# Patient Record
Sex: FEMALE | Race: WHITE | NOT HISPANIC OR LATINO
[De-identification: names, ages, dates, MRNs, and addresses within clinical notes are randomized per-mention and may not be internally consistent; named-entity substitution may affect disease eponyms.]

---

## 2017-01-17 ENCOUNTER — APPOINTMENT (OUTPATIENT)
Dept: PULMONOLOGY | Facility: CLINIC | Age: 62
End: 2017-01-17

## 2017-01-17 VITALS
DIASTOLIC BLOOD PRESSURE: 60 MMHG | HEIGHT: 64 IN | BODY MASS INDEX: 22.88 KG/M2 | WEIGHT: 134 LBS | OXYGEN SATURATION: 100 % | RESPIRATION RATE: 17 BRPM | SYSTOLIC BLOOD PRESSURE: 90 MMHG | HEART RATE: 66 BPM

## 2017-06-25 ENCOUNTER — FORM ENCOUNTER (OUTPATIENT)
Age: 62
End: 2017-06-25

## 2017-06-26 ENCOUNTER — OUTPATIENT (OUTPATIENT)
Dept: OUTPATIENT SERVICES | Facility: HOSPITAL | Age: 62
LOS: 1 days | End: 2017-06-26
Payer: COMMERCIAL

## 2017-06-26 ENCOUNTER — APPOINTMENT (OUTPATIENT)
Dept: CT IMAGING | Facility: IMAGING CENTER | Age: 62
End: 2017-06-26

## 2017-06-26 DIAGNOSIS — D24.9 BENIGN NEOPLASM OF UNSPECIFIED BREAST: Chronic | ICD-10-CM

## 2017-06-26 DIAGNOSIS — R93.8 ABNORMAL FINDINGS ON DIAGNOSTIC IMAGING OF OTHER SPECIFIED BODY STRUCTURES: ICD-10-CM

## 2017-06-26 PROCEDURE — 71250 CT THORAX DX C-: CPT

## 2017-06-28 ENCOUNTER — APPOINTMENT (OUTPATIENT)
Dept: THORACIC SURGERY | Facility: CLINIC | Age: 62
End: 2017-06-28

## 2017-06-28 VITALS
BODY MASS INDEX: 22.36 KG/M2 | DIASTOLIC BLOOD PRESSURE: 80 MMHG | HEIGHT: 64 IN | SYSTOLIC BLOOD PRESSURE: 110 MMHG | RESPIRATION RATE: 16 BRPM | OXYGEN SATURATION: 98 % | HEART RATE: 78 BPM | WEIGHT: 131 LBS

## 2017-06-28 DIAGNOSIS — Z87.81 PERSONAL HISTORY OF (HEALED) TRAUMATIC FRACTURE: ICD-10-CM

## 2017-06-28 DIAGNOSIS — Z87.891 PERSONAL HISTORY OF NICOTINE DEPENDENCE: ICD-10-CM

## 2017-06-28 DIAGNOSIS — Z86.69 PERSONAL HISTORY OF OTHER DISEASES OF THE NERVOUS SYSTEM AND SENSE ORGANS: ICD-10-CM

## 2017-06-28 RX ORDER — LEVOCETIRIZINE DIHYDROCHLORIDE 5 MG/1
5 TABLET, FILM COATED ORAL
Qty: 1 | Refills: 1 | Status: COMPLETED | COMMUNITY
Start: 2017-01-17 | End: 2017-06-28

## 2017-06-28 RX ORDER — ZOLPIDEM TARTRATE 5 MG/1
5 TABLET ORAL
Qty: 30 | Refills: 0 | Status: COMPLETED | COMMUNITY
Start: 2017-05-23

## 2017-06-28 RX ORDER — VALACYCLOVIR 500 MG/1
500 TABLET, FILM COATED ORAL
Qty: 30 | Refills: 0 | Status: COMPLETED | COMMUNITY
Start: 2017-03-16

## 2017-06-28 RX ORDER — ACYCLOVIR 50 MG/G
5 OINTMENT TOPICAL
Qty: 15 | Refills: 0 | Status: COMPLETED | COMMUNITY
Start: 2017-03-16

## 2017-06-28 RX ORDER — ZOLPIDEM TARTRATE 5 MG/1
5 TABLET ORAL
Refills: 0 | Status: ACTIVE | COMMUNITY

## 2017-07-03 ENCOUNTER — OUTPATIENT (OUTPATIENT)
Dept: OUTPATIENT SERVICES | Facility: HOSPITAL | Age: 62
LOS: 1 days | End: 2017-07-03

## 2017-07-03 VITALS
HEIGHT: 64 IN | DIASTOLIC BLOOD PRESSURE: 60 MMHG | WEIGHT: 128.09 LBS | RESPIRATION RATE: 16 BRPM | HEART RATE: 66 BPM | SYSTOLIC BLOOD PRESSURE: 107 MMHG | TEMPERATURE: 97 F | OXYGEN SATURATION: 99 %

## 2017-07-03 DIAGNOSIS — R91.1 SOLITARY PULMONARY NODULE: ICD-10-CM

## 2017-07-03 DIAGNOSIS — G47.30 SLEEP APNEA, UNSPECIFIED: ICD-10-CM

## 2017-07-03 DIAGNOSIS — D24.9 BENIGN NEOPLASM OF UNSPECIFIED BREAST: Chronic | ICD-10-CM

## 2017-07-03 LAB
BLD GP AB SCN SERPL QL: NEGATIVE — SIGNIFICANT CHANGE UP
RH IG SCN BLD-IMP: POSITIVE — SIGNIFICANT CHANGE UP

## 2017-07-03 RX ORDER — SODIUM CHLORIDE 9 MG/ML
1000 INJECTION, SOLUTION INTRAVENOUS
Qty: 0 | Refills: 0 | Status: DISCONTINUED | OUTPATIENT
Start: 2017-07-12 | End: 2017-07-13

## 2017-07-03 RX ORDER — SODIUM CHLORIDE 9 MG/ML
3 INJECTION INTRAMUSCULAR; INTRAVENOUS; SUBCUTANEOUS EVERY 8 HOURS
Qty: 0 | Refills: 0 | Status: DISCONTINUED | OUTPATIENT
Start: 2017-07-12 | End: 2017-07-20

## 2017-07-03 NOTE — H&P PST ADULT - NEGATIVE ENMT SYMPTOMS
no vertigo/no ear pain/no sinus symptoms/no recurrent cold sores/no post-nasal discharge/no hearing difficulty/no nasal discharge/no gum bleeding/no dysphagia/no tinnitus/no nasal congestion/no nasal obstruction/no nose bleeds/no throat pain/no abnormal taste sensation/no dry mouth

## 2017-07-03 NOTE — H&P PST ADULT - PMH
Disorder of shoulder  right  GERD (gastroesophageal reflux disease)    Mild asthma  denies intubation or hospitalizations  Nasal bone fracture    Papilloma of breast  right  Pulmonary nodule  bilateral  Seasonal allergies    Sleep apnea  noncompliant in using CPAP machine CAD (coronary artery disease)  non obstructive 50% proximal LAD stenosis  Disorder of shoulder  right  Eczema    GERD (gastroesophageal reflux disease)    Mild asthma  denies intubation or hospitalizations  Nasal bone fracture    Papilloma of breast  right  Pulmonary nodule  bilateral  PVC (premature ventricular contraction)    Seasonal allergies    Sleep apnea  noncompliant in using CPAP machine

## 2017-07-03 NOTE — H&P PST ADULT - RS GEN PE MLT RESP DETAILS PC
clear to auscultation bilaterally/no chest wall tenderness/good air movement/no rhonchi/no intercostal retractions/respirations non-labored/no wheezes/no rales/breath sounds equal/airway patent

## 2017-07-03 NOTE — H&P PST ADULT - NEGATIVE GENERAL GENITOURINARY SYMPTOMS
no renal colic/no hematuria/no flank pain R/no flank pain L/no bladder infections/no dysuria/no incontinence

## 2017-07-03 NOTE — H&P PST ADULT - HISTORY OF PRESENT ILLNESS
63y/o female s 63y/o female with h/o multiple lung nodule since 2011, gradually got bigger. Presents to PST for pre surgical evaluation for Right Video assisted thoracoscopy left upper lobe wedge resection possible segmentectomy possible left upper lobectomy with lung marking by Dr Chamberlain on 7/12/17. 63y/o female with h/o multiple lung nodule since 2011, gradually got bigger. Presents to PST for pre surgical evaluation for left Video assisted thoracoscopy left upper lobe wedge resection possible segmentectomy possible left upper lobectomy with lung marking by Dr Chamberlain on 7/12/17.

## 2017-07-03 NOTE — H&P PST ADULT - NEGATIVE GENERAL SYMPTOMS
no fatigue/no chills/no sweating/no anorexia/no fever/no weight gain/no polyphagia/no polyuria/no malaise/no weight loss

## 2017-07-03 NOTE — H&P PST ADULT - PRIMARY CARE PROVIDER
Dr. Acevedo pmd [phone], fax [phone] Dr. Acevedo pmd fax  [phone], phone [phone]

## 2017-07-03 NOTE — H&P PST ADULT - NEGATIVE BREAST SYMPTOMS
no breast lump R/no breast lump L/no breast tenderness R/no nipple discharge L/no nipple discharge R/no breast tenderness L

## 2017-07-03 NOTE — H&P PST ADULT - NEGATIVE CARDIOVASCULAR SYMPTOMS
no claudication/no paroxysmal nocturnal dyspnea/no dyspnea on exertion/no orthopnea/no chest pain/no palpitations/no peripheral edema

## 2017-07-03 NOTE — H&P PST ADULT - PROBLEM SELECTOR PLAN 2
pt has h/o documented sleep apnea, noncompliant with CPAP machine use OR booking notified. Instructed to bring the CPAP machine on the day of surgery.

## 2017-07-03 NOTE — H&P PST ADULT - OTHER CARE PROVIDERS
Dr. zhang cardiologist [phone], Dr Dos Santos  (pulmonary ) Dr. zhang cardiologist [phone], Dr Dos Santos  (pulmonary )  Dr. zhang   Dr Dos Santos  (pulmonary ) [phone]

## 2017-07-03 NOTE — H&P PST ADULT - PROBLEM SELECTOR PLAN 1
Scheduled to have Right video assisted thoracoscopy left upper lobe wedge resection possible segmentectomy possible left upper lobectomy with lung marking by dr Chamberlain on 7/12/17. Pre surgical instructions provided. Cardiology clearance in charts lab donne at Dr Dos Santos's office and  EKG done at Dr Blas's office Scheduled to have leftt video assisted thoracoscopy left upper lobe wedge resection possible segmentectomy possible left upper lobectomy with lung marking by dr Chamberlain on 7/12/17. Pre surgical instructions provided. Cardiology clearance in charts lab donne at Dr Dos Santos's office and  EKG done at Dr Blas's office

## 2017-07-03 NOTE — H&P PST ADULT - GASTROINTESTINAL DETAILS
no bruit/no distention/no organomegaly/no rigidity/soft/bowel sounds normal/no rebound tenderness/nontender/no masses palpable/no guarding

## 2017-07-03 NOTE — H&P PST ADULT - ANESTHESIA, PREVIOUS REACTION, PROFILE
nausea/vomiting/severe.   " I felt paralized and felt like I could not breath before going under. " severe.   " I felt paralyzed and felt like I could not breath before going under. "/nausea/vomiting

## 2017-07-03 NOTE — H&P PST ADULT - PSH
Papilloma of breast  right breast excision & bx 2013  S/P adenoidectomy  as a child  S/P arthroscopy of shoulder  right April 2013  S/P blepharoplasty  in 2008

## 2017-07-05 RX ORDER — ALPRAZOLAM 0.25 MG
0.25 TABLET ORAL
Qty: 0 | Refills: 0 | COMMUNITY
Start: 2017-07-05

## 2017-07-06 ENCOUNTER — APPOINTMENT (OUTPATIENT)
Dept: INTERVENTIONAL RADIOLOGY/VASCULAR | Facility: CLINIC | Age: 62
End: 2017-07-06

## 2017-07-06 VITALS
DIASTOLIC BLOOD PRESSURE: 63 MMHG | SYSTOLIC BLOOD PRESSURE: 96 MMHG | OXYGEN SATURATION: 99 % | HEART RATE: 57 BPM | BODY MASS INDEX: 22.2 KG/M2 | HEIGHT: 64 IN | WEIGHT: 130 LBS

## 2017-07-06 DIAGNOSIS — Z83.79 FAMILY HISTORY OF OTHER DISEASES OF THE DIGESTIVE SYSTEM: ICD-10-CM

## 2017-07-11 ENCOUNTER — APPOINTMENT (OUTPATIENT)
Dept: PULMONOLOGY | Facility: CLINIC | Age: 62
End: 2017-07-11

## 2017-07-11 VITALS
OXYGEN SATURATION: 98 % | SYSTOLIC BLOOD PRESSURE: 100 MMHG | DIASTOLIC BLOOD PRESSURE: 70 MMHG | WEIGHT: 130 LBS | BODY MASS INDEX: 22.2 KG/M2 | HEART RATE: 64 BPM | RESPIRATION RATE: 14 BRPM | HEIGHT: 64 IN

## 2017-07-12 ENCOUNTER — RESULT REVIEW (OUTPATIENT)
Age: 62
End: 2017-07-12

## 2017-07-12 ENCOUNTER — INPATIENT (INPATIENT)
Facility: HOSPITAL | Age: 62
LOS: 7 days | Discharge: HOME | End: 2017-07-20
Attending: THORACIC SURGERY (CARDIOTHORACIC VASCULAR SURGERY) | Admitting: THORACIC SURGERY (CARDIOTHORACIC VASCULAR SURGERY)
Payer: COMMERCIAL

## 2017-07-12 ENCOUNTER — APPOINTMENT (OUTPATIENT)
Dept: THORACIC SURGERY | Facility: HOSPITAL | Age: 62
End: 2017-07-12

## 2017-07-12 ENCOUNTER — TRANSCRIPTION ENCOUNTER (OUTPATIENT)
Age: 62
End: 2017-07-12

## 2017-07-12 VITALS
HEIGHT: 64 IN | HEART RATE: 56 BPM | SYSTOLIC BLOOD PRESSURE: 103 MMHG | OXYGEN SATURATION: 100 % | TEMPERATURE: 98 F | WEIGHT: 128.09 LBS | RESPIRATION RATE: 16 BRPM | DIASTOLIC BLOOD PRESSURE: 88 MMHG

## 2017-07-12 DIAGNOSIS — R91.1 SOLITARY PULMONARY NODULE: ICD-10-CM

## 2017-07-12 DIAGNOSIS — D24.9 BENIGN NEOPLASM OF UNSPECIFIED BREAST: Chronic | ICD-10-CM

## 2017-07-12 LAB — RH IG SCN BLD-IMP: POSITIVE — SIGNIFICANT CHANGE UP

## 2017-07-12 PROCEDURE — 99291 CRITICAL CARE FIRST HOUR: CPT

## 2017-07-12 PROCEDURE — 77012 CT SCAN FOR NEEDLE BIOPSY: CPT | Mod: 26

## 2017-07-12 PROCEDURE — 32999 UNLISTED PX LUNGS & PLEURA: CPT

## 2017-07-12 PROCEDURE — 88331 PATH CONSLTJ SURG 1 BLK 1SPC: CPT | Mod: 26

## 2017-07-12 PROCEDURE — 71010: CPT | Mod: 26

## 2017-07-12 PROCEDURE — 88305 TISSUE EXAM BY PATHOLOGIST: CPT | Mod: 26

## 2017-07-12 PROCEDURE — 88309 TISSUE EXAM BY PATHOLOGIST: CPT | Mod: 26

## 2017-07-12 RX ORDER — ACETAMINOPHEN 500 MG
1000 TABLET ORAL ONCE
Qty: 0 | Refills: 0 | Status: COMPLETED | OUTPATIENT
Start: 2017-07-12 | End: 2017-07-12

## 2017-07-12 RX ORDER — ONDANSETRON 8 MG/1
4 TABLET, FILM COATED ORAL ONCE
Qty: 0 | Refills: 0 | Status: COMPLETED | OUTPATIENT
Start: 2017-07-12 | End: 2017-07-12

## 2017-07-12 RX ORDER — SODIUM CHLORIDE 9 MG/ML
1000 INJECTION, SOLUTION INTRAVENOUS
Qty: 0 | Refills: 0 | Status: DISCONTINUED | OUTPATIENT
Start: 2017-07-12 | End: 2017-07-13

## 2017-07-12 RX ORDER — SENNA PLUS 8.6 MG/1
2 TABLET ORAL AT BEDTIME
Qty: 0 | Refills: 0 | Status: DISCONTINUED | OUTPATIENT
Start: 2017-07-12 | End: 2017-07-14

## 2017-07-12 RX ORDER — HYDROMORPHONE HYDROCHLORIDE 2 MG/ML
0.5 INJECTION INTRAMUSCULAR; INTRAVENOUS; SUBCUTANEOUS
Qty: 0 | Refills: 0 | Status: DISCONTINUED | OUTPATIENT
Start: 2017-07-12 | End: 2017-07-13

## 2017-07-12 RX ORDER — IPRATROPIUM BROMIDE 0.2 MG/ML
500 SOLUTION, NON-ORAL INHALATION EVERY 6 HOURS
Qty: 0 | Refills: 0 | Status: DISCONTINUED | OUTPATIENT
Start: 2017-07-12 | End: 2017-07-20

## 2017-07-12 RX ORDER — ONDANSETRON 8 MG/1
4 TABLET, FILM COATED ORAL EVERY 6 HOURS
Qty: 0 | Refills: 0 | Status: DISCONTINUED | OUTPATIENT
Start: 2017-07-12 | End: 2017-07-13

## 2017-07-12 RX ORDER — HYDROMORPHONE HYDROCHLORIDE 2 MG/ML
30 INJECTION INTRAMUSCULAR; INTRAVENOUS; SUBCUTANEOUS
Qty: 0 | Refills: 0 | Status: DISCONTINUED | OUTPATIENT
Start: 2017-07-12 | End: 2017-07-13

## 2017-07-12 RX ORDER — DOCUSATE SODIUM 100 MG
100 CAPSULE ORAL THREE TIMES A DAY
Qty: 0 | Refills: 0 | Status: DISCONTINUED | OUTPATIENT
Start: 2017-07-12 | End: 2017-07-20

## 2017-07-12 RX ORDER — MONTELUKAST 4 MG/1
10 TABLET, CHEWABLE ORAL DAILY
Qty: 0 | Refills: 0 | Status: DISCONTINUED | OUTPATIENT
Start: 2017-07-13 | End: 2017-07-20

## 2017-07-12 RX ORDER — HEPARIN SODIUM 5000 [USP'U]/ML
5000 INJECTION INTRAVENOUS; SUBCUTANEOUS ONCE
Qty: 0 | Refills: 0 | Status: COMPLETED | OUTPATIENT
Start: 2017-07-12 | End: 2017-07-12

## 2017-07-12 RX ORDER — APREPITANT 80 MG/1
40 CAPSULE ORAL EVERY 12 HOURS
Qty: 0 | Refills: 0 | Status: DISCONTINUED | OUTPATIENT
Start: 2017-07-12 | End: 2017-07-20

## 2017-07-12 RX ORDER — SIMVASTATIN 20 MG/1
40 TABLET, FILM COATED ORAL AT BEDTIME
Qty: 0 | Refills: 0 | Status: DISCONTINUED | OUTPATIENT
Start: 2017-07-12 | End: 2017-07-20

## 2017-07-12 RX ORDER — HEPARIN SODIUM 5000 [USP'U]/ML
5000 INJECTION INTRAVENOUS; SUBCUTANEOUS EVERY 12 HOURS
Qty: 0 | Refills: 0 | Status: DISCONTINUED | OUTPATIENT
Start: 2017-07-12 | End: 2017-07-14

## 2017-07-12 RX ORDER — METOCLOPRAMIDE HCL 10 MG
10 TABLET ORAL EVERY 8 HOURS
Qty: 0 | Refills: 0 | Status: DISCONTINUED | OUTPATIENT
Start: 2017-07-12 | End: 2017-07-20

## 2017-07-12 RX ORDER — LIDOCAINE 4 G/100G
1 CREAM TOPICAL ONCE
Qty: 0 | Refills: 0 | Status: COMPLETED | OUTPATIENT
Start: 2017-07-12 | End: 2017-07-12

## 2017-07-12 RX ORDER — NALOXONE HYDROCHLORIDE 4 MG/.1ML
0.1 SPRAY NASAL
Qty: 0 | Refills: 0 | Status: DISCONTINUED | OUTPATIENT
Start: 2017-07-12 | End: 2017-07-13

## 2017-07-12 RX ADMIN — ONDANSETRON 4 MILLIGRAM(S): 8 TABLET, FILM COATED ORAL at 19:00

## 2017-07-12 RX ADMIN — HEPARIN SODIUM 5000 UNIT(S): 5000 INJECTION INTRAVENOUS; SUBCUTANEOUS at 21:29

## 2017-07-12 RX ADMIN — APREPITANT 40 MILLIGRAM(S): 80 CAPSULE ORAL at 23:44

## 2017-07-12 RX ADMIN — LIDOCAINE 1 PATCH: 4 CREAM TOPICAL at 23:44

## 2017-07-12 RX ADMIN — SENNA PLUS 2 TABLET(S): 8.6 TABLET ORAL at 21:29

## 2017-07-12 RX ADMIN — SODIUM CHLORIDE 30 MILLILITER(S): 9 INJECTION, SOLUTION INTRAVENOUS at 23:44

## 2017-07-12 RX ADMIN — HYDROMORPHONE HYDROCHLORIDE 30 MILLILITER(S): 2 INJECTION INTRAMUSCULAR; INTRAVENOUS; SUBCUTANEOUS at 19:45

## 2017-07-12 RX ADMIN — HEPARIN SODIUM 5000 UNIT(S): 5000 INJECTION INTRAVENOUS; SUBCUTANEOUS at 14:01

## 2017-07-12 RX ADMIN — HYDROMORPHONE HYDROCHLORIDE 0.5 MILLIGRAM(S): 2 INJECTION INTRAMUSCULAR; INTRAVENOUS; SUBCUTANEOUS at 20:45

## 2017-07-12 RX ADMIN — SIMVASTATIN 40 MILLIGRAM(S): 20 TABLET, FILM COATED ORAL at 21:29

## 2017-07-12 RX ADMIN — Medication 400 MILLIGRAM(S): at 23:45

## 2017-07-12 RX ADMIN — ONDANSETRON 4 MILLIGRAM(S): 8 TABLET, FILM COATED ORAL at 18:35

## 2017-07-12 RX ADMIN — Medication 100 MILLIGRAM(S): at 21:29

## 2017-07-12 RX ADMIN — Medication 10 MILLIGRAM(S): at 20:31

## 2017-07-12 RX ADMIN — SODIUM CHLORIDE 3 MILLILITER(S): 9 INJECTION INTRAMUSCULAR; INTRAVENOUS; SUBCUTANEOUS at 21:32

## 2017-07-12 NOTE — ASU PATIENT PROFILE, ADULT - PMH
CAD (coronary artery disease)  non obstructive 50% proximal LAD stenosis  Disorder of shoulder  right  Eczema    GERD (gastroesophageal reflux disease)    Mild asthma  denies intubation or hospitalizations  Nasal bone fracture    Papilloma of breast  right  Pulmonary nodule  bilateral  PVC (premature ventricular contraction)    Seasonal allergies    Sleep apnea  noncompliant in using CPAP machine

## 2017-07-12 NOTE — PROGRESS NOTE ADULT - SUBJECTIVE AND OBJECTIVE BOX
LOUISA MILLAN            [id number]         codeine (Angioedema)  codeine (Angioedema; Hives)           Daily Height in cm: 162.56 (2017 10:24)    Daily     HPI:  62 F w CAD, R shoulder pain, Eczema, GERD, asthma, Nasal bone fracture, R breast papilloma, Pulmonary nodule bilateral, KIRA (BiPAP),  POD # 0 ([id number]) VATS, robot-assisted t PHYLLIS segmentectomy  Issues: PO SOB & pain    Past Medical History:  CAD (coronary artery disease)  non obstructive 50% proximal LAD stenosis  Disorder of shoulder  right  Eczema    GERD (gastroesophageal reflux disease)    Mild asthma  denies intubation or hospitalizations  Nasal bone fracture    Papilloma of breast  right  Pulmonary nodule  bilateral  PVC (premature ventricular contraction)    Seasonal allergies    Sleep apnea  noncompliant in using CPAP machine.    Past Surgical History:  Papilloma of breast  right breast excision & bx   S/P adenoidectomy  as a child  S/P arthroscopy of shoulder  right 2013  S/P blepharoplasty  in .    Family History:  Father  Still living? Unknown  Family history of lung cancer, Age at diagnosis: Age Unknown.    Social History:  · Marital Status	, 2 children, mother dementia, hypothyroid,  father  70y/o lung cancer, no siblings	  · Occupation	NP	  · Lives With	alone	    Substance Use History:  · Substance Use	never used	    Alcohol Use History:  · Have you ever consumed alcohol	yes...	  · Alcohol Type	wine	  · Alcohol Frequency	monthly or less	  · Alcohol Amount	1-2 drinks	    Tobacco Usage:  · Tobacco Usage: Former smoker	  · Tobacco Type: cigarettes	  · Number of Packs per Day: 0.5	  · Number of yrs: 30	  · Pack yrs: 15	    MEDICATIONS  (STANDING):  sodium chloride 0.9% lock flush 3 milliLiter(s) IV Push every 8 hours  lactated ringers. 1000 milliLiter(s) (30 mL/Hr) IV Continuous <Continuous>  HYDROmorphone PCA (1 mG/mL) 30 milliLiter(s) PCA Continuous PCA Continuous  simvastatin 40 milliGRAM(s) Oral at bedtime  lactated ringers. 1000 milliLiter(s) (30 mL/Hr) IV Continuous <Continuous>  docusate sodium 100 milliGRAM(s) Oral three times a day  senna 2 Tablet(s) Oral at bedtime  heparin  Injectable 5000 Unit(s) SubCutaneous every 12 hours    MEDICATIONS  (PRN):  HYDROmorphone PCA (1 mG/mL) Rescue Clinician Bolus 0.5 milliGRAM(s) IV Push every 15 minutes PRN for Pain Scale GREATER THAN 6  naloxone Injectable 0.1 milliGRAM(s) IV Push every 3 minutes PRN For ANY of the following changes in patient status:  A. RR LESS THAN 10 breaths per minute, B. Oxygen saturation LESS THAN 90%, C. Sedation score of 6  ondansetron Injectable 4 milliGRAM(s) IV Push every 6 hours PRN Nausea  ipratropium    for Nebulization 500 MICROGram(s) Nebulizer every 6 hours PRN Shortness of Breath and/or Wheezing  aprepitant 40 milliGRAM(s) Oral every 12 hours PRN N/V      ICU Vital Signs Last 24 Hrs  T(C): 35.1 (2017 18:25), Max: 36.6 (2017 10:24)  T(F): 95.2 (2017 18:25), Max: 97.8 (2017 10:24)  HR: 67 (2017 18:25) (56 - 70)  BP: 111/51 (2017 13:50) (103/88 - 111/51)  BP(mean): --  ABP: 125/59 (2017 18:25) (125/59 - 125/59)  ABP(mean): 84 (2017 18:25) (84 - 84)  RR: 13 (2017 18:25) (13 - 16)  SpO2: 99% (2017 18:25) (99% - 100%)    Physical exam:                                              Neuro:      Alert & nonfocal    /  Confused   /  Sedated                          Cardiovascular:  S1 & S2, regular / irregular                          Respiratory:   Air entry is fair and equal on both sides, has bilateral conducted sounds / rhonchi /rales                          GI:  Soft, nondistended and nontender, Bowel sounds active        Distended / tender                          Ext: No cyanosis or edema,   bilateral pedal edema        Labs:    Pending                                                                          Plan:    62 F w CAD, R shoulder pain, Eczema, GERD, asthma, Nasal bone fracture, R breast papilloma, Pulmonary nodule bilateral, KIRA (BiPAP),  POD # 0 ([id number]) VATS, robot-assisted t PHYLLIS segmentectomy                            Neuro:                                         Pain control with PCA                                          Pt is on Precedex for agitation                            Cardiovascular:                                          Continue hemodynamic monitoring.                                         Not on any pressors                                         Continue cardiovascular / antihypertensive medications                            Respiratory:                                         Pt is on  L nasal canula                                          Comfortable, not in any distress.                                         Use incentive spirometry                                          Monitor chest tube output                                         Continue bronchodilators, pulmonary toilet                          GI                                         Continue GI prophylaxis with Pepcid / Protonix                                         Continue Zofran / Reglan for nausea - PRN	                                         NPO, but may advance                                  Renal:                                         Continue IVF                                         Monitor I/Os and electrolytes                                         Keep Dimas                                                                                    Hematologic / Oncology:                                                                                  Monitor chest tube output. No signs of active bleeding.                                          Follow CBC in AM                           Infectious disease:                                            No signs of infection. Monitor for fever / leukocytosis.                                          All surgical incision / chest tube  sites look clean                                                                Endocrine:                                             Continue accuchecks with coverage      All clinical, lab, hemodynamic and radiographic data were reviewed. Pts current status discussed with pt's family at bedside.  Plan discussed with CTICU team and attending CT surgeon.     I have spent  60 minutes of critical care time with this patient between 7am/pm and 7pm/am.    Everton Hayes MD

## 2017-07-13 ENCOUNTER — MEDICATION RENEWAL (OUTPATIENT)
Age: 62
End: 2017-07-13

## 2017-07-13 LAB
BASOPHILS # BLD AUTO: 0.01 K/UL — SIGNIFICANT CHANGE UP (ref 0–0.2)
BASOPHILS NFR BLD AUTO: 0.1 % — SIGNIFICANT CHANGE UP (ref 0–2)
BUN SERPL-MCNC: 13 MG/DL — SIGNIFICANT CHANGE UP (ref 7–23)
CALCIUM SERPL-MCNC: 8.8 MG/DL — SIGNIFICANT CHANGE UP (ref 8.4–10.5)
CHLORIDE SERPL-SCNC: 101 MMOL/L — SIGNIFICANT CHANGE UP (ref 98–107)
CO2 SERPL-SCNC: 22 MMOL/L — SIGNIFICANT CHANGE UP (ref 22–31)
CREAT SERPL-MCNC: 0.63 MG/DL — SIGNIFICANT CHANGE UP (ref 0.5–1.3)
EOSINOPHIL # BLD AUTO: 0 K/UL — SIGNIFICANT CHANGE UP (ref 0–0.5)
EOSINOPHIL NFR BLD AUTO: 0 % — SIGNIFICANT CHANGE UP (ref 0–6)
GLUCOSE SERPL-MCNC: 167 MG/DL — HIGH (ref 70–99)
HCT VFR BLD CALC: 34.3 % — LOW (ref 34.5–45)
HGB BLD-MCNC: 11.4 G/DL — LOW (ref 11.5–15.5)
IMM GRANULOCYTES # BLD AUTO: 0.02 # — SIGNIFICANT CHANGE UP
IMM GRANULOCYTES NFR BLD AUTO: 0.3 % — SIGNIFICANT CHANGE UP (ref 0–1.5)
LYMPHOCYTES # BLD AUTO: 0.49 K/UL — LOW (ref 1–3.3)
LYMPHOCYTES # BLD AUTO: 6.7 % — LOW (ref 13–44)
MCHC RBC-ENTMCNC: 29.1 PG — SIGNIFICANT CHANGE UP (ref 27–34)
MCHC RBC-ENTMCNC: 33.2 % — SIGNIFICANT CHANGE UP (ref 32–36)
MCV RBC AUTO: 87.5 FL — SIGNIFICANT CHANGE UP (ref 80–100)
MONOCYTES # BLD AUTO: 0.18 K/UL — SIGNIFICANT CHANGE UP (ref 0–0.9)
MONOCYTES NFR BLD AUTO: 2.5 % — SIGNIFICANT CHANGE UP (ref 2–14)
NEUTROPHILS # BLD AUTO: 6.57 K/UL — SIGNIFICANT CHANGE UP (ref 1.8–7.4)
NEUTROPHILS NFR BLD AUTO: 90.4 % — HIGH (ref 43–77)
NRBC # FLD: 0 — SIGNIFICANT CHANGE UP
PLATELET # BLD AUTO: 157 K/UL — SIGNIFICANT CHANGE UP (ref 150–400)
PMV BLD: 10.8 FL — SIGNIFICANT CHANGE UP (ref 7–13)
POTASSIUM SERPL-MCNC: 4.2 MMOL/L — SIGNIFICANT CHANGE UP (ref 3.5–5.3)
POTASSIUM SERPL-SCNC: 4.2 MMOL/L — SIGNIFICANT CHANGE UP (ref 3.5–5.3)
RBC # BLD: 3.92 M/UL — SIGNIFICANT CHANGE UP (ref 3.8–5.2)
RBC # FLD: 13.6 % — SIGNIFICANT CHANGE UP (ref 10.3–14.5)
SODIUM SERPL-SCNC: 137 MMOL/L — SIGNIFICANT CHANGE UP (ref 135–145)
WBC # BLD: 7.27 K/UL — SIGNIFICANT CHANGE UP (ref 3.8–10.5)
WBC # FLD AUTO: 7.27 K/UL — SIGNIFICANT CHANGE UP (ref 3.8–10.5)

## 2017-07-13 PROCEDURE — 71010: CPT | Mod: 26

## 2017-07-13 RX ORDER — KETOROLAC TROMETHAMINE 30 MG/ML
15 SYRINGE (ML) INJECTION EVERY 6 HOURS
Qty: 0 | Refills: 0 | Status: DISCONTINUED | OUTPATIENT
Start: 2017-07-13 | End: 2017-07-16

## 2017-07-13 RX ORDER — CHLORHEXIDINE GLUCONATE 213 G/1000ML
1 SOLUTION TOPICAL DAILY
Qty: 0 | Refills: 0 | Status: DISCONTINUED | OUTPATIENT
Start: 2017-07-13 | End: 2017-07-13

## 2017-07-13 RX ORDER — PROGESTERONE 200 MG/1
100 CAPSULE, LIQUID FILLED ORAL AT BEDTIME
Qty: 0 | Refills: 0 | Status: DISCONTINUED | OUTPATIENT
Start: 2017-07-13 | End: 2017-07-20

## 2017-07-13 RX ADMIN — SODIUM CHLORIDE 30 MILLILITER(S): 9 INJECTION, SOLUTION INTRAVENOUS at 07:28

## 2017-07-13 RX ADMIN — Medication 15 MILLIGRAM(S): at 13:00

## 2017-07-13 RX ADMIN — HEPARIN SODIUM 5000 UNIT(S): 5000 INJECTION INTRAVENOUS; SUBCUTANEOUS at 05:34

## 2017-07-13 RX ADMIN — Medication 100 MILLIGRAM(S): at 05:35

## 2017-07-13 RX ADMIN — HYDROMORPHONE HYDROCHLORIDE 30 MILLILITER(S): 2 INJECTION INTRAMUSCULAR; INTRAVENOUS; SUBCUTANEOUS at 07:26

## 2017-07-13 RX ADMIN — Medication 15 MILLIGRAM(S): at 12:04

## 2017-07-13 RX ADMIN — Medication 10 MILLIGRAM(S): at 05:55

## 2017-07-13 RX ADMIN — MONTELUKAST 10 MILLIGRAM(S): 4 TABLET, CHEWABLE ORAL at 12:10

## 2017-07-13 RX ADMIN — SODIUM CHLORIDE 3 MILLILITER(S): 9 INJECTION INTRAMUSCULAR; INTRAVENOUS; SUBCUTANEOUS at 12:03

## 2017-07-13 RX ADMIN — Medication 15 MILLIGRAM(S): at 18:20

## 2017-07-13 RX ADMIN — HEPARIN SODIUM 5000 UNIT(S): 5000 INJECTION INTRAVENOUS; SUBCUTANEOUS at 17:38

## 2017-07-13 RX ADMIN — Medication 100 MILLIGRAM(S): at 12:10

## 2017-07-13 RX ADMIN — Medication 1000 MILLIGRAM(S): at 00:00

## 2017-07-13 RX ADMIN — SODIUM CHLORIDE 3 MILLILITER(S): 9 INJECTION INTRAMUSCULAR; INTRAVENOUS; SUBCUTANEOUS at 05:48

## 2017-07-13 RX ADMIN — Medication 15 MILLIGRAM(S): at 19:07

## 2017-07-13 RX ADMIN — SENNA PLUS 2 TABLET(S): 8.6 TABLET ORAL at 21:56

## 2017-07-13 RX ADMIN — SODIUM CHLORIDE 3 MILLILITER(S): 9 INJECTION INTRAMUSCULAR; INTRAVENOUS; SUBCUTANEOUS at 21:55

## 2017-07-13 RX ADMIN — Medication 100 MILLIGRAM(S): at 21:56

## 2017-07-13 RX ADMIN — LIDOCAINE 1 PATCH: 4 CREAM TOPICAL at 10:32

## 2017-07-13 RX ADMIN — SIMVASTATIN 40 MILLIGRAM(S): 20 TABLET, FILM COATED ORAL at 21:56

## 2017-07-13 NOTE — PROGRESS NOTE ADULT - SUBJECTIVE AND OBJECTIVE BOX
Day _2_ of Anesthesia Pain Management Service    Allergies    codeine (Angioedema)  codeine (Angioedema; Hives)      SUBJECTIVE: "I was very nauseous with the pump. IV Tylenol worked well."    Pain Scale Score	At rest: _4/10_ 	With Activity: ___ 	[ ] Refer to charted pain scores    THERAPY:    [ ] IV PCA Morphine		[ ] 5 mg/mL	[ ] 1 mg/mL  [X] IV PCA Hydromorphone	[ ] 5 mg/mL	[X] 1 mg/mL  [ ] IV PCA Fentanyl		[ ] 50 micrograms/mL    Demand dose _0.2mg_ lockout _6_ (minutes) Continuous Rate _0_ Total: _2.7mg_  Daily      MEDICATIONS  (STANDING):  sodium chloride 0.9% lock flush 3 milliLiter(s) IV Push every 8 hours  lactated ringers. 1000 milliLiter(s) (30 mL/Hr) IV Continuous <Continuous>  simvastatin 40 milliGRAM(s) Oral at bedtime  montelukast 10 milliGRAM(s) Oral daily  lactated ringers. 1000 milliLiter(s) (30 mL/Hr) IV Continuous <Continuous>  docusate sodium 100 milliGRAM(s) Oral three times a day  senna 2 Tablet(s) Oral at bedtime  heparin  Injectable 5000 Unit(s) SubCutaneous every 12 hours  chlorhexidine 4% Liquid 1 Application(s) Topical daily    MEDICATIONS  (PRN):  ipratropium    for Nebulization 500 MICROGram(s) Nebulizer every 6 hours PRN Shortness of Breath and/or Wheezing  aprepitant 40 milliGRAM(s) Oral every 12 hours PRN N/V  metoclopramide Injectable 10 milliGRAM(s) IV Push every 8 hours PRN Nausea / Vomiting      OBJECTIVE: A&Ox3, NAD, supine in bed    Sedation Score:	[X] Alert	[ ] Drowsy	[ ] Arousable	[ ] Asleep	[ ] Unresponsive    Side Effects:	[X] None	[ ] Nausea	[ ] Vomiting	[ ] Pruritus  		  [ ] Weakness		[ ] Numbness	[ ] Other:                            11.4   7.27  )-----------( 157      ( 13 Jul 2017 04:20 )             34.3       07-13    137  |  101  |  13  ----------------------------<  167<H>  4.2   |  22  |  0.63    Ca    8.8      13 Jul 2017 04:20        ASSESSMENT/ PLAN    Therapy to  be:	[] Continue   [x] Discontinued   [x] Change to prn Analgesics    Documentation and Verification of current medications:  [x] Done	[ ] Not done, not eligible  [ ] Not done, reason not given    Comments:  Discontinued by CT surgery team. Recommended Ketorolac 15mg IVPush q6hrs x2 days.

## 2017-07-13 NOTE — PROGRESS NOTE ADULT - SUBJECTIVE AND OBJECTIVE BOX
Anesthesia Pain Management Service- Attending Addendum    SUBJECTIVE: Patient's pain control adequate    Therapy:	  [ X] IV PCA	   [ ] Epidural           [ ] s/p Spinal Opoid              [ ] Postpartum infusion	  [ ] Patient controlled regional anesthesia (PCRA)    [ ] prn Analgesics    Allergies    codeine (Angioedema)  codeine (Angioedema; Hives)    Intolerances      MEDICATIONS  (STANDING):  sodium chloride 0.9% lock flush 3 milliLiter(s) IV Push every 8 hours  simvastatin 40 milliGRAM(s) Oral at bedtime  montelukast 10 milliGRAM(s) Oral daily  docusate sodium 100 milliGRAM(s) Oral three times a day  senna 2 Tablet(s) Oral at bedtime  heparin  Injectable 5000 Unit(s) SubCutaneous every 12 hours  progesterone 100 milliGRAM(s) Oral at bedtime    MEDICATIONS  (PRN):  ipratropium    for Nebulization 500 MICROGram(s) Nebulizer every 6 hours PRN Shortness of Breath and/or Wheezing  aprepitant 40 milliGRAM(s) Oral every 12 hours PRN N/V  metoclopramide Injectable 10 milliGRAM(s) IV Push every 8 hours PRN Nausea / Vomiting  ketorolac   Injectable 15 milliGRAM(s) IV Push every 6 hours PRN Moderate Pain (4 - 6)      OBJECTIVE:   [X] No new signs     [ ] Other:    Side Effects:  [X ] None			[ ] Other:      ASSESSMENT/PLAN  -Discontinue current therapy    [ ] Therapy changed to:    [ ] IV PCA       [ ] Epidural     [ X] prn Analgesics     Comments: Pain management per primary team, APS to sign off

## 2017-07-14 LAB
BUN SERPL-MCNC: 19 MG/DL — SIGNIFICANT CHANGE UP (ref 7–23)
CALCIUM SERPL-MCNC: 7.5 MG/DL — LOW (ref 8.4–10.5)
CHLORIDE SERPL-SCNC: 107 MMOL/L — SIGNIFICANT CHANGE UP (ref 98–107)
CO2 SERPL-SCNC: 25 MMOL/L — SIGNIFICANT CHANGE UP (ref 22–31)
CREAT SERPL-MCNC: 0.69 MG/DL — SIGNIFICANT CHANGE UP (ref 0.5–1.3)
GLUCOSE SERPL-MCNC: 139 MG/DL — HIGH (ref 70–99)
HCT VFR BLD CALC: 23.6 % — LOW (ref 34.5–45)
HCT VFR BLD CALC: 25.1 % — LOW (ref 34.5–45)
HCT VFR BLD CALC: 25.3 % — LOW (ref 34.5–45)
HGB BLD-MCNC: 7.8 G/DL — LOW (ref 11.5–15.5)
HGB BLD-MCNC: 8.1 G/DL — LOW (ref 11.5–15.5)
HGB BLD-MCNC: 8.2 G/DL — LOW (ref 11.5–15.5)
LACTATE SERPL-SCNC: 1.4 MMOL/L — SIGNIFICANT CHANGE UP (ref 0.5–2)
MAGNESIUM SERPL-MCNC: 1.7 MG/DL — SIGNIFICANT CHANGE UP (ref 1.6–2.6)
MCHC RBC-ENTMCNC: 28.1 PG — SIGNIFICANT CHANGE UP (ref 27–34)
MCHC RBC-ENTMCNC: 29.2 PG — SIGNIFICANT CHANGE UP (ref 27–34)
MCHC RBC-ENTMCNC: 29.5 PG — SIGNIFICANT CHANGE UP (ref 27–34)
MCHC RBC-ENTMCNC: 32.3 % — SIGNIFICANT CHANGE UP (ref 32–36)
MCHC RBC-ENTMCNC: 32.4 % — SIGNIFICANT CHANGE UP (ref 32–36)
MCHC RBC-ENTMCNC: 33.1 % — SIGNIFICANT CHANGE UP (ref 32–36)
MCV RBC AUTO: 86.6 FL — SIGNIFICANT CHANGE UP (ref 80–100)
MCV RBC AUTO: 89.4 FL — SIGNIFICANT CHANGE UP (ref 80–100)
MCV RBC AUTO: 90.6 FL — SIGNIFICANT CHANGE UP (ref 80–100)
NRBC # FLD: 0 — SIGNIFICANT CHANGE UP
PHOSPHATE SERPL-MCNC: 2.4 MG/DL — LOW (ref 2.5–4.5)
PLATELET # BLD AUTO: 157 K/UL — SIGNIFICANT CHANGE UP (ref 150–400)
PLATELET # BLD AUTO: 160 K/UL — SIGNIFICANT CHANGE UP (ref 150–400)
PLATELET # BLD AUTO: 180 K/UL — SIGNIFICANT CHANGE UP (ref 150–400)
PMV BLD: 11 FL — SIGNIFICANT CHANGE UP (ref 7–13)
PMV BLD: 11.3 FL — SIGNIFICANT CHANGE UP (ref 7–13)
PMV BLD: 11.3 FL — SIGNIFICANT CHANGE UP (ref 7–13)
POTASSIUM SERPL-MCNC: 4 MMOL/L — SIGNIFICANT CHANGE UP (ref 3.5–5.3)
POTASSIUM SERPL-SCNC: 4 MMOL/L — SIGNIFICANT CHANGE UP (ref 3.5–5.3)
RBC # BLD: 2.64 M/UL — LOW (ref 3.8–5.2)
RBC # BLD: 2.77 M/UL — LOW (ref 3.8–5.2)
RBC # BLD: 2.92 M/UL — LOW (ref 3.8–5.2)
RBC # FLD: 13.9 % — SIGNIFICANT CHANGE UP (ref 10.3–14.5)
RBC # FLD: 13.9 % — SIGNIFICANT CHANGE UP (ref 10.3–14.5)
RBC # FLD: 14.5 % — SIGNIFICANT CHANGE UP (ref 10.3–14.5)
SODIUM SERPL-SCNC: 139 MMOL/L — SIGNIFICANT CHANGE UP (ref 135–145)
WBC # BLD: 13.59 K/UL — HIGH (ref 3.8–10.5)
WBC # BLD: 14.57 K/UL — HIGH (ref 3.8–10.5)
WBC # BLD: 15.44 K/UL — HIGH (ref 3.8–10.5)
WBC # FLD AUTO: 13.59 K/UL — HIGH (ref 3.8–10.5)
WBC # FLD AUTO: 14.57 K/UL — HIGH (ref 3.8–10.5)
WBC # FLD AUTO: 15.44 K/UL — HIGH (ref 3.8–10.5)

## 2017-07-14 PROCEDURE — 99291 CRITICAL CARE FIRST HOUR: CPT

## 2017-07-14 PROCEDURE — 71010: CPT | Mod: 26,76

## 2017-07-14 PROCEDURE — 74000: CPT | Mod: 26

## 2017-07-14 PROCEDURE — 99292 CRITICAL CARE ADDL 30 MIN: CPT

## 2017-07-14 PROCEDURE — 71010: CPT | Mod: 26,77

## 2017-07-14 RX ORDER — SODIUM CHLORIDE 9 MG/ML
500 INJECTION INTRAMUSCULAR; INTRAVENOUS; SUBCUTANEOUS ONCE
Qty: 0 | Refills: 0 | Status: COMPLETED | OUTPATIENT
Start: 2017-07-14 | End: 2017-07-14

## 2017-07-14 RX ORDER — PANTOPRAZOLE SODIUM 20 MG/1
40 TABLET, DELAYED RELEASE ORAL EVERY 12 HOURS
Qty: 0 | Refills: 0 | Status: DISCONTINUED | OUTPATIENT
Start: 2017-07-14 | End: 2017-07-19

## 2017-07-14 RX ORDER — SODIUM CHLORIDE 9 MG/ML
1000 INJECTION INTRAMUSCULAR; INTRAVENOUS; SUBCUTANEOUS
Qty: 0 | Refills: 0 | Status: DISCONTINUED | OUTPATIENT
Start: 2017-07-14 | End: 2017-07-15

## 2017-07-14 RX ORDER — ACETAMINOPHEN 500 MG
1000 TABLET ORAL ONCE
Qty: 0 | Refills: 0 | Status: COMPLETED | OUTPATIENT
Start: 2017-07-14 | End: 2017-07-14

## 2017-07-14 RX ORDER — SIMETHICONE 80 MG/1
80 TABLET, CHEWABLE ORAL EVERY 6 HOURS
Qty: 0 | Refills: 0 | Status: DISCONTINUED | OUTPATIENT
Start: 2017-07-14 | End: 2017-07-20

## 2017-07-14 RX ADMIN — Medication 15 MILLIGRAM(S): at 17:14

## 2017-07-14 RX ADMIN — Medication 1000 MILLIGRAM(S): at 14:34

## 2017-07-14 RX ADMIN — HEPARIN SODIUM 5000 UNIT(S): 5000 INJECTION INTRAVENOUS; SUBCUTANEOUS at 05:06

## 2017-07-14 RX ADMIN — Medication 1000 MILLIGRAM(S): at 20:44

## 2017-07-14 RX ADMIN — SODIUM CHLORIDE 75 MILLILITER(S): 9 INJECTION INTRAMUSCULAR; INTRAVENOUS; SUBCUTANEOUS at 16:08

## 2017-07-14 RX ADMIN — SIMETHICONE 80 MILLIGRAM(S): 80 TABLET, CHEWABLE ORAL at 20:53

## 2017-07-14 RX ADMIN — SODIUM CHLORIDE 1000 MILLILITER(S): 9 INJECTION INTRAMUSCULAR; INTRAVENOUS; SUBCUTANEOUS at 14:01

## 2017-07-14 RX ADMIN — SODIUM CHLORIDE 75 MILLILITER(S): 9 INJECTION INTRAMUSCULAR; INTRAVENOUS; SUBCUTANEOUS at 20:43

## 2017-07-14 RX ADMIN — SODIUM CHLORIDE 3 MILLILITER(S): 9 INJECTION INTRAMUSCULAR; INTRAVENOUS; SUBCUTANEOUS at 04:57

## 2017-07-14 RX ADMIN — SODIUM CHLORIDE 9999 MILLILITER(S): 9 INJECTION INTRAMUSCULAR; INTRAVENOUS; SUBCUTANEOUS at 13:20

## 2017-07-14 RX ADMIN — Medication 15 MILLIGRAM(S): at 05:41

## 2017-07-14 RX ADMIN — Medication 15 MILLIGRAM(S): at 21:45

## 2017-07-14 RX ADMIN — SODIUM CHLORIDE 3 MILLILITER(S): 9 INJECTION INTRAMUSCULAR; INTRAVENOUS; SUBCUTANEOUS at 12:09

## 2017-07-14 RX ADMIN — MONTELUKAST 10 MILLIGRAM(S): 4 TABLET, CHEWABLE ORAL at 12:08

## 2017-07-14 RX ADMIN — Medication 15 MILLIGRAM(S): at 05:10

## 2017-07-14 RX ADMIN — Medication 10 MILLIGRAM(S): at 23:59

## 2017-07-14 RX ADMIN — HEPARIN SODIUM 5000 UNIT(S): 5000 INJECTION INTRAVENOUS; SUBCUTANEOUS at 17:10

## 2017-07-14 RX ADMIN — Medication 15 MILLIGRAM(S): at 00:12

## 2017-07-14 RX ADMIN — Medication 15 MILLIGRAM(S): at 12:04

## 2017-07-14 RX ADMIN — Medication 15 MILLIGRAM(S): at 13:00

## 2017-07-14 RX ADMIN — PANTOPRAZOLE SODIUM 40 MILLIGRAM(S): 20 TABLET, DELAYED RELEASE ORAL at 21:35

## 2017-07-14 RX ADMIN — Medication 400 MILLIGRAM(S): at 13:51

## 2017-07-14 RX ADMIN — Medication 15 MILLIGRAM(S): at 01:00

## 2017-07-14 RX ADMIN — SODIUM CHLORIDE 3 MILLILITER(S): 9 INJECTION INTRAMUSCULAR; INTRAVENOUS; SUBCUTANEOUS at 21:35

## 2017-07-14 RX ADMIN — Medication 400 MILLIGRAM(S): at 20:30

## 2017-07-14 RX ADMIN — Medication 100 MILLIGRAM(S): at 05:06

## 2017-07-14 RX ADMIN — Medication 15 MILLIGRAM(S): at 22:00

## 2017-07-14 RX ADMIN — SIMVASTATIN 40 MILLIGRAM(S): 20 TABLET, FILM COATED ORAL at 23:58

## 2017-07-15 LAB
ALBUMIN SERPL ELPH-MCNC: 3.2 G/DL — LOW (ref 3.3–5)
ALBUMIN SERPL ELPH-MCNC: 3.3 G/DL — SIGNIFICANT CHANGE UP (ref 3.3–5)
ALP SERPL-CCNC: 28 U/L — LOW (ref 40–120)
ALP SERPL-CCNC: 37 U/L — LOW (ref 40–120)
ALT FLD-CCNC: 12 U/L — SIGNIFICANT CHANGE UP (ref 4–33)
ALT FLD-CCNC: 17 U/L — SIGNIFICANT CHANGE UP (ref 4–33)
APTT BLD: 22.3 SEC — LOW (ref 27.5–37.4)
APTT BLD: 30.3 SEC — SIGNIFICANT CHANGE UP (ref 27.5–37.4)
AST SERPL-CCNC: 17 U/L — SIGNIFICANT CHANGE UP (ref 4–32)
AST SERPL-CCNC: 20 U/L — SIGNIFICANT CHANGE UP (ref 4–32)
BASE EXCESS BLDA CALC-SCNC: -4.4 MMOL/L — SIGNIFICANT CHANGE UP
BILIRUB SERPL-MCNC: 0.9 MG/DL — SIGNIFICANT CHANGE UP (ref 0.2–1.2)
BILIRUB SERPL-MCNC: 2 MG/DL — HIGH (ref 0.2–1.2)
BUN SERPL-MCNC: 13 MG/DL — SIGNIFICANT CHANGE UP (ref 7–23)
BUN SERPL-MCNC: 14 MG/DL — SIGNIFICANT CHANGE UP (ref 7–23)
BUN SERPL-MCNC: 21 MG/DL — SIGNIFICANT CHANGE UP (ref 7–23)
BUN SERPL-MCNC: 22 MG/DL — SIGNIFICANT CHANGE UP (ref 7–23)
CA-I BLD-SCNC: 1.11 MMOL/L — SIGNIFICANT CHANGE UP (ref 1.03–1.23)
CALCIUM SERPL-MCNC: 7.7 MG/DL — LOW (ref 8.4–10.5)
CALCIUM SERPL-MCNC: 8.1 MG/DL — LOW (ref 8.4–10.5)
CALCIUM SERPL-MCNC: 8.3 MG/DL — LOW (ref 8.4–10.5)
CALCIUM SERPL-MCNC: 8.5 MG/DL — SIGNIFICANT CHANGE UP (ref 8.4–10.5)
CHLORIDE SERPL-SCNC: 107 MMOL/L — SIGNIFICANT CHANGE UP (ref 98–107)
CHLORIDE SERPL-SCNC: 108 MMOL/L — HIGH (ref 98–107)
CHLORIDE SERPL-SCNC: 108 MMOL/L — HIGH (ref 98–107)
CHLORIDE SERPL-SCNC: 110 MMOL/L — HIGH (ref 98–107)
CO2 SERPL-SCNC: 20 MMOL/L — LOW (ref 22–31)
CO2 SERPL-SCNC: 20 MMOL/L — LOW (ref 22–31)
CO2 SERPL-SCNC: 23 MMOL/L — SIGNIFICANT CHANGE UP (ref 22–31)
CO2 SERPL-SCNC: 23 MMOL/L — SIGNIFICANT CHANGE UP (ref 22–31)
CORTIS SERPL-MCNC: 24.8 UG/DL — HIGH (ref 2.7–18.4)
CREAT SERPL-MCNC: 0.54 MG/DL — SIGNIFICANT CHANGE UP (ref 0.5–1.3)
CREAT SERPL-MCNC: 0.55 MG/DL — SIGNIFICANT CHANGE UP (ref 0.5–1.3)
CREAT SERPL-MCNC: 0.59 MG/DL — SIGNIFICANT CHANGE UP (ref 0.5–1.3)
CREAT SERPL-MCNC: 0.72 MG/DL — SIGNIFICANT CHANGE UP (ref 0.5–1.3)
GLUCOSE SERPL-MCNC: 126 MG/DL — HIGH (ref 70–99)
GLUCOSE SERPL-MCNC: 189 MG/DL — HIGH (ref 70–99)
GLUCOSE SERPL-MCNC: 92 MG/DL — SIGNIFICANT CHANGE UP (ref 70–99)
GLUCOSE SERPL-MCNC: 98 MG/DL — SIGNIFICANT CHANGE UP (ref 70–99)
HCO3 BLDA-SCNC: 21 MMOL/L — LOW (ref 22–26)
HCT VFR BLD CALC: 24.7 % — LOW (ref 34.5–45)
HCT VFR BLD CALC: 24.9 % — LOW (ref 34.5–45)
HCT VFR BLD CALC: 25.7 % — LOW (ref 34.5–45)
HGB BLD-MCNC: 8.3 G/DL — LOW (ref 11.5–15.5)
HGB BLD-MCNC: 8.8 G/DL — LOW (ref 11.5–15.5)
HGB BLD-MCNC: 9 G/DL — LOW (ref 11.5–15.5)
INR BLD: 1.03 — SIGNIFICANT CHANGE UP (ref 0.88–1.17)
INR BLD: 1.07 — SIGNIFICANT CHANGE UP (ref 0.88–1.17)
LACTATE SERPL-SCNC: 1 MMOL/L — SIGNIFICANT CHANGE UP (ref 0.5–2)
LACTATE SERPL-SCNC: 3.2 MMOL/L — HIGH (ref 0.5–2)
MAGNESIUM SERPL-MCNC: 1.7 MG/DL — SIGNIFICANT CHANGE UP (ref 1.6–2.6)
MAGNESIUM SERPL-MCNC: 2.2 MG/DL — SIGNIFICANT CHANGE UP (ref 1.6–2.6)
MAGNESIUM SERPL-MCNC: 2.3 MG/DL — SIGNIFICANT CHANGE UP (ref 1.6–2.6)
MCHC RBC-ENTMCNC: 28 PG — SIGNIFICANT CHANGE UP (ref 27–34)
MCHC RBC-ENTMCNC: 29.7 PG — SIGNIFICANT CHANGE UP (ref 27–34)
MCHC RBC-ENTMCNC: 30.2 PG — SIGNIFICANT CHANGE UP (ref 27–34)
MCHC RBC-ENTMCNC: 33.6 % — SIGNIFICANT CHANGE UP (ref 32–36)
MCHC RBC-ENTMCNC: 35 % — SIGNIFICANT CHANGE UP (ref 32–36)
MCHC RBC-ENTMCNC: 35.3 % — SIGNIFICANT CHANGE UP (ref 32–36)
MCV RBC AUTO: 83.4 FL — SIGNIFICANT CHANGE UP (ref 80–100)
MCV RBC AUTO: 84.8 FL — SIGNIFICANT CHANGE UP (ref 80–100)
MCV RBC AUTO: 85.6 FL — SIGNIFICANT CHANGE UP (ref 80–100)
NRBC # FLD: 0 — SIGNIFICANT CHANGE UP
OB PNL STL: NEGATIVE — SIGNIFICANT CHANGE UP
PCO2 BLDA: 34 MMHG — SIGNIFICANT CHANGE UP (ref 32–48)
PH BLDA: 7.39 PH — SIGNIFICANT CHANGE UP (ref 7.35–7.45)
PHOSPHATE SERPL-MCNC: 2.8 MG/DL — SIGNIFICANT CHANGE UP (ref 2.5–4.5)
PHOSPHATE SERPL-MCNC: 3.1 MG/DL — SIGNIFICANT CHANGE UP (ref 2.5–4.5)
PLATELET # BLD AUTO: 115 K/UL — LOW (ref 150–400)
PLATELET # BLD AUTO: 121 K/UL — LOW (ref 150–400)
PLATELET # BLD AUTO: 165 K/UL — SIGNIFICANT CHANGE UP (ref 150–400)
PMV BLD: 11.1 FL — SIGNIFICANT CHANGE UP (ref 7–13)
PMV BLD: 11.5 FL — SIGNIFICANT CHANGE UP (ref 7–13)
PMV BLD: 11.5 FL — SIGNIFICANT CHANGE UP (ref 7–13)
PO2 BLDA: 100 MMHG — SIGNIFICANT CHANGE UP (ref 83–108)
POTASSIUM SERPL-MCNC: 3.7 MMOL/L — SIGNIFICANT CHANGE UP (ref 3.5–5.3)
POTASSIUM SERPL-MCNC: 4.1 MMOL/L — SIGNIFICANT CHANGE UP (ref 3.5–5.3)
POTASSIUM SERPL-MCNC: 4.2 MMOL/L — SIGNIFICANT CHANGE UP (ref 3.5–5.3)
POTASSIUM SERPL-MCNC: 4.3 MMOL/L — SIGNIFICANT CHANGE UP (ref 3.5–5.3)
POTASSIUM SERPL-SCNC: 3.7 MMOL/L — SIGNIFICANT CHANGE UP (ref 3.5–5.3)
POTASSIUM SERPL-SCNC: 4.1 MMOL/L — SIGNIFICANT CHANGE UP (ref 3.5–5.3)
POTASSIUM SERPL-SCNC: 4.2 MMOL/L — SIGNIFICANT CHANGE UP (ref 3.5–5.3)
POTASSIUM SERPL-SCNC: 4.3 MMOL/L — SIGNIFICANT CHANGE UP (ref 3.5–5.3)
PROT SERPL-MCNC: 4.7 G/DL — LOW (ref 6–8.3)
PROT SERPL-MCNC: 5 G/DL — LOW (ref 6–8.3)
PROTHROM AB SERPL-ACNC: 11.6 SEC — SIGNIFICANT CHANGE UP (ref 9.8–13.1)
PROTHROM AB SERPL-ACNC: 12 SEC — SIGNIFICANT CHANGE UP (ref 9.8–13.1)
RBC # BLD: 2.91 M/UL — LOW (ref 3.8–5.2)
RBC # BLD: 2.96 M/UL — LOW (ref 3.8–5.2)
RBC # BLD: 3.03 M/UL — LOW (ref 3.8–5.2)
RBC # FLD: 15.1 % — HIGH (ref 10.3–14.5)
RBC # FLD: 15.3 % — HIGH (ref 10.3–14.5)
RBC # FLD: 15.8 % — HIGH (ref 10.3–14.5)
SAO2 % BLDA: 98.6 % — SIGNIFICANT CHANGE UP (ref 95–99)
SODIUM SERPL-SCNC: 140 MMOL/L — SIGNIFICANT CHANGE UP (ref 135–145)
SODIUM SERPL-SCNC: 141 MMOL/L — SIGNIFICANT CHANGE UP (ref 135–145)
SODIUM SERPL-SCNC: 142 MMOL/L — SIGNIFICANT CHANGE UP (ref 135–145)
SODIUM SERPL-SCNC: 144 MMOL/L — SIGNIFICANT CHANGE UP (ref 135–145)
WBC # BLD: 11.03 K/UL — HIGH (ref 3.8–10.5)
WBC # BLD: 13.28 K/UL — HIGH (ref 3.8–10.5)
WBC # BLD: 14.2 K/UL — HIGH (ref 3.8–10.5)
WBC # FLD AUTO: 11.03 K/UL — HIGH (ref 3.8–10.5)
WBC # FLD AUTO: 13.28 K/UL — HIGH (ref 3.8–10.5)
WBC # FLD AUTO: 14.2 K/UL — HIGH (ref 3.8–10.5)

## 2017-07-15 PROCEDURE — 74174 CTA ABD&PLVS W/CONTRAST: CPT | Mod: 26

## 2017-07-15 PROCEDURE — 71010: CPT | Mod: 26

## 2017-07-15 PROCEDURE — 99291 CRITICAL CARE FIRST HOUR: CPT

## 2017-07-15 PROCEDURE — 74176 CT ABD & PELVIS W/O CONTRAST: CPT | Mod: 26,59

## 2017-07-15 PROCEDURE — 99254 IP/OBS CNSLTJ NEW/EST MOD 60: CPT | Mod: GC

## 2017-07-15 RX ORDER — PHENYLEPHRINE HYDROCHLORIDE 10 MG/ML
2 INJECTION INTRAVENOUS
Qty: 80 | Refills: 0 | Status: DISCONTINUED | OUTPATIENT
Start: 2017-07-15 | End: 2017-07-15

## 2017-07-15 RX ORDER — LIDOCAINE 4 G/100G
1 CREAM TOPICAL ONCE
Qty: 0 | Refills: 0 | Status: COMPLETED | OUTPATIENT
Start: 2017-07-15 | End: 2017-07-15

## 2017-07-15 RX ORDER — PHENYLEPHRINE HYDROCHLORIDE 10 MG/ML
2 INJECTION INTRAVENOUS
Qty: 80 | Refills: 0 | Status: DISCONTINUED | OUTPATIENT
Start: 2017-07-15 | End: 2017-07-17

## 2017-07-15 RX ORDER — POTASSIUM CHLORIDE 20 MEQ
40 PACKET (EA) ORAL ONCE
Qty: 0 | Refills: 0 | Status: COMPLETED | OUTPATIENT
Start: 2017-07-15 | End: 2017-07-15

## 2017-07-15 RX ORDER — ALBUMIN HUMAN 25 %
250 VIAL (ML) INTRAVENOUS ONCE
Qty: 0 | Refills: 0 | Status: COMPLETED | OUTPATIENT
Start: 2017-07-15 | End: 2017-07-15

## 2017-07-15 RX ORDER — ACETAMINOPHEN 500 MG
1000 TABLET ORAL ONCE
Qty: 0 | Refills: 0 | Status: COMPLETED | OUTPATIENT
Start: 2017-07-15 | End: 2017-07-15

## 2017-07-15 RX ORDER — MAGNESIUM SULFATE 500 MG/ML
2 VIAL (ML) INJECTION ONCE
Qty: 0 | Refills: 0 | Status: COMPLETED | OUTPATIENT
Start: 2017-07-15 | End: 2017-07-15

## 2017-07-15 RX ORDER — POTASSIUM CHLORIDE 20 MEQ
10 PACKET (EA) ORAL
Qty: 0 | Refills: 0 | Status: DISCONTINUED | OUTPATIENT
Start: 2017-07-15 | End: 2017-07-15

## 2017-07-15 RX ORDER — TRAMADOL HYDROCHLORIDE 50 MG/1
25 TABLET ORAL ONCE
Qty: 0 | Refills: 0 | Status: DISCONTINUED | OUTPATIENT
Start: 2017-07-15 | End: 2017-07-15

## 2017-07-15 RX ADMIN — Medication 15 MILLIGRAM(S): at 04:40

## 2017-07-15 RX ADMIN — LIDOCAINE 1 PATCH: 4 CREAM TOPICAL at 15:10

## 2017-07-15 RX ADMIN — Medication 250 MILLILITER(S): at 06:50

## 2017-07-15 RX ADMIN — SODIUM CHLORIDE 3 MILLILITER(S): 9 INJECTION INTRAMUSCULAR; INTRAVENOUS; SUBCUTANEOUS at 06:13

## 2017-07-15 RX ADMIN — TRAMADOL HYDROCHLORIDE 25 MILLIGRAM(S): 50 TABLET ORAL at 23:17

## 2017-07-15 RX ADMIN — PANTOPRAZOLE SODIUM 40 MILLIGRAM(S): 20 TABLET, DELAYED RELEASE ORAL at 06:10

## 2017-07-15 RX ADMIN — Medication 15 MILLIGRAM(S): at 04:25

## 2017-07-15 RX ADMIN — Medication 50 GRAM(S): at 02:35

## 2017-07-15 RX ADMIN — Medication 400 MILLIGRAM(S): at 11:20

## 2017-07-15 RX ADMIN — Medication 10 MILLIGRAM(S): at 06:01

## 2017-07-15 RX ADMIN — MONTELUKAST 10 MILLIGRAM(S): 4 TABLET, CHEWABLE ORAL at 13:26

## 2017-07-15 RX ADMIN — PHENYLEPHRINE HYDROCHLORIDE 43.58 MICROGRAM(S)/KG/MIN: 10 INJECTION INTRAVENOUS at 06:01

## 2017-07-15 RX ADMIN — Medication 1000 MILLIGRAM(S): at 11:35

## 2017-07-15 RX ADMIN — Medication 1000 MILLIGRAM(S): at 02:30

## 2017-07-15 RX ADMIN — Medication 250 MILLILITER(S): at 05:24

## 2017-07-15 RX ADMIN — PROGESTERONE 100 MILLIGRAM(S): 200 CAPSULE, LIQUID FILLED ORAL at 22:46

## 2017-07-15 RX ADMIN — SODIUM CHLORIDE 75 MILLILITER(S): 9 INJECTION INTRAMUSCULAR; INTRAVENOUS; SUBCUTANEOUS at 06:01

## 2017-07-15 RX ADMIN — Medication 1 MILLIGRAM(S): at 22:47

## 2017-07-15 RX ADMIN — Medication 40 MILLIEQUIVALENT(S): at 17:18

## 2017-07-15 RX ADMIN — PHENYLEPHRINE HYDROCHLORIDE 43.58 MICROGRAM(S)/KG/MIN: 10 INJECTION INTRAVENOUS at 22:46

## 2017-07-15 RX ADMIN — Medication 400 MILLIGRAM(S): at 17:50

## 2017-07-15 RX ADMIN — PANTOPRAZOLE SODIUM 40 MILLIGRAM(S): 20 TABLET, DELAYED RELEASE ORAL at 17:18

## 2017-07-15 RX ADMIN — Medication 400 MILLIGRAM(S): at 02:17

## 2017-07-15 RX ADMIN — SODIUM CHLORIDE 3 MILLILITER(S): 9 INJECTION INTRAMUSCULAR; INTRAVENOUS; SUBCUTANEOUS at 22:45

## 2017-07-15 RX ADMIN — SODIUM CHLORIDE 100 MILLILITER(S): 9 INJECTION INTRAMUSCULAR; INTRAVENOUS; SUBCUTANEOUS at 07:44

## 2017-07-15 RX ADMIN — SODIUM CHLORIDE 3 MILLILITER(S): 9 INJECTION INTRAMUSCULAR; INTRAVENOUS; SUBCUTANEOUS at 13:17

## 2017-07-15 RX ADMIN — PHENYLEPHRINE HYDROCHLORIDE 43.58 MICROGRAM(S)/KG/MIN: 10 INJECTION INTRAVENOUS at 07:44

## 2017-07-15 RX ADMIN — Medication 1000 MILLIGRAM(S): at 18:05

## 2017-07-15 RX ADMIN — SIMVASTATIN 40 MILLIGRAM(S): 20 TABLET, FILM COATED ORAL at 22:46

## 2017-07-15 RX ADMIN — SIMETHICONE 80 MILLIGRAM(S): 80 TABLET, CHEWABLE ORAL at 09:42

## 2017-07-15 RX ADMIN — SIMETHICONE 80 MILLIGRAM(S): 80 TABLET, CHEWABLE ORAL at 03:00

## 2017-07-15 NOTE — CONSULT NOTE ADULT - SUBJECTIVE AND OBJECTIVE BOX
CC: Patient is a 62y old  Female who presents with a chief complaint of lung nodule left lung     HPI:  63y/o female with h/o multiple lung nodule since 2011, gradually got bigger. She underwent a laparoscopic robotic assisted left upper lobe segmentectomy. Postoperatively she was stable and transferred to the floor. POD 2 she had a rapid response that was suspected vasovagal episode. At that time her H/H was noted to have decreased to 23 from 34. She received two units of PRBC's with minimal response.    Today she has increased abdominal distention and an h/h that is 24. She went for a CT of the abdomen that shows hemoperitoneum. She is requiring neosynepherine for hypotension and is receiving two more units of PRBC's.      PMH  CAD (coronary artery disease)  PVC (premature ventricular contraction)  Eczema  Nasal bone fracture  Sleep apnea  Papilloma of breast  Disorder of shoulder  Seasonal allergies  GERD (gastroesophageal reflux disease)  Pulmonary nodule  Mild asthma    PSH  Papilloma of breast  S/P arthroscopy of shoulder  S/P arthroscopy of right knee  S/P blepharoplasty  S/P adenoidectomy    MEDS    Allergies    codeine (Angioedema)  codeine (Angioedema; Hives)    Intolerances    Physical Exam  T(C): 36.2 (07-15-17 @ 08:00), Max: 36.9 (07-14-17 @ 12:00)  HR: 72 (07-15-17 @ 10:00) (53 - 87)  BP: 111/44 (07-15-17 @ 10:00) (68/49 - 129/48)  RR: 24 (07-15-17 @ 10:00) (14 - 27)  SpO2: 100% (07-15-17 @ 10:00) (97% - 100%)  Tmax: T(C): , Max: 36.9 (07-14-17 @ 12:00)    Gen: NAD  HEENT: normocephalic  CV: S1, S2, RRR  Pulm: CTA B/L  Abd: Soft, distended, tender in left upper and lower quadrants  Ext: warm    07-14-17  -  07-15-17  --------------------------------------------------------  IN:    Albumin 5%  - 250 mL: 500 mL    IV PiggyBack: 300 mL    Oral Fluid: 480 mL    phenylephrine   Infusion: 434 mL    PRBCs (Packed Red Blood Cells): 600 mL    Sodium Chloride 0.9% IV Bolus: 500 mL    sodium chloride 0.9%.: 1200 mL  Total IN: 4014 mL    OUT:    Chest Tube: 110 mL    Indwelling Catheter - Urethral: 175 mL    Intermittent Catheterization - Urethral: 500 mL    Voided: 100 mL  Total OUT: 885 mL    Total NET: 3129 mL      07-15-17  -  07-15-17  --------------------------------------------------------  IN:    phenylephrine   Infusion: 141.5 mL    PRBCs (Packed Red Blood Cells): 300 mL    sodium chloride 0.9%.: 300 mL  Total IN: 741.5 mL    OUT:    Chest Tube: 30 mL    Indwelling Catheter - Urethral: 305 mL  Total OUT: 335 mL    Total NET: 406.5 mL          Labs:                        8.3    14.20 )-----------( 165      ( 15 Jul 2017 06:00 )             24.7     07-15    141  |  108<H>  |  22  ----------------------------<  189<H>  4.2   |  20<L>  |  0.72    Ca    8.3<L>      15 Jul 2017 06:00  Phos  2.8     07-14  Mg     1.7     07-14    TPro  5.0<L>  /  Alb  3.2<L>  /  TBili  0.9  /  DBili  x   /  AST  20  /  ALT  17  /  AlkPhos  37<L>  07-15        ABG - ( 15 Jul 2017 05:52 )  pH: 7.39  /  pCO2: 34    /  pO2: 100   / HCO3: 21    / Base Excess: -4.4  /  SaO2: 98.6 CC: Patient is a 62y old  Female who presents with a chief complaint of lung nodule left lung     HPI:  63y/o female with h/o multiple lung nodule since 2011, gradually got bigger. She underwent a laparoscopic robotic assisted left upper lobe segmentectomy. Postoperatively she was stable and transferred to the floor. POD 2 she had a rapid response that was suspected vasovagal episode. At that time her H/H was noted to have decreased to 23 from 34. She received two units of PRBC's with minimal response.    Today she has increased abdominal distention and an h/h that is 24. She went for a CT of the abdomen that shows hemoperitoneum. She is requiring neosynepherine for hypotension and is receiving two more units of PRBC's.      PMH  CAD (coronary artery disease)  PVC (premature ventricular contraction)  Eczema  Nasal bone fracture  Sleep apnea  Papilloma of breast  Disorder of shoulder  Seasonal allergies  GERD (gastroesophageal reflux disease)  Pulmonary nodule  Mild asthma    PSH  Papilloma of breast  S/P arthroscopy of shoulder  S/P arthroscopy of right knee  S/P blepharoplasty  S/P adenoidectomy    MEDS    Allergies    codeine (Angioedema)  codeine (Angioedema; Hives)    Intolerances    Physical Exam  T(C): 36.2 (07-15-17 @ 08:00), Max: 36.9 (07-14-17 @ 12:00)  HR: 72 (07-15-17 @ 10:00) (53 - 87)  BP: 111/44 (07-15-17 @ 10:00) (68/49 - 129/48)  RR: 24 (07-15-17 @ 10:00) (14 - 27)  SpO2: 100% (07-15-17 @ 10:00) (97% - 100%)  Tmax: T(C): , Max: 36.9 (07-14-17 @ 12:00)    Gen: NAD  HEENT: normocephalic  CV: S1, S2, RRR  Pulm: CTA B/L  Abd: Soft, distended, tender in left upper and lower quadrants  Ext: warm    07-14-17  -  07-15-17  --------------------------------------------------------  IN:    Albumin 5%  - 250 mL: 500 mL    IV PiggyBack: 300 mL    Oral Fluid: 480 mL    phenylephrine   Infusion: 434 mL    PRBCs (Packed Red Blood Cells): 600 mL    Sodium Chloride 0.9% IV Bolus: 500 mL    sodium chloride 0.9%.: 1200 mL  Total IN: 4014 mL    OUT:    Chest Tube: 110 mL    Indwelling Catheter - Urethral: 175 mL    Intermittent Catheterization - Urethral: 500 mL    Voided: 100 mL  Total OUT: 885 mL    Total NET: 3129 mL      07-15-17  -  07-15-17  --------------------------------------------------------  IN:    phenylephrine   Infusion: 141.5 mL    PRBCs (Packed Red Blood Cells): 300 mL    sodium chloride 0.9%.: 300 mL  Total IN: 741.5 mL    OUT:    Chest Tube: 30 mL    Indwelling Catheter - Urethral: 305 mL  Total OUT: 335 mL    Total NET: 406.5 mL    Labs:                        8.3    14.20 )-----------( 165      ( 15 Jul 2017 06:00 )             24.7     07-15    141  |  108<H>  |  22  ----------------------------<  189<H>  4.2   |  20<L>  |  0.72    Ca    8.3<L>      15 Jul 2017 06:00  Phos  2.8     07-14  Mg     1.7     07-14    TPro  5.0<L>  /  Alb  3.2<L>  /  TBili  0.9  /  DBili  x   /  AST  20  /  ALT  17  /  AlkPhos  37<L>  07-15        ABG - ( 15 Jul 2017 05:52 )  pH: 7.39  /  pCO2: 34    /  pO2: 100   / HCO3: 21    / Base Excess: -4.4  /  SaO2: 98.6

## 2017-07-15 NOTE — CONSULT NOTE ADULT - ASSESSMENT
61 yo female with hemoperitoneum and suspected splenic laceration  -NPO  -IV fluids  -Recommend repeat CT of abdomen with contrast to evaluate possible extent of laceration and possibly localize any active extravasation.  -Findings discussed with Dr. Gold 61 yo female with hemoperitoneum and suspected splenic laceration  -NPO  -IV fluids  -Recommend repeat CT of abdomen with contrast to evaluate possible extent of laceration and possibly localize any active extravasation. And will reach out to IR for possible embolization if active extravasation demonstrated.  -Hold DVT ppx  -Findings discussed with Dr. Gold

## 2017-07-15 NOTE — CONSULT NOTE ADULT - ATTENDING COMMENTS
I have reviewed the history, interviewed the patient, reviewed the pertinent labs and imaging (CT abd/pelvis with PO contrast), and discussed the care with the consult resident.    The active issues are:  1. hemorrhagic shock    Agree with pressor support as a bridge to blood resuscitation, strict bed rest and holding chemical DVT prophylaxis.  Advise rapid CTA to help determine source and activity of bleeding.  Further intervention will be determined by patient's response to blood transfusion and CTA findings.  Three options:  1) watchful waiting, pain control  2) angioembolization if active IV contrast extravasation as long as patient's vital signs remain stable  3) operative intervention, possibly splenectomy, if patient deteriorates

## 2017-07-15 NOTE — PROGRESS NOTE ADULT - SUBJECTIVE AND OBJECTIVE BOX
LOUISA MILLAN          MRN-[id number]    HPI:  63y/o female with h/o multiple lung nodule since 2011, gradually got bigger. Presents to [facility] for pre surgical evaluation for left Video assisted thoracoscopy left upper lobe wedge resection possible segmentectomy possible left upper lobectomy with lung marking by Dr Chamberlain on 7/12/17. (03 Jul 2017 07:57)      Procedure:  POD # :     Issues:        Interval/Overnight Events/ ROS  Pt remained hemodynamically stable overnight, not on any pressors or inotropes. OOB to chair, breathing comfortably with minimal pain. Ambulated several times . Denies pain, no SOB, no palpitations, no nausea/ no vomiting, no dizziness  A-line and grigsby d/natalia         PAST MEDICAL & SURGICAL HISTORY:  CAD (coronary artery disease): non obstructive 50% proximal LAD stenosis  PVC (premature ventricular contraction)  Eczema  Nasal bone fracture  Sleep apnea: noncompliant in using CPAP machine  Papilloma of breast: right  Disorder of shoulder: right  Seasonal allergies  GERD (gastroesophageal reflux disease)  Pulmonary nodule: bilateral  Mild asthma: denies intubation or hospitalizations  Papilloma of breast: right breast excision &amp; bx 2013  S/P arthroscopy of shoulder: right April 2013  S/P blepharoplasty: in 2008  S/P adenoidectomy: as a child            ***VITAL SIGNS:  Vital Signs Last 24 Hrs  T(C): 36.7 (15 Jul 2017 20:00), Max: 37.3 (15 Jul 2017 12:00)  T(F): 98.1 (15 Jul 2017 20:00), Max: 99.1 (15 Jul 2017 12:00)  HR: 62 (15 Jul 2017 22:00) (53 - 87)  BP: 109/45 (15 Jul 2017 22:00) (68/49 - 131/53)  BP(mean): 58 (15 Jul 2017 22:00) (46 - 82)  RR: 25 (15 Jul 2017 22:00) (16 - 30)  SpO2: 97% (15 Jul 2017 22:00) (96% - 100%)    I/Os:   I&O's Detail    14 Jul 2017 07:01  -  15 Jul 2017 07:00  --------------------------------------------------------  IN:    Albumin 5%  - 250 mL: 500 mL    IV PiggyBack: 300 mL    Oral Fluid: 480 mL    Packed Red Blood Cells: 600 mL    phenylephrine   Infusion: 434 mL    sodium chloride 0.9%: 1200 mL    Sodium Chloride 0.9% IV Bolus: 500 mL  Total IN: 4014 mL    OUT:    Chest Tube: 110 mL    Indwelling Catheter - Urethral: 175 mL    Intermittent Catheterization - Urethral: 500 mL    Voided: 100 mL  Total OUT: 885 mL    Total NET: 3129 mL      15 Jul 2017 07:01  -  15 Jul 2017 23:08  --------------------------------------------------------  IN:    IV PiggyBack: 200 mL    Packed Red Blood Cells: 600 mL    phenylephrine   Infusion: 204.7 mL    phenylephrine   Infusion: 266.1 mL    sodium chloride 0.9%: 500 mL  Total IN: 1770.8 mL    OUT:    Chest Tube: 130 mL    Indwelling Catheter - Urethral: 1765 mL  Total OUT: 1895 mL    Total NET: -124.2 mL          CAPILLARY BLOOD GLUCOSE  181 (15 Jul 2017 04:00)          =======================  MEDICATIONS  ===================  MEDICATIONS  (STANDING):  sodium chloride 0.9% lock flush 3 milliLiter(s) IV Push every 8 hours  simvastatin 40 milliGRAM(s) Oral at bedtime  montelukast 10 milliGRAM(s) Oral daily  docusate sodium 100 milliGRAM(s) Oral three times a day  progesterone 100 milliGRAM(s) Oral at bedtime  pantoprazole  Injectable 40 milliGRAM(s) IV Push every 12 hours  estradiol 1 milliGRAM(s) Oral daily  phenylephrine    Infusion 2 MICROgram(s)/kG/Min (43.575 mL/Hr) IV Continuous <Continuous>  traMADol 25 milliGRAM(s) Oral once    MEDICATIONS  (PRN):  ipratropium    for Nebulization 500 MICROGram(s) Nebulizer every 6 hours PRN Shortness of Breath and/or Wheezing  aprepitant 40 milliGRAM(s) Oral every 12 hours PRN N/V  metoclopramide Injectable 10 milliGRAM(s) IV Push every 8 hours PRN Nausea / Vomiting  ketorolac   Injectable 15 milliGRAM(s) IV Push every 6 hours PRN Moderate Pain (4 - 6)  simethicone 80 milliGRAM(s) Chew every 6 hours PRN Gas  bisacodyl Suppository 10 milliGRAM(s) Rectal daily PRN Constipation      ======================VENTILATOR SETTINGS  ==============      =================== PATIENT CARE ACCESS DEVICES ==========  Peripheral IV  Central Venous Line	R	L	IJ	Fem	SC			Placed:   Arterial Line	R	L	PT	DP	Fem	Rad	Ax	Placed:   Midline:				  Urinary Catheter, Date Placed:   Necessity of urinary, arterial, and venous catheters discussed    ======================= PHYSICAL EXAM===================  General:                         Comfortable, Awake, alert, not in any distress  Neuro:                            Moving all extremities to commands. No focal deficits	  HEENT:                           MELA/ ETT/ NGT/ trach  Respiratory:	Lungs clear on auscultation bilaterally with good aeration.                                           No rales, rhonchi, no wheezing. Effort even and unlabored.  CV:		Regular rate and rhythm. Normal S1/S2. No murmurs  Abdomen:	                     Soft,  nontender, not-distended. Bowel sounds present / absent.   Skin:		No rash.  Extremities:	Warm, no cyanosis or edema.  Palpable pulses    ============================ LABS =======================                        9.0    13.28 )-----------( 121      ( 15 Jul 2017 22:00 )             25.7     07-15    144  |  110<H>  |  13  ----------------------------<  92  4.3   |  23  |  0.54    Ca    8.5      15 Jul 2017 22:00  Phos  3.1     07-15  Mg     2.3     07-15    TPro  4.7<L>  /  Alb  3.3  /  TBili  2.0<H>  /  DBili  x   /  AST  17  /  ALT  12  /  AlkPhos  28<L>  07-15    LIVER FUNCTIONS - ( 15 Jul 2017 13:45 )  Alb: 3.3 g/dL / Pro: 4.7 g/dL / ALK PHOS: 28 u/L / ALT: 12 u/L / AST: 17 u/L / GGT: x           PT/INR - ( 15 Jul 2017 22:00 )   PT: 11.6 SEC;   INR: 1.03          PTT - ( 15 Jul 2017 22:00 )  PTT:30.3 SEC  ABG - ( 15 Jul 2017 05:52 )  pH: 7.39  /  pCO2: 34    /  pO2: 100   / HCO3: 21    / Base Excess: -4.4  /  SaO2: 98.6                  ===================== IMAGING STUDIES ===================      ====================ASSESSMENT AND PLAN ================      ====================== NEUROLOGY=======================  Pain control with PCA / PCEA / Tylenol IV / Toradol / Percocet  Pt is on Precedex for agitation  Pt is sedated with Propofol / Fentanyl    ==================== RESPIRATORY========================  Pt is on            L nasal canula / Face tent____% FiO2  Comfortable, no evidence of distress.  Using incentive spirometry & doing                ml  Monitor chest tube output  Chest tube to suction / water seal	    Mechanical Ventilation:    Mechanical ventilator status assessed & settings reviewed  Continue bronchodilators, pulmonary toilet  Head of bed elevation to 30-40 degrees    ====================CARDIOVASCULAR=====================  Continue hemodynamic monitoring/ telemetry  Not on any pressors  Continue cardiovascular / antihypertensive medications    ===================== RENAL ============================  Continue LR 30CC/hr      D/C IVF  Monitor I/Os, BUN/ Cr  and electrolytes  D/C Grigsby      Keep Grigsby  BPH: Continue Flomax/ Finasteride      ==================== GASTROINTESTINAL===================  On regular diet, tolerating well  Continue GI prophylaxis with Pepcid / Protonix  Continue Zofran / Reglan for nausea - PRN	  NPO    =======================    ENDOCRIN  =====================  Glycemic monitoring  F/S with coverage  ===================HEMATOLOGIC/ONCOLOGIC =============  Monitor chest tube output. No signs of active bleeding.   Follow CBC, coags  in AM  DVT prophylaxis with SCD, sc Heparin    ========================INFECTIOUS DISEASE===============  No signs of infection. Monitor for fever / leukocytosis.  All surgical incision / chest tube  sites look clean  D/C Grigsby      Pertinent clinical, laboratory, radiographic, hemodynamic, echocardiographic, respiratory data, microbiologic data and chart were reviewed and analyzed frequently throughout the course of the day and night. GI and DVT prophylaxis, glycemic control, head of bed elevation and skin care issues were addressed.  Patient seen, examined and plan discussed with CT Surgery / CTICU team during rounds.    I have spent               minutes of critical care time with this pt between            am/pm    and               am/ pm      DARON Correa MD LOUISA MILLAN          MRN-[id number]    HPI:  63y/o female with h/o multiple lung nodule since 2011, gradually enlarged in size. Pt admitted  for left Video assisted thoracoscopy left upper lobe wedge resection possible segmentectomy possible left upper lobectomy with lung marking by Dr Chamberlain on 7/12/17. Thoracic surgery was uneventful.   On POD #2 while on the floor on [address]  pt developed hypotension, acute anemia  and abdominal pain out of proportion to clinical findings. Pt transferred back to ICU, volume resuscitated and transfused total of 4 units of PRBC and 2 x Albumin in addition to Phenylephrine drip.  Emergent  surgical consult and abd CT- angiogram  obtained - with positive findings of large hemoperitoneum from splenic source. No active bleeding identified during angiogram - therefore possible  need for acute surgical intervention was deemed not required at this time .   Plan to closely monitor abdominal status, hemodynamics and H/H with surgical team stand by for now.      Procedure: Left VATS, robot-assisted   PHYLLIS segmentectomy, MLND  (07/12/2017 )  POD # : 3    Issues: acute posthemorrhagic anemia             hemoperitoneum from splenic hematoma             abdominal pain             left chest tube in place      Interval/Overnight Events/ ROS   Yesterday  on POD #2 while on the floor on [address]  pt developed hypotension, acute anemia  and abdominal pain out of proportion to clinical findings. Pt transferred back to ICU, volume resuscitated and transfused total of 4 units of PRBC and 2 x Albumin in addition to Phenylephrine drip.  Emergent  surgical consult and abd CT- angiogram  obtained - with positive findings of large hemoperitoneum from splenic source. No active bleeding identified during angiogram - therefore possible  need for acute surgical intervention was deemed not required at this time .   Plan to closely monitor abdominal status, hemodynamics and H/H with surgical team stand by for now.    Pt still c/o abd pain which is less severe with IV Tylenol and Lidocaine patch. No SOB, no palpitations, no Nausea/vomiting/diarrhea      PAST MEDICAL & SURGICAL HISTORY:  CAD (coronary artery disease): non obstructive 50% proximal LAD stenosis  PVC (premature ventricular contraction)  Eczema  Nasal bone fracture  Sleep apnea: noncompliant in using CPAP machine  Papilloma of breast: right  Disorder of shoulder: right  Seasonal allergies  GERD (gastroesophageal reflux disease)  Pulmonary nodule: bilateral  Mild asthma: denies intubation or hospitalizations  Papilloma of breast: right breast excision &amp; bx 2013  S/P arthroscopy of shoulder: right April 2013  S/P blepharoplasty: in 2008  S/P adenoidectomy: as a child      ***VITAL SIGNS:  Vital Signs Last 24 Hrs  T(C): 36.7 (15 Jul 2017 20:00), Max: 37.3 (15 Jul 2017 12:00)  T(F): 98.1 (15 Jul 2017 20:00), Max: 99.1 (15 Jul 2017 12:00)  HR: 62 (15 Jul 2017 22:00) (53 - 87)  BP: 109/45 (15 Jul 2017 22:00) (68/49 - 131/53)  BP(mean): 58 (15 Jul 2017 22:00) (46 - 82)  RR: 25 (15 Jul 2017 22:00) (16 - 30)  SpO2: 97% (15 Jul 2017 22:00) (96% - 100%)    I/Os:   I&O's Detail    14 Jul 2017 07:01  -  15 Jul 2017 07:00  --------------------------------------------------------  IN:    Albumin 5%  - 250 mL: 500 mL    IV PiggyBack: 300 mL    Oral Fluid: 480 mL    Packed Red Blood Cells: 600 mL    phenylephrine   Infusion: 434 mL    sodium chloride 0.9%: 1200 mL    Sodium Chloride 0.9% IV Bolus: 500 mL  Total IN: 4014 mL    OUT:    Chest Tube: 110 mL    Indwelling Catheter - Urethral: 175 mL    Intermittent Catheterization - Urethral: 500 mL    Voided: 100 mL  Total OUT: 885 mL    Total NET: 3129 mL      15 Jul 2017 07:01  -  15 Jul 2017 23:08  --------------------------------------------------------  IN:    IV PiggyBack: 200 mL    Packed Red Blood Cells: 600 mL    phenylephrine   Infusion: 204.7 mL    phenylephrine   Infusion: 266.1 mL    sodium chloride 0.9%: 500 mL  Total IN: 1770.8 mL    OUT:    Chest Tube: 130 mL    Indwelling Catheter - Urethral: 1765 mL  Total OUT: 1895 mL    Total NET: -124.2 mL      CAPILLARY BLOOD GLUCOSE  181 (15 Jul 2017 04:00)      =======================  MEDICATIONS  ===================  MEDICATIONS  (STANDING):  sodium chloride 0.9% lock flush 3 milliLiter(s) IV Push every 8 hours  simvastatin 40 milliGRAM(s) Oral at bedtime  montelukast 10 milliGRAM(s) Oral daily  docusate sodium 100 milliGRAM(s) Oral three times a day  progesterone 100 milliGRAM(s) Oral at bedtime  pantoprazole  Injectable 40 milliGRAM(s) IV Push every 12 hours  estradiol 1 milliGRAM(s) Oral daily  phenylephrine    Infusion 2 MICROgram(s)/kG/Min (43.575 mL/Hr) IV Continuous   traMADol 25 milliGRAM(s) Oral once    MEDICATIONS  (PRN):  ipratropium    for Nebulization 500 MICROGram(s) Nebulizer every 6 hours PRN Shortness of Breath and/or Wheezing  aprepitant 40 milliGRAM(s) Oral every 12 hours PRN N/V  metoclopramide Injectable 10 milliGRAM(s) IV Push every 8 hours PRN Nausea / Vomiting  ketorolac   Injectable 15 milliGRAM(s) IV Push every 6 hours PRN Moderate Pain (4 - 6)  simethicone 80 milliGRAM(s) Chew every 6 hours PRN Gas  bisacodyl Suppository 10 milliGRAM(s) Rectal daily PRN Constipation      =================== PATIENT CARE ACCESS DEVICES ==========  Peripheral IV (+) :				  Urinary Catheter (+)   Necessity of urinary and venous catheters discussed    ======================= PHYSICAL EXAM===================  General:             mildly uncomfortable from abd discomfort, Awake, alert, no acute distress  Neuro:                Moving all extremities to commands. No focal deficits	  HEENT:             MELA  Respiratory:	Lungs clear on auscultation bilaterally with good aeration.                           No rales, rhonchi, no wheezing. Effort even and unlabored.                          Left chest tube site - clean  CV:		Regular rate and rhythm. Normal S1/S2. No murmurs  Abdomen:	Soft, generally moderately tender, mildly-distended. Bowel sounds present    Skin:		No rash.  Extremities:	Warm, no cyanosis or edema.  Palpable pulses    ============================ LABS =======================                        9.0    13.28 )-----------( 121      ( 15 Jul 2017 22:00 )             25.7     144  |  110<H>  |  13  ----------------------------------------<  92  4.3   |   23         |  0.54    Ca    8.5      15 Jul 2017 22:00  Phos  3.1     07-15  Mg     2.3     07-15    TPro  4.7<L>  /  Alb  3.3  /  TBili  2.0<H>  /  DBili  x   /  AST  17  /  ALT  12  /  AlkPhos  28<L>  07-15           PT/INR - ( 15 Jul 2017 22:00 )   PT: 11.6 SEC;   INR: 1.03    PTT:30.3 SEC    ABG - ( 15 Jul 2017 05:52 ) pH: 7.39  /  pCO2: 34    /  pO2: 100   / HCO3: 21    / Base Excess: -4.4  /  SaO2: 98.6      ===================== IMAGING STUDIES ===================   CT Angio Abdomen and Pelvis w/ IV Cont (07.15.17 @ 11:55) >  EXAM:  CT ANGIO ABD PELV (W)AW IC    PROCEDURE DATE:  Jul 15 2017   INTERPRETATION:  CLINICAL INFORMATION: Status post left upper lung wedge   biopsy. Intra-abdominal splenic bleed.    COMPARISON: CT abdomen pelvisperformed on the same date at 9:22 AM.     FINDINGS:    LOWER CHEST: Small right pleural effusion is unchanged. Left chest tube   in place with subcutaneous emphysema tracking along the left anterior   chest wall and abdominal soft tissues. Small amount of pneumomediastinum   along the pericardial sac is unchanged.    LIVER: Within normal limits.  BILE DUCTS: Normal caliber.  GALLBLADDER: Within normal limits.  SPLEEN: Large subcapsular splenic hematoma with hyperdense fluid tracking   along the left paracolic gutter extending into the intraperitoneal   compartment, unchanged. There is focal heterogeneity of the lateral edge   of the spleen (1100 B: 51). This may represent a site of focal splenic   injury measuring less than 1 cm in depth. Noevidence of active arterial   extravasation.  PANCREAS: Within normal limits.  ADRENALS: Within normal limits.  KIDNEYS/URETERS: Unchanged left renal cyst.    BLADDER: Within normal limits.  REPRODUCTIVE ORGANS: The uterus and adnexa are within normal limits.    BOWEL: No bowel obstruction. Mild thickening of the splenic flexure is   likely reactive. Appendix is normal.  PERITONEUM: Large volume hemoperitoneum.  VESSELS: Within normal limits.  RETROPERITONEUM: Retroperitoneal adenopathy unchanged, with reference   node measuring 2.4 x 2.1 cm (series 2 image 47).  ABDOMINAL WALL: Small fat-containing of the hernia.  BONES: Degenerative changes of the spine.    IMPRESSION: Redemonstration of a large subcapsular splenic hematoma   resulting in large volume hemoperitoneum.   No CT evidence of active arterial extravasation.      CXR  Jul 15 2017   INTERPRETATION:  EXAMINATION: RAD CHEST PORTABLE ROUTINE  COMPARISON: 7/14/2017.  FINDINGS:   Cardiac silhouette normal in size. Left-sided postsurgical changes and   chest tube. Clear lungs. No pleural effusion or pneumothorax.    IMPRESSION:   No change from prior study.      ====================ASSESSMENT AND PLAN ================  62 y. F  POD # 3 post Left VATS, robot-assisted   PHYLLIS segmentectomy, MLND  (07/12/2017 )    Issues: acute posthemorrhagic anemia             hemoperitoneum from splenic hematoma             abdominal pain             left chest tube in place    ====================== NEUROLOGY=======================  Pain control with  Tylenol IV / PO Tramadol/ Lidocaine patch  Pt is not tolerating Dilaudid ( nausea)    ==================== RESPIRATORY========================  Pt is on      2  L nasal canula   Comfortable, no evidence of distress.  Using incentive spirometry   Monitor chest tube output  Chest tube to suction 	  Continue  pulmonary toilet  Head of bed elevation to 30-40 degrees    ====================CARDIOVASCULAR=====================  Continue hemodynamic monitoring/ telemetry  Titrating Phenylephrine for SBP > 90, MAP > 65    ===================== RENAL ============================   D/C IVF as per surgical recs to minimize H/H dilution  Monitor I/Os, BUN/ Cr  and electrolytes   Keep Dimas for UO monitoring    ==================== GASTROINTESTINAL===================  Continue GI prophylaxis with  Protonix  Continue  Reglan for nausea - PRN	  NPO except meds    =======================    ENDOCRIN  =====================  Glycemic monitoring    ===================HEMATOLOGIC/ONCOLOGIC =============  Monitor chest tube output. No signs of active bleeding.   Follow CBC, coags  in AM  DVT prophylaxis with SCD,     ========================INFECTIOUS DISEASE===============  No signs of infection. Monitor for fever / leukocytosis.  All surgical incision / chest tube  sites look clean        Pertinent clinical, laboratory, radiographic, hemodynamic, echocardiographic, respiratory data, microbiologic data and chart were reviewed and analyzed frequently throughout the course of the day and night. GI and DVT prophylaxis, glycemic control, head of bed elevation and skin care issues were addressed.  Patient seen, examined and plan discussed with CT Surgery / CTICU team during rounds.    I have spent    75  minutes of critical care time with this pt between   8  am    and  11:55   pm      K. Romaniuk  MD

## 2017-07-16 DIAGNOSIS — R58 HEMORRHAGE, NOT ELSEWHERE CLASSIFIED: ICD-10-CM

## 2017-07-16 LAB
ALBUMIN SERPL ELPH-MCNC: 3.4 G/DL — SIGNIFICANT CHANGE UP (ref 3.3–5)
ALP SERPL-CCNC: 34 U/L — LOW (ref 40–120)
ALT FLD-CCNC: 14 U/L — SIGNIFICANT CHANGE UP (ref 4–33)
ANISOCYTOSIS BLD QL: SLIGHT — SIGNIFICANT CHANGE UP
APTT BLD: 25.8 SEC — LOW (ref 27.5–37.4)
AST SERPL-CCNC: 18 U/L — SIGNIFICANT CHANGE UP (ref 4–32)
BILIRUB SERPL-MCNC: 1 MG/DL — SIGNIFICANT CHANGE UP (ref 0.2–1.2)
BUN SERPL-MCNC: 10 MG/DL — SIGNIFICANT CHANGE UP (ref 7–23)
BUN SERPL-MCNC: 12 MG/DL — SIGNIFICANT CHANGE UP (ref 7–23)
CALCIUM SERPL-MCNC: 8.6 MG/DL — SIGNIFICANT CHANGE UP (ref 8.4–10.5)
CALCIUM SERPL-MCNC: 8.6 MG/DL — SIGNIFICANT CHANGE UP (ref 8.4–10.5)
CHLORIDE SERPL-SCNC: 106 MMOL/L — SIGNIFICANT CHANGE UP (ref 98–107)
CHLORIDE SERPL-SCNC: 107 MMOL/L — SIGNIFICANT CHANGE UP (ref 98–107)
CO2 SERPL-SCNC: 23 MMOL/L — SIGNIFICANT CHANGE UP (ref 22–31)
CO2 SERPL-SCNC: 24 MMOL/L — SIGNIFICANT CHANGE UP (ref 22–31)
CREAT SERPL-MCNC: 0.51 MG/DL — SIGNIFICANT CHANGE UP (ref 0.5–1.3)
CREAT SERPL-MCNC: 0.53 MG/DL — SIGNIFICANT CHANGE UP (ref 0.5–1.3)
GLUCOSE SERPL-MCNC: 88 MG/DL — SIGNIFICANT CHANGE UP (ref 70–99)
GLUCOSE SERPL-MCNC: 99 MG/DL — SIGNIFICANT CHANGE UP (ref 70–99)
HCT VFR BLD CALC: 24.2 % — LOW (ref 34.5–45)
HCT VFR BLD CALC: 24.7 % — LOW (ref 34.5–45)
HCT VFR BLD CALC: 25.5 % — LOW (ref 34.5–45)
HCT VFR BLD CALC: 26.4 % — LOW (ref 34.5–45)
HGB BLD-MCNC: 8.4 G/DL — LOW (ref 11.5–15.5)
HGB BLD-MCNC: 8.6 G/DL — LOW (ref 11.5–15.5)
HGB BLD-MCNC: 8.8 G/DL — LOW (ref 11.5–15.5)
HGB BLD-MCNC: 9 G/DL — LOW (ref 11.5–15.5)
HYPOCHROMIA BLD QL: SLIGHT — SIGNIFICANT CHANGE UP
INR BLD: 1.05 — SIGNIFICANT CHANGE UP (ref 0.88–1.17)
LG PLATELETS BLD QL AUTO: SLIGHT — SIGNIFICANT CHANGE UP
MAGNESIUM SERPL-MCNC: 1.9 MG/DL — SIGNIFICANT CHANGE UP (ref 1.6–2.6)
MAGNESIUM SERPL-MCNC: 2 MG/DL — SIGNIFICANT CHANGE UP (ref 1.6–2.6)
MANUAL SMEAR VERIFICATION: SIGNIFICANT CHANGE UP
MCHC RBC-ENTMCNC: 28 PG — SIGNIFICANT CHANGE UP (ref 27–34)
MCHC RBC-ENTMCNC: 29.2 PG — SIGNIFICANT CHANGE UP (ref 27–34)
MCHC RBC-ENTMCNC: 29.6 PG — SIGNIFICANT CHANGE UP (ref 27–34)
MCHC RBC-ENTMCNC: 30.1 PG — SIGNIFICANT CHANGE UP (ref 27–34)
MCHC RBC-ENTMCNC: 34.1 % — SIGNIFICANT CHANGE UP (ref 32–36)
MCHC RBC-ENTMCNC: 34.5 % — SIGNIFICANT CHANGE UP (ref 32–36)
MCHC RBC-ENTMCNC: 34.7 % — SIGNIFICANT CHANGE UP (ref 32–36)
MCHC RBC-ENTMCNC: 34.8 % — SIGNIFICANT CHANGE UP (ref 32–36)
MCV RBC AUTO: 82.2 FL — SIGNIFICANT CHANGE UP (ref 80–100)
MCV RBC AUTO: 83.7 FL — SIGNIFICANT CHANGE UP (ref 80–100)
MCV RBC AUTO: 85.9 FL — SIGNIFICANT CHANGE UP (ref 80–100)
MCV RBC AUTO: 86.7 FL — SIGNIFICANT CHANGE UP (ref 80–100)
NRBC # FLD: 0 — SIGNIFICANT CHANGE UP
OVALOCYTES BLD QL SMEAR: SLIGHT — SIGNIFICANT CHANGE UP
PHOSPHATE SERPL-MCNC: 2.2 MG/DL — LOW (ref 2.5–4.5)
PHOSPHATE SERPL-MCNC: 2.9 MG/DL — SIGNIFICANT CHANGE UP (ref 2.5–4.5)
PLATELET # BLD AUTO: 112 K/UL — LOW (ref 150–400)
PLATELET # BLD AUTO: 114 K/UL — LOW (ref 150–400)
PLATELET # BLD AUTO: 126 K/UL — LOW (ref 150–400)
PLATELET # BLD AUTO: 129 K/UL — LOW (ref 150–400)
PLATELET COUNT - ESTIMATE: SIGNIFICANT CHANGE UP
PMV BLD: 10.9 FL — SIGNIFICANT CHANGE UP (ref 7–13)
PMV BLD: 11.1 FL — SIGNIFICANT CHANGE UP (ref 7–13)
PMV BLD: 11.3 FL — SIGNIFICANT CHANGE UP (ref 7–13)
PMV BLD: 11.9 FL — SIGNIFICANT CHANGE UP (ref 7–13)
POLYCHROMASIA BLD QL SMEAR: SLIGHT — SIGNIFICANT CHANGE UP
POTASSIUM SERPL-MCNC: 4.4 MMOL/L — SIGNIFICANT CHANGE UP (ref 3.5–5.3)
POTASSIUM SERPL-MCNC: 4.4 MMOL/L — SIGNIFICANT CHANGE UP (ref 3.5–5.3)
POTASSIUM SERPL-SCNC: 4.4 MMOL/L — SIGNIFICANT CHANGE UP (ref 3.5–5.3)
POTASSIUM SERPL-SCNC: 4.4 MMOL/L — SIGNIFICANT CHANGE UP (ref 3.5–5.3)
PROT SERPL-MCNC: 5.3 G/DL — LOW (ref 6–8.3)
PROTHROM AB SERPL-ACNC: 11.8 SEC — SIGNIFICANT CHANGE UP (ref 9.8–13.1)
RBC # BLD: 2.79 M/UL — LOW (ref 3.8–5.2)
RBC # BLD: 2.95 M/UL — LOW (ref 3.8–5.2)
RBC # BLD: 2.97 M/UL — LOW (ref 3.8–5.2)
RBC # BLD: 3.21 M/UL — LOW (ref 3.8–5.2)
RBC # FLD: 15.6 % — HIGH (ref 10.3–14.5)
RBC # FLD: 15.8 % — HIGH (ref 10.3–14.5)
RBC # FLD: 16.1 % — HIGH (ref 10.3–14.5)
RBC # FLD: 17.7 % — HIGH (ref 10.3–14.5)
SODIUM SERPL-SCNC: 141 MMOL/L — SIGNIFICANT CHANGE UP (ref 135–145)
SODIUM SERPL-SCNC: 142 MMOL/L — SIGNIFICANT CHANGE UP (ref 135–145)
WBC # BLD: 11.16 K/UL — HIGH (ref 3.8–10.5)
WBC # BLD: 12.46 K/UL — HIGH (ref 3.8–10.5)
WBC # BLD: 12.87 K/UL — HIGH (ref 3.8–10.5)
WBC # BLD: 12.88 K/UL — HIGH (ref 3.8–10.5)
WBC # FLD AUTO: 11.16 K/UL — HIGH (ref 3.8–10.5)
WBC # FLD AUTO: 12.46 K/UL — HIGH (ref 3.8–10.5)
WBC # FLD AUTO: 12.87 K/UL — HIGH (ref 3.8–10.5)
WBC # FLD AUTO: 12.88 K/UL — HIGH (ref 3.8–10.5)

## 2017-07-16 PROCEDURE — 71010: CPT | Mod: 26

## 2017-07-16 PROCEDURE — 99292 CRITICAL CARE ADDL 30 MIN: CPT

## 2017-07-16 PROCEDURE — 71010: CPT | Mod: 26,77

## 2017-07-16 PROCEDURE — 99291 CRITICAL CARE FIRST HOUR: CPT

## 2017-07-16 RX ORDER — TRAMADOL HYDROCHLORIDE 50 MG/1
25 TABLET ORAL EVERY 4 HOURS
Qty: 0 | Refills: 0 | Status: DISCONTINUED | OUTPATIENT
Start: 2017-07-16 | End: 2017-07-20

## 2017-07-16 RX ORDER — ACETAMINOPHEN 500 MG
1000 TABLET ORAL ONCE
Qty: 0 | Refills: 0 | Status: COMPLETED | OUTPATIENT
Start: 2017-07-16 | End: 2017-07-16

## 2017-07-16 RX ORDER — ACETAMINOPHEN 500 MG
1000 TABLET ORAL ONCE
Qty: 0 | Refills: 0 | Status: COMPLETED | OUTPATIENT
Start: 2017-07-17 | End: 2017-07-17

## 2017-07-16 RX ORDER — SODIUM CHLORIDE 9 MG/ML
1000 INJECTION, SOLUTION INTRAVENOUS
Qty: 0 | Refills: 0 | Status: DISCONTINUED | OUTPATIENT
Start: 2017-07-16 | End: 2017-07-18

## 2017-07-16 RX ORDER — TRAMADOL HYDROCHLORIDE 50 MG/1
25 TABLET ORAL ONCE
Qty: 0 | Refills: 0 | Status: DISCONTINUED | OUTPATIENT
Start: 2017-07-16 | End: 2017-07-16

## 2017-07-16 RX ADMIN — Medication 1000 MILLIGRAM(S): at 14:39

## 2017-07-16 RX ADMIN — Medication 1000 MILLIGRAM(S): at 09:03

## 2017-07-16 RX ADMIN — TRAMADOL HYDROCHLORIDE 25 MILLIGRAM(S): 50 TABLET ORAL at 00:29

## 2017-07-16 RX ADMIN — SODIUM CHLORIDE 3 MILLILITER(S): 9 INJECTION INTRAMUSCULAR; INTRAVENOUS; SUBCUTANEOUS at 05:19

## 2017-07-16 RX ADMIN — Medication 400 MILLIGRAM(S): at 14:07

## 2017-07-16 RX ADMIN — Medication 400 MILLIGRAM(S): at 20:00

## 2017-07-16 RX ADMIN — TRAMADOL HYDROCHLORIDE 25 MILLIGRAM(S): 50 TABLET ORAL at 05:40

## 2017-07-16 RX ADMIN — TRAMADOL HYDROCHLORIDE 25 MILLIGRAM(S): 50 TABLET ORAL at 18:31

## 2017-07-16 RX ADMIN — Medication 100 MILLIGRAM(S): at 14:07

## 2017-07-16 RX ADMIN — Medication 400 MILLIGRAM(S): at 08:33

## 2017-07-16 RX ADMIN — Medication 1000 MILLIGRAM(S): at 20:30

## 2017-07-16 RX ADMIN — Medication 63.75 MILLIMOLE(S): at 05:40

## 2017-07-16 RX ADMIN — TRAMADOL HYDROCHLORIDE 25 MILLIGRAM(S): 50 TABLET ORAL at 17:53

## 2017-07-16 RX ADMIN — SODIUM CHLORIDE 30 MILLILITER(S): 9 INJECTION, SOLUTION INTRAVENOUS at 22:15

## 2017-07-16 RX ADMIN — SIMVASTATIN 40 MILLIGRAM(S): 20 TABLET, FILM COATED ORAL at 22:15

## 2017-07-16 RX ADMIN — SODIUM CHLORIDE 3 MILLILITER(S): 9 INJECTION INTRAMUSCULAR; INTRAVENOUS; SUBCUTANEOUS at 21:15

## 2017-07-16 RX ADMIN — SODIUM CHLORIDE 30 MILLILITER(S): 9 INJECTION, SOLUTION INTRAVENOUS at 08:45

## 2017-07-16 RX ADMIN — Medication 1 MILLIGRAM(S): at 22:16

## 2017-07-16 RX ADMIN — PHENYLEPHRINE HYDROCHLORIDE 43.58 MICROGRAM(S)/KG/MIN: 10 INJECTION INTRAVENOUS at 22:15

## 2017-07-16 RX ADMIN — MONTELUKAST 10 MILLIGRAM(S): 4 TABLET, CHEWABLE ORAL at 11:59

## 2017-07-16 RX ADMIN — PANTOPRAZOLE SODIUM 40 MILLIGRAM(S): 20 TABLET, DELAYED RELEASE ORAL at 17:54

## 2017-07-16 RX ADMIN — PANTOPRAZOLE SODIUM 40 MILLIGRAM(S): 20 TABLET, DELAYED RELEASE ORAL at 05:41

## 2017-07-16 RX ADMIN — LIDOCAINE 1 PATCH: 4 CREAM TOPICAL at 04:00

## 2017-07-16 RX ADMIN — PROGESTERONE 100 MILLIGRAM(S): 200 CAPSULE, LIQUID FILLED ORAL at 22:16

## 2017-07-16 RX ADMIN — PHENYLEPHRINE HYDROCHLORIDE 43.58 MICROGRAM(S)/KG/MIN: 10 INJECTION INTRAVENOUS at 08:45

## 2017-07-16 RX ADMIN — SODIUM CHLORIDE 3 MILLILITER(S): 9 INJECTION INTRAMUSCULAR; INTRAVENOUS; SUBCUTANEOUS at 13:33

## 2017-07-16 RX ADMIN — Medication 100 MILLIGRAM(S): at 22:15

## 2017-07-16 RX ADMIN — TRAMADOL HYDROCHLORIDE 25 MILLIGRAM(S): 50 TABLET ORAL at 06:48

## 2017-07-16 NOTE — PROGRESS NOTE ADULT - SUBJECTIVE AND OBJECTIVE BOX
LOUISA MILLAN            MRN-[id number]         codeine (Angioedema)  codeine (Angioedema; Hives)             HPI:  61y/o female with h/o multiple lung nodule since 2011, gradually enlarged in size. Pt admitted  for left Video assisted thoracoscopy left upper lobe wedge resection possible segmentectomy possible left upper lobectomy with lung marking by Dr Chamberlain on 7/12/17. Thoracic surgery was uneventful.   On POD #2 while on the floor on 8 [place]  pt developed hypotension, acute anemia  and abdominal pain out of proportion to clinical findings. Pt transferred back to ICU, volume resuscitated and transfused total of 4 units of PRBC and 2 x Albumin in addition to Phenylephrine drip.  Emergent  surgical consult and abd CT- angiogram  obtained - with positive findings of large hemoperitoneum from splenic source. No active bleeding identified during angiogram - therefore possible  need for acute surgical intervention was deemed not required at this time .   Plan to closely monitor abdominal status, hemodynamics and H/H with surgical team stand by for now.      Procedure: Left VATS, robot-assisted   PHYLLIS segmentectomy, MLND  (07/12/2017 )  POD # : 4    Issues: Hypotension             acute posthemorrhagic anemia             hemoperitoneum from splenic hematoma / rupture             abdominal pain             left chest tube in place      Interval/Overnight Events/ ROS  On POD #2 while on the floor on 8 [place]  pt developed hypotension, acute anemia  and abdominal pain out of proportion to clinical findings. Pt transferred back to ICU, volume resuscitated and transfused total of 4 units of PRBC and 2 x Albumin in addition to Phenylephrine drip.  Emergent  surgical consult and abd CT- angiogram  obtained - with positive findings of large hemoperitoneum from splenic source. No active bleeding identified during angiogram - therefore possible  need for acute surgical intervention was deemed not required at this time .   Plan to closely monitor abdominal status, hemodynamics and H/H with surgical team stand by for now.    Pt still c/o abd pain but less severe. No SOB, no palpitations, no Nausea/vomiting/diarrhea.       ICU Vital Signs Last 24 Hrs  T(C): 36.7 (16 Jul 2017 12:00), Max: 37.3 (15 Jul 2017 16:00)  T(F): 98 (16 Jul 2017 12:00), Max: 99.1 (15 Jul 2017 16:00)  HR: 61 (16 Jul 2017 15:00) (54 - 66)  BP: 92/39 (16 Jul 2017 15:00) (92/39 - 127/50)  BP(mean): 52 (16 Jul 2017 15:00) (52 - 75)  ABP: --  ABP(mean): --  RR: 22 (16 Jul 2017 15:00) (16 - 29)  SpO2: 97% (16 Jul 2017 15:00) (95% - 100%)    I&O's Summary    15 Jul 2017 07:01  -  16 Jul 2017 07:00  --------------------------------------------------------  IN: 2114.8 mL / OUT: 2805 mL / NET: -690.2 mL    16 Jul 2017 07:01  -  16 Jul 2017 15:26  --------------------------------------------------------  IN: 733.6 mL / OUT: 905 mL / NET: -171.4 mL      CAPILLARY BLOOD GLUCOSE  181 (15 Jul 2017 04:00)      MEDICATIONS  (STANDING):  sodium chloride 0.9% lock flush 3 milliLiter(s) IV Push every 8 hours  simvastatin 40 milliGRAM(s) Oral at bedtime  montelukast 10 milliGRAM(s) Oral daily  docusate sodium 100 milliGRAM(s) Oral three times a day  progesterone 100 milliGRAM(s) Oral at bedtime  pantoprazole  Injectable 40 milliGRAM(s) IV Push every 12 hours  estradiol 1 milliGRAM(s) Oral daily  phenylephrine    Infusion 2 MICROgram(s)/kG/Min (43.575 mL/Hr) IV Continuous <Continuous>  dextrose 5% + lactated ringers. 1000 milliLiter(s) (30 mL/Hr) IV Continuous <Continuous>    MEDICATIONS  (PRN):  ipratropium    for Nebulization 500 MICROGram(s) Nebulizer every 6 hours PRN Shortness of Breath and/or Wheezing  aprepitant 40 milliGRAM(s) Oral every 12 hours PRN N/V  metoclopramide Injectable 10 milliGRAM(s) IV Push every 8 hours PRN Nausea / Vomiting  ketorolac   Injectable 15 milliGRAM(s) IV Push every 6 hours PRN Moderate Pain (4 - 6)  simethicone 80 milliGRAM(s) Chew every 6 hours PRN Gas  bisacodyl Suppository 10 milliGRAM(s) Rectal daily PRN Constipation      Physical exam:                             General:               Pt is awake, supine in bed, not in any distress                                                  Neuro:                  Nonfocal                             Cardiovascular:     S1 & S2, regular                           Respiratory:         Air entry is fair and equal on both sides, has bilateral conducted sounds                           GI:                       Soft, distended and tender, Bowel sounds sluggish.                            Ext:                     No cyanosis or edema     Labs:                                                                           8.8    12.87 )-----------( 112      ( 16 Jul 2017 08:57 )             25.5             07-16    142  |  106  |  10  ----------------------------<  99  4.4   |  24  |  0.51    Ca    8.6      16 Jul 2017 08:57  Phos  2.9     07-16  Mg     1.9     07-16    TPro  5.3<L>  /  Alb  3.4  /  TBili  1.0  /  DBili  x   /  AST  18  /  ALT  14  /  AlkPhos  34<L>  07-16                  PT/INR - ( 16 Jul 2017 04:20 )   PT: 11.8 SEC;   INR: 1.05          PTT - ( 16 Jul 2017 04:20 )  PTT:25.8 SEC    CXR: Small L-Pneumo    CT Angio: large subcapsular splenic hematoma   resulting in large volume hemoperitoneum.   No CT evidence of active arterial extravasation.    Plan:    General: 62yFemale  s/p Left VATS, robot-assisted   PHYLLIS segmentectomy, MLND   POD# 3, still c/o crampy abdominal pain                             Neuro:                                         Pain control with Tylenol only                            Cardiovascular:                                          Continue hemodynamic monitoring.                                        Titrate Corey to MAP>65                            Respiratory:                                         Pt is on 2 L  nasal canula,                                           Comfortable, not in any distress.                                         Monitor chest tube output                                         Chest tube to water seal   - Small L-pneumo                                                                 Continue bronchodilators, pulmonary toilet                            GI                                         NPO                                         Continue GI prophylaxis with Protonix                                         Continue Zofran / Reglan for nausea - PRN	                                                                 Renal:                                         Continue D5LR 30CC/hr                                         Monitor I/Os and electrolytes                                         Dimas                                                  Hem/ Onc:                                         CBC  Q4 hrs                                         Monitor chest tube output &  signs of bleeding.                            Infectious disease:                                            No signs of infection. Monitor for fever / leukocytosis.                                          All surgical incision / chest tube  sites look clean                            Endocrine:                                           Continue Accu-Cheks with coverage        Pertinent clinical, laboratory, radiographic, hemodynamic, echocardiographic, respiratory data, microbiologic data and chart were reviewed and analyzed frequently throughout the course of the day and night  Patient seen, examined and plan discussed with CT Surgery Dr. Feliciano / CTICU team during rounds.    Plan to continue H/H checks Q4hrs, hemodynamic support, Surgery follow up    I have spent 90 minutes of critical care time with this pt between  8 am and  12am        Wayne Freed MD

## 2017-07-16 NOTE — PROGRESS NOTE ADULT - ASSESSMENT
63yo F s/p robotic PHYLLIS segmentectomy POD 4 complicated by bleeding and a subcapsular splenic hematoma requiring 4u PRBCs now with stable Hct at 25 requiring phenylephrine.

## 2017-07-16 NOTE — PROGRESS NOTE ADULT - SUBJECTIVE AND OBJECTIVE BOX
GENERAL SURGERY CONSULT NOTE    Patient is a 62y old  Female who presents with a chief complaint of lung nodule left lung (03 Jul 2017 07:57) POD 4 s/p PHYLLIS segmentectomy with hemorrhage POD 2.      HPI:  63y/o female with h/o multiple lung nodules admitted 7/12 for robotic PHYLLIS segmentectomy complicated by an RRT on POD 2 for suspected vasovagal episode found to have hct drop from 34 to 23. She was transferred to CTICU and received 4U PRBCs. Patient was found to have a large subcapsular splenic hematoma   resulting in large volume hemoperitoneum with no evidence of active arterial extravasation. Last transfusion given at 11a yesterday with hct stable at 25.5 this am. No dizziness, SOB, chest pain. No abdominal pain or tenderness.       PAST MEDICAL & SURGICAL HISTORY:  CAD (coronary artery disease): non obstructive 50% proximal LAD stenosis  PVC (premature ventricular contraction)  Eczema  Nasal bone fracture  Sleep apnea: noncompliant in using CPAP machine  Papilloma of breast: right  Disorder of shoulder: right  Seasonal allergies  GERD (gastroesophageal reflux disease)  Pulmonary nodule: bilateral  Mild asthma: denies intubation or hospitalizations  Papilloma of breast: right breast excision &amp; bx 2013  S/P arthroscopy of shoulder: right April 2013  S/P blepharoplasty: in 2008  S/P adenoidectomy: as a child    FAMILY HISTORY:  Family history of lung cancer (Father)    MEDICATIONS  (STANDING):  sodium chloride 0.9% lock flush 3 milliLiter(s) IV Push every 8 hours  simvastatin 40 milliGRAM(s) Oral at bedtime  montelukast 10 milliGRAM(s) Oral daily  docusate sodium 100 milliGRAM(s) Oral three times a day  progesterone 100 milliGRAM(s) Oral at bedtime  pantoprazole  Injectable 40 milliGRAM(s) IV Push every 12 hours  estradiol 1 milliGRAM(s) Oral daily  phenylephrine    Infusion 2 MICROgram(s)/kG/Min (43.575 mL/Hr) IV Continuous <Continuous>  dextrose 5% + lactated ringers. 1000 milliLiter(s) (30 mL/Hr) IV Continuous <Continuous>  acetaminophen  IVPB. 1000 milliGRAM(s) IV Intermittent once    MEDICATIONS  (PRN):  ipratropium    for Nebulization 500 MICROGram(s) Nebulizer every 6 hours PRN Shortness of Breath and/or Wheezing  aprepitant 40 milliGRAM(s) Oral every 12 hours PRN N/V  metoclopramide Injectable 10 milliGRAM(s) IV Push every 8 hours PRN Nausea / Vomiting  ketorolac   Injectable 15 milliGRAM(s) IV Push every 6 hours PRN Moderate Pain (4 - 6)  simethicone 80 milliGRAM(s) Chew every 6 hours PRN Gas  bisacodyl Suppository 10 milliGRAM(s) Rectal daily PRN Constipation    Allergies    codeine (Angioedema)  codeine (Angioedema; Hives)    Intolerances    Vital Signs Last 24 Hrs  T(C): 36.7 (16 Jul 2017 12:00), Max: 37.3 (15 Jul 2017 16:00)  T(F): 98 (16 Jul 2017 12:00), Max: 99.1 (15 Jul 2017 16:00)  HR: 62 (16 Jul 2017 13:00) (54 - 66)  BP: 110/45 (16 Jul 2017 12:00) (99/47 - 127/50)  BP(mean): 60 (16 Jul 2017 12:00) (58 - 82)  RR: 23 (16 Jul 2017 13:00) (16 - 29)  SpO2: 97% (16 Jul 2017 13:00) (95% - 100%)  Daily     Daily     Exam:  General: awake, alert, NAD  HEENT: NCAT, MMM  Resp: nonlabored, equal chest rise  Chest: equal chest rise, L chest tube in place to water seal, incisions c/d/i  Abd: soft, NT, mildly distended, abdominal wall eccyhmoses noted  Ext: FREEDMAN  Neuro: intact                          8.8    12.87 )-----------( 112      ( 16 Jul 2017 08:57 )             25.5     07-16    142  |  106  |  10  ----------------------------<  99  4.4   |  24  |  0.51    Ca    8.6      16 Jul 2017 08:57  Phos  2.9     07-16  Mg     1.9     07-16    TPro  5.3<L>  /  Alb  3.4  /  TBili  1.0  /  DBili  x   /  AST  18  /  ALT  14  /  AlkPhos  34<L>  07-16    PT/INR - ( 16 Jul 2017 04:20 )   PT: 11.8 SEC;   INR: 1.05          PTT - ( 16 Jul 2017 04:20 )  PTT:25.8 SEC      IMAGING STUDIES:  CTA: Redemonstration of a large subcapsular splenic hematoma   resulting in large volume hemoperitoneum.   No CT evidence of active arterial extravasation.

## 2017-07-16 NOTE — PROGRESS NOTE ADULT - PROBLEM SELECTOR PLAN 1
- continue NPO, IVF  - wean phenylephrine as tolerated  - serial hct and transfuse as needed  - A team surgery will follow, please call with any questions or for acute change in patient status including drop in hct  - care as per CTICU

## 2017-07-17 LAB
ALBUMIN SERPL ELPH-MCNC: 3.2 G/DL — LOW (ref 3.3–5)
ALP SERPL-CCNC: 38 U/L — LOW (ref 40–120)
ALT FLD-CCNC: 14 U/L — SIGNIFICANT CHANGE UP (ref 4–33)
AST SERPL-CCNC: 17 U/L — SIGNIFICANT CHANGE UP (ref 4–32)
BILIRUB SERPL-MCNC: 0.9 MG/DL — SIGNIFICANT CHANGE UP (ref 0.2–1.2)
BLD GP AB SCN SERPL QL: NEGATIVE — SIGNIFICANT CHANGE UP
BUN SERPL-MCNC: 11 MG/DL — SIGNIFICANT CHANGE UP (ref 7–23)
CA-I BLD-SCNC: 1.1 MMOL/L — SIGNIFICANT CHANGE UP (ref 1.03–1.23)
CALCIUM SERPL-MCNC: 8.4 MG/DL — SIGNIFICANT CHANGE UP (ref 8.4–10.5)
CHLORIDE SERPL-SCNC: 105 MMOL/L — SIGNIFICANT CHANGE UP (ref 98–107)
CO2 SERPL-SCNC: 22 MMOL/L — SIGNIFICANT CHANGE UP (ref 22–31)
CREAT SERPL-MCNC: 0.53 MG/DL — SIGNIFICANT CHANGE UP (ref 0.5–1.3)
GLUCOSE SERPL-MCNC: 101 MG/DL — HIGH (ref 70–99)
HCT VFR BLD CALC: 29.1 % — LOW (ref 34.5–45)
HCT VFR BLD CALC: 29.1 % — LOW (ref 34.5–45)
HCT VFR BLD CALC: 29.4 % — LOW (ref 34.5–45)
HCT VFR BLD CALC: 33.2 % — LOW (ref 34.5–45)
HGB BLD-MCNC: 10.1 G/DL — LOW (ref 11.5–15.5)
HGB BLD-MCNC: 11.3 G/DL — LOW (ref 11.5–15.5)
HGB BLD-MCNC: 9.9 G/DL — LOW (ref 11.5–15.5)
HGB BLD-MCNC: 9.9 G/DL — LOW (ref 11.5–15.5)
MCHC RBC-ENTMCNC: 28.1 PG — SIGNIFICANT CHANGE UP (ref 27–34)
MCHC RBC-ENTMCNC: 28.1 PG — SIGNIFICANT CHANGE UP (ref 27–34)
MCHC RBC-ENTMCNC: 28.2 PG — SIGNIFICANT CHANGE UP (ref 27–34)
MCHC RBC-ENTMCNC: 29.2 PG — SIGNIFICANT CHANGE UP (ref 27–34)
MCHC RBC-ENTMCNC: 33.7 % — SIGNIFICANT CHANGE UP (ref 32–36)
MCHC RBC-ENTMCNC: 34 % — SIGNIFICANT CHANGE UP (ref 32–36)
MCHC RBC-ENTMCNC: 34 % — SIGNIFICANT CHANGE UP (ref 32–36)
MCHC RBC-ENTMCNC: 34.7 % — SIGNIFICANT CHANGE UP (ref 32–36)
MCV RBC AUTO: 82.6 FL — SIGNIFICANT CHANGE UP (ref 80–100)
MCV RBC AUTO: 82.7 FL — SIGNIFICANT CHANGE UP (ref 80–100)
MCV RBC AUTO: 83.8 FL — SIGNIFICANT CHANGE UP (ref 80–100)
MCV RBC AUTO: 84.1 FL — SIGNIFICANT CHANGE UP (ref 80–100)
NRBC # FLD: 0 — SIGNIFICANT CHANGE UP
PLATELET # BLD AUTO: 139 K/UL — LOW (ref 150–400)
PLATELET # BLD AUTO: 148 K/UL — LOW (ref 150–400)
PLATELET # BLD AUTO: 153 K/UL — SIGNIFICANT CHANGE UP (ref 150–400)
PLATELET # BLD AUTO: 159 K/UL — SIGNIFICANT CHANGE UP (ref 150–400)
PMV BLD: 10.2 FL — SIGNIFICANT CHANGE UP (ref 7–13)
PMV BLD: 10.4 FL — SIGNIFICANT CHANGE UP (ref 7–13)
PMV BLD: 10.7 FL — SIGNIFICANT CHANGE UP (ref 7–13)
PMV BLD: 11.1 FL — SIGNIFICANT CHANGE UP (ref 7–13)
POTASSIUM SERPL-MCNC: 3.8 MMOL/L — SIGNIFICANT CHANGE UP (ref 3.5–5.3)
POTASSIUM SERPL-SCNC: 3.8 MMOL/L — SIGNIFICANT CHANGE UP (ref 3.5–5.3)
PROT SERPL-MCNC: 5.3 G/DL — LOW (ref 6–8.3)
RBC # BLD: 3.46 M/UL — LOW (ref 3.8–5.2)
RBC # BLD: 3.51 M/UL — LOW (ref 3.8–5.2)
RBC # BLD: 3.52 M/UL — LOW (ref 3.8–5.2)
RBC # BLD: 4.02 M/UL — SIGNIFICANT CHANGE UP (ref 3.8–5.2)
RBC # FLD: 18.1 % — HIGH (ref 10.3–14.5)
RBC # FLD: 18.2 % — HIGH (ref 10.3–14.5)
RH IG SCN BLD-IMP: POSITIVE — SIGNIFICANT CHANGE UP
SODIUM SERPL-SCNC: 138 MMOL/L — SIGNIFICANT CHANGE UP (ref 135–145)
WBC # BLD: 10.27 K/UL — SIGNIFICANT CHANGE UP (ref 3.8–10.5)
WBC # BLD: 11.37 K/UL — HIGH (ref 3.8–10.5)
WBC # BLD: 11.62 K/UL — HIGH (ref 3.8–10.5)
WBC # BLD: 11.71 K/UL — HIGH (ref 3.8–10.5)
WBC # FLD AUTO: 10.27 K/UL — SIGNIFICANT CHANGE UP (ref 3.8–10.5)
WBC # FLD AUTO: 11.37 K/UL — HIGH (ref 3.8–10.5)
WBC # FLD AUTO: 11.62 K/UL — HIGH (ref 3.8–10.5)
WBC # FLD AUTO: 11.71 K/UL — HIGH (ref 3.8–10.5)

## 2017-07-17 PROCEDURE — 71010: CPT | Mod: 26

## 2017-07-17 RX ORDER — ACETAMINOPHEN 500 MG
1000 TABLET ORAL ONCE
Qty: 0 | Refills: 0 | Status: COMPLETED | OUTPATIENT
Start: 2017-07-17 | End: 2017-07-17

## 2017-07-17 RX ORDER — LIDOCAINE 4 G/100G
1 CREAM TOPICAL DAILY
Qty: 0 | Refills: 0 | Status: DISCONTINUED | OUTPATIENT
Start: 2017-07-17 | End: 2017-07-20

## 2017-07-17 RX ORDER — SODIUM CHLORIDE 9 MG/ML
250 INJECTION, SOLUTION INTRAVENOUS ONCE
Qty: 0 | Refills: 0 | Status: COMPLETED | OUTPATIENT
Start: 2017-07-17 | End: 2017-07-17

## 2017-07-17 RX ORDER — SODIUM CHLORIDE 9 MG/ML
250 INJECTION, SOLUTION INTRAVENOUS ONCE
Qty: 0 | Refills: 0 | Status: DISCONTINUED | OUTPATIENT
Start: 2017-07-17 | End: 2017-07-17

## 2017-07-17 RX ADMIN — SODIUM CHLORIDE 30 MILLILITER(S): 9 INJECTION, SOLUTION INTRAVENOUS at 21:58

## 2017-07-17 RX ADMIN — Medication 100 MILLIGRAM(S): at 14:08

## 2017-07-17 RX ADMIN — Medication 400 MILLIGRAM(S): at 11:15

## 2017-07-17 RX ADMIN — SODIUM CHLORIDE 3 MILLILITER(S): 9 INJECTION INTRAMUSCULAR; INTRAVENOUS; SUBCUTANEOUS at 13:16

## 2017-07-17 RX ADMIN — TRAMADOL HYDROCHLORIDE 25 MILLIGRAM(S): 50 TABLET ORAL at 01:00

## 2017-07-17 RX ADMIN — Medication 1000 MILLIGRAM(S): at 05:00

## 2017-07-17 RX ADMIN — LIDOCAINE 1 PATCH: 4 CREAM TOPICAL at 14:40

## 2017-07-17 RX ADMIN — PROGESTERONE 100 MILLIGRAM(S): 200 CAPSULE, LIQUID FILLED ORAL at 21:55

## 2017-07-17 RX ADMIN — SIMVASTATIN 40 MILLIGRAM(S): 20 TABLET, FILM COATED ORAL at 21:54

## 2017-07-17 RX ADMIN — SODIUM CHLORIDE 30 MILLILITER(S): 9 INJECTION, SOLUTION INTRAVENOUS at 07:40

## 2017-07-17 RX ADMIN — PANTOPRAZOLE SODIUM 40 MILLIGRAM(S): 20 TABLET, DELAYED RELEASE ORAL at 06:41

## 2017-07-17 RX ADMIN — Medication 1000 MILLIGRAM(S): at 17:30

## 2017-07-17 RX ADMIN — TRAMADOL HYDROCHLORIDE 25 MILLIGRAM(S): 50 TABLET ORAL at 00:30

## 2017-07-17 RX ADMIN — TRAMADOL HYDROCHLORIDE 25 MILLIGRAM(S): 50 TABLET ORAL at 22:30

## 2017-07-17 RX ADMIN — Medication 400 MILLIGRAM(S): at 17:11

## 2017-07-17 RX ADMIN — TRAMADOL HYDROCHLORIDE 25 MILLIGRAM(S): 50 TABLET ORAL at 07:40

## 2017-07-17 RX ADMIN — Medication 1 MILLIGRAM(S): at 21:55

## 2017-07-17 RX ADMIN — Medication 100 MILLIGRAM(S): at 21:54

## 2017-07-17 RX ADMIN — Medication 100 MILLIGRAM(S): at 06:41

## 2017-07-17 RX ADMIN — PANTOPRAZOLE SODIUM 40 MILLIGRAM(S): 20 TABLET, DELAYED RELEASE ORAL at 17:11

## 2017-07-17 RX ADMIN — SODIUM CHLORIDE 3 MILLILITER(S): 9 INJECTION INTRAMUSCULAR; INTRAVENOUS; SUBCUTANEOUS at 21:43

## 2017-07-17 RX ADMIN — SODIUM CHLORIDE 3 MILLILITER(S): 9 INJECTION INTRAMUSCULAR; INTRAVENOUS; SUBCUTANEOUS at 06:23

## 2017-07-17 RX ADMIN — Medication 1000 MILLIGRAM(S): at 11:30

## 2017-07-17 RX ADMIN — MONTELUKAST 10 MILLIGRAM(S): 4 TABLET, CHEWABLE ORAL at 11:10

## 2017-07-17 RX ADMIN — Medication 400 MILLIGRAM(S): at 04:30

## 2017-07-17 RX ADMIN — TRAMADOL HYDROCHLORIDE 25 MILLIGRAM(S): 50 TABLET ORAL at 21:54

## 2017-07-17 RX ADMIN — SODIUM CHLORIDE 1000 MILLILITER(S): 9 INJECTION, SOLUTION INTRAVENOUS at 09:32

## 2017-07-17 RX ADMIN — SIMETHICONE 80 MILLIGRAM(S): 80 TABLET, CHEWABLE ORAL at 21:54

## 2017-07-17 RX ADMIN — TRAMADOL HYDROCHLORIDE 25 MILLIGRAM(S): 50 TABLET ORAL at 08:40

## 2017-07-17 NOTE — PROGRESS NOTE ADULT - ASSESSMENT
63yo F s/p robotic PHYLLIS segmentectomy POD 5 complicated by bleeding and a subcapsular splenic hematoma requiring 4u PRBCs now with stable Hct at 29.1, No transfusions overnight. no longer requiring pressor support.

## 2017-07-17 NOTE — PROGRESS NOTE ADULT - SUBJECTIVE AND OBJECTIVE BOX
GENERAL SURGERY DAILY PROGRESS NOTE:   A Team  Seen in CTICU  Subjective:  Pt endorses abdominal pain and pain at chest tube site. Moderately well controlled with tramadol and IV tylenol. Off pressors and required no transfusions overnight. Denies dizziness, CP other than at L tube site, SOB while sitting up in bed.        Objective:    Physical Exam:  NAD, awake and alert  Respirations nonlabored  L chest tube site with minimal serrous drainage  Tube with serosang output, small air leak on water seal  Abd softly distended, a bit tender throughout, more to deep palpation, especially in LUQ  Dimas with clear yellow urine    MEDICATIONS  (STANDING):  sodium chloride 0.9% lock flush 3 milliLiter(s) IV Push every 8 hours  simvastatin 40 milliGRAM(s) Oral at bedtime  montelukast 10 milliGRAM(s) Oral daily  docusate sodium 100 milliGRAM(s) Oral three times a day  progesterone 100 milliGRAM(s) Oral at bedtime  pantoprazole  Injectable 40 milliGRAM(s) IV Push every 12 hours  estradiol 1 milliGRAM(s) Oral daily  phenylephrine    Infusion 2 MICROgram(s)/kG/Min (43.575 mL/Hr) IV Continuous <Continuous>  dextrose 5% + lactated ringers. 1000 milliLiter(s) (30 mL/Hr) IV Continuous <Continuous>    MEDICATIONS  (PRN):  ipratropium    for Nebulization 500 MICROGram(s) Nebulizer every 6 hours PRN Shortness of Breath and/or Wheezing  aprepitant 40 milliGRAM(s) Oral every 12 hours PRN N/V  metoclopramide Injectable 10 milliGRAM(s) IV Push every 8 hours PRN Nausea / Vomiting  simethicone 80 milliGRAM(s) Chew every 6 hours PRN Gas  bisacodyl Suppository 10 milliGRAM(s) Rectal daily PRN Constipation  traMADol 25 milliGRAM(s) Oral every 4 hours PRN Moderate Pain (4 - 6)      Vital Signs Last 24 Hrs  T(C): 36.9 (17 Jul 2017 04:00), Max: 37.1 (16 Jul 2017 19:00)  T(F): 98.4 (17 Jul 2017 04:00), Max: 98.8 (16 Jul 2017 19:00)  HR: 68 (17 Jul 2017 05:00) (60 - 80)  BP: 99/43 (17 Jul 2017 05:00) (92/39 - 113/49)  BP(mean): 56 (17 Jul 2017 05:00) (52 - 72)  RR: 18 (17 Jul 2017 05:00) (18 - 28)  SpO2: 95% (17 Jul 2017 05:00) (93% - 100%)    I&O's Detail    15 Jul 2017 07:01  -  16 Jul 2017 07:00  --------------------------------------------------------  IN:    IV PiggyBack: 200 mL    phenylephrine   Infusion: 610.1 mL    phenylephrine   Infusion: 204.7 mL    PRBCs (Packed Red Blood Cells): 600 mL    sodium chloride 0.9%: 500 mL  Total IN: 2114.8 mL    OUT:    Chest Tube: 205 mL    Indwelling Catheter - Urethral: 2600 mL  Total OUT: 2805 mL    Total NET: -690.2 mL      16 Jul 2017 07:01  -  17 Jul 2017 05:57  --------------------------------------------------------  IN:    dextrose 5% + lactated ringers.: 690 mL    IV PiggyBack: 300 mL    Oral Fluid: 130 mL    phenylephrine   Infusion: 282.9 mL    PRBCs (Packed Red Blood Cells): 300 mL  Total IN: 1702.9 mL    OUT:    Chest Tube: 40 mL    Indwelling Catheter - Urethral: 1800 mL  Total OUT: 1840 mL    Total NET: -137.1 mL          Daily     Daily     LABS:                        9.9    11.37 )-----------( 139      ( 17 Jul 2017 04:30 )             29.1     07-16    142  |  106  |  10  ----------------------------<  99  4.4   |  24  |  0.51    Ca    8.6      16 Jul 2017 08:57  Phos  2.9     07-16  Mg     1.9     07-16    TPro  5.3<L>  /  Alb  3.4  /  TBili  1.0  /  DBili  x   /  AST  18  /  ALT  14  /  AlkPhos  34<L>  07-16    PT/INR - ( 16 Jul 2017 04:20 )   PT: 11.8 SEC;   INR: 1.05          PTT - ( 16 Jul 2017 04:20 )  PTT:25.8 SEC

## 2017-07-17 NOTE — PROGRESS NOTE ADULT - PROBLEM SELECTOR PLAN 1
- continue NPO, IVF  -serial abdominal exams  - serial hct and transfuse as needed  - A team surgery will continue to follow  - care as per CTICU  - will d/w  Held this AM

## 2017-07-17 NOTE — DIETITIAN INITIAL EVALUATION ADULT. - OTHER INFO
Nutrition assessment initiated for critical care LOS. Pt. alert, oriented , tolerating clear liquids , reports intolerance to increased amount of lactose , no recent wt. loss, notedthe request for not receiving chicken breast for melas as it was dry and had difficulty tolerating .

## 2017-07-17 NOTE — PHYSICAL THERAPY INITIAL EVALUATION ADULT - PERTINENT HX OF CURRENT PROBLEM, REHAB EVAL
62 y.o. female admitted for schedule Left Video assisted thoracoscopy left upper lobe wedge resection, segmentectomy, left upper lobectomy with lung marking on 7/12/17 secondary to lung nodule. Complicated by bleeding and a subcapsular splenic hematoma requiring blood transfusion

## 2017-07-17 NOTE — PROGRESS NOTE ADULT - SUBJECTIVE AND OBJECTIVE BOX
LOUISA MILLAN  MRN-[id number]    HPI:  61y/o female with h/o multiple lung nodule since 2011, gradually got bigger. Presents to [facility] for pre surgical evaluation for left Video assisted thoracoscopy left upper lobe wedge resection possible segmentectomy possible left upper lobectomy with lung marking by Dr Chamberlain on 7/12/17. (03 Jul 2017 07:57)      Procedure:Left VATS, robot-assisted   PHYLLIS segmentectomy, MLND    POD # : 5    Issues: Hypotension             acute posthemorrhagic anemia             hemoperitoneum from splenic hematoma / rupture             abdominal pain      Interval/Overnight Events:  Pt remained hemodynamically stable overnight, not on any pressors or inotropes. OOB to chair, breathing comfortably with minimal pain. Ambulated several times   A-line and grigsby d/natalia       PAST MEDICAL & SURGICAL HISTORY:  CAD (coronary artery disease): non obstructive 50% proximal LAD stenosis  PVC (premature ventricular contraction)  Eczema  Nasal bone fracture  Sleep apnea: noncompliant in using CPAP machine  Papilloma of breast: right  Disorder of shoulder: right  Seasonal allergies  GERD (gastroesophageal reflux disease)  Pulmonary nodule: bilateral  Mild asthma: denies intubation or hospitalizations  Papilloma of breast: right breast excision &amp; bx 2013  S/P arthroscopy of shoulder: right April 2013  S/P blepharoplasty: in 2008  S/P adenoidectomy: as a child      ***VITAL SIGNS:  Vital Signs Last 24 Hrs  T(C): 36.8 (17 Jul 2017 20:00), Max: 37 (17 Jul 2017 16:00)  T(F): 98.3 (17 Jul 2017 20:00), Max: 98.6 (17 Jul 2017 16:00)  HR: 71 (17 Jul 2017 23:00) (66 - 90)  BP: 96/46 (17 Jul 2017 23:00) (92/43 - 111/50)  BP(mean): 59 (17 Jul 2017 23:00) (54 - 71)  RR: 18 (17 Jul 2017 23:00) (18 - 28)  SpO2: 99% (17 Jul 2017 23:00) (73% - 100%)  I/Os:   I&O's Detail    16 Jul 2017 07:01  -  17 Jul 2017 07:00  --------------------------------------------------------  IN:    dextrose 5% + lactated ringers.: 720 mL    IV PiggyBack: 300 mL    Oral Fluid: 130 mL    phenylephrine   Infusion: 282.9 mL    PRBCs (Packed Red Blood Cells): 300 mL  Total IN: 1732.9 mL    OUT:    Chest Tube: 40 mL    Indwelling Catheter - Urethral: 1860 mL  Total OUT: 1900 mL    Total NET: -167.1 mL      17 Jul 2017 07:01  -  17 Jul 2017 23:54  --------------------------------------------------------  IN:    dextrose 5% + lactated ringers.: 510 mL    IV PiggyBack: 200 mL    Lactated Ringers IV Bolus: 250 mL    Oral Fluid: 720 mL  Total IN: 1680 mL    OUT:    Chest Tube: 135 mL    Indwelling Catheter - Urethral: 1320 mL  Total OUT: 1455 mL    Total NET: 225 mL        CAPILLARY BLOOD GLUCOSE        =======================  VENTILATOR SETTINGS  ===================    ======================= PATIENT CARE ACCESS DEVICES ===================  Peripheral IV  Central Venous Line	R	L	IJ	Fem	SC			Placed:   Arterial Line	R	L	PT	DP	Fem	Rad	Ax	Placed:   Midline:				  Urinary Catheter, Date Placed:   Necessity of urinary, arterial, and venous catheters discussed    ======================= PHYSICAL EXAM============================  General:                         Awake, alert, not in any distress  Neuro:                            Moving all extremities to commands. No acute change from baseline exam.	  Respiratory:	Lungs clear to auscultation bilaterally. Good aeration.                                           No rales, rhonchi. Effort even and unlabored.  CV:		Regular rate and rhythm. Normal S1/S2. No murmurs                                          Distal pulses present.  Abdomen:	                     Soft, non-distended. Bowel sounds present / absent.   Skin:		No rash.  Extremities:	Warm, no cyanosis or edema.  Palpable pulses    ============================ LABS =========================                        9.9    10.27 )-----------( 159      ( 17 Jul 2017 18:45 )             29.4     07-17    138  |  105  |  11  ----------------------------<  101<H>  3.8   |  22  |  0.53    Ca    8.4      17 Jul 2017 04:30  Phos  2.9     07-16  Mg     1.9     07-16    TPro  5.3<L>  /  Alb  3.2<L>  /  TBili  0.9  /  DBili  x   /  AST  17  /  ALT  14  /  AlkPhos  38<L>  07-17    LIVER FUNCTIONS - ( 17 Jul 2017 04:30 )  Alb: 3.2 g/dL / Pro: 5.3 g/dL / ALK PHOS: 38 u/L / ALT: 14 u/L / AST: 17 u/L / GGT: x           PT/INR - ( 16 Jul 2017 04:20 )   PT: 11.8 SEC;   INR: 1.05          PTT - ( 16 Jul 2017 04:20 )  PTT:25.8 SEC        ======================Microbiology==============================    ===================== IMAGING STUDIES =========================      =====================Cardiac tests======================    ====================ASSESMENT AND PLAN ==============          =======================  MEDICATIONS  =================  MEDICATIONS  (STANDING):  sodium chloride 0.9% lock flush 3 milliLiter(s) IV Push every 8 hours  simvastatin 40 milliGRAM(s) Oral at bedtime  montelukast 10 milliGRAM(s) Oral daily  docusate sodium 100 milliGRAM(s) Oral three times a day  progesterone 100 milliGRAM(s) Oral at bedtime  pantoprazole  Injectable 40 milliGRAM(s) IV Push every 12 hours  estradiol 1 milliGRAM(s) Oral daily  dextrose 5% + lactated ringers. 1000 milliLiter(s) (30 mL/Hr) IV Continuous <Continuous>  lidocaine   Patch 1 Patch Transdermal daily    MEDICATIONS  (PRN):  ipratropium    for Nebulization 500 MICROGram(s) Nebulizer every 6 hours PRN Shortness of Breath and/or Wheezing  aprepitant 40 milliGRAM(s) Oral every 12 hours PRN N/V  metoclopramide Injectable 10 milliGRAM(s) IV Push every 8 hours PRN Nausea / Vomiting  simethicone 80 milliGRAM(s) Chew every 6 hours PRN Gas  bisacodyl Suppository 10 milliGRAM(s) Rectal daily PRN Constipation  traMADol 25 milliGRAM(s) Oral every 4 hours PRN Moderate Pain (4 - 6)      ====================== NEUROLOGY=====================  Pain control with tramadol/ Tylenol IV, avoid Toradol     ==================== RESPIRATORY======================  Pt is on 2 L nasal canula   Comfortable, not in any distress.  Using incentive spirometryMonitor chest tube output  Chest tube to water seal	, small to mod PTX, monitoring on suction, improved     Mechanical Ventilation:    Mechanical ventilator staus assessed & settings reviewed  Continue bronchodilators, pulmonary toilet    ====================CARDIOVASCULAR==================  Continue hemodynamic monitoring.  Not on any pressors  Continue cardiovascular / antihypertensive medications    ===================== RENAL =========================  Continue LR 30CC/hr      D/C IVF  Monitor I/Os and electrolytes  D/C Grigsby      Keep Grigsby  BPH: Continue Flomax      ==================== GASTROINTESTINAL===================  On regular diet, tolerating  Continue GI prophylaxis with Pepcid / Protonix  Continue Zofran / Reglan for nausea - PRN	  NPO    =======================    ENDOCRIN  =====================  Glycemic monitoring  F/S with coverage  ===================HEMATOLOGIC/ONC ===================  Monitor chest tube output. No signs of active bleeding.   Follow CBC in AM  DVT prophylaxis with SCD, sc Heparin    ========================INFECTIOUS DISEASE================  No signs of infection. Monitor for fever / leukocytosis.  All surgical incision / chest tube  sites look clean  D/C Grigsby      Pertinent clinical, laboratory, radiographic, hemodynamic, echocardiographic, respiratory data, microbiologic data and chart were reviewed and analyzed frequently throughout the course of the day and night. GI and DVT prophylaxis, glycemic control, head of bed elevation and skin care issues were addressed.  Patient seen, examined and plan discussed with CT Surgery / CTICU team during rounds.    I have spent               minutes of critical care time with this pt between            am/pm    and               am/ pm      Tanya Jamison DO, FACEP LOUISA MILLAN  MRN-[id number]    HPI:  61y/o female with h/o multiple lung nodule since 2011, gradually got bigger. Presents to [facility] for pre surgical evaluation for left Video assisted thoracoscopy left upper lobe wedge resection possible segmentectomy possible left upper lobectomy with lung marking by Dr Chamberlain on 7/12/17. (03 Jul 2017 07:57)      Procedure:Left VATS, robot-assisted   PHYLLIS segmentectomy, MLND    POD # : 5    Issues: Hypotension             acute posthemorrhagic anemia             hemoperitoneum from splenic hematoma / rupture             abdominal pain      Interval/Overnight Events:  Pt remained hemodynamically stable overnight, not on any pressors or inotropes. OOB to chair, breathing comfortably with minimal pain. Corey drip off, stable      PAST MEDICAL & SURGICAL HISTORY:  CAD (coronary artery disease): non obstructive 50% proximal LAD stenosis  PVC (premature ventricular contraction)  Eczema  Nasal bone fracture  Sleep apnea: noncompliant in using CPAP machine  Papilloma of breast: right  Disorder of shoulder: right  Seasonal allergies  GERD (gastroesophageal reflux disease)  Pulmonary nodule: bilateral  Mild asthma: denies intubation or hospitalizations  Papilloma of breast: right breast excision &amp; bx 2013  S/P arthroscopy of shoulder: right April 2013  S/P blepharoplasty: in 2008  S/P adenoidectomy: as a child      ***VITAL SIGNS:  Vital Signs Last 24 Hrs  T(C): 36.8 (17 Jul 2017 20:00), Max: 37 (17 Jul 2017 16:00)  T(F): 98.3 (17 Jul 2017 20:00), Max: 98.6 (17 Jul 2017 16:00)  HR: 71 (17 Jul 2017 23:00) (66 - 90)  BP: 96/46 (17 Jul 2017 23:00) (92/43 - 111/50)  BP(mean): 59 (17 Jul 2017 23:00) (54 - 71)  RR: 18 (17 Jul 2017 23:00) (18 - 28)  SpO2: 99% (17 Jul 2017 23:00) (73% - 100%)  I/Os:   I&O's Detail    16 Jul 2017 07:01  -  17 Jul 2017 07:00  --------------------------------------------------------  IN:    dextrose 5% + lactated ringers.: 720 mL    IV PiggyBack: 300 mL    Oral Fluid: 130 mL    phenylephrine   Infusion: 282.9 mL    PRBCs (Packed Red Blood Cells): 300 mL  Total IN: 1732.9 mL    OUT:    Chest Tube: 40 mL    Indwelling Catheter - Urethral: 1860 mL  Total OUT: 1900 mL    Total NET: -167.1 mL      17 Jul 2017 07:01  -  17 Jul 2017 23:54  --------------------------------------------------------  IN:    dextrose 5% + lactated ringers.: 510 mL    IV PiggyBack: 200 mL    Lactated Ringers IV Bolus: 250 mL    Oral Fluid: 720 mL  Total IN: 1680 mL    OUT:    Chest Tube: 135 mL    Indwelling Catheter - Urethral: 1320 mL  Total OUT: 1455 mL    Total NET: 225 mL        CAPILLARY BLOOD GLUCOSE        =======================  VENTILATOR SETTINGS  ===================    ======================= PATIENT CARE ACCESS DEVICES ===================  Peripheral IV  Dimas cath for CC illness monitoring    ======================= PHYSICAL EXAM============================  General:                         Awake, alert, not in any distress, pale gen weakness  Neuro:                            Moving all extremities to commands. nonfocal	  Respiratory:	Lungs clear to auscultation bilaterally. Good aeration.                                           No rales, rhonchi. Effort even and unlabored.  CV:		Regular rate and rhythm. Normal S1/S2. No murmurs                                          Distal pulses present.  Abdomen:	                     Soft, mild distended. Bowel sounds present, no ecchymosis   Skin:		No rash.  Extremities:	Warm, no cyanosis or edema.  Palpable pulses    ============================ LABS =========================                        9.9    10.27 )-----------( 159      ( 17 Jul 2017 18:45 )             29.4     07-17    138  |  105  |  11  ----------------------------<  101<H>  3.8   |  22  |  0.53    Ca    8.4      17 Jul 2017 04:30  Phos  2.9     07-16  Mg     1.9     07-16    TPro  5.3<L>  /  Alb  3.2<L>  /  TBili  0.9  /  DBili  x   /  AST  17  /  ALT  14  /  AlkPhos  38<L>  07-17    LIVER FUNCTIONS - ( 17 Jul 2017 04:30 )  Alb: 3.2 g/dL / Pro: 5.3 g/dL / ALK PHOS: 38 u/L / ALT: 14 u/L / AST: 17 u/L / GGT: x           PT/INR - ( 16 Jul 2017 04:20 )   PT: 11.8 SEC;   INR: 1.05          PTT - ( 16 Jul 2017 04:20 )  PTT:25.8 SEC        ======================Microbiology==============================    ===================== IMAGING STUDIES =========================    Left chest tube is unchanged in position.  There is a small to moderate left apical pneumothorax which is larger.  There is subcutaneous edema.  No focal lung consolidation or pleural effusion seen.    =======================  MEDICATIONS  =================  MEDICATIONS  (STANDING):  sodium chloride 0.9% lock flush 3 milliLiter(s) IV Push every 8 hours  simvastatin 40 milliGRAM(s) Oral at bedtime  montelukast 10 milliGRAM(s) Oral daily  docusate sodium 100 milliGRAM(s) Oral three times a day  progesterone 100 milliGRAM(s) Oral at bedtime  pantoprazole  Injectable 40 milliGRAM(s) IV Push every 12 hours  estradiol 1 milliGRAM(s) Oral daily  dextrose 5% + lactated ringers. 1000 milliLiter(s) (30 mL/Hr) IV Continuous <Continuous>  lidocaine   Patch 1 Patch Transdermal daily    MEDICATIONS  (PRN):  ipratropium    for Nebulization 500 MICROGram(s) Nebulizer every 6 hours PRN Shortness of Breath and/or Wheezing  aprepitant 40 milliGRAM(s) Oral every 12 hours PRN N/V  metoclopramide Injectable 10 milliGRAM(s) IV Push every 8 hours PRN Nausea / Vomiting  simethicone 80 milliGRAM(s) Chew every 6 hours PRN Gas  bisacodyl Suppository 10 milliGRAM(s) Rectal daily PRN Constipation  traMADol 25 milliGRAM(s) Oral every 4 hours PRN Moderate Pain (4 - 6)      ====================== NEUROLOGY=====================  Pain control with tramadol/ Tylenol IV, avoid Toradol . nonfocal  OOB to chair    ==================== RESPIRATORY======================  Pt is on 2 L nasal canula   Comfortable, not in any distress.  Using incentive spirometryMonitor chest tube output  Chest tube to water seal	, small to mod PTX, monitoring on suction, improved , repeat CXR  Continue bronchodilators, pulmonary toilet    ====================CARDIOVASCULAR==================  Continue hemodynamic monitoring.  Not on any pressors    ===================== RENAL =========================  Continue IVF  Monitor I/Os and electrolytes. Keep Dimas        ==================== GASTROINTESTINAL===================  clear liq diet  Continue GI prophylaxis with Pepcid / Protonix  Continue Zofran / Reglan for nausea - PRN	      =======================    ENDOCRIN  =====================  Glycemic monitoring    ===================HEMATOLOGIC/ONC ===================  Series abd exam, H/H Q4 hours, close observation for rebleed,   Monitor chest tube output. No signs of active bleeding.   DVT prophylaxis with SCD, Hold  Heparin    ========================INFECTIOUS DISEASE================  No signs of infection. Monitor for fever / leukocytosis.  All surgical incision / chest tube  sites look clean        Pertinent clinical, laboratory, radiographic, hemodynamic,respiratory data, and chart were reviewed and analyzed frequently throughout the course of the day and night. GI and DVT prophylaxis, glycemic control, head of bed elevation and skin care issues were addressed.  Patient seen, examined and plan discussed with CT Surgery / CTICU team during rounds.      Tanya Jamison DO, FACEP

## 2017-07-18 LAB
ALBUMIN SERPL ELPH-MCNC: 3.4 G/DL — SIGNIFICANT CHANGE UP (ref 3.3–5)
ALP SERPL-CCNC: 42 U/L — SIGNIFICANT CHANGE UP (ref 40–120)
ALT FLD-CCNC: 13 U/L — SIGNIFICANT CHANGE UP (ref 4–33)
AST SERPL-CCNC: 18 U/L — SIGNIFICANT CHANGE UP (ref 4–32)
BASOPHILS # BLD AUTO: 0.02 K/UL — SIGNIFICANT CHANGE UP (ref 0–0.2)
BASOPHILS NFR BLD AUTO: 0.2 % — SIGNIFICANT CHANGE UP (ref 0–2)
BILIRUB SERPL-MCNC: 0.7 MG/DL — SIGNIFICANT CHANGE UP (ref 0.2–1.2)
BUN SERPL-MCNC: 11 MG/DL — SIGNIFICANT CHANGE UP (ref 7–23)
CALCIUM SERPL-MCNC: 8.6 MG/DL — SIGNIFICANT CHANGE UP (ref 8.4–10.5)
CHLORIDE SERPL-SCNC: 102 MMOL/L — SIGNIFICANT CHANGE UP (ref 98–107)
CO2 SERPL-SCNC: 23 MMOL/L — SIGNIFICANT CHANGE UP (ref 22–31)
CREAT SERPL-MCNC: 0.49 MG/DL — LOW (ref 0.5–1.3)
EOSINOPHIL # BLD AUTO: 0.19 K/UL — SIGNIFICANT CHANGE UP (ref 0–0.5)
EOSINOPHIL NFR BLD AUTO: 1.9 % — SIGNIFICANT CHANGE UP (ref 0–6)
GLUCOSE SERPL-MCNC: 114 MG/DL — HIGH (ref 70–99)
HCT VFR BLD CALC: 28.1 % — LOW (ref 34.5–45)
HCT VFR BLD CALC: 29.6 % — LOW (ref 34.5–45)
HCT VFR BLD CALC: 33.3 % — LOW (ref 34.5–45)
HGB BLD-MCNC: 10 G/DL — LOW (ref 11.5–15.5)
HGB BLD-MCNC: 11.1 G/DL — LOW (ref 11.5–15.5)
HGB BLD-MCNC: 9.6 G/DL — LOW (ref 11.5–15.5)
IMM GRANULOCYTES # BLD AUTO: 0.05 # — SIGNIFICANT CHANGE UP
IMM GRANULOCYTES NFR BLD AUTO: 0.5 % — SIGNIFICANT CHANGE UP (ref 0–1.5)
LYMPHOCYTES # BLD AUTO: 1.38 K/UL — SIGNIFICANT CHANGE UP (ref 1–3.3)
LYMPHOCYTES # BLD AUTO: 13.6 % — SIGNIFICANT CHANGE UP (ref 13–44)
MCHC RBC-ENTMCNC: 28.3 PG — SIGNIFICANT CHANGE UP (ref 27–34)
MCHC RBC-ENTMCNC: 28.8 PG — SIGNIFICANT CHANGE UP (ref 27–34)
MCHC RBC-ENTMCNC: 29.4 PG — SIGNIFICANT CHANGE UP (ref 27–34)
MCHC RBC-ENTMCNC: 33.3 % — SIGNIFICANT CHANGE UP (ref 32–36)
MCHC RBC-ENTMCNC: 33.8 % — SIGNIFICANT CHANGE UP (ref 32–36)
MCHC RBC-ENTMCNC: 34.2 % — SIGNIFICANT CHANGE UP (ref 32–36)
MCV RBC AUTO: 82.9 FL — SIGNIFICANT CHANGE UP (ref 80–100)
MCV RBC AUTO: 86.5 FL — SIGNIFICANT CHANGE UP (ref 80–100)
MCV RBC AUTO: 87.1 FL — SIGNIFICANT CHANGE UP (ref 80–100)
MONOCYTES # BLD AUTO: 0.9 K/UL — SIGNIFICANT CHANGE UP (ref 0–0.9)
MONOCYTES NFR BLD AUTO: 8.9 % — SIGNIFICANT CHANGE UP (ref 2–14)
NEUTROPHILS # BLD AUTO: 7.62 K/UL — HIGH (ref 1.8–7.4)
NEUTROPHILS NFR BLD AUTO: 74.9 % — SIGNIFICANT CHANGE UP (ref 43–77)
NRBC # FLD: 0 — SIGNIFICANT CHANGE UP
PLATELET # BLD AUTO: 174 K/UL — SIGNIFICANT CHANGE UP (ref 150–400)
PLATELET # BLD AUTO: 196 K/UL — SIGNIFICANT CHANGE UP (ref 150–400)
PLATELET # BLD AUTO: 200 K/UL — SIGNIFICANT CHANGE UP (ref 150–400)
PMV BLD: 10.4 FL — SIGNIFICANT CHANGE UP (ref 7–13)
PMV BLD: 10.7 FL — SIGNIFICANT CHANGE UP (ref 7–13)
PMV BLD: 10.9 FL — SIGNIFICANT CHANGE UP (ref 7–13)
POTASSIUM SERPL-MCNC: 4 MMOL/L — SIGNIFICANT CHANGE UP (ref 3.5–5.3)
POTASSIUM SERPL-SCNC: 4 MMOL/L — SIGNIFICANT CHANGE UP (ref 3.5–5.3)
PROT SERPL-MCNC: 5.6 G/DL — LOW (ref 6–8.3)
RBC # BLD: 3.39 M/UL — LOW (ref 3.8–5.2)
RBC # BLD: 3.4 M/UL — LOW (ref 3.8–5.2)
RBC # BLD: 3.85 M/UL — SIGNIFICANT CHANGE UP (ref 3.8–5.2)
RBC # FLD: 17.8 % — HIGH (ref 10.3–14.5)
RBC # FLD: 18 % — HIGH (ref 10.3–14.5)
RBC # FLD: 18.1 % — HIGH (ref 10.3–14.5)
SODIUM SERPL-SCNC: 139 MMOL/L — SIGNIFICANT CHANGE UP (ref 135–145)
WBC # BLD: 10.16 K/UL — SIGNIFICANT CHANGE UP (ref 3.8–10.5)
WBC # BLD: 10.77 K/UL — HIGH (ref 3.8–10.5)
WBC # BLD: 10.9 K/UL — HIGH (ref 3.8–10.5)
WBC # FLD AUTO: 10.16 K/UL — SIGNIFICANT CHANGE UP (ref 3.8–10.5)
WBC # FLD AUTO: 10.77 K/UL — HIGH (ref 3.8–10.5)
WBC # FLD AUTO: 10.9 K/UL — HIGH (ref 3.8–10.5)

## 2017-07-18 PROCEDURE — 71010: CPT | Mod: 26

## 2017-07-18 PROCEDURE — 71010: CPT | Mod: 26,77

## 2017-07-18 PROCEDURE — 99233 SBSQ HOSP IP/OBS HIGH 50: CPT

## 2017-07-18 RX ORDER — ACETAMINOPHEN 500 MG
1000 TABLET ORAL ONCE
Qty: 0 | Refills: 0 | Status: COMPLETED | OUTPATIENT
Start: 2017-07-18 | End: 2017-07-18

## 2017-07-18 RX ADMIN — PROGESTERONE 100 MILLIGRAM(S): 200 CAPSULE, LIQUID FILLED ORAL at 22:06

## 2017-07-18 RX ADMIN — Medication 400 MILLIGRAM(S): at 06:12

## 2017-07-18 RX ADMIN — PANTOPRAZOLE SODIUM 40 MILLIGRAM(S): 20 TABLET, DELAYED RELEASE ORAL at 18:24

## 2017-07-18 RX ADMIN — Medication 400 MILLIGRAM(S): at 13:28

## 2017-07-18 RX ADMIN — Medication 1000 MILLIGRAM(S): at 13:58

## 2017-07-18 RX ADMIN — TRAMADOL HYDROCHLORIDE 25 MILLIGRAM(S): 50 TABLET ORAL at 04:00

## 2017-07-18 RX ADMIN — TRAMADOL HYDROCHLORIDE 25 MILLIGRAM(S): 50 TABLET ORAL at 20:05

## 2017-07-18 RX ADMIN — LIDOCAINE 1 PATCH: 4 CREAM TOPICAL at 22:31

## 2017-07-18 RX ADMIN — Medication 100 MILLIGRAM(S): at 15:30

## 2017-07-18 RX ADMIN — SODIUM CHLORIDE 3 MILLILITER(S): 9 INJECTION INTRAMUSCULAR; INTRAVENOUS; SUBCUTANEOUS at 22:00

## 2017-07-18 RX ADMIN — LIDOCAINE 1 PATCH: 4 CREAM TOPICAL at 03:00

## 2017-07-18 RX ADMIN — Medication 1000 MILLIGRAM(S): at 23:00

## 2017-07-18 RX ADMIN — Medication 100 MILLIGRAM(S): at 22:06

## 2017-07-18 RX ADMIN — SIMVASTATIN 40 MILLIGRAM(S): 20 TABLET, FILM COATED ORAL at 22:06

## 2017-07-18 RX ADMIN — PANTOPRAZOLE SODIUM 40 MILLIGRAM(S): 20 TABLET, DELAYED RELEASE ORAL at 06:07

## 2017-07-18 RX ADMIN — Medication 400 MILLIGRAM(S): at 22:30

## 2017-07-18 RX ADMIN — SODIUM CHLORIDE 3 MILLILITER(S): 9 INJECTION INTRAMUSCULAR; INTRAVENOUS; SUBCUTANEOUS at 13:58

## 2017-07-18 RX ADMIN — TRAMADOL HYDROCHLORIDE 25 MILLIGRAM(S): 50 TABLET ORAL at 03:04

## 2017-07-18 RX ADMIN — SODIUM CHLORIDE 3 MILLILITER(S): 9 INJECTION INTRAMUSCULAR; INTRAVENOUS; SUBCUTANEOUS at 06:01

## 2017-07-18 RX ADMIN — LIDOCAINE 1 PATCH: 4 CREAM TOPICAL at 11:36

## 2017-07-18 RX ADMIN — SIMETHICONE 80 MILLIGRAM(S): 80 TABLET, CHEWABLE ORAL at 19:35

## 2017-07-18 RX ADMIN — TRAMADOL HYDROCHLORIDE 25 MILLIGRAM(S): 50 TABLET ORAL at 19:35

## 2017-07-18 RX ADMIN — MONTELUKAST 10 MILLIGRAM(S): 4 TABLET, CHEWABLE ORAL at 11:36

## 2017-07-18 RX ADMIN — SODIUM CHLORIDE 30 MILLILITER(S): 9 INJECTION, SOLUTION INTRAVENOUS at 06:07

## 2017-07-18 RX ADMIN — Medication 1 MILLIGRAM(S): at 22:06

## 2017-07-18 RX ADMIN — Medication 100 MILLIGRAM(S): at 06:07

## 2017-07-18 RX ADMIN — Medication 1000 MILLIGRAM(S): at 06:42

## 2017-07-18 NOTE — PROGRESS NOTE ADULT - SUBJECTIVE AND OBJECTIVE BOX
LOUISA MILLAN            MRN-[id number]         codeine (Angioedema)  codeine (Angioedema; Hives)               HPI:  61y/o female with h/o multiple lung nodule since 2011, gradually enlarged in size. Pt admitted  for left Video assisted thoracoscopy left upper lobe wedge resection possible segmentectomy possible left upper lobectomy with lung marking by Dr Chamberlain on 7/12/17. Thoracic surgery was uneventful.   On POD #2 while on the floor on 8 [place]  pt developed hypotension, acute anemia  and abdominal pain out of proportion to clinical findings. Pt transferred back to ICU, volume resuscitated and transfused total of 4 units of PRBC and 2 x Albumin in addition to Phenylephrine drip.  Emergent  surgical consult and abd CT- angiogram  obtained - with positive findings of large hemoperitoneum from splenic source. No active bleeding identified during angiogram - therefore possible  need for acute surgical intervention was deemed not required at this time .   Plan to closely monitor abdominal status, hemodynamics and H/H with surgical team stand by for now.    Today pt is feeling better, abdominal discomfort is minimal & H/H stable      Procedure: Left VATS, robot-assisted   PHYLLIS segmentectomy, MLND  (07/12/2017 )  POD # : 6    Issues:   ·	            Hemoperitoneum from splenic hematoma / rupture  ·	            Acute posthemorrhagic anema  ·	           Abdominal pain  ·	           left chest tube in place        ICU Vital Signs Last 24 Hrs  T(C): 36.5 (18 Jul 2017 08:00), Max: 37.2 (18 Jul 2017 00:00)  T(F): 97.7 (18 Jul 2017 08:00), Max: 99 (18 Jul 2017 00:00)  HR: 75 (18 Jul 2017 10:00) (67 - 90)  BP: 98/59 (18 Jul 2017 10:00) (92/42 - 106/55)  BP(mean): 68 (18 Jul 2017 10:00) (54 - 71)  ABP: --  ABP(mean): --  RR: 24 (18 Jul 2017 10:00) (17 - 28)  SpO2: 97% (18 Jul 2017 10:00) (73% - 99%)    I&O's Summary    17 Jul 2017 07:01  -  18 Jul 2017 07:00  --------------------------------------------------------  IN: 1890 mL / OUT: 1780 mL / NET: 110 mL    18 Jul 2017 07:01  -  18 Jul 2017 10:30  --------------------------------------------------------  IN: 320 mL / OUT: 220 mL / NET: 100 mL      CAPILLARY BLOOD GLUCOSE          MEDICATIONS  (STANDING):  sodium chloride 0.9% lock flush 3 milliLiter(s) IV Push every 8 hours  simvastatin 40 milliGRAM(s) Oral at bedtime  montelukast 10 milliGRAM(s) Oral daily  docusate sodium 100 milliGRAM(s) Oral three times a day  progesterone 100 milliGRAM(s) Oral at bedtime  pantoprazole  Injectable 40 milliGRAM(s) IV Push every 12 hours  estradiol 1 milliGRAM(s) Oral daily  dextrose 5% + lactated ringers. 1000 milliLiter(s) (30 mL/Hr) IV Continuous <Continuous>  lidocaine   Patch 1 Patch Transdermal daily    MEDICATIONS  (PRN):  ipratropium    for Nebulization 500 MICROGram(s) Nebulizer every 6 hours PRN Shortness of Breath and/or Wheezing  aprepitant 40 milliGRAM(s) Oral every 12 hours PRN N/V  metoclopramide Injectable 10 milliGRAM(s) IV Push every 8 hours PRN Nausea / Vomiting  simethicone 80 milliGRAM(s) Chew every 6 hours PRN Gas  bisacodyl Suppository 10 milliGRAM(s) Rectal daily PRN Constipation  traMADol 25 milliGRAM(s) Oral every 4 hours PRN Moderate Pain (4 - 6)      Physical exam:                             General:               Pt is awake, OOB in chair, not in any distress                                                  Neuro:                  Nonfocal                             Cardiovascular:     S1 & S2, regular                           Respiratory:          Air entry is fair and equal on both sides, has bilateral conducted sounds                           GI:                       Soft, distended and tender, Bowel sounds active.                            Ext:                     No cyanosis or edema    Labs:                                                                           9.6    10.16 )-----------( 174      ( 18 Jul 2017 02:45 )             28.1             07-18    139  |  102  |  11  ----------------------------<  114<H>  4.0   |  23  |  0.49<L>    Ca    8.6      18 Jul 2017 02:45    TPro  5.6<L>  /  Alb  3.4  /  TBili  0.7  /  DBili  x   /  AST  18  /  ALT  13  /  AlkPhos  42  07-18                      CXR: 7/18/17    Minimal subsegmental atelectasis at the right base.    Left lung volume loss with postsurgicalchange.    Left chest tube unchanged in position.    Minimal left apical pneumothorax, not significantly changed.        Plan:    General: 62yFemale  s/p Left VATS, robot-assisted   PHYLLIS segmentectomy, MLND   POD# 6,  c/o mild abdominal pain, OOB / ambulated                             Neuro:                                         Pain control with Tylenol only / tramadol                            Cardiovascular:                                          Continue hemodynamic monitoring.                                        OFF Corey                             Respiratory:                                         Pt is on 2 L  nasal canula,                                           Comfortable, not in any distress.                                         Monitor chest tube output                                         Chest tube to water seal   - repeat CXR in the afternoon to r/o pneumo                                                                Continue bronchodilators, pulmonary toilet                            GI                                         NPO                                         Continue GI prophylaxis with Protonix                                         Continue Zofran / Reglan for nausea - PRN	                                                                 Renal:                                         Continue D5LR 30CC/hr                                         Monitor I/Os and electrolytes                                                  Hem/ Onc:                                         CBC  Q8 hrs x 24 hrs                                         Monitor chest tube output &  signs of bleeding.                            Infectious disease:                                            No signs of infection. Monitor for fever / leukocytosis.                                          All surgical incision / chest tube  sites look clean                            Endocrine:                                           Continue Accu-Cheks with coverage        Pertinent clinical, laboratory, radiographic, hemodynamic, echocardiographic, respiratory data, microbiologic data and chart were reviewed and analyzed frequently throughout the course of the day and night  Patient seen, examined and plan discussed with CT Surgery Dr. Feliciano / CTICU team during rounds.    Plan to continue H/H checks Q8hrs, hemodynamic support, Surgery follow up        Wayne Freed MD

## 2017-07-19 ENCOUNTER — TRANSCRIPTION ENCOUNTER (OUTPATIENT)
Age: 62
End: 2017-07-19

## 2017-07-19 LAB
BUN SERPL-MCNC: 11 MG/DL — SIGNIFICANT CHANGE UP (ref 7–23)
CALCIUM SERPL-MCNC: 8.6 MG/DL — SIGNIFICANT CHANGE UP (ref 8.4–10.5)
CHLORIDE SERPL-SCNC: 104 MMOL/L — SIGNIFICANT CHANGE UP (ref 98–107)
CO2 SERPL-SCNC: 26 MMOL/L — SIGNIFICANT CHANGE UP (ref 22–31)
CREAT SERPL-MCNC: 0.49 MG/DL — LOW (ref 0.5–1.3)
GLUCOSE SERPL-MCNC: 102 MG/DL — HIGH (ref 70–99)
HCT VFR BLD CALC: 28.4 % — LOW (ref 34.5–45)
HGB BLD-MCNC: 9.5 G/DL — LOW (ref 11.5–15.5)
MAGNESIUM SERPL-MCNC: 1.8 MG/DL — SIGNIFICANT CHANGE UP (ref 1.6–2.6)
MCHC RBC-ENTMCNC: 29.3 PG — SIGNIFICANT CHANGE UP (ref 27–34)
MCHC RBC-ENTMCNC: 33.5 % — SIGNIFICANT CHANGE UP (ref 32–36)
MCV RBC AUTO: 87.7 FL — SIGNIFICANT CHANGE UP (ref 80–100)
NRBC # FLD: 0 — SIGNIFICANT CHANGE UP
PLATELET # BLD AUTO: 190 K/UL — SIGNIFICANT CHANGE UP (ref 150–400)
PMV BLD: 10.4 FL — SIGNIFICANT CHANGE UP (ref 7–13)
POTASSIUM SERPL-MCNC: 3.9 MMOL/L — SIGNIFICANT CHANGE UP (ref 3.5–5.3)
POTASSIUM SERPL-SCNC: 3.9 MMOL/L — SIGNIFICANT CHANGE UP (ref 3.5–5.3)
RBC # BLD: 3.24 M/UL — LOW (ref 3.8–5.2)
RBC # FLD: 17.7 % — HIGH (ref 10.3–14.5)
SODIUM SERPL-SCNC: 141 MMOL/L — SIGNIFICANT CHANGE UP (ref 135–145)
WBC # BLD: 9.71 K/UL — SIGNIFICANT CHANGE UP (ref 3.8–10.5)
WBC # FLD AUTO: 9.71 K/UL — SIGNIFICANT CHANGE UP (ref 3.8–10.5)

## 2017-07-19 PROCEDURE — 71010: CPT | Mod: 26

## 2017-07-19 PROCEDURE — 71010: CPT | Mod: 26,77

## 2017-07-19 PROCEDURE — 99233 SBSQ HOSP IP/OBS HIGH 50: CPT

## 2017-07-19 RX ORDER — MAGNESIUM SULFATE 500 MG/ML
1 VIAL (ML) INJECTION ONCE
Qty: 0 | Refills: 0 | Status: COMPLETED | OUTPATIENT
Start: 2017-07-19 | End: 2017-07-19

## 2017-07-19 RX ORDER — ACETAMINOPHEN 500 MG
650 TABLET ORAL EVERY 6 HOURS
Qty: 0 | Refills: 0 | Status: DISCONTINUED | OUTPATIENT
Start: 2017-07-19 | End: 2017-07-20

## 2017-07-19 RX ORDER — CALCIUM CARBONATE 500(1250)
1 TABLET ORAL ONCE
Qty: 0 | Refills: 0 | Status: COMPLETED | OUTPATIENT
Start: 2017-07-19 | End: 2017-07-19

## 2017-07-19 RX ORDER — PANTOPRAZOLE SODIUM 20 MG/1
40 TABLET, DELAYED RELEASE ORAL
Qty: 0 | Refills: 0 | Status: DISCONTINUED | OUTPATIENT
Start: 2017-07-19 | End: 2017-07-20

## 2017-07-19 RX ORDER — ACETAMINOPHEN 500 MG
1000 TABLET ORAL ONCE
Qty: 0 | Refills: 0 | Status: COMPLETED | OUTPATIENT
Start: 2017-07-19 | End: 2017-07-19

## 2017-07-19 RX ORDER — SENNA PLUS 8.6 MG/1
2 TABLET ORAL AT BEDTIME
Qty: 0 | Refills: 0 | Status: DISCONTINUED | OUTPATIENT
Start: 2017-07-19 | End: 2017-07-20

## 2017-07-19 RX ADMIN — Medication 100 GRAM(S): at 08:33

## 2017-07-19 RX ADMIN — PANTOPRAZOLE SODIUM 40 MILLIGRAM(S): 20 TABLET, DELAYED RELEASE ORAL at 07:30

## 2017-07-19 RX ADMIN — PROGESTERONE 100 MILLIGRAM(S): 200 CAPSULE, LIQUID FILLED ORAL at 21:09

## 2017-07-19 RX ADMIN — TRAMADOL HYDROCHLORIDE 25 MILLIGRAM(S): 50 TABLET ORAL at 03:35

## 2017-07-19 RX ADMIN — MONTELUKAST 10 MILLIGRAM(S): 4 TABLET, CHEWABLE ORAL at 12:15

## 2017-07-19 RX ADMIN — TRAMADOL HYDROCHLORIDE 25 MILLIGRAM(S): 50 TABLET ORAL at 19:27

## 2017-07-19 RX ADMIN — Medication 1000 MILLIGRAM(S): at 13:00

## 2017-07-19 RX ADMIN — SODIUM CHLORIDE 3 MILLILITER(S): 9 INJECTION INTRAMUSCULAR; INTRAVENOUS; SUBCUTANEOUS at 14:27

## 2017-07-19 RX ADMIN — TRAMADOL HYDROCHLORIDE 25 MILLIGRAM(S): 50 TABLET ORAL at 07:32

## 2017-07-19 RX ADMIN — SODIUM CHLORIDE 3 MILLILITER(S): 9 INJECTION INTRAMUSCULAR; INTRAVENOUS; SUBCUTANEOUS at 06:22

## 2017-07-19 RX ADMIN — TRAMADOL HYDROCHLORIDE 25 MILLIGRAM(S): 50 TABLET ORAL at 08:10

## 2017-07-19 RX ADMIN — LIDOCAINE 1 PATCH: 4 CREAM TOPICAL at 12:15

## 2017-07-19 RX ADMIN — SODIUM CHLORIDE 3 MILLILITER(S): 9 INJECTION INTRAMUSCULAR; INTRAVENOUS; SUBCUTANEOUS at 21:05

## 2017-07-19 RX ADMIN — TRAMADOL HYDROCHLORIDE 25 MILLIGRAM(S): 50 TABLET ORAL at 03:05

## 2017-07-19 RX ADMIN — Medication 100 MILLIGRAM(S): at 15:39

## 2017-07-19 RX ADMIN — Medication 100 MILLIGRAM(S): at 21:09

## 2017-07-19 RX ADMIN — SIMVASTATIN 40 MILLIGRAM(S): 20 TABLET, FILM COATED ORAL at 21:09

## 2017-07-19 RX ADMIN — Medication 1 TABLET(S): at 22:14

## 2017-07-19 RX ADMIN — Medication 100 MILLIGRAM(S): at 07:30

## 2017-07-19 RX ADMIN — Medication 400 MILLIGRAM(S): at 12:29

## 2017-07-19 RX ADMIN — Medication 1 MILLIGRAM(S): at 21:09

## 2017-07-19 RX ADMIN — TRAMADOL HYDROCHLORIDE 25 MILLIGRAM(S): 50 TABLET ORAL at 20:00

## 2017-07-19 NOTE — PROGRESS NOTE ADULT - PROBLEM SELECTOR PLAN 1
- diet as tolerated  - serial hct   - care as per GERARD Diamond [place] [id number] - diet as tolerated  - serial hct   - care as per CTICU  - discussed with Dr. Llamas  - no objection to discharge, no need for imaging before d/c as clinical/laboratory picture is stable  - f/u with Dr. Llamas [phone] in 1-2 weeks.  ERICA Diamond [place] [id number]

## 2017-07-19 NOTE — DISCHARGE NOTE ADULT - CARE PLAN
Principal Discharge DX:	Lung nodule  Goal:	s/p Left robotic assisted PHYLLIS segmentectomy- continued wound healing  Instructions for follow-up, activity and diet:	Follow up with Dr. Feliciano in 1-2 weeks [phone]   Chest x-ray 1-2 days prior to appointment with Dr. Feliciano  Please bring copy of x-ray to appointment with Dr. Feliciano   Follow up with primary care provider in one week   Follow up with Dr. Llamas [phone] in 1-2 weeks. Principal Discharge DX:	Lung nodule  Goal:	s/p Left robotic assisted PHYLLIS segmentectomy- continued wound healing  Instructions for follow-up, activity and diet:	Follow up with Dr. Feliciano in 1-2 weeks [phone]   Chest x-ray 1-2 days prior to appointment with Dr. Feliciano  Please bring copy of x-ray to appointment with Dr. Feliciano   Follow up with primary care provider in one week   Follow up with Dr. Llamas [phone] in 1-2 weeks. Principal Discharge DX:	Lung nodule  Goal:	s/p Left robotic assisted PHYLLIS segmentectomy- continued wound healing  Instructions for follow-up, activity and diet:	Follow up with Dr. Feliciano in 1-2 weeks [phone]   Chest x-ray 1-2 days prior to appointment with Dr. Feliciano  Please bring copy of x-ray to appointment with Dr. Feliciano   Follow up with primary care provider in one week   Follow up with Dr. Llamas [phone] in 1-2 weeks. Principal Discharge DX:	Lung nodule  Goal:	s/p Left robotic assisted PHYLLIS segmentectomy- continued wound healing  Instructions for follow-up, activity and diet:	Follow up with Dr. Feliciano in 1-2 weeks [phone]   Chest x-ray 1-2 days prior to appointment with Dr. Feliciano  Please bring copy of x-ray to appointment with Dr. Feliciano   Follow up with primary care provider in one week   Follow up with Dr. Llamas [phone] in 1-2 weeks. Principal Discharge DX:	Lung nodule  Goal:	s/p Left robotic assisted PHYLLIS segmentectomy- continued wound healing  Instructions for follow-up, activity and diet:	Follow up with Dr. Feliciano in 1-2 weeks [phone]   Chest x-ray 1-2 days prior to appointment with Dr. Feliciano  Please bring copy of x-ray to appointment with Dr. Feliciano   Follow up with primary care provider in one week   Follow up with Dr. Llamas [phone] in 1-2 weeks.

## 2017-07-19 NOTE — DISCHARGE NOTE ADULT - HOSPITAL COURSE
63y/o female with h/o multiple lung nodule since 2011, gradually enlarged in size. Pt s/p left robotic assisted left upper segmentectomy with lung marking by Dr Chamberlain on 7/12/17. Thoracic surgery was uneventful.   On POD #2 while on the floor on 8 [place]  pt developed hypotension, acute anemia  and abdominal pain out of proportion to clinical findings. Pt transferred back to ICU, volume resuscitated and transfused total of 4 units of PRBC and 2 x Albumin in addition to Phenylephrine drip.  Emergent  surgical consult and abd CT- angiogram  obtained - with positive findings of large hemoperitoneum from splenic source. No active bleeding identified during angiogram - therefore possible  need for acute surgical intervention was deemed not required at this time .   Plan to closely monitor abdominal status, hemodynamics and H/H with surgical team stand by for now.    Today pt is feeling better, abdominal discomfort is minimal & H/H stable

## 2017-07-19 NOTE — DISCHARGE NOTE ADULT - CARE PROVIDER_API CALL
Wayne Feliciano (MD), Surgery; Thoracic Surgery  [address] [place] [address]  Phone: [phone]  Fax: [phone]    Guillermo Llamas), ColonRectal Surgery; Surgery  [address] [place] [address]  Phone: [phone]  Fax: [phone]

## 2017-07-19 NOTE — PROGRESS NOTE ADULT - SUBJECTIVE AND OBJECTIVE BOX
LOUISA MILLAN            MRN-[id number]         codeine (Angioedema)  codeine (Angioedema; Hives)             HPI:  61y/o female with h/o multiple lung nodule since 2011, gradually enlarged in size. Pt admitted  for left Video assisted thoracoscopy left upper lobe wedge resection possible segmentectomy possible left upper lobectomy with lung marking by Dr Chamberlain on 7/12/17. Thoracic surgery was uneventful.   On POD #2 while on the floor on 8 [place]  pt developed hypotension, acute anemia  and abdominal pain out of proportion to clinical findings. Pt transferred back to ICU, volume resuscitated and transfused total of 4 units of PRBC and 2 x Albumin in addition to Phenylephrine drip.  Emergent  surgical consult and abd CT- angiogram  obtained - with positive findings of large hemoperitoneum from splenic source. No active bleeding identified during angiogram - therefore possible  need for acute surgical intervention was deemed not required at this time .   Plan to closely monitor abdominal status, hemodynamics and H/H with surgical team stand by for now.    Today pt is feeling better, abdominal discomfort is minimal & H/H stable      Procedure: Left VATS, robot-assisted   PHYLLIS segmentectomy, MLND  (07/12/2017 )  POD # : 7    Issues:   ·	            Hemoperitoneum from splenic hematoma / rupture  ·	            Acute posthemorrhagic anema  ·	            L-Pneumothorax  ·	           left chest tube in place         PAST MEDICAL & SURGICAL HISTORY:  CAD (coronary artery disease): non obstructive 50% proximal LAD stenosis  PVC (premature ventricular contraction)  Eczema  Nasal bone fracture  Sleep apnea: noncompliant in using CPAP machine  Papilloma of breast: right  Disorder of shoulder: right  Seasonal allergies  GERD (gastroesophageal reflux disease)  Pulmonary nodule: bilateral  Mild asthma: denies intubation or hospitalizations  Papilloma of breast: right breast excision &amp; bx 2013  S/P arthroscopy of shoulder: right April 2013  S/P blepharoplasty: in 2008  S/P adenoidectomy: as a child        ICU Vital Signs Last 24 Hrs  T(C): 37 (19 Jul 2017 08:00), Max: 37.4 (18 Jul 2017 20:00)  T(F): 98.6 (19 Jul 2017 08:00), Max: 99.3 (18 Jul 2017 20:00)  HR: 76 (19 Jul 2017 09:00) (69 - 99)  BP: 105/49 (19 Jul 2017 09:00) (93/44 - 111/50)  BP(mean): 62 (19 Jul 2017 09:00) (55 - 70)  ABP: --  ABP(mean): --  RR: 17 (19 Jul 2017 09:00) (13 - 24)  SpO2: 97% (19 Jul 2017 09:00) (95% - 100%)    I&O's Summary    18 Jul 2017 07:01  -  19 Jul 2017 07:00  --------------------------------------------------------  IN: 1720 mL / OUT: 2515 mL / NET: -795 mL    19 Jul 2017 07:01  -  19 Jul 2017 09:31  --------------------------------------------------------  IN: 100 mL / OUT: 0 mL / NET: 100 mL          MEDICATIONS  (STANDING):  sodium chloride 0.9% lock flush 3 milliLiter(s) IV Push every 8 hours  simvastatin 40 milliGRAM(s) Oral at bedtime  montelukast 10 milliGRAM(s) Oral daily  docusate sodium 100 milliGRAM(s) Oral three times a day  progesterone 100 milliGRAM(s) Oral at bedtime  pantoprazole  Injectable 40 milliGRAM(s) IV Push every 12 hours  estradiol 1 milliGRAM(s) Oral daily  lidocaine   Patch 1 Patch Transdermal daily    MEDICATIONS  (PRN):  ipratropium    for Nebulization 500 MICROGram(s) Nebulizer every 6 hours PRN Shortness of Breath and/or Wheezing  aprepitant 40 milliGRAM(s) Oral every 12 hours PRN N/V  metoclopramide Injectable 10 milliGRAM(s) IV Push every 8 hours PRN Nausea / Vomiting  simethicone 80 milliGRAM(s) Chew every 6 hours PRN Gas  bisacodyl Suppository 10 milliGRAM(s) Rectal daily PRN Constipation  traMADol 25 milliGRAM(s) Oral every 4 hours PRN Moderate Pain (4 - 6)  senna 2 Tablet(s) Oral at bedtime PRN Constipation      Physical exam:                             General:               Pt is awake, OOB in chair, not in any distress                                                  Neuro:                  Nonfocal                             Cardiovascular:     S1 & S2, regular                           Respiratory:          Air entry is fair and equal on both sides, has bilateral conducted sounds                           GI:                       Soft, distended and tender, Bowel sounds active.                            Ext:                     No cyanosis or edema  Labs:                                                                           9.5    9.71  )-----------( 190      ( 19 Jul 2017 02:20 )             28.4             07-19    141  |  104  |  11  ----------------------------<  102<H>  3.9   |  26  |  0.49<L>    Ca    8.6      19 Jul 2017 02:20  Mg     1.8     07-19    TPro  5.6<L>  /  Alb  3.4  /  TBili  0.7  /  DBili  x   /  AST  18  /  ALT  13  /  AlkPhos  42  07-18                      CXR: L-Pneumo    Plan:    General: 62yFemale  s/p Left VATS, robot-assisted   PHYLLIS segmentectomy, MLND   POD# 7,  c/o mild abdominal pain, OOB / ambulated                             Neuro:                                         Pain control with Tylenol  / tramadol                            Cardiovascular:                                          Continue hemodynamic monitoring.                            Respiratory:                                         Pt is on RA                                           Comfortable, not in any distress.                                         Monitor chest tube output                                         Chest tube clamped   - if repeat CXR does not show increase in pneumo, d/c CT                                                        Bronchodilators PRN, pulmonary toilet                            GI                                         On regular diet                                         Continue GI prophylaxis with Protonix                                         Continue Zofran / Reglan for nausea - PRN                                         Follow up CT of abdomen before discharge.	                                                                 Renal:                                         D/C IVF                                         Monitor I/Os and electrolytes                                                  Hem/ Onc:                                         H/H stable                                         No  signs of bleeding.                            Infectious disease:                                            No signs of infection. Monitor for fever / leukocytosis.                                          All surgical incision / chest tube  sites look clean                            Endocrine:                                           No active issues        Pertinent clinical, laboratory, radiographic, hemodynamic, echocardiographic, respiratory data, microbiologic data and chart were reviewed and analyzed frequently throughout the course of the day and night  Patient seen, examined and plan discussed with CT Surgery Dr. Feliciano / CTICU team during rounds.    CT of abdomen before discharge.    Wayne Freed MD

## 2017-07-19 NOTE — PROGRESS NOTE ADULT - SUBJECTIVE AND OBJECTIVE BOX
GENERAL SURGERY DAILY PROGRESS NOTE:   Seen in CTICU  Subjective:  Feeling much better overall, but endorses some abdominal fullness, especially when eating. Endorses discomfort at chest tube site.  Denies n/v, dizziness/lightheadedness, SOB.        Objective:  NAD, awake and alert  Respirations nonlabored  Chest tube with air leak, minimal serosang drainage  Abdomen softly distended, tender LUQ  No guarding or rebound tenderness           MEDICATIONS  (STANDING):  sodium chloride 0.9% lock flush 3 milliLiter(s) IV Push every 8 hours  simvastatin 40 milliGRAM(s) Oral at bedtime  montelukast 10 milliGRAM(s) Oral daily  docusate sodium 100 milliGRAM(s) Oral three times a day  progesterone 100 milliGRAM(s) Oral at bedtime  pantoprazole  Injectable 40 milliGRAM(s) IV Push every 12 hours  estradiol 1 milliGRAM(s) Oral daily  lidocaine   Patch 1 Patch Transdermal daily    MEDICATIONS  (PRN):  ipratropium    for Nebulization 500 MICROGram(s) Nebulizer every 6 hours PRN Shortness of Breath and/or Wheezing  aprepitant 40 milliGRAM(s) Oral every 12 hours PRN N/V  metoclopramide Injectable 10 milliGRAM(s) IV Push every 8 hours PRN Nausea / Vomiting  simethicone 80 milliGRAM(s) Chew every 6 hours PRN Gas  bisacodyl Suppository 10 milliGRAM(s) Rectal daily PRN Constipation  traMADol 25 milliGRAM(s) Oral every 4 hours PRN Moderate Pain (4 - 6)  senna 2 Tablet(s) Oral at bedtime PRN Constipation      Vital Signs Last 24 Hrs  T(C): 36.9 (19 Jul 2017 04:00), Max: 37.4 (18 Jul 2017 20:00)  T(F): 98.4 (19 Jul 2017 04:00), Max: 99.3 (18 Jul 2017 20:00)  HR: 99 (19 Jul 2017 06:00) (67 - 99)  BP: 97/49 (19 Jul 2017 06:00) (92/42 - 111/50)  BP(mean): 61 (19 Jul 2017 06:00) (54 - 70)  RR: 23 (19 Jul 2017 06:00) (13 - 24)  SpO2: 95% (19 Jul 2017 06:00) (95% - 99%)    I&O's Detail    17 Jul 2017 07:01  -  18 Jul 2017 07:00  --------------------------------------------------------  IN:    dextrose 5% + lactated ringers.: 720 mL    IV PiggyBack: 200 mL    Lactated Ringers IV Bolus: 250 mL    Oral Fluid: 720 mL  Total IN: 1890 mL    OUT:    Chest Tube: 145 mL    Indwelling Catheter - Urethral: 1635 mL  Total OUT: 1780 mL    Total NET: 110 mL      18 Jul 2017 07:01  -  19 Jul 2017 06:24  --------------------------------------------------------  IN:    dextrose 5% + lactated ringers.: 360 mL    IV PiggyBack: 100 mL    Oral Fluid: 1060 mL  Total IN: 1520 mL    OUT:    Chest Tube: 40 mL    Voided: 2075 mL  Total OUT: 2115 mL    Total NET: -595 mL          Daily     Daily     LABS:                        9.5    9.71  )-----------( 190      ( 19 Jul 2017 02:20 )             28.4     07-19    141  |  104  |  11  ----------------------------<  102<H>  3.9   |  26  |  0.49<L>    Ca    8.6      19 Jul 2017 02:20  Mg     1.8     07-19    TPro  5.6<L>  /  Alb  3.4  /  TBili  0.7  /  DBili  x   /  AST  18  /  ALT  13  /  AlkPhos  42  07-18          RADIOLOGY & ADDITIONAL STUDIES:

## 2017-07-19 NOTE — DISCHARGE NOTE ADULT - MEDICATION SUMMARY - MEDICATIONS TO TAKE
I will START or STAY ON the medications listed below when I get home from the hospital:    acetaminophen 325 mg oral tablet  -- 2 tab(s) by mouth every 6 hours, As needed, Mild Pain (1 - 3)  -- Indication: For Pain    traMADol 50 mg oral tablet  -- 0.5 tab(s) by mouth every 4 to 6 hours, As Needed, Moderate Pain (4 - 6) MDD:3  -- Indication: For Pain    simvastatin 40 mg oral tablet  -- 1 tab(s) by mouth once a day (at bedtime)  -- Indication: For Home med    Xanax 0.25 mg oral tablet  -- 0.25 milligram(s) by mouth once a day, As Needed  -- Indication: For Home med    Ambien 5 mg oral tablet  -- 1 tab(s) by mouth once a day (at bedtime)  -- Indication: For Home med    Advair Diskus 100 mcg-50 mcg inhalation powder  -- 1 puff(s) inhaled prn, As Needed  -- Indication: For Home med    albuterol extended release  -- 2 puff(s) by mouth prn, As Needed  -- Indication: For Home med    lidocaine 5% topical film  -- Apply on skin to affected area once a day- On for 12hours Off for 12hours  -- Indication: For Pain    docusate sodium 100 mg oral capsule  -- 1 cap(s) by mouth 3 times a day  -- Indication: For Constipation    senna oral tablet  -- 2 tab(s) by mouth once a day (at bedtime), As needed, Constipation  -- Indication: For Constipation    Singulair 10 mg oral tablet  -- 1 tab(s) by mouth once a day  -- Indication: For Home med    pantoprazole 40 mg oral delayed release tablet  -- 1 tab(s) by mouth once a day (before a meal)  -- Indication: For Stomach protection    Estrace 1 mg oral tablet  -- 1 tab(s) by mouth once a day  -- Indication: For Home med    Prometrium 100 mg oral capsule  -- 1 cap(s) by mouth once a day (at bedtime)  -- Indication: For Home med

## 2017-07-19 NOTE — DISCHARGE NOTE ADULT - PLAN OF CARE
s/p Left robotic assisted PHYLLIS segmentectomy- continued wound healing Follow up with Dr. Feliciano in 1-2 weeks [phone]   Chest x-ray 1-2 days prior to appointment with Dr. Feliciano  Please bring copy of x-ray to appointment with Dr. Feliciano   Follow up with primary care provider in one week   Follow up with Dr. Llamas [phone] in 1-2 weeks.

## 2017-07-19 NOTE — DISCHARGE NOTE ADULT - PATIENT PORTAL LINK FT
“You can access the Follow[facility] Patient Portal, offered by [facility], by registering with the following website: [email or web address]”

## 2017-07-19 NOTE — DISCHARGE NOTE ADULT - CARE PROVIDERS DIRECT ADDRESSES
[email or web address]

## 2017-07-19 NOTE — DISCHARGE NOTE ADULT - CONDITIONS AT DISCHARGE
Patient remained hemodynamically stable. Patient denies chest pain, shortness of breath and palpitations. Patient shows no signs of distress. Patient discharged to home.

## 2017-07-19 NOTE — PROGRESS NOTE ADULT - PROVIDER SPECIALTY LIST ADULT
Critical Care
Pain Medicine
Pain Medicine
Surgery
Critical Care

## 2017-07-19 NOTE — DISCHARGE NOTE ADULT - NS AS ACTIVITY OBS
Showering allowed/Do not drive or operate machinery/No Heavy lifting/straining/Walking-Indoors allowed/Walking-Outdoors allowed/Stairs allowed/Do not make important decisions

## 2017-07-19 NOTE — PROGRESS NOTE ADULT - ASSESSMENT
61yo F s/p robotic PHYLLIS segmentectomy POD 7 complicated by bleeding and a subcapsular splenic hematoma requiring 4u PRBCs now with stable Hct, tolerating diet.

## 2017-07-20 VITALS
OXYGEN SATURATION: 100 % | SYSTOLIC BLOOD PRESSURE: 103 MMHG | TEMPERATURE: 98 F | RESPIRATION RATE: 18 BRPM | DIASTOLIC BLOOD PRESSURE: 54 MMHG | HEART RATE: 90 BPM

## 2017-07-20 LAB
ALBUMIN SERPL ELPH-MCNC: 3.4 G/DL — SIGNIFICANT CHANGE UP (ref 3.3–5)
ALP SERPL-CCNC: 48 U/L — SIGNIFICANT CHANGE UP (ref 40–120)
ALT FLD-CCNC: 18 U/L — SIGNIFICANT CHANGE UP (ref 4–33)
AST SERPL-CCNC: 26 U/L — SIGNIFICANT CHANGE UP (ref 4–32)
BILIRUB SERPL-MCNC: 0.9 MG/DL — SIGNIFICANT CHANGE UP (ref 0.2–1.2)
BUN SERPL-MCNC: 12 MG/DL — SIGNIFICANT CHANGE UP (ref 7–23)
CALCIUM SERPL-MCNC: 9.2 MG/DL — SIGNIFICANT CHANGE UP (ref 8.4–10.5)
CHLORIDE SERPL-SCNC: 101 MMOL/L — SIGNIFICANT CHANGE UP (ref 98–107)
CO2 SERPL-SCNC: 24 MMOL/L — SIGNIFICANT CHANGE UP (ref 22–31)
CREAT SERPL-MCNC: 0.49 MG/DL — LOW (ref 0.5–1.3)
GLUCOSE SERPL-MCNC: 102 MG/DL — HIGH (ref 70–99)
HCT VFR BLD CALC: 29.3 % — LOW (ref 34.5–45)
HGB BLD-MCNC: 9.9 G/DL — LOW (ref 11.5–15.5)
MAGNESIUM SERPL-MCNC: 2 MG/DL — SIGNIFICANT CHANGE UP (ref 1.6–2.6)
MCHC RBC-ENTMCNC: 29.4 PG — SIGNIFICANT CHANGE UP (ref 27–34)
MCHC RBC-ENTMCNC: 33.8 % — SIGNIFICANT CHANGE UP (ref 32–36)
MCV RBC AUTO: 86.9 FL — SIGNIFICANT CHANGE UP (ref 80–100)
NRBC # FLD: 0.02 — SIGNIFICANT CHANGE UP
PHOSPHATE SERPL-MCNC: 3.3 MG/DL — SIGNIFICANT CHANGE UP (ref 2.5–4.5)
PLATELET # BLD AUTO: 242 K/UL — SIGNIFICANT CHANGE UP (ref 150–400)
PMV BLD: 10.8 FL — SIGNIFICANT CHANGE UP (ref 7–13)
POTASSIUM SERPL-MCNC: 4 MMOL/L — SIGNIFICANT CHANGE UP (ref 3.5–5.3)
POTASSIUM SERPL-SCNC: 4 MMOL/L — SIGNIFICANT CHANGE UP (ref 3.5–5.3)
PROT SERPL-MCNC: 6.2 G/DL — SIGNIFICANT CHANGE UP (ref 6–8.3)
RBC # BLD: 3.37 M/UL — LOW (ref 3.8–5.2)
RBC # FLD: 16.9 % — HIGH (ref 10.3–14.5)
SODIUM SERPL-SCNC: 139 MMOL/L — SIGNIFICANT CHANGE UP (ref 135–145)
WBC # BLD: 11.19 K/UL — HIGH (ref 3.8–10.5)
WBC # FLD AUTO: 11.19 K/UL — HIGH (ref 3.8–10.5)

## 2017-07-20 PROCEDURE — 71010: CPT | Mod: 26

## 2017-07-20 RX ORDER — ACETAMINOPHEN 500 MG
2 TABLET ORAL
Qty: 0 | Refills: 0 | COMMUNITY
Start: 2017-07-20

## 2017-07-20 RX ORDER — PANTOPRAZOLE SODIUM 20 MG/1
1 TABLET, DELAYED RELEASE ORAL
Qty: 0 | Refills: 0 | COMMUNITY
Start: 2017-07-20

## 2017-07-20 RX ORDER — LIDOCAINE 4 G/100G
1 CREAM TOPICAL
Qty: 21 | Refills: 0 | OUTPATIENT
Start: 2017-07-20 | End: 2017-08-10

## 2017-07-20 RX ORDER — SENNA PLUS 8.6 MG/1
2 TABLET ORAL
Qty: 0 | Refills: 0 | COMMUNITY
Start: 2017-07-20

## 2017-07-20 RX ORDER — TRAMADOL HYDROCHLORIDE 50 MG/1
0.5 TABLET ORAL
Qty: 0 | Refills: 0 | COMMUNITY
Start: 2017-07-20

## 2017-07-20 RX ORDER — TRAMADOL HYDROCHLORIDE 50 MG/1
0.5 TABLET ORAL
Qty: 21 | Refills: 0 | OUTPATIENT
Start: 2017-07-20 | End: 2017-07-27

## 2017-07-20 RX ORDER — LIDOCAINE 4 G/100G
1 CREAM TOPICAL
Qty: 0 | Refills: 0 | COMMUNITY
Start: 2017-07-20

## 2017-07-20 RX ORDER — DOCUSATE SODIUM 100 MG
1 CAPSULE ORAL
Qty: 0 | Refills: 0 | COMMUNITY
Start: 2017-07-20

## 2017-07-20 RX ADMIN — TRAMADOL HYDROCHLORIDE 25 MILLIGRAM(S): 50 TABLET ORAL at 01:00

## 2017-07-20 RX ADMIN — MONTELUKAST 10 MILLIGRAM(S): 4 TABLET, CHEWABLE ORAL at 11:37

## 2017-07-20 RX ADMIN — LIDOCAINE 1 PATCH: 4 CREAM TOPICAL at 00:24

## 2017-07-20 RX ADMIN — Medication 650 MILLIGRAM(S): at 03:48

## 2017-07-20 RX ADMIN — Medication 100 MILLIGRAM(S): at 12:19

## 2017-07-20 RX ADMIN — TRAMADOL HYDROCHLORIDE 25 MILLIGRAM(S): 50 TABLET ORAL at 12:00

## 2017-07-20 RX ADMIN — PANTOPRAZOLE SODIUM 40 MILLIGRAM(S): 20 TABLET, DELAYED RELEASE ORAL at 05:14

## 2017-07-20 RX ADMIN — Medication 100 MILLIGRAM(S): at 05:14

## 2017-07-20 RX ADMIN — TRAMADOL HYDROCHLORIDE 25 MILLIGRAM(S): 50 TABLET ORAL at 00:29

## 2017-07-20 RX ADMIN — TRAMADOL HYDROCHLORIDE 25 MILLIGRAM(S): 50 TABLET ORAL at 11:37

## 2017-07-20 RX ADMIN — SODIUM CHLORIDE 3 MILLILITER(S): 9 INJECTION INTRAMUSCULAR; INTRAVENOUS; SUBCUTANEOUS at 05:12

## 2017-07-20 RX ADMIN — Medication 650 MILLIGRAM(S): at 04:30

## 2017-07-26 ENCOUNTER — APPOINTMENT (OUTPATIENT)
Dept: SURGERY | Facility: CLINIC | Age: 62
End: 2017-07-26
Payer: COMMERCIAL

## 2017-07-26 VITALS
SYSTOLIC BLOOD PRESSURE: 112 MMHG | HEART RATE: 95 BPM | HEIGHT: 64 IN | WEIGHT: 124 LBS | DIASTOLIC BLOOD PRESSURE: 70 MMHG | BODY MASS INDEX: 21.17 KG/M2 | TEMPERATURE: 98.6 F

## 2017-07-26 PROCEDURE — 99213 OFFICE O/P EST LOW 20 MIN: CPT

## 2017-08-01 ENCOUNTER — APPOINTMENT (OUTPATIENT)
Dept: PULMONOLOGY | Facility: CLINIC | Age: 62
End: 2017-08-01
Payer: COMMERCIAL

## 2017-08-01 VITALS
WEIGHT: 123 LBS | DIASTOLIC BLOOD PRESSURE: 60 MMHG | SYSTOLIC BLOOD PRESSURE: 110 MMHG | HEART RATE: 91 BPM | RESPIRATION RATE: 15 BRPM | BODY MASS INDEX: 21 KG/M2 | HEIGHT: 64 IN | OXYGEN SATURATION: 98 %

## 2017-08-01 PROCEDURE — 71020: CPT

## 2017-08-01 PROCEDURE — 99214 OFFICE O/P EST MOD 30 MIN: CPT | Mod: 25

## 2017-08-08 ENCOUNTER — APPOINTMENT (OUTPATIENT)
Dept: THORACIC SURGERY | Facility: CLINIC | Age: 62
End: 2017-08-08
Payer: COMMERCIAL

## 2017-08-08 VITALS
DIASTOLIC BLOOD PRESSURE: 69 MMHG | SYSTOLIC BLOOD PRESSURE: 105 MMHG | HEART RATE: 90 BPM | RESPIRATION RATE: 16 BRPM | OXYGEN SATURATION: 99 %

## 2017-08-08 PROCEDURE — 99024 POSTOP FOLLOW-UP VISIT: CPT

## 2017-08-21 ENCOUNTER — OUTPATIENT (OUTPATIENT)
Dept: OUTPATIENT SERVICES | Facility: HOSPITAL | Age: 62
LOS: 1 days | Discharge: HOME | End: 2017-08-21

## 2017-08-21 DIAGNOSIS — C34.90 MALIGNANT NEOPLASM OF UNSPECIFIED PART OF UNSPECIFIED BRONCHUS OR LUNG: ICD-10-CM

## 2017-08-21 DIAGNOSIS — D24.9 BENIGN NEOPLASM OF UNSPECIFIED BREAST: Chronic | ICD-10-CM

## 2017-08-22 ENCOUNTER — RESULT REVIEW (OUTPATIENT)
Age: 62
End: 2017-08-22

## 2017-08-23 ENCOUNTER — APPOINTMENT (OUTPATIENT)
Dept: HEMATOLOGY ONCOLOGY | Facility: CLINIC | Age: 62
End: 2017-08-23
Payer: COMMERCIAL

## 2017-08-23 PROCEDURE — 99215 OFFICE O/P EST HI 40 MIN: CPT

## 2017-08-24 DIAGNOSIS — C34.92 MALIGNANT NEOPLASM OF UNSPECIFIED PART OF LEFT BRONCHUS OR LUNG: ICD-10-CM

## 2017-09-01 ENCOUNTER — APPOINTMENT (OUTPATIENT)
Dept: PULMONOLOGY | Facility: CLINIC | Age: 62
End: 2017-09-01
Payer: COMMERCIAL

## 2017-09-01 VITALS
WEIGHT: 123 LBS | BODY MASS INDEX: 21 KG/M2 | RESPIRATION RATE: 16 BRPM | SYSTOLIC BLOOD PRESSURE: 91 MMHG | HEIGHT: 64 IN | OXYGEN SATURATION: 98 % | HEART RATE: 80 BPM | DIASTOLIC BLOOD PRESSURE: 60 MMHG

## 2017-09-01 PROCEDURE — 71020: CPT

## 2017-09-01 PROCEDURE — 99214 OFFICE O/P EST MOD 30 MIN: CPT | Mod: 25

## 2017-09-01 RX ORDER — CIPROFLOXACIN HYDROCHLORIDE 250 MG/1
250 TABLET, FILM COATED ORAL
Qty: 10 | Refills: 0 | Status: DISCONTINUED | COMMUNITY
Start: 2017-07-24 | End: 2017-09-01

## 2017-09-09 ENCOUNTER — APPOINTMENT (OUTPATIENT)
Dept: ULTRASOUND IMAGING | Facility: IMAGING CENTER | Age: 62
End: 2017-09-09
Payer: COMMERCIAL

## 2017-09-09 ENCOUNTER — OUTPATIENT (OUTPATIENT)
Dept: OUTPATIENT SERVICES | Facility: HOSPITAL | Age: 62
LOS: 1 days | End: 2017-09-09
Payer: COMMERCIAL

## 2017-09-09 ENCOUNTER — APPOINTMENT (OUTPATIENT)
Dept: MAMMOGRAPHY | Facility: IMAGING CENTER | Age: 62
End: 2017-09-09
Payer: COMMERCIAL

## 2017-09-09 DIAGNOSIS — D24.9 BENIGN NEOPLASM OF UNSPECIFIED BREAST: Chronic | ICD-10-CM

## 2017-09-09 DIAGNOSIS — Z00.8 ENCOUNTER FOR OTHER GENERAL EXAMINATION: ICD-10-CM

## 2017-09-09 PROCEDURE — 76641 ULTRASOUND BREAST COMPLETE: CPT | Mod: 26,50

## 2017-09-09 PROCEDURE — 77063 BREAST TOMOSYNTHESIS BI: CPT | Mod: 26

## 2017-09-09 PROCEDURE — 76641 ULTRASOUND BREAST COMPLETE: CPT

## 2017-09-09 PROCEDURE — 77063 BREAST TOMOSYNTHESIS BI: CPT

## 2017-09-09 PROCEDURE — G0202: CPT | Mod: 26

## 2017-09-09 PROCEDURE — 77067 SCR MAMMO BI INCL CAD: CPT

## 2017-09-11 ENCOUNTER — APPOINTMENT (OUTPATIENT)
Dept: HEMATOLOGY ONCOLOGY | Facility: CLINIC | Age: 62
End: 2017-09-11

## 2017-09-11 ENCOUNTER — LABORATORY RESULT (OUTPATIENT)
Age: 62
End: 2017-09-11

## 2017-09-11 ENCOUNTER — APPOINTMENT (OUTPATIENT)
Dept: HEMATOLOGY ONCOLOGY | Facility: CLINIC | Age: 62
End: 2017-09-11
Payer: COMMERCIAL

## 2017-09-11 PROCEDURE — 96040M: CUSTOM

## 2017-09-12 ENCOUNTER — TRANSCRIPTION ENCOUNTER (OUTPATIENT)
Age: 62
End: 2017-09-12

## 2017-09-18 ENCOUNTER — RESULT REVIEW (OUTPATIENT)
Age: 62
End: 2017-09-18

## 2017-09-18 ENCOUNTER — OUTPATIENT (OUTPATIENT)
Dept: OUTPATIENT SERVICES | Facility: HOSPITAL | Age: 62
LOS: 1 days | End: 2017-09-18
Payer: COMMERCIAL

## 2017-09-18 DIAGNOSIS — D24.9 BENIGN NEOPLASM OF UNSPECIFIED BREAST: Chronic | ICD-10-CM

## 2017-09-18 PROCEDURE — 88342 IMHCHEM/IMCYTCHM 1ST ANTB: CPT | Mod: 26

## 2017-09-18 PROCEDURE — 88363 XM ARCHIVE TISSUE MOLEC ANAL: CPT

## 2017-09-19 ENCOUNTER — TRANSCRIPTION ENCOUNTER (OUTPATIENT)
Age: 62
End: 2017-09-19

## 2017-09-19 DIAGNOSIS — N63 UNSPECIFIED LUMP IN BREAST: ICD-10-CM

## 2017-09-19 DIAGNOSIS — R92.2 INCONCLUSIVE MAMMOGRAM: ICD-10-CM

## 2017-09-19 DIAGNOSIS — Z12.31 ENCOUNTER FOR SCREENING MAMMOGRAM FOR MALIGNANT NEOPLASM OF BREAST: ICD-10-CM

## 2017-10-02 ENCOUNTER — OUTPATIENT (OUTPATIENT)
Dept: OUTPATIENT SERVICES | Facility: HOSPITAL | Age: 62
LOS: 1 days | Discharge: HOME | End: 2017-10-02

## 2017-10-02 DIAGNOSIS — C34.92 MALIGNANT NEOPLASM OF UNSPECIFIED PART OF LEFT BRONCHUS OR LUNG: ICD-10-CM

## 2017-10-02 DIAGNOSIS — D24.9 BENIGN NEOPLASM OF UNSPECIFIED BREAST: Chronic | ICD-10-CM

## 2017-10-03 ENCOUNTER — APPOINTMENT (OUTPATIENT)
Dept: HEMATOLOGY ONCOLOGY | Facility: CLINIC | Age: 62
End: 2017-10-03
Payer: COMMERCIAL

## 2017-10-03 VITALS
TEMPERATURE: 98.1 F | WEIGHT: 125 LBS | HEART RATE: 73 BPM | DIASTOLIC BLOOD PRESSURE: 67 MMHG | RESPIRATION RATE: 16 BRPM | BODY MASS INDEX: 21.46 KG/M2 | OXYGEN SATURATION: 98 % | SYSTOLIC BLOOD PRESSURE: 102 MMHG

## 2017-10-03 PROCEDURE — 99215 OFFICE O/P EST HI 40 MIN: CPT

## 2017-10-03 RX ORDER — NORTRIPTYLINE HYDROCHLORIDE 25 MG/1
25 CAPSULE ORAL
Qty: 30 | Refills: 0 | Status: DISCONTINUED | COMMUNITY
Start: 2017-09-26

## 2017-10-03 RX ORDER — NORTRIPTYLINE HYDROCHLORIDE 10 MG/1
10 CAPSULE ORAL
Qty: 30 | Refills: 0 | Status: DISCONTINUED | COMMUNITY
Start: 2017-09-12

## 2017-10-06 ENCOUNTER — FORM ENCOUNTER (OUTPATIENT)
Age: 62
End: 2017-10-06

## 2017-10-07 ENCOUNTER — OUTPATIENT (OUTPATIENT)
Dept: OUTPATIENT SERVICES | Facility: HOSPITAL | Age: 62
LOS: 1 days | End: 2017-10-07
Payer: COMMERCIAL

## 2017-10-07 ENCOUNTER — APPOINTMENT (OUTPATIENT)
Dept: CT IMAGING | Facility: IMAGING CENTER | Age: 62
End: 2017-10-07

## 2017-10-07 DIAGNOSIS — C34.92 MALIGNANT NEOPLASM OF UNSPECIFIED PART OF LEFT BRONCHUS OR LUNG: ICD-10-CM

## 2017-10-07 DIAGNOSIS — D24.9 BENIGN NEOPLASM OF UNSPECIFIED BREAST: Chronic | ICD-10-CM

## 2017-10-07 PROCEDURE — 71250 CT THORAX DX C-: CPT

## 2017-10-07 PROCEDURE — 71250 CT THORAX DX C-: CPT | Mod: 26

## 2017-10-09 ENCOUNTER — TRANSCRIPTION ENCOUNTER (OUTPATIENT)
Age: 62
End: 2017-10-09

## 2017-10-10 ENCOUNTER — APPOINTMENT (OUTPATIENT)
Dept: THORACIC SURGERY | Facility: CLINIC | Age: 62
End: 2017-10-10

## 2017-10-10 DIAGNOSIS — C34.92 MALIGNANT NEOPLASM OF UNSPECIFIED PART OF LEFT BRONCHUS OR LUNG: ICD-10-CM

## 2017-10-23 ENCOUNTER — APPOINTMENT (OUTPATIENT)
Dept: HEMATOLOGY ONCOLOGY | Facility: CLINIC | Age: 62
End: 2017-10-23
Payer: COMMERCIAL

## 2017-10-23 PROCEDURE — 99499A: CUSTOM | Mod: NC

## 2017-11-09 PROBLEM — R92.1 BREAST CALCIFICATIONS: Status: RESOLVED | Noted: 2017-11-09 | Resolved: 2017-11-09

## 2017-11-09 PROBLEM — C34.92: Status: RESOLVED | Noted: 2017-08-02 | Resolved: 2017-11-09

## 2017-11-09 PROBLEM — Z80.3 FAMILY HISTORY OF BREAST CANCER: Status: ACTIVE | Noted: 2017-11-09

## 2017-11-09 PROBLEM — Z80.41 FAMILY HISTORY OF OVARIAN CANCER: Status: ACTIVE | Noted: 2017-11-09

## 2017-11-09 RX ORDER — ALPRAZOLAM 0.25 MG/1
0.25 TABLET ORAL
Qty: 30 | Refills: 0 | Status: COMPLETED | COMMUNITY
Start: 2017-06-29 | End: 2017-11-09

## 2017-11-09 RX ORDER — TRAMADOL HYDROCHLORIDE 50 MG/1
50 TABLET, COATED ORAL
Qty: 21 | Refills: 0 | Status: COMPLETED | COMMUNITY
Start: 2017-07-20 | End: 2017-11-09

## 2017-11-15 ENCOUNTER — APPOINTMENT (OUTPATIENT)
Dept: GYNECOLOGIC ONCOLOGY | Facility: CLINIC | Age: 62
End: 2017-11-15
Payer: COMMERCIAL

## 2017-11-15 VITALS — DIASTOLIC BLOOD PRESSURE: 56 MMHG | SYSTOLIC BLOOD PRESSURE: 90 MMHG

## 2017-11-15 VITALS — WEIGHT: 125 LBS | BODY MASS INDEX: 21.34 KG/M2 | HEIGHT: 64 IN

## 2017-11-15 DIAGNOSIS — Z84.81 FAMILY HISTORY OF CARRIER OF GENETIC DISEASE: ICD-10-CM

## 2017-11-15 DIAGNOSIS — C34.92 MALIGNANT NEOPLASM OF UNSPECIFIED PART OF LEFT BRONCHUS OR LUNG: ICD-10-CM

## 2017-11-15 DIAGNOSIS — R92.1 MAMMOGRAPHIC CALCIFICATION FOUND ON DIAGNOSTIC IMAGING OF BREAST: ICD-10-CM

## 2017-11-15 DIAGNOSIS — K66.1 HEMOPERITONEUM: ICD-10-CM

## 2017-11-15 DIAGNOSIS — J90 PLEURAL EFFUSION, NOT ELSEWHERE CLASSIFIED: ICD-10-CM

## 2017-11-15 DIAGNOSIS — Z80.41 FAMILY HISTORY OF MALIGNANT NEOPLASM OF OVARY: ICD-10-CM

## 2017-11-15 DIAGNOSIS — Z80.3 FAMILY HISTORY OF MALIGNANT NEOPLASM OF BREAST: ICD-10-CM

## 2017-11-15 DIAGNOSIS — Z87.410 PERSONAL HISTORY OF CERVICAL DYSPLASIA: ICD-10-CM

## 2017-11-15 DIAGNOSIS — D24.1 BENIGN NEOPLASM OF RIGHT BREAST: ICD-10-CM

## 2017-11-15 PROCEDURE — 99244 OFF/OP CNSLTJ NEW/EST MOD 40: CPT

## 2017-11-15 RX ORDER — ALBUTEROL 90 MCG
AEROSOL (GRAM) INHALATION
Refills: 0 | Status: COMPLETED | COMMUNITY
End: 2017-11-15

## 2017-11-15 RX ORDER — ERGOCALCIFEROL 1.25 MG/1
1.25 MG CAPSULE, LIQUID FILLED ORAL
Qty: 6 | Refills: 1 | Status: COMPLETED | COMMUNITY
Start: 2017-01-17 | End: 2017-11-15

## 2017-11-17 ENCOUNTER — FORM ENCOUNTER (OUTPATIENT)
Age: 62
End: 2017-11-17

## 2017-11-18 ENCOUNTER — APPOINTMENT (OUTPATIENT)
Dept: ULTRASOUND IMAGING | Facility: IMAGING CENTER | Age: 62
End: 2017-11-18
Payer: COMMERCIAL

## 2017-11-18 ENCOUNTER — OUTPATIENT (OUTPATIENT)
Dept: OUTPATIENT SERVICES | Facility: HOSPITAL | Age: 62
LOS: 1 days | End: 2017-11-18
Payer: COMMERCIAL

## 2017-11-18 DIAGNOSIS — Z15.02 GENETIC SUSCEPTIBILITY TO MALIGNANT NEOPLASM OF OVARY: ICD-10-CM

## 2017-11-18 DIAGNOSIS — D24.9 BENIGN NEOPLASM OF UNSPECIFIED BREAST: Chronic | ICD-10-CM

## 2017-11-18 DIAGNOSIS — Z15.01 GENETIC SUSCEPTIBILITY TO MALIGNANT NEOPLASM OF BREAST: ICD-10-CM

## 2017-11-18 PROCEDURE — 76856 US EXAM PELVIC COMPLETE: CPT

## 2017-11-18 PROCEDURE — 76830 TRANSVAGINAL US NON-OB: CPT

## 2017-11-18 PROCEDURE — 76856 US EXAM PELVIC COMPLETE: CPT | Mod: 26

## 2017-11-18 PROCEDURE — 76830 TRANSVAGINAL US NON-OB: CPT | Mod: 26

## 2017-11-20 ENCOUNTER — APPOINTMENT (OUTPATIENT)
Dept: MRI IMAGING | Facility: IMAGING CENTER | Age: 62
End: 2017-11-20
Payer: COMMERCIAL

## 2017-11-20 ENCOUNTER — OUTPATIENT (OUTPATIENT)
Dept: OUTPATIENT SERVICES | Facility: HOSPITAL | Age: 62
LOS: 1 days | End: 2017-11-20
Payer: COMMERCIAL

## 2017-11-20 ENCOUNTER — TRANSCRIPTION ENCOUNTER (OUTPATIENT)
Age: 62
End: 2017-11-20

## 2017-11-20 DIAGNOSIS — Z00.8 ENCOUNTER FOR OTHER GENERAL EXAMINATION: ICD-10-CM

## 2017-11-20 DIAGNOSIS — D24.9 BENIGN NEOPLASM OF UNSPECIFIED BREAST: Chronic | ICD-10-CM

## 2017-11-20 PROCEDURE — 82565 ASSAY OF CREATININE: CPT

## 2017-11-20 PROCEDURE — 0159T: CPT | Mod: 26

## 2017-11-20 PROCEDURE — 77059 MRI BREAST BILATERAL: CPT | Mod: 26

## 2017-11-20 PROCEDURE — C8937: CPT

## 2017-11-20 PROCEDURE — C8908: CPT

## 2017-11-20 PROCEDURE — A9585: CPT

## 2017-11-28 ENCOUNTER — OUTPATIENT (OUTPATIENT)
Dept: OUTPATIENT SERVICES | Facility: HOSPITAL | Age: 62
LOS: 1 days | End: 2017-11-28
Payer: COMMERCIAL

## 2017-11-28 ENCOUNTER — APPOINTMENT (OUTPATIENT)
Dept: MRI IMAGING | Facility: IMAGING CENTER | Age: 62
End: 2017-11-28

## 2017-11-28 ENCOUNTER — APPOINTMENT (OUTPATIENT)
Dept: ULTRASOUND IMAGING | Facility: IMAGING CENTER | Age: 62
End: 2017-11-28
Payer: COMMERCIAL

## 2017-11-28 ENCOUNTER — TRANSCRIPTION ENCOUNTER (OUTPATIENT)
Age: 62
End: 2017-11-28

## 2017-11-28 ENCOUNTER — RESULT REVIEW (OUTPATIENT)
Age: 62
End: 2017-11-28

## 2017-11-28 DIAGNOSIS — D24.9 BENIGN NEOPLASM OF UNSPECIFIED BREAST: Chronic | ICD-10-CM

## 2017-11-28 DIAGNOSIS — Z00.8 ENCOUNTER FOR OTHER GENERAL EXAMINATION: ICD-10-CM

## 2017-11-28 PROCEDURE — G0206: CPT | Mod: 26,LT

## 2017-11-28 PROCEDURE — 76642 ULTRASOUND BREAST LIMITED: CPT | Mod: 26,LT

## 2017-11-28 PROCEDURE — 88305 TISSUE EXAM BY PATHOLOGIST: CPT | Mod: 26

## 2017-11-28 PROCEDURE — A9585: CPT

## 2017-11-28 PROCEDURE — 19085 BX BREAST 1ST LESION MR IMAG: CPT | Mod: LT

## 2017-11-28 PROCEDURE — 19085 BX BREAST 1ST LESION MR IMAG: CPT

## 2017-11-28 PROCEDURE — 88305 TISSUE EXAM BY PATHOLOGIST: CPT

## 2017-11-28 PROCEDURE — 77065 DX MAMMO INCL CAD UNI: CPT

## 2017-11-28 PROCEDURE — 76642 ULTRASOUND BREAST LIMITED: CPT

## 2017-11-29 ENCOUNTER — APPOINTMENT (OUTPATIENT)
Dept: SURGERY | Facility: CLINIC | Age: 62
End: 2017-11-29
Payer: COMMERCIAL

## 2017-11-29 PROCEDURE — 99205K: CUSTOM

## 2017-11-30 DIAGNOSIS — R92.8 OTHER ABNORMAL AND INCONCLUSIVE FINDINGS ON DIAGNOSTIC IMAGING OF BREAST: ICD-10-CM

## 2017-11-30 DIAGNOSIS — N63.22 UNSPECIFIED LUMP IN THE LEFT BREAST, UPPER INNER QUADRANT: ICD-10-CM

## 2017-12-08 ENCOUNTER — APPOINTMENT (OUTPATIENT)
Dept: PLASTIC SURGERY | Facility: CLINIC | Age: 62
End: 2017-12-08
Payer: COMMERCIAL

## 2017-12-08 VITALS
RESPIRATION RATE: 15 BRPM | DIASTOLIC BLOOD PRESSURE: 65 MMHG | TEMPERATURE: 98.1 F | SYSTOLIC BLOOD PRESSURE: 105 MMHG | HEIGHT: 64 IN | BODY MASS INDEX: 20.83 KG/M2 | WEIGHT: 122 LBS | HEART RATE: 71 BPM | OXYGEN SATURATION: 99 %

## 2017-12-08 PROCEDURE — 99245 OFF/OP CONSLTJ NEW/EST HI 55: CPT

## 2017-12-12 ENCOUNTER — APPOINTMENT (OUTPATIENT)
Dept: PULMONOLOGY | Facility: CLINIC | Age: 62
End: 2017-12-12

## 2017-12-13 ENCOUNTER — APPOINTMENT (OUTPATIENT)
Dept: PULMONOLOGY | Facility: CLINIC | Age: 62
End: 2017-12-13
Payer: COMMERCIAL

## 2017-12-13 VITALS
RESPIRATION RATE: 17 BRPM | OXYGEN SATURATION: 100 % | HEIGHT: 64 IN | BODY MASS INDEX: 20.83 KG/M2 | SYSTOLIC BLOOD PRESSURE: 110 MMHG | HEART RATE: 84 BPM | WEIGHT: 122 LBS | DIASTOLIC BLOOD PRESSURE: 60 MMHG

## 2017-12-13 PROCEDURE — 94620 PULMONARY STRESS TESTING SIMPLE: CPT

## 2017-12-13 PROCEDURE — 99214 OFFICE O/P EST MOD 30 MIN: CPT | Mod: 25

## 2017-12-13 PROCEDURE — 94010 BREATHING CAPACITY TEST: CPT | Mod: 59

## 2017-12-22 ENCOUNTER — OTHER (OUTPATIENT)
Age: 62
End: 2017-12-22

## 2017-12-28 ENCOUNTER — OUTPATIENT (OUTPATIENT)
Dept: OUTPATIENT SERVICES | Facility: HOSPITAL | Age: 62
LOS: 1 days | End: 2017-12-28

## 2017-12-28 VITALS
WEIGHT: 123.02 LBS | SYSTOLIC BLOOD PRESSURE: 100 MMHG | RESPIRATION RATE: 16 BRPM | TEMPERATURE: 99 F | HEART RATE: 87 BPM | OXYGEN SATURATION: 98 % | HEIGHT: 64 IN | DIASTOLIC BLOOD PRESSURE: 62 MMHG

## 2017-12-28 DIAGNOSIS — D24.9 BENIGN NEOPLASM OF UNSPECIFIED BREAST: Chronic | ICD-10-CM

## 2017-12-28 DIAGNOSIS — Z15.01 GENETIC SUSCEPTIBILITY TO MALIGNANT NEOPLASM OF BREAST: ICD-10-CM

## 2017-12-28 DIAGNOSIS — J45.998 OTHER ASTHMA: ICD-10-CM

## 2017-12-28 DIAGNOSIS — S36.039A UNSPECIFIED LACERATION OF SPLEEN, INITIAL ENCOUNTER: Chronic | ICD-10-CM

## 2017-12-28 DIAGNOSIS — Z90.2 ACQUIRED ABSENCE OF LUNG [PART OF]: Chronic | ICD-10-CM

## 2017-12-28 DIAGNOSIS — S02.2XXA FRACTURE OF NASAL BONES, INITIAL ENCOUNTER FOR CLOSED FRACTURE: Chronic | ICD-10-CM

## 2017-12-28 LAB
ALBUMIN SERPL ELPH-MCNC: 4.3 G/DL — SIGNIFICANT CHANGE UP (ref 3.3–5)
ALP SERPL-CCNC: 57 U/L — SIGNIFICANT CHANGE UP (ref 40–120)
ALT FLD-CCNC: 29 U/L — SIGNIFICANT CHANGE UP (ref 4–33)
AST SERPL-CCNC: 24 U/L — SIGNIFICANT CHANGE UP (ref 4–32)
BILIRUB SERPL-MCNC: 0.4 MG/DL — SIGNIFICANT CHANGE UP (ref 0.2–1.2)
BLD GP AB SCN SERPL QL: NEGATIVE — SIGNIFICANT CHANGE UP
BUN SERPL-MCNC: 15 MG/DL — SIGNIFICANT CHANGE UP (ref 7–23)
CALCIUM SERPL-MCNC: 8.9 MG/DL — SIGNIFICANT CHANGE UP (ref 8.4–10.5)
CHLORIDE SERPL-SCNC: 104 MMOL/L — SIGNIFICANT CHANGE UP (ref 98–107)
CO2 SERPL-SCNC: 27 MMOL/L — SIGNIFICANT CHANGE UP (ref 22–31)
CREAT SERPL-MCNC: 0.61 MG/DL — SIGNIFICANT CHANGE UP (ref 0.5–1.3)
GLUCOSE SERPL-MCNC: 92 MG/DL — SIGNIFICANT CHANGE UP (ref 70–99)
HCT VFR BLD CALC: 37.2 % — SIGNIFICANT CHANGE UP (ref 34.5–45)
HGB BLD-MCNC: 12 G/DL — SIGNIFICANT CHANGE UP (ref 11.5–15.5)
MCHC RBC-ENTMCNC: 28.8 PG — SIGNIFICANT CHANGE UP (ref 27–34)
MCHC RBC-ENTMCNC: 32.3 % — SIGNIFICANT CHANGE UP (ref 32–36)
MCV RBC AUTO: 89.4 FL — SIGNIFICANT CHANGE UP (ref 80–100)
NRBC # FLD: 0 — SIGNIFICANT CHANGE UP
PLATELET # BLD AUTO: 218 K/UL — SIGNIFICANT CHANGE UP (ref 150–400)
PMV BLD: 10.4 FL — SIGNIFICANT CHANGE UP (ref 7–13)
POTASSIUM SERPL-MCNC: 4.1 MMOL/L — SIGNIFICANT CHANGE UP (ref 3.5–5.3)
POTASSIUM SERPL-SCNC: 4.1 MMOL/L — SIGNIFICANT CHANGE UP (ref 3.5–5.3)
PROT SERPL-MCNC: 6.7 G/DL — SIGNIFICANT CHANGE UP (ref 6–8.3)
RBC # BLD: 4.16 M/UL — SIGNIFICANT CHANGE UP (ref 3.8–5.2)
RBC # FLD: 14.1 % — SIGNIFICANT CHANGE UP (ref 10.3–14.5)
RH IG SCN BLD-IMP: POSITIVE — SIGNIFICANT CHANGE UP
SODIUM SERPL-SCNC: 143 MMOL/L — SIGNIFICANT CHANGE UP (ref 135–145)
WBC # BLD: 5.85 K/UL — SIGNIFICANT CHANGE UP (ref 3.8–10.5)
WBC # FLD AUTO: 5.85 K/UL — SIGNIFICANT CHANGE UP (ref 3.8–10.5)

## 2017-12-28 RX ORDER — MONTELUKAST 4 MG/1
1 TABLET, CHEWABLE ORAL
Qty: 0 | Refills: 0 | COMMUNITY

## 2017-12-28 RX ORDER — FLUTICASONE PROPIONATE AND SALMETEROL 50; 250 UG/1; UG/1
1 POWDER ORAL; RESPIRATORY (INHALATION)
Qty: 0 | Refills: 0 | COMMUNITY

## 2017-12-28 RX ORDER — SIMVASTATIN 20 MG/1
1 TABLET, FILM COATED ORAL
Qty: 0 | Refills: 0 | COMMUNITY

## 2017-12-28 RX ORDER — PROGESTERONE 200 MG/1
1 CAPSULE, LIQUID FILLED ORAL
Qty: 0 | Refills: 0 | COMMUNITY

## 2017-12-28 RX ORDER — ALBUTEROL 90 UG/1
2 AEROSOL, METERED ORAL
Qty: 0 | Refills: 0 | COMMUNITY

## 2017-12-28 RX ORDER — ZOLPIDEM TARTRATE 10 MG/1
1 TABLET ORAL
Qty: 0 | Refills: 0 | COMMUNITY

## 2017-12-28 NOTE — H&P PST ADULT - FAMILY HISTORY
Father  Still living? Unknown  Family history of lung cancer, Age at diagnosis: Age Unknown     Mother  Still living? Yes, Estimated age: Age Unknown  Family history of dementia, Age at diagnosis: Age Unknown     Child  Still living? Yes, Estimated age: Age Unknown  Family history of chronic ulcerative colitis, Age at diagnosis: Age Unknown

## 2017-12-28 NOTE — H&P PST ADULT - RS GEN PE MLT RESP DETAILS PC
no wheezes/no rales/airway patent/clear to auscultation bilaterally/no rhonchi/breath sounds equal/good air movement/no chest wall tenderness/no intercostal retractions/respirations non-labored

## 2017-12-28 NOTE — H&P PST ADULT - NEGATIVE GENERAL GENITOURINARY SYMPTOMS
no flank pain R/no renal colic/no flank pain L/no incontinence/no nocturia/no bladder infections/no dysuria/normal urinary frequency/no hematuria

## 2017-12-28 NOTE — H&P PST ADULT - NEGATIVE ENMT SYMPTOMS
no nasal discharge/no vertigo/no sinus symptoms/no nasal obstruction/no tinnitus/no nose bleeds/no recurrent cold sores/no hearing difficulty/no throat pain/no dysphagia/no nasal congestion/no post-nasal discharge/no abnormal taste sensation/no gum bleeding/no dry mouth/no ear pain

## 2017-12-28 NOTE — H&P PST ADULT - NEGATIVE CARDIOVASCULAR SYMPTOMS
no dyspnea on exertion/no claudication/no orthopnea/no paroxysmal nocturnal dyspnea/no chest pain/no peripheral edema/no palpitations

## 2017-12-28 NOTE — H&P PST ADULT - PMH
CAD (coronary artery disease)  non obstructive 50% proximal LAD stenosis  Disorder of shoulder  right  Eczema    GERD (gastroesophageal reflux disease)    Mild asthma  denies intubation or hospitalizations  Nasal bone fracture    Papilloma of breast  right  Pulmonary nodule  bilateral  PVC (premature ventricular contraction)    Seasonal allergies    Sleep apnea  noncompliant in using CPAP machine  Asymtomatic - was never retested for sleep apnea Adenocarcinoma of lung, stage 1  Left upper laobe stage 1A  CAD (coronary artery disease)  non obstructive 50% proximal LAD stenosis  Disorder of shoulder  right  Eczema    GERD (gastroesophageal reflux disease)    Mild asthma  denies intubation or hospitalizations  Nasal bone fracture    Papilloma of breast  right  Pulmonary nodule  bilateral  PVC (premature ventricular contraction)    Seasonal allergies    Sleep apnea  noncompliant in using CPAP machine  Asymtomatic - was never retested for sleep apnea

## 2017-12-28 NOTE — H&P PST ADULT - NEGATIVE GENERAL SYMPTOMS
no polyphagia/no sweating/no anorexia/no malaise/no chills/no fever/no polyuria/no fatigue/no weight gain

## 2017-12-28 NOTE — H&P PST ADULT - PROBLEM SELECTOR PLAN 1
scheduled for Bilateral Nipple sparing mastectomies, sentinel lymph node biopsies, possible axillary dissection, lymph node open deep axillary node, bilateral tissue expanders, bilateral alloderm, dilation curettage laparoscopic bilateral salpingectomy oophorectomy, laparoscopy / adrexal structure on 01/09/18  pending labs, medical, pulmonary & cardiac clearances  surgical scrub & preop instructions given & explained, pt verbalized understanding

## 2017-12-28 NOTE — H&P PST ADULT - PSH
Papilloma of breast  right breast excision & bx 2013  S/P adenoidectomy  as a child  S/P arthroscopy of shoulder  right April 2013  S/P blepharoplasty  in 2008 Nasal bone fracture  ORIG nasal fx- 2015  Papilloma of breast  right breast excision & bx 2013  S/P adenoidectomy  as a child  S/P arthroscopy of shoulder  right April 2013  S/P blepharoplasty  in 2008  S/P partial lobectomy of lung  Left upper lobe - VATS - segmrntectomy - 7/2017  Spleen laceration  7/2017 - transfused with 5 units PRBC

## 2017-12-28 NOTE — H&P PST ADULT - HISTORY OF PRESENT ILLNESS
61y/o female with h/o multiple lung nodule since 2011, gradually got bigger. Presents to PST for pre surgical evaluation for left Video assisted thoracoscopy left upper lobe wedge resection possible segmentectomy possible left upper lobectomy with lung marking by Dr Chamberlain on 7/12/17. 61y/o female with h/o multiple lung nodule since 2011 left Video assisted thoracoscopy left upper lobe wedge resection, segmentectomy, left upper lobectomy, 7/12/17 with laceration of spleen & was transfused with 5 units PRBC. Pt states she was diagnosed with BRAC 1. Pt had MRI of breasts - negative, Ct scan chest, Ultrasound abdomen/pelvic & transvaginal sonogram done. Now scheduled for Bilateral Nipple sparing mastectomies, sentinel lymph node biopsies, possible axillary dissection, lymph node open deep axillarynode, bilateral tissue expanders, bilateral alloderm, dilation curettage laparoscopic bilateral salpingectomy oophorectomy, laparoscopy / adrexal structure on 01/09/18.. 61y/o female with h/o multiple lung nodule since 2011 left Video assisted thoracoscopy left upper lobe wedge resection, segmentectomy, left upper lobectomy, 7/12/17 with laceration of spleen & was transfused with 5 units PRBC. Pt states she was diagnosed with BRAC 1. Pt had MRI of breasts - negative, Ct scan chest, Ultrasound abdomen/pelvic & transvaginal sonogram done. Now scheduled for Bilateral Nipple sparing mastectomies, sentinel lymph node biopsies, possible axillary dissection, lymph node open deep axillary node, bilateral tissue expanders, bilateral alloderm, dilation curettage laparoscopic bilateral salpingectomy oophorectomy, laparoscopy / adrexal structure on 01/09/18.

## 2017-12-28 NOTE — H&P PST ADULT - BREASTS DETAILS
no discharge or discoloration/normal shape/nipples normal/right breast bruising from s/p biopsy no mass/normal shape/no tenderness/nipples normal/no discharge or discoloration

## 2017-12-28 NOTE — H&P PST ADULT - NEGATIVE BREAST SYMPTOMS
no breast tenderness L/no breast lump R/no nipple discharge R/no breast tenderness R/no breast lump L/no nipple discharge L

## 2017-12-29 ENCOUNTER — TRANSCRIPTION ENCOUNTER (OUTPATIENT)
Age: 62
End: 2017-12-29

## 2018-01-09 ENCOUNTER — RESULT REVIEW (OUTPATIENT)
Age: 63
End: 2018-01-09

## 2018-01-09 ENCOUNTER — APPOINTMENT (OUTPATIENT)
Dept: NUCLEAR MEDICINE | Facility: HOSPITAL | Age: 63
End: 2018-01-09

## 2018-01-09 ENCOUNTER — INPATIENT (INPATIENT)
Facility: HOSPITAL | Age: 63
LOS: 0 days | Discharge: HOME | End: 2018-01-10
Attending: OBSTETRICS & GYNECOLOGY | Admitting: OBSTETRICS & GYNECOLOGY
Payer: COMMERCIAL

## 2018-01-09 ENCOUNTER — APPOINTMENT (OUTPATIENT)
Dept: GYNECOLOGIC ONCOLOGY | Facility: HOSPITAL | Age: 63
End: 2018-01-09

## 2018-01-09 ENCOUNTER — APPOINTMENT (OUTPATIENT)
Dept: SURGERY | Facility: HOSPITAL | Age: 63
End: 2018-01-09

## 2018-01-09 VITALS
TEMPERATURE: 98 F | HEIGHT: 64 IN | WEIGHT: 123.02 LBS | OXYGEN SATURATION: 100 % | DIASTOLIC BLOOD PRESSURE: 54 MMHG | RESPIRATION RATE: 16 BRPM | SYSTOLIC BLOOD PRESSURE: 95 MMHG | HEART RATE: 66 BPM

## 2018-01-09 DIAGNOSIS — D24.9 BENIGN NEOPLASM OF UNSPECIFIED BREAST: Chronic | ICD-10-CM

## 2018-01-09 DIAGNOSIS — Z15.01 GENETIC SUSCEPTIBILITY TO MALIGNANT NEOPLASM OF BREAST: ICD-10-CM

## 2018-01-09 DIAGNOSIS — S36.039A UNSPECIFIED LACERATION OF SPLEEN, INITIAL ENCOUNTER: Chronic | ICD-10-CM

## 2018-01-09 DIAGNOSIS — S02.2XXA FRACTURE OF NASAL BONES, INITIAL ENCOUNTER FOR CLOSED FRACTURE: Chronic | ICD-10-CM

## 2018-01-09 DIAGNOSIS — Z90.2 ACQUIRED ABSENCE OF LUNG [PART OF]: Chronic | ICD-10-CM

## 2018-01-09 PROCEDURE — 88307 TISSUE EXAM BY PATHOLOGIST: CPT | Mod: 26

## 2018-01-09 PROCEDURE — 88305 TISSUE EXAM BY PATHOLOGIST: CPT | Mod: 26

## 2018-01-09 PROCEDURE — 88342 IMHCHEM/IMCYTCHM 1ST ANTB: CPT | Mod: 26

## 2018-01-09 PROCEDURE — 88331 PATH CONSLTJ SURG 1 BLK 1SPC: CPT | Mod: 26

## 2018-01-09 PROCEDURE — 88341 IMHCHEM/IMCYTCHM EA ADD ANTB: CPT | Mod: 26

## 2018-01-09 PROCEDURE — 88112 CYTOPATH CELL ENHANCE TECH: CPT | Mod: 26

## 2018-01-09 PROCEDURE — 88309 TISSUE EXAM BY PATHOLOGIST: CPT | Mod: 26

## 2018-01-09 PROCEDURE — 38573 LAPS PELVIC LYMPHADEC: CPT

## 2018-01-09 PROCEDURE — 88305 TISSUE EXAM BY PATHOLOGIST: CPT | Mod: 26,59

## 2018-01-09 RX ORDER — ZOLPIDEM TARTRATE 10 MG/1
5 TABLET ORAL AT BEDTIME
Qty: 0 | Refills: 0 | Status: DISCONTINUED | OUTPATIENT
Start: 2018-01-09 | End: 2018-01-10

## 2018-01-09 RX ORDER — ACETAMINOPHEN 500 MG
975 TABLET ORAL EVERY 6 HOURS
Qty: 0 | Refills: 0 | Status: DISCONTINUED | OUTPATIENT
Start: 2018-01-09 | End: 2018-01-10

## 2018-01-09 RX ORDER — ONDANSETRON 8 MG/1
4 TABLET, FILM COATED ORAL ONCE
Qty: 0 | Refills: 0 | Status: COMPLETED | OUTPATIENT
Start: 2018-01-09 | End: 2018-01-09

## 2018-01-09 RX ORDER — ALPRAZOLAM 0.25 MG
0.5 TABLET ORAL THREE TIMES A DAY
Qty: 0 | Refills: 0 | Status: DISCONTINUED | OUTPATIENT
Start: 2018-01-09 | End: 2018-01-10

## 2018-01-09 RX ORDER — HYDROMORPHONE HYDROCHLORIDE 2 MG/ML
0.5 INJECTION INTRAMUSCULAR; INTRAVENOUS; SUBCUTANEOUS
Qty: 0 | Refills: 0 | Status: DISCONTINUED | OUTPATIENT
Start: 2018-01-09 | End: 2018-01-09

## 2018-01-09 RX ORDER — TOBRAMYCIN 0.3 %
1 DROPS OPHTHALMIC (EYE) EVERY 4 HOURS
Qty: 0 | Refills: 0 | Status: COMPLETED | OUTPATIENT
Start: 2018-01-09 | End: 2018-01-10

## 2018-01-09 RX ORDER — HYDROMORPHONE HYDROCHLORIDE 2 MG/ML
2 INJECTION INTRAMUSCULAR; INTRAVENOUS; SUBCUTANEOUS EVERY 4 HOURS
Qty: 0 | Refills: 0 | Status: DISCONTINUED | OUTPATIENT
Start: 2018-01-09 | End: 2018-01-10

## 2018-01-09 RX ORDER — DOCUSATE SODIUM 100 MG
100 CAPSULE ORAL
Qty: 0 | Refills: 0 | Status: DISCONTINUED | OUTPATIENT
Start: 2018-01-09 | End: 2018-01-10

## 2018-01-09 RX ORDER — ENOXAPARIN SODIUM 100 MG/ML
40 INJECTION SUBCUTANEOUS DAILY
Qty: 0 | Refills: 0 | Status: DISCONTINUED | OUTPATIENT
Start: 2018-01-09 | End: 2018-01-10

## 2018-01-09 RX ORDER — SODIUM CHLORIDE 9 MG/ML
1000 INJECTION, SOLUTION INTRAVENOUS
Qty: 0 | Refills: 0 | Status: DISCONTINUED | OUTPATIENT
Start: 2018-01-09 | End: 2018-01-10

## 2018-01-09 RX ORDER — MONTELUKAST 4 MG/1
10 TABLET, CHEWABLE ORAL DAILY
Qty: 0 | Refills: 0 | Status: DISCONTINUED | OUTPATIENT
Start: 2018-01-09 | End: 2018-01-10

## 2018-01-09 RX ORDER — ALBUTEROL 90 UG/1
2 AEROSOL, METERED ORAL EVERY 6 HOURS
Qty: 0 | Refills: 0 | Status: DISCONTINUED | OUTPATIENT
Start: 2018-01-09 | End: 2018-01-10

## 2018-01-09 RX ORDER — SIMETHICONE 80 MG/1
80 TABLET, CHEWABLE ORAL
Qty: 0 | Refills: 0 | Status: DISCONTINUED | OUTPATIENT
Start: 2018-01-09 | End: 2018-01-10

## 2018-01-09 RX ORDER — IBUPROFEN 200 MG
600 TABLET ORAL EVERY 6 HOURS
Qty: 0 | Refills: 0 | Status: DISCONTINUED | OUTPATIENT
Start: 2018-01-09 | End: 2018-01-10

## 2018-01-09 RX ORDER — HYDROMORPHONE HYDROCHLORIDE 2 MG/ML
0.25 INJECTION INTRAMUSCULAR; INTRAVENOUS; SUBCUTANEOUS
Qty: 0 | Refills: 0 | Status: DISCONTINUED | OUTPATIENT
Start: 2018-01-09 | End: 2018-01-09

## 2018-01-09 RX ADMIN — Medication 1 DROP(S): at 14:45

## 2018-01-09 RX ADMIN — Medication 200 MILLIGRAM(S): at 15:30

## 2018-01-09 RX ADMIN — Medication 600 MILLIGRAM(S): at 18:23

## 2018-01-09 RX ADMIN — Medication 1 DROP(S): at 23:50

## 2018-01-09 RX ADMIN — SODIUM CHLORIDE 125 MILLILITER(S): 9 INJECTION, SOLUTION INTRAVENOUS at 20:32

## 2018-01-09 RX ADMIN — Medication 975 MILLIGRAM(S): at 17:50

## 2018-01-09 RX ADMIN — HYDROMORPHONE HYDROCHLORIDE 0.5 MILLIGRAM(S): 2 INJECTION INTRAMUSCULAR; INTRAVENOUS; SUBCUTANEOUS at 13:55

## 2018-01-09 RX ADMIN — Medication 600 MILLIGRAM(S): at 17:50

## 2018-01-09 RX ADMIN — Medication 975 MILLIGRAM(S): at 18:23

## 2018-01-09 RX ADMIN — Medication 1 DROP(S): at 18:21

## 2018-01-09 RX ADMIN — HYDROMORPHONE HYDROCHLORIDE 0.5 MILLIGRAM(S): 2 INJECTION INTRAMUSCULAR; INTRAVENOUS; SUBCUTANEOUS at 13:45

## 2018-01-09 RX ADMIN — Medication 100 MILLIGRAM(S): at 17:51

## 2018-01-09 RX ADMIN — Medication 975 MILLIGRAM(S): at 23:50

## 2018-01-09 RX ADMIN — Medication 0.5 MILLIGRAM(S): at 17:51

## 2018-01-09 RX ADMIN — SIMETHICONE 80 MILLIGRAM(S): 80 TABLET, CHEWABLE ORAL at 17:50

## 2018-01-09 RX ADMIN — ONDANSETRON 4 MILLIGRAM(S): 8 TABLET, FILM COATED ORAL at 15:24

## 2018-01-09 RX ADMIN — ENOXAPARIN SODIUM 40 MILLIGRAM(S): 100 INJECTION SUBCUTANEOUS at 20:18

## 2018-01-09 RX ADMIN — Medication 600 MILLIGRAM(S): at 23:50

## 2018-01-09 NOTE — ASU PREOP CHECKLIST - ALLERGY BAND ON
Unable to fill per protocol as MD authorization required.    Last office visit - 1/3/17 pre op  Last refill - 12/8/16 #30 refills-3    Please advise.  Script set to send with approval.    
clonidine refilled  
done

## 2018-01-09 NOTE — ASU PATIENT PROFILE, ADULT - ANESTHESIA, PREVIOUS REACTION, PROFILE
severe.   " I felt paralyzed and felt like I could not breath before going under. " - 2015   N&V - severe  2017/nausea/vomiting

## 2018-01-09 NOTE — PATIENT PROFILE ADULT. - PATIENT REPRESENTATIVE PHONE
Initial Consultation Note    ASSESSMENT:   Patient with left sided low back/hip/groin pain    S/p lumbar surgery x2 and SCS.  No benefit with recent spine injections.    Predominate generator seems to be left hip joint and GTB.  Could have contributions from left SIJ or left lower lumbar facets.    Very frail and elderly.  Very comorbid.    A complex case.     PLAN:  1) Medical Modalities:     - Inc cymbalta to 60mg qHS  - Lido patch  - Inc gabapentin down the road if needed    Discussed dc'ing oral NSAID (Salsate) due to risks of MI, CVA, GI ulceration with oral NSAIDs.  Especially as he does not feel it is helping.  If his pain worsens when we stop, we can resume it.    - The patient seems to be having cognitive issues and relative intolerance to his opioids in terms of side effects. At this point he feels they are no longer helping at all, especially the fentanyl patch. I told him this is pretty typical with chronic opioid therapy as opioids are not effective chronic pain but are more useful in acute pain states. He is likely tolerant at this point. I would recommend weaning the fentanyl patch and only using the hydrocodone occasionally 1 tablet every day or two for his worst pains in order to get maximal medical benefit out of his opioid therapy. He can slowly undertake to wean as we begin the multimodal treatment outlined here so as not to have excessive pain or discomfort during the weaning process.    2) Interventional Modalities:   - Left Hip IA and Left GTB inj  - Hold plavix, need permission  - Hold Salsate    If fails, consider treatment of Left SIJ or left lower lumbar facets    - He is at elevated risk for both bleeding and infection.  While he is on chronic antibiotic therapy for his recurrent lung infection issues, he has no current systemic infections, fever, chills or other such issues.    Recommend he continue to work with the rep from the [employer] company on reprogramming    3) Behavioral Medicine  Modalities:    - None    4) Other Modalities:   - TENS unit  PT order written  - Patient with much debility and frailty. These work on strengthening, conditioning, stretching. mobility.    5) Imaging/Labs:   - None    HISTORY OF PRESENT ILLNESS:  The patient is seen in the pain management clinic at the request of Darrlel Kee DO.    The patient presents with low back pain on the left into the left hip but not groin which began years ago.   No specific inciting event recalled.      The pain does not radiate.  The patient reports numbness in the legs.  The patient reports tingling in the legs.  The patient reports weakness in the legs.  The pain is described as tingling  in character.   Pain is rated as a 6-10/10 usually and is rated as a 6/10 currently. Pt reports that the pain is worse with lyind down. The pain is better with sitting.    SCS unit in place h/o L3/4 laminectomy in 2012 and repeat in 2014.  Dr Leavitt does not recommend repeat surgery.  Recently readjusted the settings.    Former smoker    H/o COPD, anemia, CHF, CAD, JUSTYN on CPAP, high lipids, HTN, HLD, tracheomalacia, BL TKAs    On bactrim/doxycycline - on constantly as well as prednisone for COPD and recurrent lung infections    PAIN COURSE AND PREVIOUS INTERVENTIONS:  Medications:  Fent 25mcg - did not do well with the higher patch  Norco - BID from an old prescription  Gabapentin 300mg QID  Cymbalta 30 daily  Prednisone 30mg daily    Interventions:    Caudal LION x 2 recently with no relief    Adjuvants:  PT  TENS    BLOOD THINNING MEDICATIONS:  Plavix with Dr Farhad Johnson    REVIEW OF SYSTEMS:   I agree with the ROS as documented by my staff    CURRENT MEDICATIONS:  Current Outpatient Prescriptions   Medication Sig Dispense Refill   • fentaNYL (DURAGESIC) 25 MCG/HR patch Place 1 patch onto the skin every 72 hours. 10 patch 0   • mupirocin (BACTROBAN) 2 % cream Apply topically 2 times daily. (Patient taking differently: Apply topically 2  times daily as needed. ) 30 g 3   • losartan (COZAAR) 100 MG tablet Take 1 tablet by mouth daily. 90 tablet 3   • digoxin (LANOXIN) 0.25 MG tablet Take 250 mcg by mouth daily.     • spironolactone (ALDACTONE) 100 MG tablet Take 0.5 tablets by mouth daily. 90 tablet 0   • metoCLOPramide (REGLAN) 5 MG tablet Taking 1/2 tab BID on empty stomach 90 tablet 0   • salsalate (DISALCID) 750 MG tablet Take 1 tablet by mouth 2 times daily. for pain 180 tablet 3   • albuterol-ipratropium 2.5 mg/0.5 mg (DUONEB) 0.5-2.5 (3) MG/3ML nebulizer solution Take 3 mLs by nebulization four times daily. 360 mL 0   • furosemide (LASIX) 80 MG tablet Take 1 tablet by mouth daily.     • gabapentin (NEURONTIN) 300 MG capsule 1 tab poqid 120 capsule    • simvastatin (ZOCOR) 80 MG tablet Take 1 tablet by mouth nightly.     • Polyethyl Glycol-Propyl Glycol (SYSTANE OP) Apply 1 drop to eye daily. Instill 1 drop in each eye daily     • HYDROcodone-acetaminophen (NORCO)  MG per tablet Take 1 tablet by mouth every 6 hours as needed for Pain. 120 tablet 0   • DULoxetine (CYMBALTA) 30 MG capsule Take 1 capsule by mouth daily. 30 capsule 5   • predniSONE (DELTASONE) 10 MG tablet Take 1 tablet 3 times daily (total dose of 30 mg)     • doxycycline hyclate (VIBRAMYCIN) 100 MG capsule Take 100 mg by mouth daily. Indications: sinus infection,alternates with bactrim Every other month      • potassium chloride (KLOR-CON M20) 20 MEQ CR tablet Take 2 tablets by mouth daily. 90 tablet 3   • Tiotropium Bromide Monohydrate (SPIRIVA RESPIMAT) 2.5 MCG/ACT Aero Soln Inhale 1 puff into the lungs 2 times daily.     • montelukast (SINGULAIR) 10 MG tablet Take 10 mg by mouth nightly.     • Flunisolide 25 MCG/ACT (0.025%) Solution Spray 1 spray in each nostril daily. 1 Bottle 11   • omeprazole 20 MG tablet Take 1 tablet by mouth 2 times daily (before meals). For GERD/Reflux 90 tablet 3   • clopidogrel (PLAVIX) 75 MG tablet Take 1 tablet by mouth Monday-Friday.     •  Cholecalciferol (VITAMIN D PO) Take 600 mg by mouth 2 times daily.     • budesonide-formoterol (SYMBICORT) 160-4.5 MCG/ACT inhaler Inhale 2 puffs into the lungs 2 times daily. 1 Inhaler 12   • theophylline (MATHEW-24) 300 MG 24 hr capsule Take 1 capsule by mouth 2 times daily. For asthma 180 capsule 3   • finasteride (PROSCAR) 5 MG tablet Take 1 tablet by mouth daily. For prostate 90 tablet 3   • sulfamethoxazole-trimethoprim (BACTRIM DS) 800-160 MG per tablet Take 1 tablet by mouth 2 times daily. (Patient taking differently: Take 1 tablet by mouth 2 times daily. Alternates with doxycycline) 30 tablet 0     No current facility-administered medications for this visit.        ALLERGIES:  ALLERGIES:   Allergen Reactions   • Adhesive RASH and ANAPHYLAXIS     tape, painful and skin irritation due to anticoagulants causing skin  thinning   • Latex RASH   • Aciphex [Rabeprazole] Other (See Comments)     Reaction not specified   • Aspirin Other (See Comments)     PT STATES UNABLE TO TAKE ASA DUE TO SINUS SURGERY     • Esomeprazole DIARRHEA     nexium diarrhea   • Lansoprazole DIARRHEA     prevacid  diarrhea   • Lyrica [Pregabalin] DIARRHEA   • Pantoprazole Other (See Comments)     protonix- unknown reaction       MEDICAL HISTORY:  Past Medical History:   Diagnosis Date   • Acute bronchitis    • Anemia    • Arthritis    • Asbestosis    • Bronchiectasis (CMS/HCC)    • Cancer of skin    • Cardiac failure congestive    • Chronic sinusitis    • COPD (chronic obstructive pulmonary disease) (CMS/HCC)    • Coronary atherosclerosis of unspecified type of vessel, native or graft 9-2004    CAD   • CPAP (continuous positive airway pressure) dependence    • Dyslipidemia    • Dysphonia     vocal chord   • Esophageal reflux    • Essential (primary) hypertension    • History of agent Orange exposure    • Hypertrophy of prostate without urinary obstruction and other lower urinary tract symptoms (LUTS)    • Other and unspecified hyperlipidemia     • Other dyspnea and respiratory abnormality 9-2004    Acute Respiratory Insufficiency   • Other osteoporosis    • PMH of Jan 2014    MRSA positive nares   • Pneumonia    • Positive MRSA culture Rt knee post op suture 08/18/07 03/02/08 sputum, 07/08 nares, 12/08 bnares--3 neg cultures9/11,12/11,12/11   • Pulmonary Infiltrates 9-2004   • RAD (reactive airway disease)    • Seasonal allergies    • Sleep apnea     CPAP   • Steroid myopathy     chronic   • Tracheomalacia, acquired    • Uses nebulizer and inhaler at home 12/22/14    3 times/day       SURGICAL HISTORY:  Past Surgical History:   Procedure Laterality Date   • ARTHROSCOPY KNEE MEDIAL&LATERA  11/15/06    right [place] F[place]. [place] [facility] [place]   • BIOPSY SYNOVIUM KNEE JOINT     • COLONOSCOPY DIAGNOSTIC  5/8/13; 6/25/10    Dr. Mao, Diverticulosis/Poor Prepping,F/U 5 years   • CYSTOSCOPY,DIR VIS INT URETHROTOMY     • DENTAL SURGERY  4/2016    3 tooth removal - no anesthia   • ESOPHAGOGASTRIC FUNDOPLASTY  1997   • ESOPHAGOGASTRODUODENOSCOPY  6/25/2010; 1/05/2008   • EXPLOR FRONT SINUS,TRANSORBIT,UNI      multiple sinus surgeries, polyps   • INSERT INTRACORONARY STENT      right coronary artery and left anterior descending   • LAMINECTOMY,LUMBAR  5/16/14     L1-2 laminectomy/[facility]/DrStoll/5/16/14   • MEDIPORT INSERTION, SINGLE     • MOHS 1 STAGE UPTO5 TISSUE BLOCKS [facility]  9/16   • REMOVAL GALLBLADDER      Dr. Hall?   • REMV CATARACT EXTRACAP INSERT LENS  2005?    Phaco w/ IOL both    • SHOULDER SURG PROC UNLISTED Right 5/11/16    Ganglion cyst excision Dr Courtney   • SPINE SURGERY  4/13/12    L3-4 laminectomy   • TOTAL KNEE ARTHROPLASTY Bilateral 12/11/2006   • TOTAL KNEE REPLACEMENT Right 7/11/07    Right knee replacement/Dr Tyler    • TOTAL KNEE REPLACEMENT Left 7/14/08    Left knee replacement/Dr Tyler/       FAMILY HISTORY:  Family History   Problem Relation Age of Onset   • Heart Mother      heart disease   • Asthma Mother    • Asthma Father     • Cancer Sister      BREAST, LUNG ,AND THROAT   • Cancer Sister    • Diabetes Maternal Aunt    • Lung Cancer Brother      with mets to brain       SOCIAL HISTORY:  Social History   Substance Use Topics   • Smoking status: Former Smoker     Years: 15.00     Types: Cigarettes     Quit date: 1970   • Smokeless tobacco: Never Used      Comment: 30 years ago    • Alcohol use No       PHYSICAL EXAMINATION:  Visit Vitals  /62   Pulse 63   Resp 18   Wt 78 kg   SpO2 97%   BMI 26.15 kg/m²     General: Alert and oriented to person  Affect:  No apparent distress  Head: normocephalic, atraumatic  Neck: No gross asymmetry noted, no masses noted  Eyes: anicteric; no injection  Ears/Nose: No notable scar/lesions/masses     Respiratory: breathing comfortably at rest, dyspnea with minimal exertion    Spine:   Lumbar pain with extension (facet loading): yes  Lumbar pain with facet palpation: yes    MOTOR EXAMINATION:  LOWER EXTREMITY      LEFT RIGHT   Iliopsoas 5/5 5/5   Quadriceps 5/5 5/5   Hamstrings 5/5 5/5   Foot Dorsiflexion 5/5 5/5   Extensor hallucis longus 5/5 5/5   Gastrocnemius 5/5 5/5     Straight Leg Raise Test: negative bilaterally  Fabers test: pos bilaterally  Greater Trochanteric Bursa tenderness:  Pos left greater than right  Joint Exam: No redness, warmth, swelling or deformity  Trigger Points: Absent within Lumbar paraspinal muscles  Myofascial tenderness Absent within Lumbar paraspinal muscles  Pain in Right groin with hip abduction/adduction/internal rotation/external rotation Absent  Pain in Left groin with hip abduction/adduction/internal rotation/external rotation Absent    Right Deep tendon reflexes:   NA Patellar    1/4 Achilles      Left Deep tendon reflexes:    NA Patellar    1/4 Achilles      Temperature:  normal to touch   Edema -  Some  Skin - normal     Sensation:   Right Lower Extremity:  Reduced  Left Lower Extremity: Reduced    IMAGIN17  IMPRESSION:   1. Lumbar spine series  showing prominent multilevel disc space narrowing,  along with a grade 1 anterolisthesis at the L4-5 level.  2. Mild to moderate left hip osteoarthrosis is suggested.    5/18/15        On review of lab work:   GFR > 60mL/min/1.73m2   Platelet > 100K/mcL  ______________________________________________________________      I reviewed the patients recent labwork as part of my decision making process.  I reviewed and summarized old medical records in assessing the patient today.  This note was routed to the referring provider: Darrell Kee DO David Sliwoski MD  Interventional Pain Management     [phone] and

## 2018-01-09 NOTE — BRIEF OPERATIVE NOTE - OPERATION/FINDINGS
8 week sized uterus, grossly normal right adnexa, left adnexa with irregularity around distal portion of fallopian tube (sent for frozen: fallopian tube cancer). possible endometriosis noted by posterior cul de sac, and anterior abdominal wall. adhesions noted by LUQ. no gross disease noted on cavity.

## 2018-01-09 NOTE — BRIEF OPERATIVE NOTE - PROCEDURE
<<-----Click on this checkbox to enter Procedure Peritoneal biopsy  01/09/2018    Active  LOKESHASARUPERTO  Dilation and curettage  01/09/2018    Active  LOKESHASARUPERTO  Omentectomy  01/09/2018    Active  LOKESHASARUPERTO  Lymph node dissection  01/09/2018  pelvic and para aortic  Active  LOKESHASARUPERTO  Hysterectomy, total, laparoscopic, with bilateral salpingo-oophorectomy  01/09/2018    Active  REESE

## 2018-01-09 NOTE — ASU PATIENT PROFILE, ADULT - PSH
Nasal bone fracture  ORIG nasal fx- 2015  Papilloma of breast  right breast excision & bx 2013  S/P adenoidectomy  as a child  S/P arthroscopy of shoulder  right April 2013  S/P blepharoplasty  in 2008  S/P partial lobectomy of lung  Left upper lobe - VATS - segmrntectomy - 7/2017  Spleen laceration  7/2017 - transfused with 5 units PRBC

## 2018-01-09 NOTE — ASU PATIENT PROFILE, ADULT - PMH
Adenocarcinoma of lung, stage 1  Left upper laobe stage 1A  CAD (coronary artery disease)  non obstructive 50% proximal LAD stenosis  Disorder of shoulder  right  Eczema    GERD (gastroesophageal reflux disease)    Mild asthma  denies intubation or hospitalizations  Nasal bone fracture    Papilloma of breast  right  Pulmonary nodule  bilateral  PVC (premature ventricular contraction)    Seasonal allergies    Sleep apnea  noncompliant in using CPAP machine  Asymtomatic - was never retested for sleep apnea

## 2018-01-09 NOTE — BRIEF OPERATIVE NOTE - SPECIMENS
omentum, left diaphragm peritoneum, EMC, anterior abdominal wall peritoneum, sigmoid adhesion, left tube and ovary, right tube and ovary, uterus and cervix, left/right pelvic lymph node, left/right para aortic lymph node

## 2018-01-09 NOTE — PROGRESS NOTE ADULT - ASSESSMENT
ASSESSMENT/PLAN  61 yo female with +BRAC gene mutation, adenoCA of lung stage I, CAD, GERD, nasal bone fx; today s/p TLH,BSO, PPALND, omentectomy, peritoneal bx, D&C  heent: R corneal abrasion-Tobramycin 1drop q4hrs  cvs: hemodynamically stable  pulm: saturating well on RA, encourage incentive spirometer  heme: Lovenox, venodynes, early ambulation for DVT prophylaxis  ID: afebrile, labs in am  : grigsby catheter in place, adequate UOP (280/2hrs), clear yellow urine  F/E/N: IVF:LR@ 125cc/hr, advance diet as tolerated  neuro: pain management:   dispo: admit to 4t for post surgical management  Dr. Wilde's team updated. ASSESSMENT/PLAN  63 yo female with +BRAC gene mutation, adenoCA of lung stage I, CAD, GERD, nasal bone fx; today s/p TLH,BSO, PPALND, omentectomy, peritoneal bx, D&C  heent: R corneal abrasion-Tobramycin 1drop q4hrs  cvs: hemodynamically stable  pulm: saturating well on RA, encourage incentive spirometer  heme: Lovenox, venodynes, early ambulation for DVT prophylaxis  ID: afebrile, labs in am  : grigsby catheter in place, adequate UOP (280/2hrs), clear yellow urine  F/E/N: IVF:LR@ 125cc/hr, advance diet as tolerated  neuro: pain management: IV Dilaudid, PO Tylenol Motrin,Dilaudid  dispo: admit to 4t for post surgical management  Dr. Wilde's team updated.

## 2018-01-09 NOTE — PROGRESS NOTE ADULT - SUBJECTIVE AND OBJECTIVE BOX
GYNECOLOGIC ONCOLOGY POST OP  NOTE    Pt seen and examined at bedside. Patient c/o R eye pain/itching. Denies nausea, vomiting, headache, chest pain, SOB. Post surgical pain well controlled.        OBJECTIVE:     VITALS:  T(F): 97.9 (01-09-18 @ 13:45), Max: 97.9 (01-09-18 @ 13:45)  HR: 82 (01-09-18 @ 14:45) (66 - 82)  BP: 109/57 (01-09-18 @ 14:45) (94/49 - 115/66)  RR: 16 (01-09-18 @ 14:45) (16 - 19)  SpO2: 98% (01-09-18 @ 14:45) (98% - 100%)  Wt(kg): --    I&O's Summary    09 Jan 2018 07:01  -  09 Jan 2018 14:59  --------------------------------------------------------  IN: 375 mL / OUT: 280 mL / NET: 95 mL        MEDICATIONS  (STANDING):  acetaminophen   Tablet. 975 milliGRAM(s) Oral every 6 hours  docusate sodium 100 milliGRAM(s) Oral two times a day  enoxaparin Injectable 40 milliGRAM(s) SubCutaneous daily  ibuprofen  Tablet 600 milliGRAM(s) Oral every 6 hours  lactated ringers. 1000 milliLiter(s) (125 mL/Hr) IV Continuous <Continuous>  montelukast 10 milliGRAM(s) Oral daily  simethicone 80 milliGRAM(s) Chew two times a day  tobramycin 0.3% Solution 1 Drop(s) Right EYE every 4 hours    MEDICATIONS  (PRN):  ALBUTerol    90 MICROgram(s) HFA Inhaler 2 Puff(s) Inhalation every 6 hours PRN Shortness of Breath and/or Wheezing  ALPRAZolam 0.5 milliGRAM(s) Oral three times a day PRN anxiety  HYDROmorphone   Tablet 2 milliGRAM(s) Oral every 4 hours PRN Severe Pain (7 - 10)  HYDROmorphone  Injectable 0.25 milliGRAM(s) IV Push every 10 minutes PRN Moderate Pain  HYDROmorphone  Injectable 0.5 milliGRAM(s) IV Push every 10 minutes PRN Severe Pain (7 - 10)  ondansetron Injectable 4 milliGRAM(s) IV Push once PRN Nausea and/or Vomiting  zolpidem 5 milliGRAM(s) Oral at bedtime PRN Insomnia      Physical Exam:  Constitutional: NAD  Pulmonary: clear to auscultation bilaterally   Cardiovascular: Regular rate and rhythm, + systolic murmur (grade I/VI)  Abdomen: soft, non-distended, appropriate tenderness, scope sites C/D/I  Extremities: no lower extremity edema or calve tenderness

## 2018-01-10 ENCOUNTER — TRANSCRIPTION ENCOUNTER (OUTPATIENT)
Age: 63
End: 2018-01-10

## 2018-01-10 VITALS
OXYGEN SATURATION: 98 % | TEMPERATURE: 98 F | HEART RATE: 67 BPM | DIASTOLIC BLOOD PRESSURE: 41 MMHG | RESPIRATION RATE: 17 BRPM | SYSTOLIC BLOOD PRESSURE: 112 MMHG

## 2018-01-10 DIAGNOSIS — Z98.890 OTHER SPECIFIED POSTPROCEDURAL STATES: ICD-10-CM

## 2018-01-10 LAB
HCT VFR BLD CALC: 28.5 % — LOW (ref 34.5–45)
HGB BLD-MCNC: 9.3 G/DL — LOW (ref 11.5–15.5)
MCHC RBC-ENTMCNC: 29.4 PG — SIGNIFICANT CHANGE UP (ref 27–34)
MCHC RBC-ENTMCNC: 32.6 % — SIGNIFICANT CHANGE UP (ref 32–36)
MCV RBC AUTO: 90.2 FL — SIGNIFICANT CHANGE UP (ref 80–100)
NON-GYNECOLOGICAL CYTOLOGY STUDY: SIGNIFICANT CHANGE UP
NRBC # FLD: 0 — SIGNIFICANT CHANGE UP
PLATELET # BLD AUTO: 183 K/UL — SIGNIFICANT CHANGE UP (ref 150–400)
PMV BLD: 10.6 FL — SIGNIFICANT CHANGE UP (ref 7–13)
RBC # BLD: 3.16 M/UL — LOW (ref 3.8–5.2)
RBC # FLD: 14.2 % — SIGNIFICANT CHANGE UP (ref 10.3–14.5)
WBC # BLD: 8.7 K/UL — SIGNIFICANT CHANGE UP (ref 3.8–10.5)
WBC # FLD AUTO: 8.7 K/UL — SIGNIFICANT CHANGE UP (ref 3.8–10.5)

## 2018-01-10 RX ORDER — ALBUTEROL 90 UG/1
0 AEROSOL, METERED ORAL
Qty: 0 | Refills: 0 | COMMUNITY

## 2018-01-10 RX ORDER — ACETAMINOPHEN 500 MG
650 TABLET ORAL EVERY 6 HOURS
Qty: 0 | Refills: 0 | Status: DISCONTINUED | OUTPATIENT
Start: 2018-01-10 | End: 2018-01-10

## 2018-01-10 RX ORDER — ACETAMINOPHEN 500 MG
2 TABLET ORAL
Qty: 80 | Refills: 0 | OUTPATIENT
Start: 2018-01-10 | End: 2018-01-19

## 2018-01-10 RX ORDER — IBUPROFEN 200 MG
1 TABLET ORAL
Qty: 60 | Refills: 0 | OUTPATIENT
Start: 2018-01-10 | End: 2018-01-24

## 2018-01-10 RX ORDER — SIMETHICONE 80 MG/1
1 TABLET, CHEWABLE ORAL
Qty: 10 | Refills: 0 | OUTPATIENT
Start: 2018-01-10 | End: 2018-01-14

## 2018-01-10 RX ADMIN — SIMETHICONE 80 MILLIGRAM(S): 80 TABLET, CHEWABLE ORAL at 10:43

## 2018-01-10 RX ADMIN — Medication 600 MILLIGRAM(S): at 05:58

## 2018-01-10 RX ADMIN — Medication 975 MILLIGRAM(S): at 01:27

## 2018-01-10 RX ADMIN — Medication 975 MILLIGRAM(S): at 06:34

## 2018-01-10 RX ADMIN — Medication 650 MILLIGRAM(S): at 10:43

## 2018-01-10 RX ADMIN — Medication 600 MILLIGRAM(S): at 01:27

## 2018-01-10 RX ADMIN — SIMETHICONE 80 MILLIGRAM(S): 80 TABLET, CHEWABLE ORAL at 06:06

## 2018-01-10 RX ADMIN — Medication 600 MILLIGRAM(S): at 06:34

## 2018-01-10 RX ADMIN — Medication 975 MILLIGRAM(S): at 05:59

## 2018-01-10 RX ADMIN — Medication 1 DROP(S): at 02:30

## 2018-01-10 RX ADMIN — Medication 100 MILLIGRAM(S): at 05:59

## 2018-01-10 NOTE — DISCHARGE NOTE ADULT - NS AS ACTIVITY OBS
No Heavy lifting/straining Showering allowed/Walking-Indoors allowed/No Heavy lifting/straining/Walking-Outdoors allowed

## 2018-01-10 NOTE — DISCHARGE NOTE ADULT - CONDITIONS AT DISCHARGE
pt ambulating, eating, voiding without difficulty. iv discontinued. no distress noted. scope sites abdominal scope sites x5 intact and dry without sign of infection.

## 2018-01-10 NOTE — DISCHARGE NOTE ADULT - CARE PLAN
Principal Discharge DX:	Carcinoma of fallopian tube, unspecified laterality  Goal:	ADL  Instructions for follow-up, activity and diet:	regular diet, ambulate as tolerated, continue pain medication as needed

## 2018-01-10 NOTE — PROGRESS NOTE ADULT - ASSESSMENT
Assessment/Plan: 62y POD#1 , s/p Total Laparoscopic Hysterectomy, Bilateral Salpingo-oophorectomy, pelvic and para-aortic lymph node dissection, Omentectomy, Peritoneal Biopsy, D&C for Frozen =fallopian tube cancer.  Patient stable and doing well this AM.

## 2018-01-10 NOTE — PROGRESS NOTE ADULT - PROBLEM SELECTOR PLAN 1
Neuro: Continue PO pain medication  CV: Hemodynamically stable  Pulm: Saturating well on RA. Increase incentive spirometry.  GI: Advance diet as tolerated  : Voiding spontaneously.   Heme: Continue Lovenox/Venodynes for DVT ppx. Increase OOB.    ID: Afebrile  Psych: reassurance and support provided to patient this AM.  she expressed appreciation.    Dispo: continue inpatient care.  discharge planning today.     Kayla Lgeer PGY-2  Patient seen with GYN ONC team

## 2018-01-10 NOTE — PROGRESS NOTE ADULT - PROVIDER SPECIALTY LIST ADULT
10/10/17 1030   /[facility] Disorder Services Daily Treatment   Date 10/10/17   Symptom Review Education Group 10:30 - 11:30   Affect Other (comment);Anxious  (positive, happy)   Mood Anxious;Other (comment)  (proud, thankful, positive, apprehensive )   Thought Process Appropriate   Psychosis None   Significant Symptom Changes pt rates anxiety @ 6, sadness and irritability @ 3, obsessive thinking @ 6, ed thoughts @ 4, racing thoughts @ 7.6   Sleep Report Good   Hours Slept pt reports 8   Sleep Report/Concerns Use of sleep aid;Good   Post Acute Withdrawal Symptoms Appetite changes   Followed Meal Plan Yes   Percent of Meal Plan Followed pt reports 100+   Evening Snack Yes   Any Type of Disorders (pt denies)   Types of Exercising Done Pt denies   Took Meds Yes   AODA:  ETOH No   AODA:  Drugs No   Psychosocial Stressors Financial;Interpersonal conflict;Loss / Grief   Goal Complete / Activity pt shared 3 things pt was proud of of her accomplishments in treatment. pt is going to take away the most she can from her last day in inpatient and take steps for her transition to partial programming.       Gyn Onc

## 2018-01-10 NOTE — PROGRESS NOTE ADULT - ATTENDING COMMENTS
Pt seen and examined with team. patient doing well - appropriately sad re. diagnosis  NAD  Abd soft/mild distention/nd  incisions c/d/i  Ext NT  Plan  reg diet/voiding freely  d/c home  lovenox/scd's

## 2018-01-10 NOTE — DISCHARGE NOTE ADULT - PATIENT PORTAL LINK FT
“You can access the [employer] Patient Portal, offered by [facility], by registering with the following website: [email or web address]”

## 2018-01-10 NOTE — DISCHARGE NOTE ADULT - HOSPITAL COURSE
61 yo female initially admitted for prophylactic mastectomy and bilateral Bilateral Salpingo-oophorectomy secondary to BRCA 1 mutation. Frozen pathology of fallopian tube noted carcinoma. Decision made to proceed with surgical staging and cancel prophylactic mastectomy. Patient underwent Total Laparoscopic Hysterectomy, Bilateral Salpingo-oophorectomy, Omenectomy, PPALND, Peritoneal Biopsies, D&C. Post operatively patient doing well. Ambulating, voiding and tolerating diet. Patient voiding freely. Pain well controlled.

## 2018-01-10 NOTE — DISCHARGE NOTE ADULT - CARE PROVIDER_API CALL
Shelby Wilde), Gynecologic Oncology; Obstetrics and Gynecology  [address] [place] [address]  Phone: [phone]  Fax: [phone]

## 2018-01-10 NOTE — DISCHARGE NOTE ADULT - INSTRUCTIONS
drink 9-13 eight oz. glasses of fluid daily. call md for follow up appt. call md for sign of infection (temp greater than 101f, redness at incision, pain not relieved by meds). resume pre operative diet

## 2018-01-10 NOTE — PROGRESS NOTE ADULT - SUBJECTIVE AND OBJECTIVE BOX
R2 Gyn ONC Progress Note POD#1  HD#2    Subjective:   Pt seen and examined at bedside. No events overnight. Pain well controlled. Patient ambulating.  Not yet Passing flatus. Tolerating regular diet. Pt denies fever, chills, chest pain, SOB, nausea, vomiting, lightheadedness, dizziness.   Patient tearful this AM_ stating fear surrounding diagnosis of cancer.      Objective:  T(F): 97.8 (01-10-18 @ 06:03), Max: 98.3 (01-09-18 @ 21:25)  HR: 62 (01-10-18 @ 06:03) (62 - 709)  BP: 84/46 (01-10-18 @ 06:03) (81/43 - 115/66)  RR: 17 (01-10-18 @ 06:03) (12 - 19)  SpO2: 98% (01-10-18 @ 06:03) (94% - 100%)  Wt(kg): --  I&O's Summary    09 Jan 2018 07:01  -  10 Jeffrey 2018 06:46  --------------------------------------------------------  IN: 755 mL / OUT: 2290 mL / NET: -1535 mL      CAPILLARY BLOOD GLUCOSE          MEDICATIONS  (STANDING):  acetaminophen   Tablet. 650 milliGRAM(s) Oral every 6 hours  docusate sodium 100 milliGRAM(s) Oral two times a day  enoxaparin Injectable 40 milliGRAM(s) SubCutaneous daily  ibuprofen  Tablet 600 milliGRAM(s) Oral every 6 hours  montelukast 10 milliGRAM(s) Oral daily  simethicone 80 milliGRAM(s) Chew two times a day    MEDICATIONS  (PRN):  ALBUTerol    90 MICROgram(s) HFA Inhaler 2 Puff(s) Inhalation every 6 hours PRN Shortness of Breath and/or Wheezing  ALPRAZolam 0.5 milliGRAM(s) Oral three times a day PRN anxiety  HYDROmorphone   Tablet 2 milliGRAM(s) Oral every 4 hours PRN Severe Pain (7 - 10)  zolpidem 5 milliGRAM(s) Oral at bedtime PRN Insomnia      Physical Exam:  Constitutional: NAD, A+O x3  CV: RR S1S2 no m/r/g  Lungs: CTA b/l, good air flow b/l  Abdomen: soft, softly-distended, appropriately-tender. no guarding, no rebound, normal bowel sounds  Incision: clean, dry, intact  Extremities: no lower extremity edema or calf tenderness bilaterally; venodynes in place    LABS:               9.3    8.70  )-----------( 183      ( 01-10 @ 04:44 )             28.5

## 2018-01-10 NOTE — DISCHARGE NOTE ADULT - MEDICATION SUMMARY - MEDICATIONS TO TAKE
I will START or STAY ON the medications listed below when I get home from the hospital:    vitamin b complex one tab daily  -- Indication: For home med    acetaminophen 325 mg oral tablet  -- 2 tab(s) by mouth every 6 hours  -- Indication: For pain    ibuprofen 600 mg oral tablet  -- 1 tab(s) by mouth every 6 hours  -- Indication: For pain    Imitrex 25 mg oral tablet  -- 1 tab(s) by mouth once, As Needed  -- Indication: For home med    simvastatin 40 mg oral tablet  -- 1 tab(s) by mouth once a day (at bedtime)  -- Indication: For home med    Xanax 0.25 mg oral tablet  -- 1 tab(s) by mouth 3 times a day, As Needed  -- Indication: For home med    Ambien 5 mg oral tablet  -- 1 tab(s) by mouth once a day (at bedtime), As Needed  -- Indication: For home med    Echinacea oral tablet  -- orally once a day last dose 12/28  -- Indication: For home med    Singulair 10 mg oral tablet  -- 1 tab(s) by mouth once a day (at bedtime)  -- Indication: For home med    simethicone 80 mg oral tablet, chewable  -- 1 tab(s) by mouth 2 times a day  -- Indication: For home med    Multiple Vitamins oral tablet  -- 1 tab(s) by mouth once a day last dose 12/28  -- Indication: For home med    Vitamin C 1000 mg oral tablet  -- orally 2 times a day  -- Indication: For home med

## 2018-01-12 ENCOUNTER — TRANSCRIPTION ENCOUNTER (OUTPATIENT)
Age: 63
End: 2018-01-12

## 2018-01-17 ENCOUNTER — APPOINTMENT (OUTPATIENT)
Dept: SURGERY | Facility: CLINIC | Age: 63
End: 2018-01-17

## 2018-01-17 ENCOUNTER — OUTPATIENT (OUTPATIENT)
Dept: OUTPATIENT SERVICES | Facility: HOSPITAL | Age: 63
LOS: 1 days | Discharge: HOME | End: 2018-01-17

## 2018-01-17 DIAGNOSIS — S02.2XXA FRACTURE OF NASAL BONES, INITIAL ENCOUNTER FOR CLOSED FRACTURE: Chronic | ICD-10-CM

## 2018-01-17 DIAGNOSIS — C34.92 MALIGNANT NEOPLASM OF UNSPECIFIED PART OF LEFT BRONCHUS OR LUNG: ICD-10-CM

## 2018-01-17 DIAGNOSIS — Z90.2 ACQUIRED ABSENCE OF LUNG [PART OF]: Chronic | ICD-10-CM

## 2018-01-17 DIAGNOSIS — D24.9 BENIGN NEOPLASM OF UNSPECIFIED BREAST: Chronic | ICD-10-CM

## 2018-01-17 DIAGNOSIS — S36.039A UNSPECIFIED LACERATION OF SPLEEN, INITIAL ENCOUNTER: Chronic | ICD-10-CM

## 2018-01-18 ENCOUNTER — RESULT REVIEW (OUTPATIENT)
Age: 63
End: 2018-01-18

## 2018-01-18 ENCOUNTER — APPOINTMENT (OUTPATIENT)
Dept: HEMATOLOGY ONCOLOGY | Facility: CLINIC | Age: 63
End: 2018-01-18
Payer: COMMERCIAL

## 2018-01-18 VITALS
HEIGHT: 63.98 IN | WEIGHT: 119.05 LBS | RESPIRATION RATE: 18 BRPM | DIASTOLIC BLOOD PRESSURE: 70 MMHG | SYSTOLIC BLOOD PRESSURE: 110 MMHG | HEART RATE: 78 BPM | BODY MASS INDEX: 20.32 KG/M2 | TEMPERATURE: 98.1 F | OXYGEN SATURATION: 98 %

## 2018-01-18 LAB
ALBUMIN SERPL ELPH-MCNC: 4.2 G/DL
ALP BLD-CCNC: 56 U/L
ALT SERPL-CCNC: 26 U/L
ANION GAP SERPL CALC-SCNC: 13 MMOL/L
APTT BLD: 30.4 SEC
AST SERPL-CCNC: 20 U/L
BILIRUB SERPL-MCNC: 0.4 MG/DL
BUN SERPL-MCNC: 20 MG/DL
CALCIUM SERPL-MCNC: 9.8 MG/DL
CHLORIDE SERPL-SCNC: 102 MMOL/L
CO2 SERPL-SCNC: 26 MMOL/L
CREAT SERPL-MCNC: 0.73 MG/DL
GLUCOSE SERPL-MCNC: 100 MG/DL
INR PPP: 0.99 RATIO
MAGNESIUM SERPL-MCNC: 2.1 MG/DL
POTASSIUM SERPL-SCNC: 4.9 MMOL/L
PROT SERPL-MCNC: 6.6 G/DL
PT BLD: 11.2 SEC
SODIUM SERPL-SCNC: 141 MMOL/L
SURGICAL PATHOLOGY STUDY: SIGNIFICANT CHANGE UP

## 2018-01-18 PROCEDURE — 99215 OFFICE O/P EST HI 40 MIN: CPT

## 2018-01-18 RX ORDER — CEPHALEXIN 500 MG/1
500 CAPSULE ORAL
Qty: 40 | Refills: 0 | Status: COMPLETED | COMMUNITY
Start: 2017-12-20

## 2018-01-18 RX ORDER — MIRTAZAPINE 7.5 MG/1
7.5 TABLET, FILM COATED ORAL
Qty: 30 | Refills: 0 | Status: DISCONTINUED | COMMUNITY
Start: 2017-08-31 | End: 2018-01-18

## 2018-01-18 RX ORDER — IMIPRAMINE HYDROCHLORIDE 25 MG/1
25 TABLET, FILM COATED ORAL
Qty: 30 | Refills: 0 | Status: DISCONTINUED | COMMUNITY
Start: 2017-09-08 | End: 2018-01-18

## 2018-01-18 RX ORDER — DOXYCYCLINE HYCLATE 100 MG/1
100 CAPSULE ORAL
Qty: 14 | Refills: 0 | Status: COMPLETED | COMMUNITY
Start: 2017-12-29

## 2018-01-18 RX ORDER — TRETINOIN 1 MG/G
0.1 CREAM TOPICAL
Qty: 20 | Refills: 0 | Status: DISCONTINUED | COMMUNITY
Start: 2017-10-31 | End: 2018-01-18

## 2018-01-18 RX ORDER — BUSPIRONE HYDROCHLORIDE 5 MG/1
5 TABLET ORAL
Qty: 90 | Refills: 0 | Status: DISCONTINUED | COMMUNITY
Start: 2017-10-27 | End: 2018-01-18

## 2018-01-19 LAB
CANCER AG125 SERPL-ACNC: 89 U/ML
HAV IGM SER QL: NONREACTIVE
HBV CORE IGM SER QL: NONREACTIVE
HBV SURFACE AG SER QL: NONREACTIVE
HCV AB SER QL: NONREACTIVE
HCV S/CO RATIO: 0.12 S/CO

## 2018-01-22 DIAGNOSIS — C57.00 MALIGNANT NEOPLASM OF UNSPECIFIED FALLOPIAN TUBE: ICD-10-CM

## 2018-01-24 ENCOUNTER — TRANSCRIPTION ENCOUNTER (OUTPATIENT)
Age: 63
End: 2018-01-24

## 2018-01-25 ENCOUNTER — APPOINTMENT (OUTPATIENT)
Dept: INFUSION THERAPY | Facility: HOSPITAL | Age: 63
End: 2018-01-25

## 2018-01-30 ENCOUNTER — APPOINTMENT (OUTPATIENT)
Dept: GYNECOLOGIC ONCOLOGY | Facility: CLINIC | Age: 63
End: 2018-01-30
Payer: COMMERCIAL

## 2018-01-30 PROCEDURE — 99024 POSTOP FOLLOW-UP VISIT: CPT

## 2018-02-01 ENCOUNTER — FORM ENCOUNTER (OUTPATIENT)
Age: 63
End: 2018-02-01

## 2018-02-02 ENCOUNTER — OUTPATIENT (OUTPATIENT)
Dept: OUTPATIENT SERVICES | Facility: HOSPITAL | Age: 63
LOS: 1 days | End: 2018-02-02
Payer: COMMERCIAL

## 2018-02-02 ENCOUNTER — APPOINTMENT (OUTPATIENT)
Dept: CT IMAGING | Facility: IMAGING CENTER | Age: 63
End: 2018-02-02
Payer: COMMERCIAL

## 2018-02-02 DIAGNOSIS — D24.9 BENIGN NEOPLASM OF UNSPECIFIED BREAST: Chronic | ICD-10-CM

## 2018-02-02 DIAGNOSIS — S02.2XXA FRACTURE OF NASAL BONES, INITIAL ENCOUNTER FOR CLOSED FRACTURE: Chronic | ICD-10-CM

## 2018-02-02 DIAGNOSIS — S36.039A UNSPECIFIED LACERATION OF SPLEEN, INITIAL ENCOUNTER: Chronic | ICD-10-CM

## 2018-02-02 DIAGNOSIS — Z90.2 ACQUIRED ABSENCE OF LUNG [PART OF]: Chronic | ICD-10-CM

## 2018-02-02 DIAGNOSIS — R93.8 ABNORMAL FINDINGS ON DIAGNOSTIC IMAGING OF OTHER SPECIFIED BODY STRUCTURES: ICD-10-CM

## 2018-02-02 PROCEDURE — 71250 CT THORAX DX C-: CPT

## 2018-02-02 PROCEDURE — 71250 CT THORAX DX C-: CPT | Mod: 26

## 2018-02-05 ENCOUNTER — RESULT REVIEW (OUTPATIENT)
Age: 63
End: 2018-02-05

## 2018-02-05 ENCOUNTER — APPOINTMENT (OUTPATIENT)
Dept: INFUSION THERAPY | Facility: HOSPITAL | Age: 63
End: 2018-02-05

## 2018-02-05 LAB
BASOPHILS # BLD AUTO: 0 K/UL — SIGNIFICANT CHANGE UP (ref 0–0.2)
EOSINOPHIL # BLD AUTO: 0 K/UL — SIGNIFICANT CHANGE UP (ref 0–0.5)
HCT VFR BLD CALC: 35.1 % — SIGNIFICANT CHANGE UP (ref 34.5–45)
HGB BLD-MCNC: 12 G/DL — SIGNIFICANT CHANGE UP (ref 11.5–15.5)
LYMPHOCYTES # BLD AUTO: 0.4 K/UL — LOW (ref 1–3.3)
LYMPHOCYTES # BLD AUTO: 9 % — LOW (ref 13–44)
MACROCYTES BLD QL: SLIGHT — SIGNIFICANT CHANGE UP
MCHC RBC-ENTMCNC: 30.8 PG — SIGNIFICANT CHANGE UP (ref 27–34)
MCHC RBC-ENTMCNC: 34.3 G/DL — SIGNIFICANT CHANGE UP (ref 32–36)
MCV RBC AUTO: 89.6 FL — SIGNIFICANT CHANGE UP (ref 80–100)
MICROCYTES BLD QL: SLIGHT — SIGNIFICANT CHANGE UP
MONOCYTES # BLD AUTO: 0 K/UL — SIGNIFICANT CHANGE UP (ref 0–0.9)
MONOCYTES NFR BLD AUTO: 4 % — SIGNIFICANT CHANGE UP (ref 2–14)
NEUTROPHILS # BLD AUTO: 3.2 K/UL — SIGNIFICANT CHANGE UP (ref 1.8–7.4)
NEUTROPHILS NFR BLD AUTO: 87 % — HIGH (ref 43–77)
PLAT MORPH BLD: NORMAL — SIGNIFICANT CHANGE UP
PLATELET # BLD AUTO: 248 K/UL — SIGNIFICANT CHANGE UP (ref 150–400)
RBC # BLD: 3.91 M/UL — SIGNIFICANT CHANGE UP (ref 3.8–5.2)
RBC # FLD: 12.5 % — SIGNIFICANT CHANGE UP (ref 10.3–14.5)
RBC BLD AUTO: SIGNIFICANT CHANGE UP
WBC # BLD: 3.6 K/UL — LOW (ref 3.8–10.5)
WBC # FLD AUTO: 3.6 K/UL — LOW (ref 3.8–10.5)

## 2018-02-06 DIAGNOSIS — Z51.11 ENCOUNTER FOR ANTINEOPLASTIC CHEMOTHERAPY: ICD-10-CM

## 2018-02-06 DIAGNOSIS — R11.2 NAUSEA WITH VOMITING, UNSPECIFIED: ICD-10-CM

## 2018-02-14 ENCOUNTER — RESULT REVIEW (OUTPATIENT)
Age: 63
End: 2018-02-14

## 2018-02-14 ENCOUNTER — APPOINTMENT (OUTPATIENT)
Dept: HEMATOLOGY ONCOLOGY | Facility: CLINIC | Age: 63
End: 2018-02-14
Payer: COMMERCIAL

## 2018-02-14 VITALS
SYSTOLIC BLOOD PRESSURE: 98 MMHG | DIASTOLIC BLOOD PRESSURE: 61 MMHG | TEMPERATURE: 98.5 F | WEIGHT: 117.7 LBS | OXYGEN SATURATION: 100 % | RESPIRATION RATE: 16 BRPM | HEART RATE: 75 BPM | BODY MASS INDEX: 20.22 KG/M2

## 2018-02-14 LAB
BASOPHILS # BLD AUTO: 0 K/UL — SIGNIFICANT CHANGE UP (ref 0–0.2)
BASOPHILS NFR BLD AUTO: 0.3 % — SIGNIFICANT CHANGE UP (ref 0–2)
EOSINOPHIL # BLD AUTO: 0 K/UL — SIGNIFICANT CHANGE UP (ref 0–0.5)
EOSINOPHIL NFR BLD AUTO: 1.2 % — SIGNIFICANT CHANGE UP (ref 0–6)
HCT VFR BLD CALC: 30.6 % — LOW (ref 34.5–45)
HGB BLD-MCNC: 10.5 G/DL — LOW (ref 11.5–15.5)
LYMPHOCYTES # BLD AUTO: 1.3 K/UL — SIGNIFICANT CHANGE UP (ref 1–3.3)
LYMPHOCYTES # BLD AUTO: 33.8 % — SIGNIFICANT CHANGE UP (ref 13–44)
MCHC RBC-ENTMCNC: 30.9 PG — SIGNIFICANT CHANGE UP (ref 27–34)
MCHC RBC-ENTMCNC: 34.3 G/DL — SIGNIFICANT CHANGE UP (ref 32–36)
MCV RBC AUTO: 89.9 FL — SIGNIFICANT CHANGE UP (ref 80–100)
MONOCYTES # BLD AUTO: 0.2 K/UL — SIGNIFICANT CHANGE UP (ref 0–0.9)
MONOCYTES NFR BLD AUTO: 4.9 % — SIGNIFICANT CHANGE UP (ref 2–14)
NEUTROPHILS # BLD AUTO: 2.2 K/UL — SIGNIFICANT CHANGE UP (ref 1.8–7.4)
NEUTROPHILS NFR BLD AUTO: 59.8 % — SIGNIFICANT CHANGE UP (ref 43–77)
PLATELET # BLD AUTO: 164 K/UL — SIGNIFICANT CHANGE UP (ref 150–400)
RBC # BLD: 3.4 M/UL — LOW (ref 3.8–5.2)
RBC # FLD: 12.4 % — SIGNIFICANT CHANGE UP (ref 10.3–14.5)
WBC # BLD: 3.8 K/UL — SIGNIFICANT CHANGE UP (ref 3.8–10.5)
WBC # FLD AUTO: 3.8 K/UL — SIGNIFICANT CHANGE UP (ref 3.8–10.5)

## 2018-02-14 PROCEDURE — 99215 OFFICE O/P EST HI 40 MIN: CPT

## 2018-02-20 LAB
ALBUMIN SERPL ELPH-MCNC: 4.1 G/DL
ALP BLD-CCNC: 62 U/L
ALT SERPL-CCNC: 29 U/L
ANION GAP SERPL CALC-SCNC: 12 MMOL/L
AST SERPL-CCNC: 24 U/L
BILIRUB SERPL-MCNC: 0.2 MG/DL
BUN SERPL-MCNC: 18 MG/DL
CALCIUM SERPL-MCNC: 9.4 MG/DL
CANCER AG125 SERPL-ACNC: 25 U/ML
CHLORIDE SERPL-SCNC: 104 MMOL/L
CO2 SERPL-SCNC: 27 MMOL/L
CREAT SERPL-MCNC: 0.67 MG/DL
GLUCOSE SERPL-MCNC: 119 MG/DL
POTASSIUM SERPL-SCNC: 4.2 MMOL/L
PROT SERPL-MCNC: 6.1 G/DL
SODIUM SERPL-SCNC: 143 MMOL/L

## 2018-02-23 ENCOUNTER — RESULT REVIEW (OUTPATIENT)
Age: 63
End: 2018-02-23

## 2018-02-23 ENCOUNTER — OUTPATIENT (OUTPATIENT)
Dept: OUTPATIENT SERVICES | Facility: HOSPITAL | Age: 63
LOS: 1 days | Discharge: HOME | End: 2018-02-23

## 2018-02-23 ENCOUNTER — APPOINTMENT (OUTPATIENT)
Dept: HEMATOLOGY ONCOLOGY | Facility: CLINIC | Age: 63
End: 2018-02-23
Payer: COMMERCIAL

## 2018-02-23 VITALS
BODY MASS INDEX: 20.87 KG/M2 | DIASTOLIC BLOOD PRESSURE: 53 MMHG | SYSTOLIC BLOOD PRESSURE: 111 MMHG | HEART RATE: 60 BPM | WEIGHT: 121.45 LBS | TEMPERATURE: 97.9 F | OXYGEN SATURATION: 100 % | RESPIRATION RATE: 16 BRPM

## 2018-02-23 DIAGNOSIS — Z90.2 ACQUIRED ABSENCE OF LUNG [PART OF]: Chronic | ICD-10-CM

## 2018-02-23 DIAGNOSIS — S36.039A UNSPECIFIED LACERATION OF SPLEEN, INITIAL ENCOUNTER: Chronic | ICD-10-CM

## 2018-02-23 DIAGNOSIS — S02.2XXA FRACTURE OF NASAL BONES, INITIAL ENCOUNTER FOR CLOSED FRACTURE: Chronic | ICD-10-CM

## 2018-02-23 DIAGNOSIS — D24.9 BENIGN NEOPLASM OF UNSPECIFIED BREAST: Chronic | ICD-10-CM

## 2018-02-23 DIAGNOSIS — C57.00 MALIGNANT NEOPLASM OF UNSPECIFIED FALLOPIAN TUBE: ICD-10-CM

## 2018-02-23 LAB
HCT VFR BLD CALC: 33.1 % — LOW (ref 34.5–45)
HGB BLD-MCNC: 11.3 G/DL — LOW (ref 11.5–15.5)
LYMPHOCYTES # BLD AUTO: 42 % — SIGNIFICANT CHANGE UP (ref 13–44)
MCHC RBC-ENTMCNC: 30.7 PG — SIGNIFICANT CHANGE UP (ref 27–34)
MCHC RBC-ENTMCNC: 34.2 G/DL — SIGNIFICANT CHANGE UP (ref 32–36)
MCV RBC AUTO: 89.8 FL — SIGNIFICANT CHANGE UP (ref 80–100)
MONOCYTES NFR BLD AUTO: 3 % — SIGNIFICANT CHANGE UP (ref 2–14)
NEUTROPHILS # BLD AUTO: SIGNIFICANT CHANGE UP (ref 1.8–7.4)
NEUTROPHILS NFR BLD AUTO: 55 % — SIGNIFICANT CHANGE UP (ref 43–77)
PLAT MORPH BLD: NORMAL — SIGNIFICANT CHANGE UP
PLATELET # BLD AUTO: 164 K/UL — SIGNIFICANT CHANGE UP (ref 150–400)
RBC # BLD: 3.69 M/UL — LOW (ref 3.8–5.2)
RBC # FLD: 13 % — SIGNIFICANT CHANGE UP (ref 10.3–14.5)
RBC BLD AUTO: SIGNIFICANT CHANGE UP
WBC # BLD: 5 K/UL — SIGNIFICANT CHANGE UP (ref 3.8–10.5)
WBC # FLD AUTO: 5 K/UL — SIGNIFICANT CHANGE UP (ref 3.8–10.5)

## 2018-02-23 PROCEDURE — 99214 OFFICE O/P EST MOD 30 MIN: CPT

## 2018-03-01 ENCOUNTER — RESULT REVIEW (OUTPATIENT)
Age: 63
End: 2018-03-01

## 2018-03-01 ENCOUNTER — APPOINTMENT (OUTPATIENT)
Dept: INFUSION THERAPY | Facility: HOSPITAL | Age: 63
End: 2018-03-01

## 2018-03-01 LAB
BASOPHILS # BLD AUTO: 0 K/UL — SIGNIFICANT CHANGE UP (ref 0–0.2)
BASOPHILS NFR BLD AUTO: 0.9 % — SIGNIFICANT CHANGE UP (ref 0–2)
EOSINOPHIL # BLD AUTO: 0.2 K/UL — SIGNIFICANT CHANGE UP (ref 0–0.5)
EOSINOPHIL NFR BLD AUTO: 3.1 % — SIGNIFICANT CHANGE UP (ref 0–6)
HCT VFR BLD CALC: 34.1 % — LOW (ref 34.5–45)
HGB BLD-MCNC: 11.6 G/DL — SIGNIFICANT CHANGE UP (ref 11.5–15.5)
LYMPHOCYTES # BLD AUTO: 1.3 K/UL — SIGNIFICANT CHANGE UP (ref 1–3.3)
LYMPHOCYTES # BLD AUTO: 24 % — SIGNIFICANT CHANGE UP (ref 13–44)
MCHC RBC-ENTMCNC: 30.5 PG — SIGNIFICANT CHANGE UP (ref 27–34)
MCHC RBC-ENTMCNC: 34 G/DL — SIGNIFICANT CHANGE UP (ref 32–36)
MCV RBC AUTO: 89.8 FL — SIGNIFICANT CHANGE UP (ref 80–100)
MONOCYTES # BLD AUTO: 0.5 K/UL — SIGNIFICANT CHANGE UP (ref 0–0.9)
MONOCYTES NFR BLD AUTO: 9 % — SIGNIFICANT CHANGE UP (ref 2–14)
NEUTROPHILS # BLD AUTO: 3.5 K/UL — SIGNIFICANT CHANGE UP (ref 1.8–7.4)
NEUTROPHILS NFR BLD AUTO: 63 % — SIGNIFICANT CHANGE UP (ref 43–77)
PLATELET # BLD AUTO: 148 K/UL — LOW (ref 150–400)
RBC # BLD: 3.8 M/UL — SIGNIFICANT CHANGE UP (ref 3.8–5.2)
RBC # FLD: 13.2 % — SIGNIFICANT CHANGE UP (ref 10.3–14.5)
WBC # BLD: 5.5 K/UL — SIGNIFICANT CHANGE UP (ref 3.8–10.5)
WBC # FLD AUTO: 5.5 K/UL — SIGNIFICANT CHANGE UP (ref 3.8–10.5)

## 2018-03-02 ENCOUNTER — TRANSCRIPTION ENCOUNTER (OUTPATIENT)
Age: 63
End: 2018-03-02

## 2018-03-02 DIAGNOSIS — Z51.11 ENCOUNTER FOR ANTINEOPLASTIC CHEMOTHERAPY: ICD-10-CM

## 2018-03-02 DIAGNOSIS — R11.2 NAUSEA WITH VOMITING, UNSPECIFIED: ICD-10-CM

## 2018-03-08 ENCOUNTER — RESULT REVIEW (OUTPATIENT)
Age: 63
End: 2018-03-08

## 2018-03-08 ENCOUNTER — APPOINTMENT (OUTPATIENT)
Dept: HEMATOLOGY ONCOLOGY | Facility: CLINIC | Age: 63
End: 2018-03-08
Payer: COMMERCIAL

## 2018-03-08 VITALS
OXYGEN SATURATION: 99 % | SYSTOLIC BLOOD PRESSURE: 110 MMHG | DIASTOLIC BLOOD PRESSURE: 70 MMHG | WEIGHT: 119.49 LBS | TEMPERATURE: 98.5 F | RESPIRATION RATE: 16 BRPM | HEART RATE: 68 BPM | BODY MASS INDEX: 20.53 KG/M2

## 2018-03-08 PROCEDURE — 99215 OFFICE O/P EST HI 40 MIN: CPT

## 2018-03-12 LAB
ALBUMIN SERPL ELPH-MCNC: 4.1 G/DL
ALP BLD-CCNC: 61 U/L
ALT SERPL-CCNC: 47 U/L
ANION GAP SERPL CALC-SCNC: 12 MMOL/L
AST SERPL-CCNC: 28 U/L
BILIRUB SERPL-MCNC: 0.2 MG/DL
BUN SERPL-MCNC: 16 MG/DL
CALCIUM SERPL-MCNC: 9.7 MG/DL
CANCER AG125 SERPL-ACNC: 20 U/ML
CHLORIDE SERPL-SCNC: 103 MMOL/L
CO2 SERPL-SCNC: 27 MMOL/L
CREAT SERPL-MCNC: 0.72 MG/DL
GLUCOSE SERPL-MCNC: 97 MG/DL
POTASSIUM SERPL-SCNC: 4.5 MMOL/L
PROT SERPL-MCNC: 6.3 G/DL
SODIUM SERPL-SCNC: 142 MMOL/L

## 2018-03-22 ENCOUNTER — LABORATORY RESULT (OUTPATIENT)
Age: 63
End: 2018-03-22

## 2018-03-22 ENCOUNTER — RESULT REVIEW (OUTPATIENT)
Age: 63
End: 2018-03-22

## 2018-03-22 ENCOUNTER — APPOINTMENT (OUTPATIENT)
Dept: INFUSION THERAPY | Facility: HOSPITAL | Age: 63
End: 2018-03-22

## 2018-03-22 LAB
BASOPHILS # BLD AUTO: 0 K/UL — SIGNIFICANT CHANGE UP (ref 0–0.2)
BASOPHILS NFR BLD AUTO: 0.3 % — SIGNIFICANT CHANGE UP (ref 0–2)
EOSINOPHIL # BLD AUTO: 0.2 K/UL — SIGNIFICANT CHANGE UP (ref 0–0.5)
EOSINOPHIL NFR BLD AUTO: 3.2 % — SIGNIFICANT CHANGE UP (ref 0–6)
HCT VFR BLD CALC: 34.4 % — LOW (ref 34.5–45)
HGB BLD-MCNC: 12 G/DL — SIGNIFICANT CHANGE UP (ref 11.5–15.5)
LYMPHOCYTES # BLD AUTO: 1.3 K/UL — SIGNIFICANT CHANGE UP (ref 1–3.3)
LYMPHOCYTES # BLD AUTO: 27.2 % — SIGNIFICANT CHANGE UP (ref 13–44)
MCHC RBC-ENTMCNC: 31.4 PG — SIGNIFICANT CHANGE UP (ref 27–34)
MCHC RBC-ENTMCNC: 34.9 G/DL — SIGNIFICANT CHANGE UP (ref 32–36)
MCV RBC AUTO: 90 FL — SIGNIFICANT CHANGE UP (ref 80–100)
MONOCYTES # BLD AUTO: 0.4 K/UL — SIGNIFICANT CHANGE UP (ref 0–0.9)
MONOCYTES NFR BLD AUTO: 7.3 % — SIGNIFICANT CHANGE UP (ref 2–14)
NEUTROPHILS # BLD AUTO: 3 K/UL — SIGNIFICANT CHANGE UP (ref 1.8–7.4)
NEUTROPHILS NFR BLD AUTO: 62.1 % — SIGNIFICANT CHANGE UP (ref 43–77)
PLATELET # BLD AUTO: 115 K/UL — LOW (ref 150–400)
RBC # BLD: 3.82 M/UL — SIGNIFICANT CHANGE UP (ref 3.8–5.2)
RBC # FLD: 13.5 % — SIGNIFICANT CHANGE UP (ref 10.3–14.5)
WBC # BLD: 4.9 K/UL — SIGNIFICANT CHANGE UP (ref 3.8–10.5)
WBC # FLD AUTO: 4.9 K/UL — SIGNIFICANT CHANGE UP (ref 3.8–10.5)

## 2018-03-22 RX ORDER — ASCORBIC ACID 60 MG
0 TABLET,CHEWABLE ORAL
Qty: 0 | Refills: 0 | COMMUNITY

## 2018-03-22 RX ORDER — ECHINACEA PURPUREA EXTRACT 125 MG
0 TABLET ORAL
Qty: 0 | Refills: 0 | COMMUNITY

## 2018-03-28 ENCOUNTER — OTHER (OUTPATIENT)
Age: 63
End: 2018-03-28

## 2018-03-29 ENCOUNTER — OUTPATIENT (OUTPATIENT)
Dept: OUTPATIENT SERVICES | Facility: HOSPITAL | Age: 63
LOS: 1 days | Discharge: HOME | End: 2018-03-29

## 2018-03-29 DIAGNOSIS — C57.00 MALIGNANT NEOPLASM OF UNSPECIFIED FALLOPIAN TUBE: ICD-10-CM

## 2018-03-29 DIAGNOSIS — D24.9 BENIGN NEOPLASM OF UNSPECIFIED BREAST: Chronic | ICD-10-CM

## 2018-03-29 DIAGNOSIS — S36.039A UNSPECIFIED LACERATION OF SPLEEN, INITIAL ENCOUNTER: Chronic | ICD-10-CM

## 2018-03-29 DIAGNOSIS — Z90.2 ACQUIRED ABSENCE OF LUNG [PART OF]: Chronic | ICD-10-CM

## 2018-03-29 DIAGNOSIS — S02.2XXA FRACTURE OF NASAL BONES, INITIAL ENCOUNTER FOR CLOSED FRACTURE: Chronic | ICD-10-CM

## 2018-04-02 ENCOUNTER — RESULT REVIEW (OUTPATIENT)
Age: 63
End: 2018-04-02

## 2018-04-02 ENCOUNTER — APPOINTMENT (OUTPATIENT)
Dept: HEMATOLOGY ONCOLOGY | Facility: CLINIC | Age: 63
End: 2018-04-02
Payer: COMMERCIAL

## 2018-04-02 VITALS
HEART RATE: 64 BPM | SYSTOLIC BLOOD PRESSURE: 104 MMHG | OXYGEN SATURATION: 100 % | TEMPERATURE: 98 F | BODY MASS INDEX: 20.64 KG/M2 | WEIGHT: 120.15 LBS | RESPIRATION RATE: 16 BRPM | DIASTOLIC BLOOD PRESSURE: 61 MMHG

## 2018-04-02 LAB
BASOPHILS # BLD AUTO: 0 K/UL — SIGNIFICANT CHANGE UP (ref 0–0.2)
EOSINOPHIL # BLD AUTO: 0.1 K/UL — SIGNIFICANT CHANGE UP (ref 0–0.5)
HCT VFR BLD CALC: 31.8 % — LOW (ref 34.5–45)
HGB BLD-MCNC: 10.8 G/DL — LOW (ref 11.5–15.5)
LYMPHOCYTES # BLD AUTO: 1.2 K/UL — SIGNIFICANT CHANGE UP (ref 1–3.3)
LYMPHOCYTES # BLD AUTO: 48 % — HIGH (ref 13–44)
MCHC RBC-ENTMCNC: 30.1 PG — SIGNIFICANT CHANGE UP (ref 27–34)
MCHC RBC-ENTMCNC: 33.9 G/DL — SIGNIFICANT CHANGE UP (ref 32–36)
MCV RBC AUTO: 88.9 FL — SIGNIFICANT CHANGE UP (ref 80–100)
MONOCYTES # BLD AUTO: 0.2 K/UL — SIGNIFICANT CHANGE UP (ref 0–0.9)
MONOCYTES NFR BLD AUTO: 9 % — SIGNIFICANT CHANGE UP (ref 2–14)
NEUTROPHILS # BLD AUTO: 0.9 K/UL — LOW (ref 1.8–7.4)
NEUTROPHILS NFR BLD AUTO: 43 % — SIGNIFICANT CHANGE UP (ref 43–77)
PLAT MORPH BLD: NORMAL — SIGNIFICANT CHANGE UP
PLATELET # BLD AUTO: 116 K/UL — LOW (ref 150–400)
RBC # BLD: 3.57 M/UL — LOW (ref 3.8–5.2)
RBC # FLD: 13.3 % — SIGNIFICANT CHANGE UP (ref 10.3–14.5)
RBC BLD AUTO: SIGNIFICANT CHANGE UP
WBC # BLD: 2.4 K/UL — LOW (ref 3.8–10.5)
WBC # FLD AUTO: 2.4 K/UL — LOW (ref 3.8–10.5)

## 2018-04-02 PROCEDURE — 99215 OFFICE O/P EST HI 40 MIN: CPT

## 2018-04-12 ENCOUNTER — APPOINTMENT (OUTPATIENT)
Dept: INFUSION THERAPY | Facility: HOSPITAL | Age: 63
End: 2018-04-12

## 2018-04-12 ENCOUNTER — RESULT REVIEW (OUTPATIENT)
Age: 63
End: 2018-04-12

## 2018-04-12 LAB
ANISOCYTOSIS BLD QL: SLIGHT — SIGNIFICANT CHANGE UP
BASOPHILS # BLD AUTO: 0 K/UL — SIGNIFICANT CHANGE UP (ref 0–0.2)
BASOPHILS NFR BLD AUTO: 1 % — SIGNIFICANT CHANGE UP (ref 0–2)
EOSINOPHIL # BLD AUTO: 0 K/UL — SIGNIFICANT CHANGE UP (ref 0–0.5)
HCT VFR BLD CALC: 33.5 % — LOW (ref 34.5–45)
HGB BLD-MCNC: 11.5 G/DL — SIGNIFICANT CHANGE UP (ref 11.5–15.5)
LYMPHOCYTES # BLD AUTO: 1.1 K/UL — SIGNIFICANT CHANGE UP (ref 1–3.3)
LYMPHOCYTES # BLD AUTO: 33 % — SIGNIFICANT CHANGE UP (ref 13–44)
MCHC RBC-ENTMCNC: 31.1 PG — SIGNIFICANT CHANGE UP (ref 27–34)
MCHC RBC-ENTMCNC: 34.3 G/DL — SIGNIFICANT CHANGE UP (ref 32–36)
MCV RBC AUTO: 90.6 FL — SIGNIFICANT CHANGE UP (ref 80–100)
MONOCYTES # BLD AUTO: 0.4 K/UL — SIGNIFICANT CHANGE UP (ref 0–0.9)
MONOCYTES NFR BLD AUTO: 20 % — HIGH (ref 2–14)
NEUTROPHILS # BLD AUTO: 1.6 K/UL — LOW (ref 1.8–7.4)
NEUTROPHILS NFR BLD AUTO: 46 % — SIGNIFICANT CHANGE UP (ref 43–77)
PLAT MORPH BLD: NORMAL — SIGNIFICANT CHANGE UP
PLATELET # BLD AUTO: 126 K/UL — LOW (ref 150–400)
POIKILOCYTOSIS BLD QL AUTO: SLIGHT — SIGNIFICANT CHANGE UP
RBC # BLD: 3.69 M/UL — LOW (ref 3.8–5.2)
RBC # FLD: 14.2 % — SIGNIFICANT CHANGE UP (ref 10.3–14.5)
RBC BLD AUTO: SIGNIFICANT CHANGE UP
WBC # BLD: 3.1 K/UL — LOW (ref 3.8–10.5)
WBC # FLD AUTO: 3.1 K/UL — LOW (ref 3.8–10.5)

## 2018-04-13 ENCOUNTER — APPOINTMENT (OUTPATIENT)
Dept: PULMONOLOGY | Facility: CLINIC | Age: 63
End: 2018-04-13
Payer: COMMERCIAL

## 2018-04-13 VITALS
SYSTOLIC BLOOD PRESSURE: 100 MMHG | HEIGHT: 63.98 IN | WEIGHT: 119 LBS | RESPIRATION RATE: 17 BRPM | OXYGEN SATURATION: 100 % | DIASTOLIC BLOOD PRESSURE: 60 MMHG | HEART RATE: 83 BPM | BODY MASS INDEX: 20.32 KG/M2

## 2018-04-13 DIAGNOSIS — R11.2 NAUSEA WITH VOMITING, UNSPECIFIED: ICD-10-CM

## 2018-04-13 DIAGNOSIS — Z51.11 ENCOUNTER FOR ANTINEOPLASTIC CHEMOTHERAPY: ICD-10-CM

## 2018-04-13 PROCEDURE — 99214 OFFICE O/P EST MOD 30 MIN: CPT | Mod: 25

## 2018-04-13 PROCEDURE — 94010 BREATHING CAPACITY TEST: CPT

## 2018-04-17 ENCOUNTER — APPOINTMENT (OUTPATIENT)
Dept: PULMONOLOGY | Facility: CLINIC | Age: 63
End: 2018-04-17

## 2018-04-18 ENCOUNTER — RESULT REVIEW (OUTPATIENT)
Age: 63
End: 2018-04-18

## 2018-04-18 ENCOUNTER — APPOINTMENT (OUTPATIENT)
Dept: GYNECOLOGIC ONCOLOGY | Facility: CLINIC | Age: 63
End: 2018-04-18
Payer: COMMERCIAL

## 2018-04-18 ENCOUNTER — APPOINTMENT (OUTPATIENT)
Dept: HEMATOLOGY ONCOLOGY | Facility: CLINIC | Age: 63
End: 2018-04-18
Payer: COMMERCIAL

## 2018-04-18 VITALS
DIASTOLIC BLOOD PRESSURE: 62 MMHG | WEIGHT: 116 LBS | SYSTOLIC BLOOD PRESSURE: 104 MMHG | BODY MASS INDEX: 20.55 KG/M2 | HEIGHT: 63 IN

## 2018-04-18 DIAGNOSIS — Z15.02 GENETIC SUSCEPTIBILITY TO MALIGNANT NEOPLASM OF OVARY: ICD-10-CM

## 2018-04-18 PROCEDURE — 99213 OFFICE O/P EST LOW 20 MIN: CPT

## 2018-04-18 PROCEDURE — 99215 OFFICE O/P EST HI 40 MIN: CPT

## 2018-04-18 RX ORDER — DULOXETINE HYDROCHLORIDE 20 MG/1
20 CAPSULE, DELAYED RELEASE PELLETS ORAL
Qty: 30 | Refills: 0 | Status: DISCONTINUED | COMMUNITY
Start: 2017-12-28 | End: 2018-04-18

## 2018-04-18 RX ORDER — GABAPENTIN 100 MG/1
100 CAPSULE ORAL
Qty: 30 | Refills: 3 | Status: DISCONTINUED | COMMUNITY
Start: 2018-03-08 | End: 2018-04-18

## 2018-04-18 RX ORDER — DIPHENHYDRAMINE HYDROCHLORIDE AND LIDOCAINE HYDROCHLORIDE AND ALUMINUM HYDROXIDE AND MAGNESIUM HYDRO
KIT 4 TIMES DAILY
Qty: 600 | Refills: 3 | Status: DISCONTINUED | COMMUNITY
Start: 2018-03-28 | End: 2018-04-18

## 2018-05-01 ENCOUNTER — OUTPATIENT (OUTPATIENT)
Dept: OUTPATIENT SERVICES | Facility: HOSPITAL | Age: 63
LOS: 1 days | Discharge: HOME | End: 2018-05-01

## 2018-05-01 DIAGNOSIS — Z90.2 ACQUIRED ABSENCE OF LUNG [PART OF]: Chronic | ICD-10-CM

## 2018-05-01 DIAGNOSIS — D24.9 BENIGN NEOPLASM OF UNSPECIFIED BREAST: Chronic | ICD-10-CM

## 2018-05-01 DIAGNOSIS — S36.039A UNSPECIFIED LACERATION OF SPLEEN, INITIAL ENCOUNTER: Chronic | ICD-10-CM

## 2018-05-01 DIAGNOSIS — S02.2XXA FRACTURE OF NASAL BONES, INITIAL ENCOUNTER FOR CLOSED FRACTURE: Chronic | ICD-10-CM

## 2018-05-01 DIAGNOSIS — C57.00 MALIGNANT NEOPLASM OF UNSPECIFIED FALLOPIAN TUBE: ICD-10-CM

## 2018-05-03 ENCOUNTER — APPOINTMENT (OUTPATIENT)
Dept: INFUSION THERAPY | Facility: HOSPITAL | Age: 63
End: 2018-05-03

## 2018-05-03 ENCOUNTER — RESULT REVIEW (OUTPATIENT)
Age: 63
End: 2018-05-03

## 2018-05-03 LAB
BASOPHILS # BLD AUTO: 0 K/UL — SIGNIFICANT CHANGE UP (ref 0–0.2)
BASOPHILS NFR BLD AUTO: 0.2 % — SIGNIFICANT CHANGE UP (ref 0–2)
EOSINOPHIL # BLD AUTO: 0.1 K/UL — SIGNIFICANT CHANGE UP (ref 0–0.5)
EOSINOPHIL NFR BLD AUTO: 3.6 % — SIGNIFICANT CHANGE UP (ref 0–6)
HCT VFR BLD CALC: 30.9 % — LOW (ref 34.5–45)
HGB BLD-MCNC: 10.9 G/DL — LOW (ref 11.5–15.5)
LYMPHOCYTES # BLD AUTO: 1 K/UL — SIGNIFICANT CHANGE UP (ref 1–3.3)
LYMPHOCYTES # BLD AUTO: 27.8 % — SIGNIFICANT CHANGE UP (ref 13–44)
MCHC RBC-ENTMCNC: 32.3 PG — SIGNIFICANT CHANGE UP (ref 27–34)
MCHC RBC-ENTMCNC: 35.3 G/DL — SIGNIFICANT CHANGE UP (ref 32–36)
MCV RBC AUTO: 91.5 FL — SIGNIFICANT CHANGE UP (ref 80–100)
MONOCYTES # BLD AUTO: 0.5 K/UL — SIGNIFICANT CHANGE UP (ref 0–0.9)
MONOCYTES NFR BLD AUTO: 13.4 % — SIGNIFICANT CHANGE UP (ref 2–14)
NEUTROPHILS # BLD AUTO: 2 K/UL — SIGNIFICANT CHANGE UP (ref 1.8–7.4)
NEUTROPHILS NFR BLD AUTO: 55 % — SIGNIFICANT CHANGE UP (ref 43–77)
PLATELET # BLD AUTO: 153 K/UL — SIGNIFICANT CHANGE UP (ref 150–400)
RBC # BLD: 3.38 M/UL — LOW (ref 3.8–5.2)
RBC # FLD: 15.1 % — HIGH (ref 10.3–14.5)
WBC # BLD: 3.7 K/UL — LOW (ref 3.8–10.5)
WBC # FLD AUTO: 3.7 K/UL — LOW (ref 3.8–10.5)

## 2018-05-03 RX ORDER — MONTELUKAST 4 MG/1
1 TABLET, CHEWABLE ORAL
Qty: 0 | Refills: 0 | COMMUNITY

## 2018-05-04 DIAGNOSIS — R11.2 NAUSEA WITH VOMITING, UNSPECIFIED: ICD-10-CM

## 2018-05-04 DIAGNOSIS — Z51.11 ENCOUNTER FOR ANTINEOPLASTIC CHEMOTHERAPY: ICD-10-CM

## 2018-05-09 ENCOUNTER — RESULT REVIEW (OUTPATIENT)
Age: 63
End: 2018-05-09

## 2018-05-09 ENCOUNTER — APPOINTMENT (OUTPATIENT)
Dept: HEMATOLOGY ONCOLOGY | Facility: CLINIC | Age: 63
End: 2018-05-09
Payer: COMMERCIAL

## 2018-05-09 VITALS
BODY MASS INDEX: 20.7 KG/M2 | RESPIRATION RATE: 17 BRPM | DIASTOLIC BLOOD PRESSURE: 58 MMHG | TEMPERATURE: 98.8 F | HEART RATE: 72 BPM | SYSTOLIC BLOOD PRESSURE: 94 MMHG | WEIGHT: 116.82 LBS | OXYGEN SATURATION: 98 %

## 2018-05-09 LAB
BASOPHILS # BLD AUTO: 0 K/UL — SIGNIFICANT CHANGE UP (ref 0–0.2)
BASOPHILS NFR BLD AUTO: 0.3 % — SIGNIFICANT CHANGE UP (ref 0–2)
EOSINOPHIL # BLD AUTO: 0 K/UL — SIGNIFICANT CHANGE UP (ref 0–0.5)
EOSINOPHIL NFR BLD AUTO: 0.6 % — SIGNIFICANT CHANGE UP (ref 0–6)
HCT VFR BLD CALC: 29.2 % — LOW (ref 34.5–45)
HGB BLD-MCNC: 10.3 G/DL — LOW (ref 11.5–15.5)
LYMPHOCYTES # BLD AUTO: 1.5 K/UL — SIGNIFICANT CHANGE UP (ref 1–3.3)
LYMPHOCYTES # BLD AUTO: 39.6 % — SIGNIFICANT CHANGE UP (ref 13–44)
MCHC RBC-ENTMCNC: 32 PG — SIGNIFICANT CHANGE UP (ref 27–34)
MCHC RBC-ENTMCNC: 35.1 G/DL — SIGNIFICANT CHANGE UP (ref 32–36)
MCV RBC AUTO: 91.2 FL — SIGNIFICANT CHANGE UP (ref 80–100)
MONOCYTES # BLD AUTO: 0.2 K/UL — SIGNIFICANT CHANGE UP (ref 0–0.9)
MONOCYTES NFR BLD AUTO: 4.1 % — SIGNIFICANT CHANGE UP (ref 2–14)
NEUTROPHILS # BLD AUTO: 2.1 K/UL — SIGNIFICANT CHANGE UP (ref 1.8–7.4)
NEUTROPHILS NFR BLD AUTO: 55.4 % — SIGNIFICANT CHANGE UP (ref 43–77)
PLATELET # BLD AUTO: 82 K/UL — LOW (ref 150–400)
RBC # BLD: 3.2 M/UL — LOW (ref 3.8–5.2)
RBC # FLD: 14.5 % — SIGNIFICANT CHANGE UP (ref 10.3–14.5)
WBC # BLD: 3.8 K/UL — SIGNIFICANT CHANGE UP (ref 3.8–10.5)
WBC # FLD AUTO: 3.8 K/UL — SIGNIFICANT CHANGE UP (ref 3.8–10.5)

## 2018-05-09 PROCEDURE — 99215 OFFICE O/P EST HI 40 MIN: CPT

## 2018-05-17 LAB
ALBUMIN SERPL ELPH-MCNC: 3.9 G/DL
ALBUMIN SERPL ELPH-MCNC: 4.1 G/DL
ALBUMIN SERPL ELPH-MCNC: 4.5 G/DL
ALP BLD-CCNC: 60 U/L
ALP BLD-CCNC: 64 U/L
ALP BLD-CCNC: 64 U/L
ALT SERPL-CCNC: 25 U/L
ALT SERPL-CCNC: 30 U/L
ALT SERPL-CCNC: 37 U/L
ANION GAP SERPL CALC-SCNC: 12 MMOL/L
ANION GAP SERPL CALC-SCNC: 14 MMOL/L
ANION GAP SERPL CALC-SCNC: 17 MMOL/L
AST SERPL-CCNC: 23 U/L
AST SERPL-CCNC: 25 U/L
AST SERPL-CCNC: 26 U/L
BILIRUB SERPL-MCNC: 0.2 MG/DL
BILIRUB SERPL-MCNC: 0.3 MG/DL
BILIRUB SERPL-MCNC: <0.2 MG/DL
BUN SERPL-MCNC: 15 MG/DL
BUN SERPL-MCNC: 19 MG/DL
BUN SERPL-MCNC: 23 MG/DL
CALCIUM SERPL-MCNC: 9.2 MG/DL
CALCIUM SERPL-MCNC: 9.5 MG/DL
CALCIUM SERPL-MCNC: 9.6 MG/DL
CANCER AG125 SERPL-ACNC: 14 U/ML
CANCER AG125 SERPL-ACNC: 16 U/ML
CANCER AG125 SERPL-ACNC: 17 U/ML
CHLORIDE SERPL-SCNC: 102 MMOL/L
CHLORIDE SERPL-SCNC: 103 MMOL/L
CHLORIDE SERPL-SCNC: 106 MMOL/L
CO2 SERPL-SCNC: 21 MMOL/L
CO2 SERPL-SCNC: 25 MMOL/L
CO2 SERPL-SCNC: 26 MMOL/L
CREAT SERPL-MCNC: 0.55 MG/DL
CREAT SERPL-MCNC: 0.64 MG/DL
CREAT SERPL-MCNC: 0.74 MG/DL
GLUCOSE SERPL-MCNC: 102 MG/DL
GLUCOSE SERPL-MCNC: 158 MG/DL
GLUCOSE SERPL-MCNC: 99 MG/DL
POTASSIUM SERPL-SCNC: 4.3 MMOL/L
POTASSIUM SERPL-SCNC: 4.4 MMOL/L
POTASSIUM SERPL-SCNC: 4.6 MMOL/L
PROT SERPL-MCNC: 6.1 G/DL
PROT SERPL-MCNC: 6.2 G/DL
PROT SERPL-MCNC: 6.8 G/DL
SODIUM SERPL-SCNC: 139 MMOL/L
SODIUM SERPL-SCNC: 141 MMOL/L
SODIUM SERPL-SCNC: 146 MMOL/L

## 2018-05-24 ENCOUNTER — RESULT REVIEW (OUTPATIENT)
Age: 63
End: 2018-05-24

## 2018-05-24 ENCOUNTER — APPOINTMENT (OUTPATIENT)
Dept: INFUSION THERAPY | Facility: HOSPITAL | Age: 63
End: 2018-05-24

## 2018-05-24 LAB
BASOPHILS # BLD AUTO: 0 K/UL — SIGNIFICANT CHANGE UP (ref 0–0.2)
BASOPHILS NFR BLD AUTO: 0.5 % — SIGNIFICANT CHANGE UP (ref 0–2)
EOSINOPHIL # BLD AUTO: 0.2 K/UL — SIGNIFICANT CHANGE UP (ref 0–0.5)
EOSINOPHIL NFR BLD AUTO: 4 % — SIGNIFICANT CHANGE UP (ref 0–6)
HCT VFR BLD CALC: 32.1 % — LOW (ref 34.5–45)
HGB BLD-MCNC: 10.8 G/DL — LOW (ref 11.5–15.5)
LYMPHOCYTES # BLD AUTO: 1.4 K/UL — SIGNIFICANT CHANGE UP (ref 1–3.3)
LYMPHOCYTES # BLD AUTO: 35.3 % — SIGNIFICANT CHANGE UP (ref 13–44)
MCHC RBC-ENTMCNC: 31.5 PG — SIGNIFICANT CHANGE UP (ref 27–34)
MCHC RBC-ENTMCNC: 33.8 G/DL — SIGNIFICANT CHANGE UP (ref 32–36)
MCV RBC AUTO: 93.2 FL — SIGNIFICANT CHANGE UP (ref 80–100)
MONOCYTES # BLD AUTO: 0.5 K/UL — SIGNIFICANT CHANGE UP (ref 0–0.9)
MONOCYTES NFR BLD AUTO: 13 % — SIGNIFICANT CHANGE UP (ref 2–14)
NEUTROPHILS # BLD AUTO: 1.8 K/UL — SIGNIFICANT CHANGE UP (ref 1.8–7.4)
NEUTROPHILS NFR BLD AUTO: 47.2 % — SIGNIFICANT CHANGE UP (ref 43–77)
PLATELET # BLD AUTO: 127 K/UL — LOW (ref 150–400)
RBC # BLD: 3.44 M/UL — LOW (ref 3.8–5.2)
RBC # FLD: 14.9 % — HIGH (ref 10.3–14.5)
WBC # BLD: 3.9 K/UL — SIGNIFICANT CHANGE UP (ref 3.8–10.5)
WBC # FLD AUTO: 3.9 K/UL — SIGNIFICANT CHANGE UP (ref 3.8–10.5)

## 2018-05-30 ENCOUNTER — APPOINTMENT (OUTPATIENT)
Dept: HEMATOLOGY ONCOLOGY | Facility: CLINIC | Age: 63
End: 2018-05-30
Payer: COMMERCIAL

## 2018-05-30 ENCOUNTER — RESULT REVIEW (OUTPATIENT)
Age: 63
End: 2018-05-30

## 2018-05-30 VITALS
DIASTOLIC BLOOD PRESSURE: 61 MMHG | SYSTOLIC BLOOD PRESSURE: 100 MMHG | OXYGEN SATURATION: 99 % | HEART RATE: 71 BPM | WEIGHT: 119.05 LBS | TEMPERATURE: 98.8 F | RESPIRATION RATE: 17 BRPM | BODY MASS INDEX: 21.09 KG/M2

## 2018-05-30 PROCEDURE — 99215 OFFICE O/P EST HI 40 MIN: CPT

## 2018-05-31 LAB
ALBUMIN SERPL ELPH-MCNC: 4.3 G/DL
ALP BLD-CCNC: 68 U/L
ALT SERPL-CCNC: 25 U/L
ANION GAP SERPL CALC-SCNC: 10 MMOL/L
AST SERPL-CCNC: 24 U/L
BILIRUB SERPL-MCNC: 0.2 MG/DL
BUN SERPL-MCNC: 18 MG/DL
CALCIUM SERPL-MCNC: 9.7 MG/DL
CANCER AG125 SERPL-ACNC: 18 U/ML
CHLORIDE SERPL-SCNC: 103 MMOL/L
CO2 SERPL-SCNC: 27 MMOL/L
CREAT SERPL-MCNC: 0.73 MG/DL
GLUCOSE SERPL-MCNC: 101 MG/DL
POTASSIUM SERPL-SCNC: 4.2 MMOL/L
PROT SERPL-MCNC: 6.6 G/DL
SODIUM SERPL-SCNC: 140 MMOL/L

## 2018-06-05 ENCOUNTER — FORM ENCOUNTER (OUTPATIENT)
Age: 63
End: 2018-06-05

## 2018-06-06 ENCOUNTER — APPOINTMENT (OUTPATIENT)
Dept: CT IMAGING | Facility: IMAGING CENTER | Age: 63
End: 2018-06-06
Payer: COMMERCIAL

## 2018-06-06 ENCOUNTER — OUTPATIENT (OUTPATIENT)
Dept: OUTPATIENT SERVICES | Facility: HOSPITAL | Age: 63
LOS: 1 days | End: 2018-06-06
Payer: COMMERCIAL

## 2018-06-06 DIAGNOSIS — S02.2XXA FRACTURE OF NASAL BONES, INITIAL ENCOUNTER FOR CLOSED FRACTURE: Chronic | ICD-10-CM

## 2018-06-06 DIAGNOSIS — D24.9 BENIGN NEOPLASM OF UNSPECIFIED BREAST: Chronic | ICD-10-CM

## 2018-06-06 DIAGNOSIS — C57.00 MALIGNANT NEOPLASM OF UNSPECIFIED FALLOPIAN TUBE: ICD-10-CM

## 2018-06-06 DIAGNOSIS — S36.039A UNSPECIFIED LACERATION OF SPLEEN, INITIAL ENCOUNTER: Chronic | ICD-10-CM

## 2018-06-06 DIAGNOSIS — Z90.2 ACQUIRED ABSENCE OF LUNG [PART OF]: Chronic | ICD-10-CM

## 2018-06-06 PROCEDURE — 71260 CT THORAX DX C+: CPT

## 2018-06-06 PROCEDURE — 71250 CT THORAX DX C-: CPT

## 2018-06-06 PROCEDURE — 74177 CT ABD & PELVIS W/CONTRAST: CPT

## 2018-06-06 PROCEDURE — 74177 CT ABD & PELVIS W/CONTRAST: CPT | Mod: 26

## 2018-06-06 PROCEDURE — 82565 ASSAY OF CREATININE: CPT

## 2018-06-06 PROCEDURE — 71260 CT THORAX DX C+: CPT | Mod: 26

## 2018-06-07 ENCOUNTER — TRANSCRIPTION ENCOUNTER (OUTPATIENT)
Age: 63
End: 2018-06-07

## 2018-06-08 ENCOUNTER — TRANSCRIPTION ENCOUNTER (OUTPATIENT)
Age: 63
End: 2018-06-08

## 2018-06-12 ENCOUNTER — APPOINTMENT (OUTPATIENT)
Dept: CT IMAGING | Facility: IMAGING CENTER | Age: 63
End: 2018-06-12

## 2018-06-27 ENCOUNTER — APPOINTMENT (OUTPATIENT)
Dept: SURGERY | Facility: CLINIC | Age: 63
End: 2018-06-27
Payer: COMMERCIAL

## 2018-06-27 PROCEDURE — 99213K: CUSTOM

## 2018-07-12 ENCOUNTER — APPOINTMENT (OUTPATIENT)
Dept: ULTRASOUND IMAGING | Facility: IMAGING CENTER | Age: 63
End: 2018-07-12

## 2018-07-12 ENCOUNTER — OUTPATIENT (OUTPATIENT)
Dept: OUTPATIENT SERVICES | Facility: HOSPITAL | Age: 63
LOS: 1 days | End: 2018-07-12
Payer: COMMERCIAL

## 2018-07-12 ENCOUNTER — APPOINTMENT (OUTPATIENT)
Dept: MAMMOGRAPHY | Facility: IMAGING CENTER | Age: 63
End: 2018-07-12
Payer: COMMERCIAL

## 2018-07-12 DIAGNOSIS — Z00.8 ENCOUNTER FOR OTHER GENERAL EXAMINATION: ICD-10-CM

## 2018-07-12 DIAGNOSIS — S36.039A UNSPECIFIED LACERATION OF SPLEEN, INITIAL ENCOUNTER: Chronic | ICD-10-CM

## 2018-07-12 DIAGNOSIS — Z90.2 ACQUIRED ABSENCE OF LUNG [PART OF]: Chronic | ICD-10-CM

## 2018-07-12 DIAGNOSIS — D24.9 BENIGN NEOPLASM OF UNSPECIFIED BREAST: Chronic | ICD-10-CM

## 2018-07-12 DIAGNOSIS — S02.2XXA FRACTURE OF NASAL BONES, INITIAL ENCOUNTER FOR CLOSED FRACTURE: Chronic | ICD-10-CM

## 2018-07-12 PROCEDURE — 76641 ULTRASOUND BREAST COMPLETE: CPT | Mod: 26,50

## 2018-07-12 PROCEDURE — 77066 DX MAMMO INCL CAD BI: CPT | Mod: 26

## 2018-07-12 PROCEDURE — G0279: CPT

## 2018-07-12 PROCEDURE — 77066 DX MAMMO INCL CAD BI: CPT

## 2018-07-12 PROCEDURE — 76641 ULTRASOUND BREAST COMPLETE: CPT

## 2018-07-12 PROCEDURE — G0279: CPT | Mod: 26

## 2018-07-27 PROBLEM — I49.3 VENTRICULAR PREMATURE DEPOLARIZATION: Chronic | Status: ACTIVE | Noted: 2017-07-03

## 2018-08-14 ENCOUNTER — NON-APPOINTMENT (OUTPATIENT)
Age: 63
End: 2018-08-14

## 2018-08-14 ENCOUNTER — APPOINTMENT (OUTPATIENT)
Dept: PULMONOLOGY | Facility: CLINIC | Age: 63
End: 2018-08-14
Payer: COMMERCIAL

## 2018-08-14 VITALS
DIASTOLIC BLOOD PRESSURE: 60 MMHG | SYSTOLIC BLOOD PRESSURE: 110 MMHG | RESPIRATION RATE: 14 BRPM | BODY MASS INDEX: 19.81 KG/M2 | HEART RATE: 72 BPM | HEIGHT: 64 IN | OXYGEN SATURATION: 100 % | WEIGHT: 116 LBS

## 2018-08-14 PROCEDURE — 94010 BREATHING CAPACITY TEST: CPT

## 2018-08-14 PROCEDURE — 99214 OFFICE O/P EST MOD 30 MIN: CPT | Mod: 25

## 2018-08-14 RX ORDER — DEXAMETHASONE 4 MG/1
4 TABLET ORAL
Qty: 10 | Refills: 0 | Status: DISCONTINUED | COMMUNITY
Start: 2018-01-18 | End: 2018-08-14

## 2018-08-14 RX ORDER — ONDANSETRON 8 MG/1
8 TABLET ORAL
Qty: 30 | Refills: 2 | Status: DISCONTINUED | COMMUNITY
Start: 2018-01-18 | End: 2018-08-14

## 2018-08-17 ENCOUNTER — OUTPATIENT (OUTPATIENT)
Dept: OUTPATIENT SERVICES | Facility: HOSPITAL | Age: 63
LOS: 1 days | Discharge: HOME | End: 2018-08-17

## 2018-08-17 DIAGNOSIS — S02.2XXA FRACTURE OF NASAL BONES, INITIAL ENCOUNTER FOR CLOSED FRACTURE: Chronic | ICD-10-CM

## 2018-08-17 DIAGNOSIS — S36.039A UNSPECIFIED LACERATION OF SPLEEN, INITIAL ENCOUNTER: Chronic | ICD-10-CM

## 2018-08-17 DIAGNOSIS — C57.00 MALIGNANT NEOPLASM OF UNSPECIFIED FALLOPIAN TUBE: ICD-10-CM

## 2018-08-17 DIAGNOSIS — D24.9 BENIGN NEOPLASM OF UNSPECIFIED BREAST: Chronic | ICD-10-CM

## 2018-08-17 DIAGNOSIS — Z90.2 ACQUIRED ABSENCE OF LUNG [PART OF]: Chronic | ICD-10-CM

## 2018-08-21 ENCOUNTER — APPOINTMENT (OUTPATIENT)
Dept: GYNECOLOGIC ONCOLOGY | Facility: CLINIC | Age: 63
End: 2018-08-21
Payer: COMMERCIAL

## 2018-08-21 VITALS
HEIGHT: 64 IN | WEIGHT: 116 LBS | DIASTOLIC BLOOD PRESSURE: 66 MMHG | BODY MASS INDEX: 19.81 KG/M2 | SYSTOLIC BLOOD PRESSURE: 106 MMHG

## 2018-08-21 PROCEDURE — 99213 OFFICE O/P EST LOW 20 MIN: CPT

## 2018-08-29 ENCOUNTER — APPOINTMENT (OUTPATIENT)
Dept: HEMATOLOGY ONCOLOGY | Facility: CLINIC | Age: 63
End: 2018-08-29
Payer: COMMERCIAL

## 2018-08-29 ENCOUNTER — RESULT REVIEW (OUTPATIENT)
Age: 63
End: 2018-08-29

## 2018-08-29 VITALS
OXYGEN SATURATION: 100 % | HEART RATE: 69 BPM | TEMPERATURE: 98.9 F | SYSTOLIC BLOOD PRESSURE: 93 MMHG | WEIGHT: 114.64 LBS | DIASTOLIC BLOOD PRESSURE: 60 MMHG | RESPIRATION RATE: 16 BRPM | BODY MASS INDEX: 19.68 KG/M2

## 2018-08-29 PROCEDURE — 99214 OFFICE O/P EST MOD 30 MIN: CPT

## 2018-08-30 LAB
ALBUMIN SERPL ELPH-MCNC: 4.4 G/DL
ALP BLD-CCNC: 72 U/L
ALT SERPL-CCNC: 29 U/L
ANION GAP SERPL CALC-SCNC: 10 MMOL/L
AST SERPL-CCNC: 28 U/L
BILIRUB SERPL-MCNC: 0.5 MG/DL
BUN SERPL-MCNC: 15 MG/DL
CALCIUM SERPL-MCNC: 9.8 MG/DL
CANCER AG125 SERPL-ACNC: 16 U/ML
CHLORIDE SERPL-SCNC: 101 MMOL/L
CO2 SERPL-SCNC: 30 MMOL/L
CREAT SERPL-MCNC: 0.73 MG/DL
GLUCOSE SERPL-MCNC: 107 MG/DL
POTASSIUM SERPL-SCNC: 5 MMOL/L
PROT SERPL-MCNC: 6.7 G/DL
SODIUM SERPL-SCNC: 141 MMOL/L

## 2018-09-10 ENCOUNTER — FORM ENCOUNTER (OUTPATIENT)
Age: 63
End: 2018-09-10

## 2018-09-11 ENCOUNTER — APPOINTMENT (OUTPATIENT)
Dept: CT IMAGING | Facility: IMAGING CENTER | Age: 63
End: 2018-09-11
Payer: COMMERCIAL

## 2018-09-11 ENCOUNTER — OUTPATIENT (OUTPATIENT)
Dept: OUTPATIENT SERVICES | Facility: HOSPITAL | Age: 63
LOS: 1 days | End: 2018-09-11
Payer: COMMERCIAL

## 2018-09-11 ENCOUNTER — TRANSCRIPTION ENCOUNTER (OUTPATIENT)
Age: 63
End: 2018-09-11

## 2018-09-11 DIAGNOSIS — S02.2XXA FRACTURE OF NASAL BONES, INITIAL ENCOUNTER FOR CLOSED FRACTURE: Chronic | ICD-10-CM

## 2018-09-11 DIAGNOSIS — S36.039A UNSPECIFIED LACERATION OF SPLEEN, INITIAL ENCOUNTER: Chronic | ICD-10-CM

## 2018-09-11 DIAGNOSIS — D24.9 BENIGN NEOPLASM OF UNSPECIFIED BREAST: Chronic | ICD-10-CM

## 2018-09-11 DIAGNOSIS — Z90.2 ACQUIRED ABSENCE OF LUNG [PART OF]: Chronic | ICD-10-CM

## 2018-09-11 DIAGNOSIS — C34.12 MALIGNANT NEOPLASM OF UPPER LOBE, LEFT BRONCHUS OR LUNG: ICD-10-CM

## 2018-09-11 PROCEDURE — 74177 CT ABD & PELVIS W/CONTRAST: CPT

## 2018-09-11 PROCEDURE — 71260 CT THORAX DX C+: CPT | Mod: 26

## 2018-09-11 PROCEDURE — 82565 ASSAY OF CREATININE: CPT

## 2018-09-11 PROCEDURE — 74177 CT ABD & PELVIS W/CONTRAST: CPT | Mod: 26

## 2018-09-11 PROCEDURE — 71260 CT THORAX DX C+: CPT

## 2018-10-12 ENCOUNTER — OUTPATIENT (OUTPATIENT)
Dept: OUTPATIENT SERVICES | Facility: HOSPITAL | Age: 63
LOS: 1 days | End: 2018-10-12

## 2018-10-12 VITALS
WEIGHT: 117.07 LBS | DIASTOLIC BLOOD PRESSURE: 60 MMHG | TEMPERATURE: 97 F | HEIGHT: 64 IN | RESPIRATION RATE: 16 BRPM | OXYGEN SATURATION: 98 % | SYSTOLIC BLOOD PRESSURE: 106 MMHG | HEART RATE: 77 BPM

## 2018-10-12 DIAGNOSIS — F41.9 ANXIETY DISORDER, UNSPECIFIED: ICD-10-CM

## 2018-10-12 DIAGNOSIS — S02.2XXA FRACTURE OF NASAL BONES, INITIAL ENCOUNTER FOR CLOSED FRACTURE: Chronic | ICD-10-CM

## 2018-10-12 DIAGNOSIS — G47.30 SLEEP APNEA, UNSPECIFIED: ICD-10-CM

## 2018-10-12 DIAGNOSIS — Z15.01 GENETIC SUSCEPTIBILITY TO MALIGNANT NEOPLASM OF BREAST: ICD-10-CM

## 2018-10-12 DIAGNOSIS — S36.039A UNSPECIFIED LACERATION OF SPLEEN, INITIAL ENCOUNTER: Chronic | ICD-10-CM

## 2018-10-12 DIAGNOSIS — J45.998 OTHER ASTHMA: ICD-10-CM

## 2018-10-12 DIAGNOSIS — F32.9 MAJOR DEPRESSIVE DISORDER, SINGLE EPISODE, UNSPECIFIED: ICD-10-CM

## 2018-10-12 DIAGNOSIS — D24.9 BENIGN NEOPLASM OF UNSPECIFIED BREAST: Chronic | ICD-10-CM

## 2018-10-12 DIAGNOSIS — Z90.2 ACQUIRED ABSENCE OF LUNG [PART OF]: Chronic | ICD-10-CM

## 2018-10-12 DIAGNOSIS — C50.919 MALIGNANT NEOPLASM OF UNSPECIFIED SITE OF UNSPECIFIED FEMALE BREAST: ICD-10-CM

## 2018-10-12 DIAGNOSIS — Z90.710 ACQUIRED ABSENCE OF BOTH CERVIX AND UTERUS: Chronic | ICD-10-CM

## 2018-10-12 LAB
ALBUMIN SERPL ELPH-MCNC: 4.5 G/DL — SIGNIFICANT CHANGE UP (ref 3.3–5)
ALP SERPL-CCNC: 80 U/L — SIGNIFICANT CHANGE UP (ref 40–120)
ALT FLD-CCNC: 35 U/L — HIGH (ref 4–33)
AST SERPL-CCNC: 31 U/L — SIGNIFICANT CHANGE UP (ref 4–32)
BILIRUB SERPL-MCNC: 0.3 MG/DL — SIGNIFICANT CHANGE UP (ref 0.2–1.2)
BLD GP AB SCN SERPL QL: NEGATIVE — SIGNIFICANT CHANGE UP
BUN SERPL-MCNC: 21 MG/DL — SIGNIFICANT CHANGE UP (ref 7–23)
CALCIUM SERPL-MCNC: 9.3 MG/DL — SIGNIFICANT CHANGE UP (ref 8.4–10.5)
CHLORIDE SERPL-SCNC: 102 MMOL/L — SIGNIFICANT CHANGE UP (ref 98–107)
CO2 SERPL-SCNC: 27 MMOL/L — SIGNIFICANT CHANGE UP (ref 22–31)
CREAT SERPL-MCNC: 0.69 MG/DL — SIGNIFICANT CHANGE UP (ref 0.5–1.3)
GLUCOSE SERPL-MCNC: 74 MG/DL — SIGNIFICANT CHANGE UP (ref 70–99)
HCT VFR BLD CALC: 38.2 % — SIGNIFICANT CHANGE UP (ref 34.5–45)
HGB BLD-MCNC: 12 G/DL — SIGNIFICANT CHANGE UP (ref 11.5–15.5)
MCHC RBC-ENTMCNC: 29.4 PG — SIGNIFICANT CHANGE UP (ref 27–34)
MCHC RBC-ENTMCNC: 31.4 % — LOW (ref 32–36)
MCV RBC AUTO: 93.6 FL — SIGNIFICANT CHANGE UP (ref 80–100)
NRBC # FLD: 0 — SIGNIFICANT CHANGE UP
PLATELET # BLD AUTO: 183 K/UL — SIGNIFICANT CHANGE UP (ref 150–400)
PMV BLD: 9.7 FL — SIGNIFICANT CHANGE UP (ref 7–13)
POTASSIUM SERPL-MCNC: 4.1 MMOL/L — SIGNIFICANT CHANGE UP (ref 3.5–5.3)
POTASSIUM SERPL-SCNC: 4.1 MMOL/L — SIGNIFICANT CHANGE UP (ref 3.5–5.3)
PROT SERPL-MCNC: 6.8 G/DL — SIGNIFICANT CHANGE UP (ref 6–8.3)
RBC # BLD: 4.08 M/UL — SIGNIFICANT CHANGE UP (ref 3.8–5.2)
RBC # FLD: 13.2 % — SIGNIFICANT CHANGE UP (ref 10.3–14.5)
RH IG SCN BLD-IMP: POSITIVE — SIGNIFICANT CHANGE UP
SODIUM SERPL-SCNC: 142 MMOL/L — SIGNIFICANT CHANGE UP (ref 135–145)
WBC # BLD: 4.75 K/UL — SIGNIFICANT CHANGE UP (ref 3.8–10.5)
WBC # FLD AUTO: 4.75 K/UL — SIGNIFICANT CHANGE UP (ref 3.8–10.5)

## 2018-10-12 RX ORDER — SODIUM CHLORIDE 9 MG/ML
1000 INJECTION, SOLUTION INTRAVENOUS
Qty: 0 | Refills: 0 | Status: DISCONTINUED | OUTPATIENT
Start: 2018-10-23 | End: 2018-10-23

## 2018-10-12 RX ORDER — ALPRAZOLAM 0.25 MG
1 TABLET ORAL
Qty: 0 | Refills: 0 | COMMUNITY

## 2018-10-12 RX ORDER — SIMVASTATIN 20 MG/1
1 TABLET, FILM COATED ORAL
Qty: 0 | Refills: 0 | COMMUNITY

## 2018-10-12 RX ORDER — ZOLPIDEM TARTRATE 10 MG/1
1 TABLET ORAL
Qty: 0 | Refills: 0 | COMMUNITY

## 2018-10-12 RX ORDER — SUMATRIPTAN SUCCINATE 4 MG/.5ML
1 INJECTION, SOLUTION SUBCUTANEOUS
Qty: 0 | Refills: 0 | COMMUNITY

## 2018-10-12 NOTE — H&P PST ADULT - NEGATIVE GENERAL SYMPTOMS
no anorexia/no malaise/no chills/no sweating/no weight gain/no polyphagia/no polyuria/no fatigue/no fever

## 2018-10-12 NOTE — H&P PST ADULT - NEGATIVE GASTROINTESTINAL SYMPTOMS
no constipation/no abdominal pain/no nausea/no vomiting/no diarrhea no diarrhea/no abdominal pain/no nausea/no vomiting

## 2018-10-12 NOTE — H&P PST ADULT - PROBLEM SELECTOR PLAN 1
Scheduled for Bilateral tissue expanders, bilateral muscle flap with Alloderm & maxillary closure, bilateral nipple sparing, simple mastectomies, sentinel lymph node biopsies possible axillary dissection on 10/23/18  Pending labs, medical, pulmonary & cardiac clearances  Surgical wash, Famotidine & preop instructions given & explained, pt verbalized understanding

## 2018-10-12 NOTE — H&P PST ADULT - NEGATIVE ENMT SYMPTOMS
no vertigo/no gum bleeding/no ear pain/no sinus symptoms/no throat pain/no recurrent cold sores/no abnormal taste sensation/no nasal discharge/no post-nasal discharge/no nose bleeds/no dry mouth/no nasal congestion/no nasal obstruction/no dysphagia/no hearing difficulty/no tinnitus

## 2018-10-12 NOTE — H&P PST ADULT - HISTORY OF PRESENT ILLNESS
61y/o female with h/o multiple lung nodule since 2011 left Video assisted thoracoscopy left upper lobe wedge resection, segmentectomy, left upper lobectomy, 7/12/17 with laceration of spleen & was transfused with 5 units PRBC. Pt states she was diagnosed with BRAC 1. Pt had MRI of breasts - negative, Ct scan chest, Ultrasound abdomen/pelvic & transvaginal sonogram done. Now scheduled for Bilateral Nipple sparing mastectomies, sentinel lymph node biopsies, possible axillary dissection, lymph node open deep axillary node, bilateral tissue expanders, bilateral alloderm, dilation curettage laparoscopic bilateral salpingectomy oophorectomy, laparoscopy / adrexal structure on 01/09/18. 64 y/o female with h/o multiple lung nodule since 2011 left Video assisted thoracoscopy left upper lobe wedge resection, segmentectomy, left upper lobectomy, 7/12/17 with laceration of spleen & was transfused with 5 units PRBC. Pt states she was diagnosed with BRAC 1. Pt had MRI of breasts - negative, Ct scan chest, Ultrasound abdomen/pelvic & transvaginal sonogram done. Now scheduled for Bilateral Nipple sparing mastectomies, sentinel lymph node biopsies, possible axillary dissection, lymph node open deep axillary node, bilateral tissue expanders, bilateral alloderm, dilation curettage laparoscopic bilateral salpingectomy oophorectomy, laparoscopy / adrexal structure on 01/09/18. 64 y/o female with h/o multiple lung nodule since 2011 left Video assisted thoracoscopy left upper lobe wedge resection, segmentectomy, left upper lobectomy, 7/12/17 with laceration of spleen & was transfused with 5 units PRBC. Pt states she was diagnosed with BRAC 1. Pt had MRI of breasts - negative, Ct scan chest, Ultrasound abdomen/pelvic & transvaginal sonogram done. Pt had Laparoscopic vaginal Assisted Hysterectomy,  Omentectomy, BSO on 2/2018, due to Ca left fallopian tube being diagnosed. Pt now scheduled for Bilateral tissue expanders, bilateral muscle flap with Alloderm & maxillary closure, bilateral nipple sparing, simple mastectomies, sentinel lymph node biopsies possible axillary dissection on 10/23/18.   Pt has hx CAD, 50% proximal LAD stenosis, Constipation, Anxiety, Depression, Eczema & Migraines.

## 2018-10-12 NOTE — H&P PST ADULT - LYMPHATIC
posterior cervical L/supraclavicular L/posterior cervical R/anterior cervical R/supraclavicular R/anterior cervical L

## 2018-10-12 NOTE — H&P PST ADULT - NEGATIVE CARDIOVASCULAR SYMPTOMS
no orthopnea/no palpitations/no paroxysmal nocturnal dyspnea/no claudication/no dyspnea on exertion/no peripheral edema/no chest pain

## 2018-10-12 NOTE — H&P PST ADULT - PMH
Adenocarcinoma of lung, stage 1  Left upper laobe stage 1A  CAD (coronary artery disease)  non obstructive 50% proximal LAD stenosis  Disorder of shoulder  right  Eczema    GERD (gastroesophageal reflux disease)    Mild asthma  denies intubation or hospitalizations  Nasal bone fracture    Papilloma of breast  right  Pulmonary nodule  bilateral  PVC (premature ventricular contraction)    Seasonal allergies    Sleep apnea  noncompliant in using CPAP machine  Asymtomatic - was never retested for sleep apnea Adenocarcinoma of lung, stage 1  Left upper lobe stage 1A  Anxiety    CAD (coronary artery disease)  non obstructive 50% proximal LAD stenosis  Depression    Disorder of shoulder  right  Eczema    Fallopian tube cancer, carcinoma, left    GERD (gastroesophageal reflux disease)    Hyperlipidemia    Migraines    Mild asthma  denies intubation or hospitalizations  Nasal bone fracture    Papilloma of breast  right  Pulmonary nodule  bilateral  PVC (premature ventricular contraction)    Seasonal allergies    Sleep apnea  noncompliant in using CPAP machine  Asymtomatic - was never retested for sleep apnea

## 2018-10-12 NOTE — H&P PST ADULT - RS GEN PE MLT RESP DETAILS PC
breath sounds equal/stridor/clear to auscultation bilaterally/no rales/no rhonchi/no wheezes/airway patent/respirations non-labored

## 2018-10-12 NOTE — H&P PST ADULT - PRIMARY CARE PROVIDER
Dr. Acevedo pmd fax  [phone], phone [phone] Dr. Acevedo pmd fax  [phone], phone [phone]                     Dr Kimberly Velez ( Oncologist) [phone]

## 2018-10-12 NOTE — H&P PST ADULT - ANESTHESIA, PREVIOUS REACTION, PROFILE
severe.   " I felt paralyzed and felt like I could not breath before going under. " - 2015   N&V - severe  2017/nausea/vomiting severe.   " I felt paralyzed and felt like I could not breath before going under. " - 2015   N&V - severe  2017  Pt states  "Emend works best"/nausea/vomiting

## 2018-10-12 NOTE — H&P PST ADULT - MARITAL STATUS
2 children, mother dementia, hypothyroid,  father  68y/o lung cancer, no siblings/Single Single/2 children, daughter BRCA 1, s/p B/L mastectomy with reconstruction

## 2018-10-12 NOTE — H&P PST ADULT - RESPIRATORY AND THORAX COMMENTS
Hx Asthma - last episode - several months; lung mass upper left- s/p segmentectomy 7/2017 Hx Asthma - last episode - summer months; lung mass upper left- s/p segmentectomy 7/2017

## 2018-10-12 NOTE — H&P PST ADULT - NEGATIVE GENERAL GENITOURINARY SYMPTOMS
no bladder infections/no dysuria/normal urinary frequency/no renal colic/no hematuria/no flank pain R/no incontinence/no nocturia/no flank pain L

## 2018-10-12 NOTE — H&P PST ADULT - NEGATIVE FEMALE-SPECIFIC SYMPTOMS
no pelvic pain/no spotting/no abnormal vaginal bleeding s/o Hysterectomy , BSO/no abnormal vaginal bleeding/no pelvic pain/no spotting

## 2018-10-12 NOTE — H&P PST ADULT - FAMILY HISTORY
Father  Still living? Unknown  Family history of lung cancer, Age at diagnosis: Age Unknown     Mother  Still living? Yes, Estimated age: Age Unknown  Family history of dementia, Age at diagnosis: Age Unknown     Child  Still living? Yes, Estimated age: Age Unknown  Family history of chronic ulcerative colitis, Age at diagnosis: Age Unknown Father  Still living? Unknown  Family history of lung cancer, Age at diagnosis: Age Unknown     Mother  Still living? Yes, Estimated age: Age Unknown  Family history of dementia, Age at diagnosis: Age Unknown     Child  Still living? Yes, Estimated age: Age Unknown  Family history of chronic ulcerative colitis, Age at diagnosis: Age Unknown     Child  Still living? Yes, Estimated age: Age Unknown  Family history of secondary breast cancer, Age at diagnosis: 31-40

## 2018-10-12 NOTE — H&P PST ADULT - PSH
Nasal bone fracture  ORIG nasal fx- 2015  Papilloma of breast  right breast excision & bx 2013  S/P adenoidectomy  as a child  S/P arthroscopy of shoulder  right April 2013  S/P blepharoplasty  in 2008  S/P partial lobectomy of lung  Left upper lobe - VATS - segmrntectomy - 7/2017  Spleen laceration  7/2017 - transfused with 5 units PRBC Nasal bone fracture  ORIG nasal fx- 2015  Papilloma of breast  right breast excision & bx 2013  S/P adenoidectomy  as a child  S/P arthroscopy of shoulder  right April 2013  S/P blepharoplasty  in 2008  S/P laparoscopic assisted vaginal hysterectomy ([facility])  Omentectomy, BSO - 2/2018  Chemotherapy x 4 months  S/P partial lobectomy of lung  Left upper lobe - VATS - segmrntectomy - 7/2017  Spleen laceration  7/2017 - transfused with 5 units PRBC Nasal bone fracture  ORIF nasal fx- 2015  Papilloma of breast  right breast excision & bx 2013  S/P adenoidectomy  as a child  S/P arthroscopy of shoulder  right April 2013  S/P blepharoplasty  in 2008  S/P laparoscopic assisted vaginal hysterectomy ([facility])  Omentectomy, BSO - 2/2018  Chemotherapy x 4 months  S/P partial lobectomy of lung  Left upper lobe - VATS - segmentectomy - 7/2017  Spleen laceration  7/2017 - transfused with 5 units PRBC

## 2018-10-18 PROBLEM — E78.5 HYPERLIPIDEMIA, UNSPECIFIED: Chronic | Status: ACTIVE | Noted: 2018-10-12

## 2018-10-18 PROBLEM — F41.9 ANXIETY DISORDER, UNSPECIFIED: Chronic | Status: ACTIVE | Noted: 2018-10-12

## 2018-10-18 PROBLEM — G43.909 MIGRAINE, UNSPECIFIED, NOT INTRACTABLE, WITHOUT STATUS MIGRAINOSUS: Chronic | Status: ACTIVE | Noted: 2018-10-12

## 2018-10-18 PROBLEM — F32.9 MAJOR DEPRESSIVE DISORDER, SINGLE EPISODE, UNSPECIFIED: Chronic | Status: ACTIVE | Noted: 2018-10-12

## 2018-10-22 NOTE — ASU PATIENT PROFILE, ADULT - ANESTHESIA, PREVIOUS REACTION, PROFILE
nausea/vomiting/severe.   " I felt paralyzed and felt like I could not breath before going under. " - 2015   N&V - severe  2017  Pt states  "Emend works best"

## 2018-10-22 NOTE — ASU PATIENT PROFILE, ADULT - PSH
Nasal bone fracture  ORIF nasal fx- 2015  Papilloma of breast  right breast excision & bx 2013  S/P adenoidectomy  as a child  S/P arthroscopy of shoulder  right April 2013  S/P blepharoplasty  in 2008  S/P laparoscopic assisted vaginal hysterectomy ([facility])  Omentectomy, BSO - 2/2018  Chemotherapy x 4 months  S/P partial lobectomy of lung  Left upper lobe - VATS - segmentectomy - 7/2017  Spleen laceration  7/2017 - transfused with 5 units PRBC

## 2018-10-23 ENCOUNTER — APPOINTMENT (OUTPATIENT)
Dept: SURGERY | Facility: HOSPITAL | Age: 63
End: 2018-10-23

## 2018-10-23 ENCOUNTER — RESULT REVIEW (OUTPATIENT)
Age: 63
End: 2018-10-23

## 2018-10-23 ENCOUNTER — INPATIENT (INPATIENT)
Facility: HOSPITAL | Age: 63
LOS: 0 days | Discharge: HOME | End: 2018-10-24
Attending: SURGERY | Admitting: SURGERY
Payer: COMMERCIAL

## 2018-10-23 ENCOUNTER — APPOINTMENT (OUTPATIENT)
Dept: NUCLEAR MEDICINE | Facility: HOSPITAL | Age: 63
End: 2018-10-23

## 2018-10-23 VITALS
DIASTOLIC BLOOD PRESSURE: 49 MMHG | HEART RATE: 77 BPM | TEMPERATURE: 97 F | WEIGHT: 117.07 LBS | HEIGHT: 64 IN | OXYGEN SATURATION: 98 % | SYSTOLIC BLOOD PRESSURE: 92 MMHG | RESPIRATION RATE: 16 BRPM

## 2018-10-23 DIAGNOSIS — Z90.710 ACQUIRED ABSENCE OF BOTH CERVIX AND UTERUS: Chronic | ICD-10-CM

## 2018-10-23 DIAGNOSIS — Z15.01 GENETIC SUSCEPTIBILITY TO MALIGNANT NEOPLASM OF BREAST: ICD-10-CM

## 2018-10-23 DIAGNOSIS — S36.039A UNSPECIFIED LACERATION OF SPLEEN, INITIAL ENCOUNTER: Chronic | ICD-10-CM

## 2018-10-23 DIAGNOSIS — Z90.2 ACQUIRED ABSENCE OF LUNG [PART OF]: Chronic | ICD-10-CM

## 2018-10-23 DIAGNOSIS — S02.2XXA FRACTURE OF NASAL BONES, INITIAL ENCOUNTER FOR CLOSED FRACTURE: Chronic | ICD-10-CM

## 2018-10-23 DIAGNOSIS — D24.9 BENIGN NEOPLASM OF UNSPECIFIED BREAST: Chronic | ICD-10-CM

## 2018-10-23 LAB — RH IG SCN BLD-IMP: POSITIVE — SIGNIFICANT CHANGE UP

## 2018-10-23 PROCEDURE — 88331 PATH CONSLTJ SURG 1 BLK 1SPC: CPT | Mod: 26

## 2018-10-23 PROCEDURE — 15734 MUSCLE-SKIN GRAFT TRUNK: CPT | Mod: 59,LT

## 2018-10-23 PROCEDURE — 88304 TISSUE EXAM BY PATHOLOGIST: CPT | Mod: 26

## 2018-10-23 PROCEDURE — 88307 TISSUE EXAM BY PATHOLOGIST: CPT | Mod: 26

## 2018-10-23 PROCEDURE — 15777 ACELLULAR DERM MATRIX IMPLT: CPT | Mod: 59,RT

## 2018-10-23 PROCEDURE — 19357 TISS XPNDR PLMT BRST RCNSTJ: CPT | Mod: RT

## 2018-10-23 PROCEDURE — 38525K: CUSTOM | Mod: 50

## 2018-10-23 PROCEDURE — 19303K: CUSTOM | Mod: 50

## 2018-10-23 PROCEDURE — 12034 INTMD RPR S/TR/EXT 7.6-12.5: CPT | Mod: 59

## 2018-10-23 PROCEDURE — 88332 PATH CONSLTJ SURG EA ADD BLK: CPT | Mod: 26

## 2018-10-23 PROCEDURE — 38792K: CUSTOM | Mod: 50

## 2018-10-23 RX ORDER — CEFAZOLIN SODIUM 1 G
1000 VIAL (EA) INJECTION EVERY 8 HOURS
Qty: 0 | Refills: 0 | Status: DISCONTINUED | OUTPATIENT
Start: 2018-10-23 | End: 2018-10-24

## 2018-10-23 RX ORDER — ONDANSETRON 8 MG/1
4 TABLET, FILM COATED ORAL ONCE
Qty: 0 | Refills: 0 | Status: DISCONTINUED | OUTPATIENT
Start: 2018-10-23 | End: 2018-10-24

## 2018-10-23 RX ORDER — SODIUM CHLORIDE 9 MG/ML
1000 INJECTION, SOLUTION INTRAVENOUS
Qty: 0 | Refills: 0 | Status: DISCONTINUED | OUTPATIENT
Start: 2018-10-23 | End: 2018-10-24

## 2018-10-23 RX ORDER — MONTELUKAST 4 MG/1
10 TABLET, CHEWABLE ORAL DAILY
Qty: 0 | Refills: 0 | Status: DISCONTINUED | OUTPATIENT
Start: 2018-10-23 | End: 2018-10-24

## 2018-10-23 RX ORDER — SIMVASTATIN 20 MG/1
40 TABLET, FILM COATED ORAL AT BEDTIME
Qty: 0 | Refills: 0 | Status: DISCONTINUED | OUTPATIENT
Start: 2018-10-23 | End: 2018-10-24

## 2018-10-23 RX ORDER — TRAMADOL HYDROCHLORIDE 50 MG/1
50 TABLET ORAL EVERY 4 HOURS
Qty: 0 | Refills: 0 | Status: DISCONTINUED | OUTPATIENT
Start: 2018-10-23 | End: 2018-10-24

## 2018-10-23 RX ORDER — ACETAMINOPHEN 500 MG
650 TABLET ORAL EVERY 6 HOURS
Qty: 0 | Refills: 0 | Status: DISCONTINUED | OUTPATIENT
Start: 2018-10-23 | End: 2018-10-24

## 2018-10-23 RX ORDER — HYDROMORPHONE HYDROCHLORIDE 2 MG/ML
0.5 INJECTION INTRAMUSCULAR; INTRAVENOUS; SUBCUTANEOUS
Qty: 0 | Refills: 0 | Status: DISCONTINUED | OUTPATIENT
Start: 2018-10-23 | End: 2018-10-24

## 2018-10-23 RX ORDER — TRAMADOL HYDROCHLORIDE 50 MG/1
25 TABLET ORAL EVERY 4 HOURS
Qty: 0 | Refills: 0 | Status: DISCONTINUED | OUTPATIENT
Start: 2018-10-23 | End: 2018-10-24

## 2018-10-23 RX ORDER — DIAZEPAM 5 MG
5 TABLET ORAL EVERY 6 HOURS
Qty: 0 | Refills: 0 | Status: DISCONTINUED | OUTPATIENT
Start: 2018-10-23 | End: 2018-10-24

## 2018-10-23 RX ORDER — ALBUTEROL 90 UG/1
2 AEROSOL, METERED ORAL EVERY 6 HOURS
Qty: 0 | Refills: 0 | Status: DISCONTINUED | OUTPATIENT
Start: 2018-10-23 | End: 2018-10-24

## 2018-10-23 RX ADMIN — TRAMADOL HYDROCHLORIDE 50 MILLIGRAM(S): 50 TABLET ORAL at 21:38

## 2018-10-23 RX ADMIN — SIMVASTATIN 40 MILLIGRAM(S): 20 TABLET, FILM COATED ORAL at 21:37

## 2018-10-23 RX ADMIN — Medication 100 MILLIGRAM(S): at 18:28

## 2018-10-23 RX ADMIN — TRAMADOL HYDROCHLORIDE 50 MILLIGRAM(S): 50 TABLET ORAL at 22:35

## 2018-10-23 RX ADMIN — SODIUM CHLORIDE 90 MILLILITER(S): 9 INJECTION, SOLUTION INTRAVENOUS at 13:34

## 2018-10-23 RX ADMIN — Medication 5 MILLIGRAM(S): at 18:28

## 2018-10-23 NOTE — PROGRESS NOTE ADULT - ASSESSMENT
63F s/p bilateral mastectomies with bilateral submuscular Tissue Expander placement with alloderm, POD #0    - pain control prn  - monitor drain outputs  - BP soft, continue to monitor  - finish sapna-op abx   - advance diet as tolerated  - transfer to surgical floor when meets PACU criteria.              VALENTINE Castorena Team Surgery / [id number]

## 2018-10-23 NOTE — PROGRESS NOTE ADULT - SUBJECTIVE AND OBJECTIVE BOX
D Team Surgery Post Op Note     Patient seen and evaluated in PACU. Post-operative pain is well-controlled. Patient denies headache, fever, chills, chest pain, SOB, nausea and vomiting.     Vitals:  Afebrile. BP: 96/49, HR: 82, RR: 18, 99% RA    I&O's Summary    23 Oct 2018 07:01  -  23 Oct 2018 16:26  --------------------------------------------------------  IN: 90 mL / OUT: 35 mL / NET: 55 mL      PHYSICAL EXAM:  Constitutional: Well-developed, well-nourished, NAD.  Neuro: AOx3, no focal deficits  Chest: surgical bra with dressings in place, clean and dry. Bilateral ANJANA serosanguinous x 2.   No ecchymosis or swelling noted.   Respiratory:  Lungs CTA, B/L, no rales , no wheezing, no rhonchi.  Cardiovascular:  S1, S2, Reg rate  Gastrointestinal: Abdomen soft, non distended, + BS, non tender  Extremities: warm, No edema.

## 2018-10-24 ENCOUNTER — TRANSCRIPTION ENCOUNTER (OUTPATIENT)
Age: 63
End: 2018-10-24

## 2018-10-24 VITALS
HEART RATE: 86 BPM | SYSTOLIC BLOOD PRESSURE: 99 MMHG | RESPIRATION RATE: 16 BRPM | DIASTOLIC BLOOD PRESSURE: 41 MMHG | TEMPERATURE: 98 F | OXYGEN SATURATION: 98 %

## 2018-10-24 RX ORDER — DIAZEPAM 5 MG
1 TABLET ORAL
Qty: 0 | Refills: 0 | COMMUNITY
Start: 2018-10-24

## 2018-10-24 RX ORDER — ACETAMINOPHEN 500 MG
2 TABLET ORAL
Qty: 0 | Refills: 0 | COMMUNITY
Start: 2018-10-24

## 2018-10-24 RX ORDER — ALPRAZOLAM 0.25 MG
1 TABLET ORAL
Qty: 0 | Refills: 0 | COMMUNITY

## 2018-10-24 RX ADMIN — Medication 100 MILLIGRAM(S): at 09:50

## 2018-10-24 RX ADMIN — Medication 60 MILLIGRAM(S): at 00:08

## 2018-10-24 RX ADMIN — Medication 5 MILLIGRAM(S): at 05:35

## 2018-10-24 RX ADMIN — Medication 650 MILLIGRAM(S): at 02:00

## 2018-10-24 RX ADMIN — TRAMADOL HYDROCHLORIDE 50 MILLIGRAM(S): 50 TABLET ORAL at 08:15

## 2018-10-24 RX ADMIN — Medication 5 MILLIGRAM(S): at 00:08

## 2018-10-24 RX ADMIN — Medication 650 MILLIGRAM(S): at 01:06

## 2018-10-24 RX ADMIN — TRAMADOL HYDROCHLORIDE 50 MILLIGRAM(S): 50 TABLET ORAL at 09:00

## 2018-10-24 RX ADMIN — Medication 100 MILLIGRAM(S): at 01:03

## 2018-10-24 NOTE — DISCHARGE NOTE ADULT - MEDICATION SUMMARY - MEDICATIONS TO STOP TAKING
I will STOP taking the medications listed below when I get home from the hospital:    Xanax 0.25 mg oral tablet  -- 1 tab(s) by mouth 3 times a day, As Needed

## 2018-10-24 NOTE — PROGRESS NOTE ADULT - SUBJECTIVE AND OBJECTIVE BOX
LOUISA MILLAN  [id number]    Subjective:    Patient seen and examined by plastic surgery team, no acute changes overnight.   The patient has no complaints, feels well, denies pain.   Requesting DC home.        Objective:  T(C): 36.8 (10-24-18 @ 05:28), Max: 36.8 (10-23-18 @ 13:00)  HR: 65 (10-24-18 @ 05:28) (65 - 98)  BP: 89/46 (10-24-18 @ 05:28) (89/46 - 109/38)  RR: 16 (10-24-18 @ 05:28) (10 - 19)  SpO2: 98% (10-24-18 @ 05:28) (95% - 100%)  Wt(kg): --           10-23 @ 07:01  -  10-24 @ 07:00  --------------------------------------------------------  IN: 480 mL / OUT: 993 mL / NET: -513 mL      PHYSICAL EXAM:    General: NAD  Breast: Surgical bra in place, Incisions CDI, no palpable collections. ANJANA SS.             MEDICATIONS  (STANDING):  ceFAZolin   IVPB 1000 milliGRAM(s) IV Intermittent every 8 hours  diazepam    Tablet 5 milliGRAM(s) Oral every 6 hours  imipramine 60 milliGRAM(s) Oral at bedtime  lactated ringers. 1000 milliLiter(s) (90 mL/Hr) IV Continuous <Continuous>  montelukast 10 milliGRAM(s) Oral daily  simvastatin 40 milliGRAM(s) Oral at bedtime    MEDICATIONS  (PRN):  acetaminophen   Tablet .. 650 milliGRAM(s) Oral every 6 hours PRN Temp greater or equal to 38C (100.4F), Mild Pain (1 - 3)  ALBUTerol    90 MICROgram(s) HFA Inhaler 2 Puff(s) Inhalation every 6 hours PRN Respiratory Distress  HYDROmorphone  Injectable 0.5 milliGRAM(s) IV Push every 10 minutes PRN Severe Pain (7 - 10)  ondansetron Injectable 4 milliGRAM(s) IV Push once PRN Nausea and/or Vomiting  traMADol 25 milliGRAM(s) Oral every 4 hours PRN Moderate Pain (4 - 6)  traMADol 50 milliGRAM(s) Oral every 4 hours PRN Severe Pain (7 - 10)

## 2018-10-24 NOTE — DISCHARGE NOTE ADULT - CARE PROVIDERS DIRECT ADDRESSES
[email or web address]

## 2018-10-24 NOTE — DISCHARGE NOTE ADULT - CARE PROVIDER_API CALL
Graciela French (MD), FPPLJ Breast Surgery  [address]  [address]  [place], [place] [id number]  Phone: [phone]  Fax: [phone]    Keyur Wright), Plastic Surgery  [address] [place] [address]  [address]  [place] [address]  Phone: [phone]  Fax: [phone]

## 2018-10-24 NOTE — DISCHARGE NOTE ADULT - MEDICATION SUMMARY - MEDICATIONS TO TAKE
I will START or STAY ON the medications listed below when I get home from the hospital:    Pain medication  -- Rx given preop  -- Indication: For Severe Pain    acetaminophen 325 mg oral tablet  -- 2 tab(s) by mouth every 6 hours, As needed, Temp greater or equal to 38C (100.4F), Mild Pain (1 - 3)  -- Indication: For Mild and moderate pain    diazePAM 5 mg oral tablet  -- 1 tab(s) by mouth every 6 hours PRN muscle spasm    Rx given preop  -- Indication: For As needed for muscle spasm, do not take xanax if taking valium    imipramine  -- 60 milligram(s) by mouth once a day (at bedtime)  -- Indication: For Depression    simvastatin 40 mg oral tablet  -- 1 tab(s) by mouth once a day (at bedtime)  -- Indication: For Cholesterol    Proventil HFA 90 mcg/inh inhalation aerosol  -- 2 puff(s) inhaled 4 times a day, As Needed  -- Indication: For COPD    Keflex 500 mg oral capsule  -- Rx given preop, take as prescribed  -- Indication: For Antibiotic    MiraLax oral powder for reconstitution  -- 3 x week  -- Indication: For Prevent constipation    Singulair 10 mg oral tablet  -- 1 tab(s) by mouth once a day  -- Indication: For COPD

## 2018-10-24 NOTE — DISCHARGE NOTE ADULT - CONDITIONS AT DISCHARGE
Pt. is afebrile and offers no complaints. In no acute distress. Breast dressing with surgical bra: clean, dry and intact. Pt is ambulating, tolerating diet well, and voiding in adequate amounts. ANJANA drains X 2 patent.

## 2018-10-24 NOTE — DISCHARGE NOTE ADULT - NS AS DC FOLLOWUP STROKE INST
Influenza vaccination (VIS Pub Date: August 7, 2015)/mastectomy, drain care, tramadol/Smoking Cessation

## 2018-10-24 NOTE — DISCHARGE NOTE ADULT - PLAN OF CARE
wound healing WOUND CARE: Apply clean gauze and paper tape over drain site once a day.    BATHING: Please do not submerge wound underwater. You may shower and/or sponge bathe. It is OK to wash drain wound site.  ACTIVITY: No heavy lifting or straining. Otherwise, you may return to your usual level of physical activty. If you are taking narcotic pain medication (such as Percocet) DO NOT drive a car, operate machinery or make important decisions.  DIET: Return to your usual diet.  NOTIFY YOUR SURGEON IF: You have any bleeding that does not stop, any pus draining from your wound(s), any fever (over 100.4 F) or chills, persistent nausea/vomiting, persistent diarrhea, or if your pain is not controlled on your discharge pain medications.  FOLLOW-UP: Please follow up with your primary care physician in one week regarding your hospitalization WOUND CARE: Leave dressing intact until your follow up appointment with Dr. Wright.  ANJANA care as taught.  Please monitor and record drain output and bring to your follow up appointment.  Drains will be removed at your follow up appointments.    BATHING: Please do not submerge wound underwater. You may sponge bathe.  Keep dressing clean and dry.  ACTIVITY: No heavy lifting or straining. Otherwise, you may return to your usual level of physical activity. If you are taking narcotic pain medication (such as Percocet) DO NOT drive a car, operate machinery or make important decisions.  No vigorous exercise.  No raising arms above your shoulder.  DIET: Return to your usual diet.  NOTIFY YOUR SURGEON IF: You have any bleeding that does not stop, any pus draining from your wound(s), any fever (over 100.4 F) or chills, persistent nausea/vomiting, persistent diarrhea, or if your pain is not controlled on your discharge pain medications.  FOLLOW-UP: Please follow up with your primary care physician in one week regarding your hospitalization.  Please follow up with Dr. French and Dr. Wright in one week.  Call to schedule appointments.

## 2018-10-24 NOTE — PROGRESS NOTE ADULT - ASSESSMENT
ASSESSMENT:     63F POD 1 s/p BL mastectomy with TE based reconstruction, doing well;     Plan:     - Pain control PRN   - Incentive spirometry   - Ambulate as tolerated  - Regular diet   - ANJANA drain care  - DC home today with followup OP

## 2018-10-24 NOTE — DISCHARGE NOTE ADULT - PATIENT PORTAL LINK FT
You can access the [email or web address], offered by [facility], by registering with the following website: [email or web address]

## 2018-10-24 NOTE — DISCHARGE NOTE ADULT - CARE PLAN
Principal Discharge DX:	Malignant neoplasm of breast (female)  Goal:	wound healing  Assessment and plan of treatment:	WOUND CARE: Apply clean gauze and paper tape over drain site once a day.    BATHING: Please do not submerge wound underwater. You may shower and/or sponge bathe. It is OK to wash drain wound site.  ACTIVITY: No heavy lifting or straining. Otherwise, you may return to your usual level of physical activty. If you are taking narcotic pain medication (such as Percocet) DO NOT drive a car, operate machinery or make important decisions.  DIET: Return to your usual diet.  NOTIFY YOUR SURGEON IF: You have any bleeding that does not stop, any pus draining from your wound(s), any fever (over 100.4 F) or chills, persistent nausea/vomiting, persistent diarrhea, or if your pain is not controlled on your discharge pain medications.  FOLLOW-UP: Please follow up with your primary care physician in one week regarding your hospitalization Principal Discharge DX:	Malignant neoplasm of breast (female)  Goal:	wound healing  Assessment and plan of treatment:	WOUND CARE: Leave dressing intact until your follow up appointment with Dr. Wright.  ANJANA care as taught.  Please monitor and record drain output and bring to your follow up appointment.  Drains will be removed at your follow up appointments.    BATHING: Please do not submerge wound underwater. You may sponge bathe.  Keep dressing clean and dry.  ACTIVITY: No heavy lifting or straining. Otherwise, you may return to your usual level of physical activity. If you are taking narcotic pain medication (such as Percocet) DO NOT drive a car, operate machinery or make important decisions.  No vigorous exercise.  No raising arms above your shoulder.  DIET: Return to your usual diet.  NOTIFY YOUR SURGEON IF: You have any bleeding that does not stop, any pus draining from your wound(s), any fever (over 100.4 F) or chills, persistent nausea/vomiting, persistent diarrhea, or if your pain is not controlled on your discharge pain medications.  FOLLOW-UP: Please follow up with your primary care physician in one week regarding your hospitalization.  Please follow up with Dr. French and Dr. Wright in one week.  Call to schedule appointments.

## 2018-10-24 NOTE — DISCHARGE NOTE ADULT - HOSPITAL COURSE
62 y/o female with h/o multiple lung nodule since 2011 left Video assisted thoracoscopy left upper lobe wedge resection, segmentectomy, left upper lobectomy, 7/12/17 with laceration of spleen & was transfused with 5 units PRBC. Pt states she was diagnosed with BRAC 1. Pt had MRI of breasts - negative, Ct scan chest, Ultrasound abdomen/pelvic & transvaginal sonogram done. Pt had Laparoscopic vaginal Assisted Hysterectomy,  Omentectomy, BSO on 2/2018, due to Ca left fallopian tube being diagnosed. Pt now scheduled for Bilateral tissue expanders, bilateral muscle flap with Alloderm & maxillary closure, bilateral nipple sparing, simple mastectomies, sentinel lymph node biopsies possible axillary dissection on 10/23/18.   Pt has hx CAD, 50% proximal LAD stenosis, Constipation, Anxiety, Depression, Eczema & Migraines.    Pt admitted to surgical oncology service and went to the OR with Dr. French and Dr. Wright on 10/23 for b/l mastectomies and b/l submuscular TE placement with alloderm.  B/l ANJANA drains left.  Pt tolerated procedure well, without complication.  Post op pt transferred from PACU to surgical floor.  Diet was restarted and advanced as tolerated.  Pain control was transitioned from IV to PO pain meds.  ANJANA teaching provided prior to discharge.  Pt currently ambulating, voiding, tolerating a regular diet, with pain well controlled on PO pain meds.  Per team and attending, pt is hemodynamically stable for discharge home with ANJANA drains to follow up as an outpatient. 64 y/o female with h/o multiple lung nodule since 2011 left Video assisted thoracoscopy left upper lobe wedge resection, segmentectomy, left upper lobectomy, 7/12/17 with laceration of spleen & was transfused with 5 units PRBC. Pt states she was diagnosed with BRAC 1. Pt had MRI of breasts - negative, Ct scan chest, Ultrasound abdomen/pelvic & transvaginal sonogram done. Pt had Laparoscopic vaginal Assisted Hysterectomy,  Omentectomy, BSO on 2/2018, due to Ca left fallopian tube being diagnosed. Pt now scheduled for Bilateral tissue expanders, bilateral muscle flap with Alloderm & maxillary closure, bilateral nipple sparing, simple mastectomies, sentinel lymph node biopsies possible axillary dissection on 10/23/18.   Pt has hx CAD, 50% proximal LAD stenosis, Constipation, Anxiety, Depression, Eczema & Migraines.    Pt admitted to surgical oncology service and went to the OR with Dr. French and Dr. Wright on 10/23 for b/l mastectomies and b/l submuscular TE placement with alloderm.  B/l ANJANA drains left.  Pt tolerated procedure well, without complication.  Post op pt transferred from PACU to surgical floor.  Diet was restarted and advanced as tolerated.  Pain control was transitioned from IV to PO pain meds.  ANJANA teaching provided prior to discharge.  Pt currently ambulating, voiding, tolerating a regular diet, with pain well controlled on PO pain meds.  Per team and attending, pt is hemodynamically stable for discharge home with ANJANA drains to follow up as an outpatient.  Pt recieved abx, pain meds, and valium preop.

## 2018-10-25 LAB — SURGICAL PATHOLOGY STUDY: SIGNIFICANT CHANGE UP

## 2018-10-29 ENCOUNTER — APPOINTMENT (OUTPATIENT)
Dept: SURGERY | Facility: CLINIC | Age: 63
End: 2018-10-29
Payer: COMMERCIAL

## 2018-10-29 PROCEDURE — 99024 POSTOP FOLLOW-UP VISIT: CPT

## 2018-11-06 ENCOUNTER — OUTPATIENT (OUTPATIENT)
Dept: OUTPATIENT SERVICES | Facility: HOSPITAL | Age: 63
LOS: 1 days | Discharge: HOME | End: 2018-11-06

## 2018-11-06 DIAGNOSIS — Z90.710 ACQUIRED ABSENCE OF BOTH CERVIX AND UTERUS: Chronic | ICD-10-CM

## 2018-11-06 DIAGNOSIS — S36.039A UNSPECIFIED LACERATION OF SPLEEN, INITIAL ENCOUNTER: Chronic | ICD-10-CM

## 2018-11-06 DIAGNOSIS — S02.2XXA FRACTURE OF NASAL BONES, INITIAL ENCOUNTER FOR CLOSED FRACTURE: Chronic | ICD-10-CM

## 2018-11-06 DIAGNOSIS — C57.00 MALIGNANT NEOPLASM OF UNSPECIFIED FALLOPIAN TUBE: ICD-10-CM

## 2018-11-06 DIAGNOSIS — D24.9 BENIGN NEOPLASM OF UNSPECIFIED BREAST: Chronic | ICD-10-CM

## 2018-11-06 DIAGNOSIS — Z90.2 ACQUIRED ABSENCE OF LUNG [PART OF]: Chronic | ICD-10-CM

## 2018-11-15 ENCOUNTER — RESULT REVIEW (OUTPATIENT)
Age: 63
End: 2018-11-15

## 2018-11-15 ENCOUNTER — APPOINTMENT (OUTPATIENT)
Dept: HEMATOLOGY ONCOLOGY | Facility: CLINIC | Age: 63
End: 2018-11-15
Payer: COMMERCIAL

## 2018-11-15 VITALS
DIASTOLIC BLOOD PRESSURE: 68 MMHG | WEIGHT: 116.62 LBS | RESPIRATION RATE: 17 BRPM | OXYGEN SATURATION: 100 % | SYSTOLIC BLOOD PRESSURE: 107 MMHG | TEMPERATURE: 97.4 F | HEART RATE: 73 BPM | BODY MASS INDEX: 20.02 KG/M2

## 2018-11-15 LAB
HCT VFR BLD CALC: 36.9 % — SIGNIFICANT CHANGE UP (ref 34.5–45)
HGB BLD-MCNC: 12.2 G/DL — SIGNIFICANT CHANGE UP (ref 11.5–15.5)
MCHC RBC-ENTMCNC: 29.6 PG — SIGNIFICANT CHANGE UP (ref 27–34)
MCHC RBC-ENTMCNC: 33.2 G/DL — SIGNIFICANT CHANGE UP (ref 32–36)
MCV RBC AUTO: 89.2 FL — SIGNIFICANT CHANGE UP (ref 80–100)
PLATELET # BLD AUTO: 210 K/UL — SIGNIFICANT CHANGE UP (ref 150–400)
RBC # BLD: 4.13 M/UL — SIGNIFICANT CHANGE UP (ref 3.8–5.2)
RBC # FLD: 12.1 % — SIGNIFICANT CHANGE UP (ref 10.3–14.5)
WBC # BLD: 5.1 K/UL — SIGNIFICANT CHANGE UP (ref 3.8–10.5)
WBC # FLD AUTO: 5.1 K/UL — SIGNIFICANT CHANGE UP (ref 3.8–10.5)

## 2018-11-15 PROCEDURE — 99214 OFFICE O/P EST MOD 30 MIN: CPT

## 2018-11-15 RX ORDER — APREPITANT 80 MG/1
80 CAPSULE ORAL
Qty: 2 | Refills: 5 | Status: DISCONTINUED | COMMUNITY
Start: 2018-01-18 | End: 2018-11-15

## 2018-11-15 RX ORDER — PROCHLORPERAZINE MALEATE 10 MG/1
10 TABLET ORAL EVERY 6 HOURS
Qty: 30 | Refills: 2 | Status: DISCONTINUED | COMMUNITY
Start: 2018-01-18 | End: 2018-11-15

## 2018-11-16 LAB — CANCER AG125 SERPL-ACNC: 28 U/ML

## 2018-11-19 LAB
ALBUMIN SERPL ELPH-MCNC: 5 G/DL
ALP BLD-CCNC: 87 U/L
ALT SERPL-CCNC: 28 U/L
ANION GAP SERPL CALC-SCNC: 22 MMOL/L
AST SERPL-CCNC: 25 U/L
BILIRUB SERPL-MCNC: 0.3 MG/DL
BUN SERPL-MCNC: 16 MG/DL
CALCIUM SERPL-MCNC: 10.3 MG/DL
CHLORIDE SERPL-SCNC: 103 MMOL/L
CO2 SERPL-SCNC: 22 MMOL/L
CREAT SERPL-MCNC: 0.78 MG/DL
GLUCOSE SERPL-MCNC: 112 MG/DL
POTASSIUM SERPL-SCNC: 5.1 MMOL/L
PROT SERPL-MCNC: 7.4 G/DL
SODIUM SERPL-SCNC: 147 MMOL/L

## 2018-11-20 ENCOUNTER — APPOINTMENT (OUTPATIENT)
Dept: PHYSICAL MEDICINE AND REHAB | Facility: CLINIC | Age: 63
End: 2018-11-20
Payer: COMMERCIAL

## 2018-11-20 PROCEDURE — 99213 OFFICE O/P EST LOW 20 MIN: CPT

## 2018-11-20 RX ORDER — ALPRAZOLAM 2 MG/1
TABLET ORAL
Refills: 0 | Status: ACTIVE | COMMUNITY

## 2018-11-27 ENCOUNTER — NON-APPOINTMENT (OUTPATIENT)
Age: 63
End: 2018-11-27

## 2018-11-27 ENCOUNTER — APPOINTMENT (OUTPATIENT)
Dept: PULMONOLOGY | Facility: CLINIC | Age: 63
End: 2018-11-27
Payer: COMMERCIAL

## 2018-11-27 VITALS
SYSTOLIC BLOOD PRESSURE: 96 MMHG | HEART RATE: 75 BPM | BODY MASS INDEX: 19.81 KG/M2 | WEIGHT: 116 LBS | DIASTOLIC BLOOD PRESSURE: 62 MMHG | HEIGHT: 64 IN | OXYGEN SATURATION: 100 %

## 2018-11-27 DIAGNOSIS — Z63.5 DISRUPTION OF FAMILY BY SEPARATION AND DIVORCE: ICD-10-CM

## 2018-11-27 PROCEDURE — 94010 BREATHING CAPACITY TEST: CPT

## 2018-11-27 PROCEDURE — 99214 OFFICE O/P EST MOD 30 MIN: CPT | Mod: 25

## 2018-11-27 SDOH — SOCIAL STABILITY - SOCIAL INSECURITY: DISRUPTION OF FAMILY BY SEPARATION AND DIVORCE: Z63.5

## 2018-11-30 NOTE — H&P PST ADULT - VENOUS THROMBOEMBOLISM BMI
Vitals  Signs   Recorded: 37RSZ3135 01:22PM   Height: 5 ft 4.37 in  Weight: 158 lb 2.88 oz  BMI Calculated: 26.84  BSA Calculated: 1.78  Blood Pressure: 118 / 62, LUE, Sitting  Heart Rate: 77, R Radial  Pulse Quality: Regular, R Radial    Reason For Visit    Patient presents for a consult ASD as a child. Accompanied By: alone. Referred By: Dr. Ty Trujillo MD      Allergies   1. Iodinated Contrast Media    Presenting Meds   1. Levonorgest-Eth Estrad 91-Day 0.15-0.03 &0.01 MG Oral Tablet; take one tablet by mouth   every day; Therapy: [id number] to (Evaluate:[id number])  Requested for: 73Bun7416; Last   Rx:[id number] Ordered    No Medication Changes. PT verbalized no changes. Quality    Adult Wellness CI height documented, alcohol use, not having considered quitting drinking, not getting angry when talked to about drinking, not having a drink or two in the morning to get going, not drinking alcohol regularly, and feeling guilty about it, no tobacco use, not taking aspirin, no discussion of risks and benefits of Aspirin and no medications withdrawn because of contraindication. History of Present Illness    She is here for cardiac evaluation. She is a very pleasant 62-year-old woman who has a history of atrial septal defect that was corrected with open heart surgery at the age of 7. Apparently an Amplatzer device was attempted but failed. At present she has no cardiac symptoms. She denies chest pain, palpitations, shortness of breath, presyncope, or syncope. She was seeking to establish a relationship with a cardiologist for ongoing monitoring and care. She tells me that she and her  are planning on starting a family soon and wants to make sure that her cardiac status is stable enough to tolerate pregnancy. She otherwise appears comfortable and is in good spirits. Review of Systems    Systemic: no fever, no chills and no recent weight change.    Cardiovascular: intermittent leg claudication, but no chest pain, no palpitations, the heart is not racing, no lightheadedness and no lower extremity edema . as noted in HPI. Pulmonary: no chronic cough, no hemoptysis, no new dyspnea, not expressed as feeling short of breath and no sputum. Gastrointestinal: no melena, no bleeding and no hematemesis. Genitourinary: no hematuria, no urinary urgency and no dysuria. Hematologic and Lymphatic: no tendency for easy bruising and no tendency for easy bleeding. Musculoskeletal: no diffuse joint pain and no myalgias. Neurological: no headache, no dizziness, no fainting,  and no seizures. Psychiatric: no feelings of hopelessness, no anhedonia and no depression. Integumentary and Breasts: no rashes, no skin lesions and no skin ulcer. Past Medical History   1. History of Acute epigastric pain (R10.13)   2. History of Acute pain of right shoulder (M25.511)   3. History of Acute right flank pain (R10.9)   4. History of nausea (Z87.898)   5. History of ovarian cyst (Z87.42)   6. History of pyelonephritis (Z87.448)   7. History of renal calculi (Z87.442)   8. History of No significant past medical history   9. History of Pregnancy test negative (Z32.02)    Surgical History   1. History of Laparoscopic Aspiration Right Ovary Cyst   2. History of Renal Lithotripsy   3. History of Repair Of Congenital Atrial Septal Defect    Family History   1. Family history of colon cancer (Z80.0) : Maternal Grandfather   2. Family history of hypertension (Z82.49)   3. Family history of malignant neoplasm of stomach (Z80.0) : Maternal Grandfather   4. Family history of Gallbladder calculus : Mother   5. Family history of High cholesterol   6. Family history of Renal cancer :  Mother    Social History   Â· Current non-smoker (Z78.9)   Â· Minimum alcohol consumption   Â· Never a smoker   Â· No illicit drug use    Review    Past medical history, problem list, family medical history, surgical history and social history reviewed. Physical Exam    General Appearance   Not in acute distress. Head   No trauma, normocephalic. Neck   No elevated JVP. Eyes   Conjunctivae not injected, no xanthelasma. Lungs   Full expansion and clear to auscultation    Cardiovascular   Auscultation of heart: Regular rhythm, normal S1,S2 without S3. No pathological murmurs. Carotid pulses: Normal without bruits. Pedal pulses: Normal.    Examination of extremities for edema and/or varicosities: No peripheral edema. Abdomen   No masses, tenderness or hepatosplenomegaly. Rectal Exam: Deferred   Musculoskeletal   Normal gait, normal muscle tone. Neurologic   Oriented to person, place and time. Normal affect. Skin   No rashes, lesions or ulcers. Assessment   1. History of repair of atrial septal defect (Z87.74)   Â· EKG in the office today demonstrated sinus bradycardia with no acute ischemic ST or T      wave changes. I will obtain an echocardiogram to evaluate the status of the surgical      repair as well as for any other structural disease. Orders   Â· ECHOCARDIOGRAM COMPLETE ADULT; Status:Active; Requested RJY:[id number]; Perform: In Office; [id number]; Ordered; For:History of repair of atrial septal defect; Ordered By:DARA VALDEZ;   Â· Follow-up visit in 1 year Follow Up  Follow-up  Status: Active  Requested for: [id number]   Ordered; For: History of repair of atrial septal defect; Ordered By: Frances Okeefe Performed:  Due: 54ZCU9990   Â· [facility] EKG ROUTINE 12 LEAD W/INTERPRETATION & REPORT; Status:Complete;   Done:  46SMS3833   Perform: In Office; [id number]; Last Updated By:Lauren Oropeza; 10/26/2018 1:33:06 PM;Ordered; For:History of repair of atrial septal defect; Ordered By:DARA VALDEZ; Discussion/Summary    I will see her again annually. It appears that her cardiac status is stable.  I will follow-up with an echocardiogram.      Signatures   Electronically signed by : Frances Okeefe MD; Oct 26 2018  7:10PM CST 30 or less

## 2018-12-18 ENCOUNTER — APPOINTMENT (OUTPATIENT)
Dept: PHYSICAL MEDICINE AND REHAB | Facility: CLINIC | Age: 63
End: 2018-12-18
Payer: COMMERCIAL

## 2018-12-18 VITALS
HEART RATE: 65 BPM | SYSTOLIC BLOOD PRESSURE: 98 MMHG | TEMPERATURE: 97.6 F | DIASTOLIC BLOOD PRESSURE: 53 MMHG | OXYGEN SATURATION: 65 %

## 2018-12-18 PROCEDURE — 99213 OFFICE O/P EST LOW 20 MIN: CPT

## 2019-01-03 ENCOUNTER — FORM ENCOUNTER (OUTPATIENT)
Age: 64
End: 2019-01-03

## 2019-01-04 ENCOUNTER — APPOINTMENT (OUTPATIENT)
Dept: CT IMAGING | Facility: IMAGING CENTER | Age: 64
End: 2019-01-04
Payer: COMMERCIAL

## 2019-01-04 ENCOUNTER — OUTPATIENT (OUTPATIENT)
Dept: OUTPATIENT SERVICES | Facility: HOSPITAL | Age: 64
LOS: 1 days | End: 2019-01-04
Payer: COMMERCIAL

## 2019-01-04 DIAGNOSIS — S02.2XXA FRACTURE OF NASAL BONES, INITIAL ENCOUNTER FOR CLOSED FRACTURE: Chronic | ICD-10-CM

## 2019-01-04 DIAGNOSIS — S36.039A UNSPECIFIED LACERATION OF SPLEEN, INITIAL ENCOUNTER: Chronic | ICD-10-CM

## 2019-01-04 DIAGNOSIS — Z90.2 ACQUIRED ABSENCE OF LUNG [PART OF]: Chronic | ICD-10-CM

## 2019-01-04 DIAGNOSIS — Z90.710 ACQUIRED ABSENCE OF BOTH CERVIX AND UTERUS: Chronic | ICD-10-CM

## 2019-01-04 DIAGNOSIS — D24.9 BENIGN NEOPLASM OF UNSPECIFIED BREAST: Chronic | ICD-10-CM

## 2019-01-04 DIAGNOSIS — C57.00 MALIGNANT NEOPLASM OF UNSPECIFIED FALLOPIAN TUBE: ICD-10-CM

## 2019-01-04 PROCEDURE — 74177 CT ABD & PELVIS W/CONTRAST: CPT

## 2019-01-04 PROCEDURE — 71260 CT THORAX DX C+: CPT | Mod: 26

## 2019-01-04 PROCEDURE — 74177 CT ABD & PELVIS W/CONTRAST: CPT | Mod: 26

## 2019-01-04 PROCEDURE — 71260 CT THORAX DX C+: CPT

## 2019-01-04 PROCEDURE — 82565 ASSAY OF CREATININE: CPT

## 2019-01-07 ENCOUNTER — TRANSCRIPTION ENCOUNTER (OUTPATIENT)
Age: 64
End: 2019-01-07

## 2019-01-08 ENCOUNTER — APPOINTMENT (OUTPATIENT)
Dept: GYNECOLOGIC ONCOLOGY | Facility: CLINIC | Age: 64
End: 2019-01-08
Payer: COMMERCIAL

## 2019-01-08 VITALS
HEIGHT: 64 IN | BODY MASS INDEX: 19.81 KG/M2 | SYSTOLIC BLOOD PRESSURE: 102 MMHG | WEIGHT: 116 LBS | DIASTOLIC BLOOD PRESSURE: 56 MMHG

## 2019-01-08 PROCEDURE — 99213 OFFICE O/P EST LOW 20 MIN: CPT

## 2019-01-08 RX ORDER — GABAPENTIN 300 MG/1
300 CAPSULE ORAL
Qty: 30 | Refills: 0 | Status: DISCONTINUED | COMMUNITY
Start: 2018-11-20 | End: 2019-01-08

## 2019-01-08 NOTE — PHYSICAL EXAM
[Absent] : Adnexa(ae): Absent [Normal] : Recto-Vaginal Exam: Normal [Fully active, able to carry on all pre-disease performance without restriction] : Status 0 - Fully active, able to carry on all pre-disease performance without restriction [de-identified] : well healed LSC scars

## 2019-01-08 NOTE — HISTORY OF PRESENT ILLNESS
[FreeTextEntry1] : 62 yo woman with a BRCA 1 deleterious mutation. \par Initially scheduled for RR LSC BSO in conjunction with prophylactic mastectomy on 1/9/18. 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 \par She underwent a TLH, BSO, omentectomy, P&PA LND and staging and breast surgery was deferred. \par Postoperative recuperation was unremarkable. 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 \par \par She completed 6  cycles of Carbo and Taxol given on a q3 week schedule 2/5/18 - 5/24/18. \par \par  was 89 at diagnosis and is now normal.\par Imaging 1/4/19 was [facility]. \par \par She has a history of early stage lung cancer. \par She had her prophylactic mastectomy this October and pathology was all benign. Had some postoperative complications (necrosis of the nipple) and has expanders in place. \par \par

## 2019-01-22 ENCOUNTER — RESULT REVIEW (OUTPATIENT)
Age: 64
End: 2019-01-22

## 2019-02-13 ENCOUNTER — OUTPATIENT (OUTPATIENT)
Dept: OUTPATIENT SERVICES | Facility: HOSPITAL | Age: 64
LOS: 1 days | Discharge: HOME | End: 2019-02-13

## 2019-02-13 DIAGNOSIS — C57.00 MALIGNANT NEOPLASM OF UNSPECIFIED FALLOPIAN TUBE: ICD-10-CM

## 2019-02-13 DIAGNOSIS — S36.039A UNSPECIFIED LACERATION OF SPLEEN, INITIAL ENCOUNTER: Chronic | ICD-10-CM

## 2019-02-13 DIAGNOSIS — Z90.2 ACQUIRED ABSENCE OF LUNG [PART OF]: Chronic | ICD-10-CM

## 2019-02-13 DIAGNOSIS — D24.9 BENIGN NEOPLASM OF UNSPECIFIED BREAST: Chronic | ICD-10-CM

## 2019-02-13 DIAGNOSIS — Z90.710 ACQUIRED ABSENCE OF BOTH CERVIX AND UTERUS: Chronic | ICD-10-CM

## 2019-02-13 DIAGNOSIS — S02.2XXA FRACTURE OF NASAL BONES, INITIAL ENCOUNTER FOR CLOSED FRACTURE: Chronic | ICD-10-CM

## 2019-02-26 ENCOUNTER — APPOINTMENT (OUTPATIENT)
Dept: HEMATOLOGY ONCOLOGY | Facility: CLINIC | Age: 64
End: 2019-02-26
Payer: COMMERCIAL

## 2019-02-26 ENCOUNTER — RESULT REVIEW (OUTPATIENT)
Age: 64
End: 2019-02-26

## 2019-02-26 VITALS
SYSTOLIC BLOOD PRESSURE: 115 MMHG | RESPIRATION RATE: 16 BRPM | BODY MASS INDEX: 20.59 KG/M2 | HEART RATE: 84 BPM | TEMPERATURE: 97.6 F | DIASTOLIC BLOOD PRESSURE: 72 MMHG | OXYGEN SATURATION: 100 % | WEIGHT: 119.93 LBS

## 2019-02-26 LAB
HCT VFR BLD CALC: 40.3 % — SIGNIFICANT CHANGE UP (ref 34.5–45)
HGB BLD-MCNC: 12.9 G/DL — SIGNIFICANT CHANGE UP (ref 11.5–15.5)
MCHC RBC-ENTMCNC: 28 PG — SIGNIFICANT CHANGE UP (ref 27–34)
MCHC RBC-ENTMCNC: 32 G/DL — SIGNIFICANT CHANGE UP (ref 32–36)
MCV RBC AUTO: 87.5 FL — SIGNIFICANT CHANGE UP (ref 80–100)
PLATELET # BLD AUTO: 191 K/UL — SIGNIFICANT CHANGE UP (ref 150–400)
RBC # BLD: 4.6 M/UL — SIGNIFICANT CHANGE UP (ref 3.8–5.2)
RBC # FLD: 12.5 % — SIGNIFICANT CHANGE UP (ref 10.3–14.5)
WBC # BLD: 4.3 K/UL — SIGNIFICANT CHANGE UP (ref 3.8–10.5)
WBC # FLD AUTO: 4.3 K/UL — SIGNIFICANT CHANGE UP (ref 3.8–10.5)

## 2019-02-26 PROCEDURE — 99214 OFFICE O/P EST MOD 30 MIN: CPT

## 2019-02-26 RX ORDER — BACILLUS COAGULANS/INULIN 1B-250 MG
CAPSULE ORAL
Refills: 0 | Status: ACTIVE | COMMUNITY

## 2019-02-26 RX ORDER — MULTIVITAMIN
TABLET ORAL
Refills: 0 | Status: ACTIVE | COMMUNITY

## 2019-02-26 RX ORDER — VITAMIN B COMPLEX
CAPSULE ORAL
Refills: 0 | Status: ACTIVE | COMMUNITY

## 2019-02-26 RX ORDER — PSYLLIUM HUSK 0.4 G
CAPSULE ORAL
Refills: 0 | Status: ACTIVE | COMMUNITY

## 2019-02-27 LAB
ALBUMIN SERPL ELPH-MCNC: 4.7 G/DL
ALP BLD-CCNC: 77 U/L
ALT SERPL-CCNC: 24 U/L
ANION GAP SERPL CALC-SCNC: 13 MMOL/L
AST SERPL-CCNC: 25 U/L
BILIRUB SERPL-MCNC: 0.3 MG/DL
BUN SERPL-MCNC: 18 MG/DL
CALCIUM SERPL-MCNC: 9.8 MG/DL
CANCER AG125 SERPL-ACNC: 12 U/ML
CHLORIDE SERPL-SCNC: 103 MMOL/L
CO2 SERPL-SCNC: 27 MMOL/L
CREAT SERPL-MCNC: 0.7 MG/DL
GLUCOSE SERPL-MCNC: 94 MG/DL
POTASSIUM SERPL-SCNC: 4.3 MMOL/L
PROT SERPL-MCNC: 6.8 G/DL
SODIUM SERPL-SCNC: 143 MMOL/L

## 2019-02-28 ENCOUNTER — TRANSCRIPTION ENCOUNTER (OUTPATIENT)
Age: 64
End: 2019-02-28

## 2019-03-04 NOTE — HISTORY OF PRESENT ILLNESS
[Disease: _____________________] : Disease: [unfilled] [AJCC Stage: ____] : AJCC Stage: [unfilled] [de-identified] : Ms. Pham is a 62 year old  postmenopausal who presents for discussion regarding treatment for fallopian tube cancer, and she has BRCA 1 pathogenic mutation.\par \par Ms. Pham has a personal history of stage IA adenocarcinoma of the lung(eGFR positive) s/p left VATS (2017, [facility]) c/b splenic laceration requiring ICU stay, and breast papilloma s/p excision (2013, [facility]), and HGSIL s/p LEEP 1996. Past GYN history significant for one episode of postmenopausal bleeding at the age of 49 which was ultimately determined to be a menstrual cycle. No history of uterine fibroids or ovarian cysts. Was on oral contraception from her early 40s until just a few weeks ago. \par \par Ms. Pham has a pertinent family history significant for breast (paternal cousin, paternal aunt) and ovarian (paternal cousin) cancer. \par Due to her high risk, she opted to go for risk reducing BSO and double mastectomy on 1/9/18.\par The frozen section showed malignancy and she underwent full surgical staging and mastectomy was postponed.\par Pathology: stage IC fallopian tube cancer.\par Patient has fully recovered from surgery. [de-identified] : going for breast surgery in [place].\par She feels well, working full time.\par She denies any pain, appetite is normal.\par She is up to date with colonoscopy and bone density. [Cardiovascular] : Cardiovascular [Constitutional] : Constitutional [ENT] : ENT [Dermatologic] : Dermatologic [Endocrine] : Endocrine [Gastrointestinal] : Gastrointestinal [Genitourinary] : Genitourinary [Gynecologic] : Gynecologic [Infectious] : Infectious [Musculoskeletal] : Musculoskeletal [Neurologic] : Neurologic [Pain] : Pain [Pulmonary] : Pulmonary [Hematologic] : Hematologic

## 2019-03-06 ENCOUNTER — APPOINTMENT (OUTPATIENT)
Dept: PULMONOLOGY | Facility: CLINIC | Age: 64
End: 2019-03-06
Payer: COMMERCIAL

## 2019-03-06 VITALS
RESPIRATION RATE: 17 BRPM | SYSTOLIC BLOOD PRESSURE: 110 MMHG | OXYGEN SATURATION: 98 % | HEIGHT: 63 IN | DIASTOLIC BLOOD PRESSURE: 68 MMHG | WEIGHT: 119 LBS | BODY MASS INDEX: 21.09 KG/M2 | HEART RATE: 74 BPM

## 2019-03-06 PROCEDURE — 99214 OFFICE O/P EST MOD 30 MIN: CPT | Mod: 25

## 2019-03-06 PROCEDURE — 94060 EVALUATION OF WHEEZING: CPT

## 2019-03-06 PROCEDURE — 94727 GAS DIL/WSHOT DETER LNG VOL: CPT

## 2019-03-06 PROCEDURE — 94729 DIFFUSING CAPACITY: CPT

## 2019-03-06 PROCEDURE — ZZZZZ: CPT

## 2019-03-06 NOTE — PHYSICAL EXAM
[General Appearance - Well Developed] : well developed [Normal Appearance] : normal appearance [Well Groomed] : well groomed [General Appearance - Well Nourished] : well nourished [No Deformities] : no deformities [General Appearance - In No Acute Distress] : no acute distress [Normal Conjunctiva] : the conjunctiva exhibited no abnormalities [Eyelids - No Xanthelasma] : the eyelids demonstrated no xanthelasmas [Normal Oropharynx] : normal oropharynx [I] : I [Neck Appearance] : the appearance of the neck was normal [Neck Cervical Mass (___cm)] : no neck mass was observed [Jugular Venous Distention Increased] : there was no jugular-venous distention [Thyroid Diffuse Enlargement] : the thyroid was not enlarged [Thyroid Nodule] : there were no palpable thyroid nodules [Heart Rate And Rhythm] : heart rate and rhythm were normal [Heart Sounds] : normal S1 and S2 [Murmurs] : no murmurs present [Respiration, Rhythm And Depth] : normal respiratory rhythm and effort [Exaggerated Use Of Accessory Muscles For Inspiration] : no accessory muscle use [Auscultation Breath Sounds / Voice Sounds] : lungs were clear to auscultation bilaterally [Abdomen Soft] : soft [Abdomen Tenderness] : non-tender [Abdomen Mass (___ Cm)] : no abdominal mass palpated [Abnormal Walk] : normal gait [Gait - Sufficient For Exercise Testing] : the gait was sufficient for exercise testing [Nail Clubbing] : no clubbing of the fingernails [Cyanosis, Localized] : no localized cyanosis [Petechial Hemorrhages (___cm)] : no petechial hemorrhages [Skin Color & Pigmentation] : normal skin color and pigmentation [] : no rash [No Venous Stasis] : no venous stasis [Skin Lesions] : no skin lesions [No Skin Ulcers] : no skin ulcer [No Xanthoma] : no  xanthoma was observed [Deep Tendon Reflexes (DTR)] : deep tendon reflexes were 2+ and symmetric [Sensation] : the sensory exam was normal to light touch and pinprick [No Focal Deficits] : no focal deficits [Oriented To Time, Place, And Person] : oriented to person, place, and time [Impaired Insight] : insight and judgment were intact [Affect] : the affect was normal [FreeTextEntry1] : I:E 1:3, clear

## 2019-03-06 NOTE — ADDENDUM
[FreeTextEntry1] : Documented by Samia Reyes acting as a scribe for Dr. Naveen Dos Santos on 3/6/2019.\par \par All medical record entries made by the scribe, Samia Reyes, were at my, Dr. Naveen Dos Santos's, direction and personally dictated by me on 3/6/2019. I have reviewed the chart and agree that the record accurately reflects my personal performance of the history, physical exam, assessment and plan. I have also personally directed, reviewed, and agree with the discharge instructions.\par

## 2019-03-06 NOTE — ASSESSMENT
[FreeTextEntry1] : Ms. Pham is a 62 y/o female with a history of allergies, GERD, KIRA, and asthma. She is s/p VATS procedure with Dr. Feliciano scheduled for 7/12/17 c/w adenocarcinoma with acinar predominant complicated by splenic laceration and ICU stay. She is s/p chemotherapy for fallopian tube tumor. She is s/p breast surgery 10/18 and is now awaiting breast reconstruction surgery and additional plastic surgery. **Pre-Op clearance -Ms. Pham is cleared for surgery from a pulmonary stand point**\par \par Her history of chronic cough is multifactorial due to: (resolved)\par -mild persistent asthma\par -post nasal drip syndrome\par -GERD\par \par problem 1: mild persistent asthma\par -continue Advair 100 at 1 inhalation BID\par - Add Ventolin 2 puffs q6H, prn\par -Asthma is believed to be caused by inherited (genetic) and environmental factor, but its exact cause is unknown. Asthma may be triggered by allergens, lung infections, or irritants in the air. Asthma triggers are different for each person\par -Inhaler technique reviewed as well as oral hygiene techniques reviewed with patient. Avoidance of cold air, extremes of temperature, rescue inhaler should be used before exercise. Order of medication reviewed with patient. Recommended use of a cool mist humidifier in the bedroom.\par \par problem 2: post nasal drip syndrome\par -continue to use Zaditor eye drops\par - Continue Atrovent 0.6% at 1 sniff/nostril, up to TID \par -continue to use OTC antihistamine\par -Environmental measures for allergies were encouraged including mattress and pillow cover, air purifier, and environmental controls.\par \par problem 3: GERD\par -continue to use omeprazole 40 mg before breakfast\par \par -Rule of 2s: avoid eating too much, eating too late, eating too spicy, eating two hours before bed\par -Things to avoid including overeating, spicy foods, tight clothing, eating within three hours of bed, this list is not all inclusive. \par -For treatment of reflux, possible options discussed including diet control, H2 blockers, PPIs, as well as coating motility agents discussed as treatment options. Timing of meals and proximity of last meal to sleep were discussed. If symptoms persist, a formal gastrointestinal evaluation is needed.\par \par problem 4: OSAS \par -no longer present secondary to weight loss and positional sleep\par -Sleep apnea is associated with adverse clinical consequences which an affect most organ systems. Cardiovascular disease risk includes arrhythmias, atrial fibrillation, hypertension, coronary artery disease, and stroke. Metabolic disorders include diabetes type 2, non-alcoholic fatty liver disease. Mood disorder especially depression; and cognitive decline especially in the elderly. Associations with chronic reflux/Perkins’s esophagus some but not all inclusive. \par -Reasons to assess this include arousal consistent with hypopnea; respiratory events most prominent in REM sleep or supine position; therefore sleep staging and body position are important for accurate diagnosis and estimation of AHI. \par \par problem 5: stage IA lung cancer, adenocarcinoma\par -she is s/p segment\par -follow up chest CT in 7/2019 with Dr. Momin \par \par problem 6: pre-op clearance for breast reconstruction surgery/plastic surgery - **she is cleared for surgery from pulmonary stand point.**\par -at this point in time there are no absolute pulmonary contraindications to go forward with the planned procedure \par -at the time of surgery s/he should have optimal pain control, incentive spirometry, early ambulation, DVT and GI prophylaxis. \par \par problem 7: health maintenance\par - She has received an influenza vaccine 2018\par -recommended strep pneumonia vaccines: Prevnar-13 vaccine, followed by Pneumo vaccine 23 on year following\par -recommended early intervention for URIs\par -recommended osteoporosis evaluations\par -recommended early dermatological evaluations\par -recommended after the age of 50 to the age of 70, colonoscopy every 5 years\par -encouraged early intervention\par \par F/U in 3 months\par She is encouraged to call with any changes, concerns, or questions.

## 2019-03-06 NOTE — REASON FOR VISIT
[Follow-Up] : a follow-up visit [FreeTextEntry1] : Pre-Op clearance for Breast reconstruction surgery with a history of abnormal chest CT, allergic rhinitis, asthma, DNS, GERD, non-allergic rhinitis, and KIRA

## 2019-03-06 NOTE — HISTORY OF PRESENT ILLNESS
[FreeTextEntry1] : Ms. Pham is a 63 year old female with a history of abnormal chest CT, allergic rhinitis, asthma, DNS, GERD, non-allergic rhinitis, nose injury and KIRA presenting to the office today for a follow up visit. Her chief complaint is postsurgical discomfort. \par - She is scheduled for breast reconstruction surgery (tissue expansion/implants). \par - her energy level is improving. On a scale of 1-10, her energy level is a 7\par - She sometimes gets constipated controlled on MiraLAX. \par - She notes some fatigue. \par - She notes average of 5 hours of sleep each night, takes Ambien occasionally. \par - She denies any headaches, nausea, vomiting, fever, chills, sweats, chest pressure, palpitations, SOB, coughing, wheezing, fatigue, diarrhea, constipation, dysphagia, myalgias, dizziness, leg swelling, leg pain, itchy eyes, itchy ears, or sour taste in the mouth.

## 2019-03-06 NOTE — PROCEDURE
[FreeTextEntry1] : PFT- spi reveals mild obstructive dysfunction, mid-low volume; FEV1 was 2.15L which is 91% of predicted; normal flow volume loop with post BD FEV1 of 2.15 L which id 91%, with normal diffusion, DLCO 21.8 which is 123% of predicted value. \par \par CT Chest on 1/4/2019: Interval bilateral mastectomy with placement of breast expanders, otherwise stable exam. No new foci of disease in the chest, abdomen or pelvis.

## 2019-03-11 ENCOUNTER — MEDICATION RENEWAL (OUTPATIENT)
Age: 64
End: 2019-03-11

## 2019-03-14 ENCOUNTER — OUTPATIENT (OUTPATIENT)
Dept: OUTPATIENT SERVICES | Facility: HOSPITAL | Age: 64
LOS: 1 days | End: 2019-03-14

## 2019-03-14 VITALS
HEIGHT: 64 IN | SYSTOLIC BLOOD PRESSURE: 110 MMHG | DIASTOLIC BLOOD PRESSURE: 70 MMHG | OXYGEN SATURATION: 98 % | WEIGHT: 119.93 LBS | HEART RATE: 73 BPM | RESPIRATION RATE: 16 BRPM | TEMPERATURE: 98 F

## 2019-03-14 DIAGNOSIS — S02.2XXA FRACTURE OF NASAL BONES, INITIAL ENCOUNTER FOR CLOSED FRACTURE: Chronic | ICD-10-CM

## 2019-03-14 DIAGNOSIS — S36.039A UNSPECIFIED LACERATION OF SPLEEN, INITIAL ENCOUNTER: Chronic | ICD-10-CM

## 2019-03-14 DIAGNOSIS — Z15.01 GENETIC SUSCEPTIBILITY TO MALIGNANT NEOPLASM OF BREAST: ICD-10-CM

## 2019-03-14 DIAGNOSIS — Z90.2 ACQUIRED ABSENCE OF LUNG [PART OF]: Chronic | ICD-10-CM

## 2019-03-14 DIAGNOSIS — D24.9 BENIGN NEOPLASM OF UNSPECIFIED BREAST: Chronic | ICD-10-CM

## 2019-03-14 DIAGNOSIS — Z90.710 ACQUIRED ABSENCE OF BOTH CERVIX AND UTERUS: Chronic | ICD-10-CM

## 2019-03-14 RX ORDER — POLYETHYLENE GLYCOL 3350 17 G/17G
1 POWDER, FOR SOLUTION ORAL
Qty: 0 | Refills: 0 | COMMUNITY

## 2019-03-14 RX ORDER — CEPHALEXIN 500 MG
0 CAPSULE ORAL
Qty: 0 | Refills: 0 | COMMUNITY

## 2019-03-14 RX ORDER — SODIUM CHLORIDE 9 MG/ML
1000 INJECTION, SOLUTION INTRAVENOUS
Qty: 0 | Refills: 0 | Status: DISCONTINUED | OUTPATIENT
Start: 2019-03-26 | End: 2019-03-26

## 2019-03-14 RX ORDER — MONTELUKAST 4 MG/1
1 TABLET, CHEWABLE ORAL
Qty: 0 | Refills: 0 | COMMUNITY

## 2019-03-14 NOTE — H&P PST ADULT - ANESTHESIA, PREVIOUS REACTION, PROFILE
severe.   " I felt paralyzed and felt like I could not breath before going under. " - 2015   N&V - severe  2017  Pt states  "Emend works best" Denies family hx of malignant hyperthermia/nausea/vomiting

## 2019-03-14 NOTE — H&P PST ADULT - NSICDXPROBLEM_GEN_ALL_CORE_FT
PROBLEM DIAGNOSES  Problem: Genetic susceptibility to malignant neoplasm of breast  Assessment and Plan: Pre op and Hibiclens instructions given and explained.  Avoid NSAIDs and OTC supplements.   Patient verbalized understanding  medical consult requested by surgeon appreciated in the chart     Problem: Pulmonary nodule  Assessment and Plan: pulmonary consult appreciated in the chart     Problem: Sleep apnea  Assessment and Plan: OR notified     Problem: History of multiple allergies  Assessment and Plan: OR notified

## 2019-03-14 NOTE — H&P PST ADULT - NSICDXFAMILYHX_GEN_ALL_CORE_FT
FAMILY HISTORY:  Father  Still living? Unknown  Family history of lung cancer, Age at diagnosis: Age Unknown    Mother  Still living? Yes, Estimated age: Age Unknown  Family history of dementia, Age at diagnosis: Age Unknown    Child  Still living? Yes, Estimated age: Age Unknown  Family history of chronic ulcerative colitis, Age at diagnosis: Age Unknown  Family history of secondary breast cancer, Age at diagnosis: 31-40

## 2019-03-14 NOTE — H&P PST ADULT - ASSESSMENT
62 y/o female with h/o multiple lung nodule since 2011 s/p left upper lobe wedge resection, left fallopian tube ca, BRAC 1 positive s/p b/l mastectomy with expander ( 10/18) now presents to [facility] for scheduled bilateral exchange of tissue expanders and rhytidectomy revision of bilateral reconstruction on 3/26/19.

## 2019-03-14 NOTE — H&P PST ADULT - RS GEN PE MLT RESP DETAILS PC
no rales/no rhonchi/no wheezes/breath sounds equal/clear to auscultation bilaterally/airway patent/respirations non-labored

## 2019-03-14 NOTE — H&P PST ADULT - NEGATIVE ENMT SYMPTOMS
no nasal congestion/no dry mouth/no tinnitus/no vertigo/no sinus symptoms/no hearing difficulty/no ear pain/no throat pain/no dysphagia

## 2019-03-14 NOTE — H&P PST ADULT - OTHER CARE PROVIDERS
Dr. Blas (cards) [phone]         Dr Dos Santos  (pulmonary ) [phone] Dr. Blas (cards) [phone]  Dr Dos Santos  (pulmonary ) [phone]

## 2019-03-14 NOTE — H&P PST ADULT - NSICDXPASTSURGICALHX_GEN_ALL_CORE_FT
PAST SURGICAL HISTORY:  Nasal bone fracture ORIF nasal fx- 2015    Papilloma of breast right breast excision & bx 2013    S/P adenoidectomy as a child    S/P arthroscopy of shoulder right April 2013    S/P blepharoplasty in 2008    S/P laparoscopic assisted vaginal hysterectomy ([facility]) Omentectomy, BSO - 2/2018  Chemotherapy x 4 months    S/P partial lobectomy of lung Left upper lobe - VATS - segmentectomy - 7/2017    Spleen laceration 7/2017 - transfused with 5 units PRBC

## 2019-03-14 NOTE — H&P PST ADULT - NSICDXPASTMEDICALHX_GEN_ALL_CORE_FT
PAST MEDICAL HISTORY:  Adenocarcinoma of lung, stage 1 Left upper lobe stage 1A    Anxiety     CAD (coronary artery disease) non obstructive 50% proximal LAD stenosis    Depression     Disorder of shoulder right    Eczema     Fallopian tube cancer, carcinoma, left     GERD (gastroesophageal reflux disease)     Hyperlipidemia     Migraines     Mild asthma denies intubation or hospitalizations    Nasal bone fracture     Papilloma of breast right    Pulmonary nodule bilateral    PVC (premature ventricular contraction)     Seasonal allergies     Sleep apnea noncompliant in using CPAP machine  Asymtomatic - was never retested for sleep apnea

## 2019-03-14 NOTE — H&P PST ADULT - HISTORY OF PRESENT ILLNESS
62 y/o female with h/o  asthma never intubated or hospitalized, multiple lung nodule since 2011 s/p left upper lobe wedge resection, left fallopian tube ca, BRAC 1 positive s/p b/l mastectomy with expander ( 10/18) now presents to [facility] for scheduled bilateral exchange of tissue expanders and rhytidectomy revision of bilateral reconstruction on 3/26/19.

## 2019-03-19 ENCOUNTER — APPOINTMENT (OUTPATIENT)
Dept: PHYSICAL MEDICINE AND REHAB | Facility: CLINIC | Age: 64
End: 2019-03-19
Payer: COMMERCIAL

## 2019-03-19 VITALS
HEART RATE: 76 BPM | SYSTOLIC BLOOD PRESSURE: 117 MMHG | DIASTOLIC BLOOD PRESSURE: 68 MMHG | TEMPERATURE: 98.1 F | OXYGEN SATURATION: 100 %

## 2019-03-19 DIAGNOSIS — G89.18 OTHER ACUTE POSTPROCEDURAL PAIN: ICD-10-CM

## 2019-03-19 PROCEDURE — 99212 OFFICE O/P EST SF 10 MIN: CPT

## 2019-03-19 NOTE — HISTORY OF PRESENT ILLNESS
[FreeTextEntry1] : Ms. Pham is a 62 year old  history of stage IA adenocarcinoma of the lung(eGFR positive) s/p left VATS (2017, [facility]), + BRCA, risk reducing BSO and double mastectomy on 1/9/18.\par The frozen section showed malignancy and she underwent full surgical staging and mastectomy was postponed. Patient had b/l mastectomy few weeks ago.\par + pins/needles/unpleasant sensations b/l breasts, + cording LUE. \par \par \par Interval: significant improvement in cording/pain. Finished PT, off the gabapentin. Planning for final stage of reconstruction.

## 2019-03-19 NOTE — PHYSICAL EXAM
[FreeTextEntry1] : General Appearance: Well developed, well nourished, in no acute distress.\par Skin: Inspection of the skin reveals no redness. \par Chest: Normal chest expansion. Breast incisions are clean, dry and intact. No seromas or neuromas are noted. \par Axilla:+axillary cording is noted, improved arm cording \par Musculoskeletal: Normal range of motion of bilateral shoulders. Negative impingement tests.\par Extremities: No edema is noted in bilateral upper extremities. \par

## 2019-03-19 NOTE — REVIEW OF SYSTEMS
[Joint Stiffness] : joint stiffness [Muscle Pain] : muscle pain [Insomnia] : insomnia [Anxiety] : anxiety [Negative] : Heme/Lymph

## 2019-03-22 ENCOUNTER — TRANSCRIPTION ENCOUNTER (OUTPATIENT)
Age: 64
End: 2019-03-22

## 2019-03-26 ENCOUNTER — INPATIENT (INPATIENT)
Facility: HOSPITAL | Age: 64
LOS: 0 days | Discharge: HOME | End: 2019-03-27
Attending: SURGERY | Admitting: SURGERY
Payer: COMMERCIAL

## 2019-03-26 VITALS
OXYGEN SATURATION: 100 % | RESPIRATION RATE: 16 BRPM | HEIGHT: 64 IN | HEART RATE: 70 BPM | TEMPERATURE: 98 F | WEIGHT: 119.93 LBS | DIASTOLIC BLOOD PRESSURE: 59 MMHG | SYSTOLIC BLOOD PRESSURE: 104 MMHG

## 2019-03-26 DIAGNOSIS — Z90.2 ACQUIRED ABSENCE OF LUNG [PART OF]: Chronic | ICD-10-CM

## 2019-03-26 DIAGNOSIS — Z15.01 GENETIC SUSCEPTIBILITY TO MALIGNANT NEOPLASM OF BREAST: ICD-10-CM

## 2019-03-26 DIAGNOSIS — G47.30 SLEEP APNEA, UNSPECIFIED: ICD-10-CM

## 2019-03-26 DIAGNOSIS — D24.9 BENIGN NEOPLASM OF UNSPECIFIED BREAST: Chronic | ICD-10-CM

## 2019-03-26 DIAGNOSIS — R91.1 SOLITARY PULMONARY NODULE: ICD-10-CM

## 2019-03-26 DIAGNOSIS — Z91.89 OTHER SPECIFIED PERSONAL RISK FACTORS, NOT ELSEWHERE CLASSIFIED: ICD-10-CM

## 2019-03-26 DIAGNOSIS — S36.039A UNSPECIFIED LACERATION OF SPLEEN, INITIAL ENCOUNTER: Chronic | ICD-10-CM

## 2019-03-26 DIAGNOSIS — S02.2XXA FRACTURE OF NASAL BONES, INITIAL ENCOUNTER FOR CLOSED FRACTURE: Chronic | ICD-10-CM

## 2019-03-26 DIAGNOSIS — Z90.710 ACQUIRED ABSENCE OF BOTH CERVIX AND UTERUS: Chronic | ICD-10-CM

## 2019-03-26 PROCEDURE — 19342 INSJ/RPLCMT BRST IMPLT SEP D: CPT | Mod: 59,LT

## 2019-03-26 PROCEDURE — 20926: CPT

## 2019-03-26 PROCEDURE — 19380 REVJ RECONSTRUCTED BREAST: CPT | Mod: 59,LT

## 2019-03-26 PROCEDURE — 15734 MUSCLE-SKIN GRAFT TRUNK: CPT | Mod: 59,RT

## 2019-03-26 PROCEDURE — 15777 ACELLULAR DERM MATRIX IMPLT: CPT | Mod: 50,59

## 2019-03-26 PROCEDURE — 15825 RHYTDCT NCK PLTYSML TGHTG: CPT | Mod: 50

## 2019-03-26 RX ORDER — DIAZEPAM 5 MG
5 TABLET ORAL EVERY 8 HOURS
Qty: 0 | Refills: 0 | Status: DISCONTINUED | OUTPATIENT
Start: 2019-03-26 | End: 2019-03-26

## 2019-03-26 RX ORDER — OXYCODONE HYDROCHLORIDE 5 MG/1
10 TABLET ORAL EVERY 6 HOURS
Qty: 0 | Refills: 0 | Status: DISCONTINUED | OUTPATIENT
Start: 2019-03-26 | End: 2019-03-26

## 2019-03-26 RX ORDER — ONDANSETRON 8 MG/1
4 TABLET, FILM COATED ORAL ONCE
Qty: 0 | Refills: 0 | Status: COMPLETED | OUTPATIENT
Start: 2019-03-26 | End: 2019-03-26

## 2019-03-26 RX ORDER — TRAMADOL HYDROCHLORIDE 50 MG/1
50 TABLET ORAL EVERY 6 HOURS
Qty: 0 | Refills: 0 | Status: DISCONTINUED | OUTPATIENT
Start: 2019-03-26 | End: 2019-03-27

## 2019-03-26 RX ORDER — ACETAMINOPHEN 500 MG
650 TABLET ORAL EVERY 6 HOURS
Qty: 0 | Refills: 0 | Status: DISCONTINUED | OUTPATIENT
Start: 2019-03-26 | End: 2019-03-27

## 2019-03-26 RX ORDER — CEFAZOLIN SODIUM 1 G
1000 VIAL (EA) INJECTION EVERY 8 HOURS
Qty: 0 | Refills: 0 | Status: DISCONTINUED | OUTPATIENT
Start: 2019-03-26 | End: 2019-03-27

## 2019-03-26 RX ORDER — FENTANYL CITRATE 50 UG/ML
25 INJECTION INTRAVENOUS
Qty: 0 | Refills: 0 | Status: DISCONTINUED | OUTPATIENT
Start: 2019-03-26 | End: 2019-03-27

## 2019-03-26 RX ORDER — DIAZEPAM 5 MG
5 TABLET ORAL EVERY 8 HOURS
Qty: 0 | Refills: 0 | Status: DISCONTINUED | OUTPATIENT
Start: 2019-03-26 | End: 2019-03-27

## 2019-03-26 RX ORDER — SODIUM CHLORIDE 9 MG/ML
1000 INJECTION, SOLUTION INTRAVENOUS
Qty: 0 | Refills: 0 | Status: DISCONTINUED | OUTPATIENT
Start: 2019-03-26 | End: 2019-03-27

## 2019-03-26 RX ORDER — HYDRALAZINE HCL 50 MG
5 TABLET ORAL ONCE
Qty: 0 | Refills: 0 | Status: DISCONTINUED | OUTPATIENT
Start: 2019-03-26 | End: 2019-03-27

## 2019-03-26 RX ORDER — TRAMADOL HYDROCHLORIDE 50 MG/1
100 TABLET ORAL EVERY 6 HOURS
Qty: 0 | Refills: 0 | Status: DISCONTINUED | OUTPATIENT
Start: 2019-03-26 | End: 2019-03-27

## 2019-03-26 RX ORDER — TOBRAMYCIN 0.3 %
1 DROPS OPHTHALMIC (EYE) EVERY 4 HOURS
Qty: 0 | Refills: 0 | Status: COMPLETED | OUTPATIENT
Start: 2019-03-26 | End: 2019-03-27

## 2019-03-26 RX ORDER — OXYCODONE HYDROCHLORIDE 5 MG/1
5 TABLET ORAL EVERY 6 HOURS
Qty: 0 | Refills: 0 | Status: DISCONTINUED | OUTPATIENT
Start: 2019-03-26 | End: 2019-03-26

## 2019-03-26 RX ADMIN — ONDANSETRON 4 MILLIGRAM(S): 8 TABLET, FILM COATED ORAL at 20:42

## 2019-03-26 RX ADMIN — FENTANYL CITRATE 25 MICROGRAM(S): 50 INJECTION INTRAVENOUS at 20:42

## 2019-03-26 RX ADMIN — Medication 100 MILLIGRAM(S): at 22:15

## 2019-03-26 RX ADMIN — Medication 5 MILLIGRAM(S): at 22:15

## 2019-03-26 RX ADMIN — FENTANYL CITRATE 25 MICROGRAM(S): 50 INJECTION INTRAVENOUS at 21:00

## 2019-03-26 RX ADMIN — Medication 1 DROP(S): at 19:29

## 2019-03-26 RX ADMIN — Medication 1 DROP(S): at 19:35

## 2019-03-26 RX ADMIN — SODIUM CHLORIDE 100 MILLILITER(S): 9 INJECTION, SOLUTION INTRAVENOUS at 18:30

## 2019-03-26 NOTE — BRIEF OPERATIVE NOTE - OPERATION/FINDINGS
Bilateral breast reconstruction with tissue expander exchange for implants.  Facelift, neck lift, autologous fat grafting from flank donor site to face.

## 2019-03-26 NOTE — PROGRESS NOTE ADULT - SUBJECTIVE AND OBJECTIVE BOX
Called to bedside by RN for Pt. with complaint of "something in my eye"  Pt. denies diplopia, blurry vision,  or photophobia.  Eye irrigated with normal saline without alleviation of symptoms.    PE:  OD: PERRL, EOMI, sclera white, conjunctiva pink, no foreign object seen. + corneal abrasion seen at the 3 o'clock position.  A/P  Corneal abrasion OD  1) tetracaine opht. Solution 0.5% one drop to the affected eye times one  2) Tobramycin opht. solution 0.3% one drop to the affected eye every four hours times three doses  3) F/U with opht. if symptoms persist >24 hours  4) Case D/W Dr. Can

## 2019-03-26 NOTE — ASU PATIENT PROFILE, ADULT - NS SC CAGE ALCOHOL ANNOYED YOU
Psychiatric Outpatient Progress Note    Account Number:  [de-identified]  Name: Raul Yeh    SUBJECTIVE:   CHIEF COMPLAINT:  Raul Yeh is a 49 Nicole Miller is not progressing.     Baldemar Hammer NP  8/29/2017 no

## 2019-03-27 ENCOUNTER — TRANSCRIPTION ENCOUNTER (OUTPATIENT)
Age: 64
End: 2019-03-27

## 2019-03-27 VITALS
HEART RATE: 79 BPM | DIASTOLIC BLOOD PRESSURE: 47 MMHG | OXYGEN SATURATION: 100 % | TEMPERATURE: 98 F | RESPIRATION RATE: 18 BRPM | SYSTOLIC BLOOD PRESSURE: 94 MMHG

## 2019-03-27 RX ORDER — ACETAMINOPHEN 500 MG
1000 TABLET ORAL ONCE
Qty: 0 | Refills: 0 | Status: COMPLETED | OUTPATIENT
Start: 2019-03-27 | End: 2019-03-27

## 2019-03-27 RX ORDER — INFLUENZA VIRUS VACCINE 15; 15; 15; 15 UG/.5ML; UG/.5ML; UG/.5ML; UG/.5ML
0.5 SUSPENSION INTRAMUSCULAR ONCE
Qty: 0 | Refills: 0 | Status: DISCONTINUED | OUTPATIENT
Start: 2019-03-27 | End: 2019-03-27

## 2019-03-27 RX ORDER — DOCUSATE SODIUM 100 MG
100 CAPSULE ORAL THREE TIMES A DAY
Qty: 0 | Refills: 0 | Status: DISCONTINUED | OUTPATIENT
Start: 2019-03-27 | End: 2019-03-27

## 2019-03-27 RX ADMIN — Medication 100 MILLIGRAM(S): at 10:22

## 2019-03-27 RX ADMIN — SODIUM CHLORIDE 100 MILLILITER(S): 9 INJECTION, SOLUTION INTRAVENOUS at 06:41

## 2019-03-27 RX ADMIN — Medication 5 MILLIGRAM(S): at 06:41

## 2019-03-27 RX ADMIN — TRAMADOL HYDROCHLORIDE 100 MILLIGRAM(S): 50 TABLET ORAL at 04:11

## 2019-03-27 RX ADMIN — TRAMADOL HYDROCHLORIDE 100 MILLIGRAM(S): 50 TABLET ORAL at 03:28

## 2019-03-27 RX ADMIN — SODIUM CHLORIDE 100 MILLILITER(S): 9 INJECTION, SOLUTION INTRAVENOUS at 00:23

## 2019-03-27 RX ADMIN — Medication 100 MILLIGRAM(S): at 06:41

## 2019-03-27 RX ADMIN — TRAMADOL HYDROCHLORIDE 50 MILLIGRAM(S): 50 TABLET ORAL at 10:53

## 2019-03-27 RX ADMIN — Medication 1000 MILLIGRAM(S): at 01:00

## 2019-03-27 RX ADMIN — Medication 1 DROP(S): at 02:00

## 2019-03-27 RX ADMIN — TRAMADOL HYDROCHLORIDE 50 MILLIGRAM(S): 50 TABLET ORAL at 10:23

## 2019-03-27 RX ADMIN — SODIUM CHLORIDE 100 MILLILITER(S): 9 INJECTION, SOLUTION INTRAVENOUS at 03:28

## 2019-03-27 RX ADMIN — Medication 1 DROP(S): at 06:41

## 2019-03-27 RX ADMIN — Medication 400 MILLIGRAM(S): at 00:22

## 2019-03-27 NOTE — DISCHARGE NOTE PROVIDER - HOSPITAL COURSE
64 y/o female with h/o asthma never intubated or hospitalized, multiple lung nodule since 2011 s/p left upper lobe wedge resection, left fallopian tube ca, BRAC 1 positive s/p b/l mastectomy with expander (10/18) now presents to [facility] for scheduled bilateral exchange of tissue expanders and rhytidectomy revision of bilateral reconstruction on 3/26/19.  She underwent exchange of tissue expander for implants, face and neck rhytidectomy, and fat grafting from flank to face with placement of 2 ANJANA drains.  She recovered well in the PACU and was transferred to the surgical floor.  She experienced no issues overnight, pain was well controlled, and she was deemed medically stable for discharge. The drains were removed and the patient was sent home with instructions for follow up.  She felt comfortable with discharge and all questions were answered.

## 2019-03-27 NOTE — DISCHARGE NOTE NURSING/CASE MANAGEMENT/SOCIAL WORK - NSDCDPATPORTLINK_GEN_ALL_CORE
You can access the [facility] Patient Portal, offered by [facility], by registering with the following website: [email or web address]

## 2019-03-27 NOTE — DISCHARGE NOTE NURSING/CASE MANAGEMENT/SOCIAL WORK - NSDCPNPNATDISSUGG_GEN_ALL_CORE
ANJANA drains d/c by attending along with dressings change. patient wearing jobst bra over dressings, binder over dressings. no sign of infection noted to incisions. pt ambulating, eating, voiding without difficulty. iv discontinued. no distress noted. patient given supplies./No

## 2019-03-27 NOTE — DISCHARGE NOTE PROVIDER - NSDCFUADDINST_GEN_ALL_CORE_FT
Please follow up with Dr. Wright in his office within 1 week, call for appointment: [phone].  Leave dressings intact, do not submerge in water.

## 2019-03-27 NOTE — DISCHARGE NOTE NURSING/CASE MANAGEMENT/SOCIAL WORK - NSDCPNINST_GEN_ALL_CORE
call md for sign of infection (temp greater than 100.4f, redness at incision, pain not relieved by meds). drink 9-13 eight oz. glasses of fluid daily. call md for follow up appt.

## 2019-03-27 NOTE — DISCHARGE NOTE PROVIDER - NSDCCPCAREPLAN_GEN_ALL_CORE_FT
PRINCIPAL DISCHARGE DIAGNOSIS  Diagnosis: Genetic susceptibility to malignant neoplasm of breast  Assessment and Plan of Treatment:

## 2019-03-27 NOTE — DISCHARGE NOTE PROVIDER - NSDCCPTREATMENT_GEN_ALL_CORE_FT
PRINCIPAL PROCEDURE  Procedure: Reconstruction, breast, stage 2, with implant insertion  Findings and Treatment:

## 2019-03-27 NOTE — PROGRESS NOTE ADULT - ASSESSMENT
A/P: 63F s/p bilateral breast reconstruction with tissue expanders exchange for implants, facelift, necklift, autologous fat grafting from flank donor site to face.     - DVT ppx  - periop ancef  - pain control  - monitor ANJANA output  - plan to discharge today after pt is seen by Dr. Wright

## 2019-03-27 NOTE — PROGRESS NOTE ADULT - SUBJECTIVE AND OBJECTIVE BOX
SUBJECTIVE:  Patient seen and examined this AM, no acute events overnight.  Denies fevers/chills, nausea/vomiting, chest pain, SOB.      OBJECTIVE:     ** VITAL SIGNS / I&O's **    T(C): 36.8 (03-27-19 @ 05:36), Max: 36.9 (03-26-19 @ 18:20)  T(F): 98.3 (03-27-19 @ 05:36), Max: 98.5 (03-27-19 @ 00:14)  HR: 79 (03-27-19 @ 05:36) (76 - 90)  BP: 94/47 (03-27-19 @ 05:36) (92/53 - 109/59)  RR: 18 (03-27-19 @ 05:36) (11 - 19)  SpO2: 100% (03-27-19 @ 05:36) (95% - 100%)      26 Mar 2019 07:01  -  27 Mar 2019 07:00  --------------------------------------------------------  IN:    IV PiggyBack: 100 mL    lactated ringers.: 1100 mL    Oral Fluid: 440 mL  Total IN: 1640 mL    OUT:    Bulb: 27.5 mL    Bulb: 25 mL    Indwelling Catheter - Urethral: 1925 mL  Total OUT: 1977.5 mL    Total NET: -337.5 mL      27 Mar 2019 07:01  -  27 Mar 2019 09:36  --------------------------------------------------------  IN:  Total IN: 0 mL    OUT:    Indwelling Catheter - Urethral: 800 mL  Total OUT: 800 mL    Total NET: -800 mL      ** PHYSICAL EXAM **    -- CONSTITUTIONAL: AOx3. NAD.   -- HEENT: Moderate facial swelling, facial dressing c/d/i, smiles normally.    -- NECK: dressing c/d/i, drains intact with serosanguinous output  -- RESPIRATORY: no increased WOB  -- CHEST: bilateral breast incisions intact, no erythema, no collections/fluctuance.  -- ABDOMEN: flank incision c/d/i, abdominal binder in place  -- EXTREMITIES: WWP distally

## 2019-03-27 NOTE — DISCHARGE NOTE PROVIDER - CARE PROVIDER_API CALL
Keyur Wright)  Plastic Surgery  [address] [place] [address], [address]  [place] [address]  Phone: [phone]  Fax: [phone]  Follow Up Time:

## 2019-04-17 ENCOUNTER — APPOINTMENT (OUTPATIENT)
Dept: SURGERY | Facility: CLINIC | Age: 64
End: 2019-04-17
Payer: COMMERCIAL

## 2019-04-17 PROCEDURE — 99213K: CUSTOM

## 2019-05-03 LAB
CHOLEST SERPL-MCNC: 152 MG/DL
CHOLEST/HDLC SERPL: 2.4 RATIO
HDLC SERPL-MCNC: 64 MG/DL
INR PPP: 0.96 RATIO
LDLC SERPL CALC-MCNC: 77 MG/DL
PT BLD: 10.8 SEC
TRIGL SERPL-MCNC: 55 MG/DL

## 2019-05-06 NOTE — ASU PATIENT PROFILE, ADULT - TEACHING/LEARNING FACTORS INFLUENCE READINESS TO LEARN
Detail Level: Detailed Render Note In Bullet Format When Appropriate: No Post-Care Instructions: I reviewed with the patient in detail post-care instructions. Patient is to wear sunprotection, and avoid picking at any of the treated lesions. Pt may apply Vaseline to crusted or scabbing areas. Duration Of Freeze Thaw-Cycle (Seconds): 4 Number Of Freeze-Thaw Cycles: 1 freeze-thaw cycle Render Post-Care Instructions In Note?: yes Consent: The patient's consent was obtained including but not limited to risks of crusting, scabbing, blistering, scarring, darker or lighter pigmentary change, recurrence, incomplete removal and infection. none

## 2019-05-10 ENCOUNTER — OUTPATIENT (OUTPATIENT)
Dept: OUTPATIENT SERVICES | Facility: HOSPITAL | Age: 64
LOS: 1 days | Discharge: HOME | End: 2019-05-10

## 2019-05-10 DIAGNOSIS — D24.9 BENIGN NEOPLASM OF UNSPECIFIED BREAST: Chronic | ICD-10-CM

## 2019-05-10 DIAGNOSIS — Z90.2 ACQUIRED ABSENCE OF LUNG [PART OF]: Chronic | ICD-10-CM

## 2019-05-10 DIAGNOSIS — S02.2XXA FRACTURE OF NASAL BONES, INITIAL ENCOUNTER FOR CLOSED FRACTURE: Chronic | ICD-10-CM

## 2019-05-10 DIAGNOSIS — S36.039A UNSPECIFIED LACERATION OF SPLEEN, INITIAL ENCOUNTER: Chronic | ICD-10-CM

## 2019-05-10 DIAGNOSIS — Z90.710 ACQUIRED ABSENCE OF BOTH CERVIX AND UTERUS: Chronic | ICD-10-CM

## 2019-05-10 DIAGNOSIS — C57.00 MALIGNANT NEOPLASM OF UNSPECIFIED FALLOPIAN TUBE: ICD-10-CM

## 2019-05-14 ENCOUNTER — APPOINTMENT (OUTPATIENT)
Dept: HEMATOLOGY ONCOLOGY | Facility: CLINIC | Age: 64
End: 2019-05-14
Payer: COMMERCIAL

## 2019-05-14 ENCOUNTER — RESULT REVIEW (OUTPATIENT)
Age: 64
End: 2019-05-14

## 2019-05-14 ENCOUNTER — APPOINTMENT (OUTPATIENT)
Dept: GYNECOLOGIC ONCOLOGY | Facility: CLINIC | Age: 64
End: 2019-05-14
Payer: COMMERCIAL

## 2019-05-14 VITALS
HEART RATE: 63 BPM | BODY MASS INDEX: 22.06 KG/M2 | SYSTOLIC BLOOD PRESSURE: 90 MMHG | DIASTOLIC BLOOD PRESSURE: 52 MMHG | OXYGEN SATURATION: 99 % | TEMPERATURE: 98.1 F | RESPIRATION RATE: 12 BRPM | WEIGHT: 124.56 LBS

## 2019-05-14 VITALS
SYSTOLIC BLOOD PRESSURE: 104 MMHG | DIASTOLIC BLOOD PRESSURE: 64 MMHG | WEIGHT: 123 LBS | HEIGHT: 64 IN | BODY MASS INDEX: 21 KG/M2

## 2019-05-14 DIAGNOSIS — Z90.13 ACQUIRED ABSENCE OF BILATERAL BREASTS AND NIPPLES: ICD-10-CM

## 2019-05-14 LAB
BASOPHILS # BLD AUTO: 0 K/UL — SIGNIFICANT CHANGE UP (ref 0–0.2)
BASOPHILS NFR BLD AUTO: 0.7 % — SIGNIFICANT CHANGE UP (ref 0–2)
EOSINOPHIL # BLD AUTO: 0.1 K/UL — SIGNIFICANT CHANGE UP (ref 0–0.5)
EOSINOPHIL NFR BLD AUTO: 1.6 % — SIGNIFICANT CHANGE UP (ref 0–6)
HCT VFR BLD CALC: 37.7 % — SIGNIFICANT CHANGE UP (ref 34.5–45)
HGB BLD-MCNC: 13.2 G/DL — SIGNIFICANT CHANGE UP (ref 11.5–15.5)
LYMPHOCYTES # BLD AUTO: 1.2 K/UL — SIGNIFICANT CHANGE UP (ref 1–3.3)
LYMPHOCYTES # BLD AUTO: 23.1 % — SIGNIFICANT CHANGE UP (ref 13–44)
MCHC RBC-ENTMCNC: 31.6 PG — SIGNIFICANT CHANGE UP (ref 27–34)
MCHC RBC-ENTMCNC: 35.1 G/DL — SIGNIFICANT CHANGE UP (ref 32–36)
MCV RBC AUTO: 89.9 FL — SIGNIFICANT CHANGE UP (ref 80–100)
MONOCYTES # BLD AUTO: 0.4 K/UL — SIGNIFICANT CHANGE UP (ref 0–0.9)
MONOCYTES NFR BLD AUTO: 8.8 % — SIGNIFICANT CHANGE UP (ref 2–14)
NEUTROPHILS # BLD AUTO: 3.3 K/UL — SIGNIFICANT CHANGE UP (ref 1.8–7.4)
NEUTROPHILS NFR BLD AUTO: 65.9 % — SIGNIFICANT CHANGE UP (ref 43–77)
PLATELET # BLD AUTO: 189 K/UL — SIGNIFICANT CHANGE UP (ref 150–400)
RBC # BLD: 4.2 M/UL — SIGNIFICANT CHANGE UP (ref 3.8–5.2)
RBC # FLD: 12.3 % — SIGNIFICANT CHANGE UP (ref 10.3–14.5)
WBC # BLD: 5 K/UL — SIGNIFICANT CHANGE UP (ref 3.8–10.5)
WBC # FLD AUTO: 5 K/UL — SIGNIFICANT CHANGE UP (ref 3.8–10.5)

## 2019-05-14 PROCEDURE — 99213 OFFICE O/P EST LOW 20 MIN: CPT

## 2019-05-14 PROCEDURE — 99214 OFFICE O/P EST MOD 30 MIN: CPT

## 2019-05-14 NOTE — PHYSICAL EXAM
[Absent] : Adnexa(ae): Absent [Normal] : Anus and perineum: Normal sphincter tone, no masses, no prolapse. [Fully active, able to carry on all pre-disease performance without restriction] : Status 0 - Fully active, able to carry on all pre-disease performance without restriction [de-identified] : well healed LSC scars

## 2019-05-14 NOTE — HISTORY OF PRESENT ILLNESS
[FreeTextEntry1] : 63 yo woman with a BRCA 1 deleterious mutation. \par Initially scheduled for RR LSC BSO in conjunction with prophylactic mastectomy on 1/9/18. 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 \par She underwent a TLH, BSO, omentectomy, P&PA LND and staging and breast surgery was deferred. \par Postoperative recuperation was unremarkable. 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 \par \par She completed 6  cycles of Carbo and Taxol given on a q3 week schedule 2/5/18 - 5/24/18. \par \par  was 89 at diagnosis and is now normal.\par Imaging 1/4/19 was [facility]. \par \par She has a history of early stage lung cancer. \par She had her prophylactic mastectomy this October and pathology was all benign. Has permanent implants now. Due for some minor revisions (fat grafting) but overall happy to be nearly done with process. \par \par

## 2019-05-15 PROBLEM — Z90.13 STATUS POST BILATERAL MASTECTOMY: Status: ACTIVE | Noted: 2019-05-15

## 2019-05-15 LAB
ALBUMIN SERPL ELPH-MCNC: 4.5 G/DL
ALP BLD-CCNC: 78 U/L
ALT SERPL-CCNC: 27 U/L
ANION GAP SERPL CALC-SCNC: 10 MMOL/L
AST SERPL-CCNC: 21 U/L
BILIRUB SERPL-MCNC: 0.3 MG/DL
BUN SERPL-MCNC: 18 MG/DL
CALCIUM SERPL-MCNC: 9.8 MG/DL
CANCER AG125 SERPL-ACNC: 12 U/ML
CHLORIDE SERPL-SCNC: 103 MMOL/L
CO2 SERPL-SCNC: 29 MMOL/L
CREAT SERPL-MCNC: 0.66 MG/DL
GLUCOSE SERPL-MCNC: 93 MG/DL
POTASSIUM SERPL-SCNC: 4.6 MMOL/L
PROT SERPL-MCNC: 6.7 G/DL
SODIUM SERPL-SCNC: 142 MMOL/L

## 2019-05-29 ENCOUNTER — APPOINTMENT (OUTPATIENT)
Dept: HEMATOLOGY ONCOLOGY | Facility: CLINIC | Age: 64
End: 2019-05-29

## 2019-05-30 ENCOUNTER — FORM ENCOUNTER (OUTPATIENT)
Age: 64
End: 2019-05-30

## 2019-05-31 ENCOUNTER — OUTPATIENT (OUTPATIENT)
Dept: OUTPATIENT SERVICES | Facility: HOSPITAL | Age: 64
LOS: 1 days | End: 2019-05-31
Payer: COMMERCIAL

## 2019-05-31 ENCOUNTER — APPOINTMENT (OUTPATIENT)
Dept: CT IMAGING | Facility: IMAGING CENTER | Age: 64
End: 2019-05-31
Payer: COMMERCIAL

## 2019-05-31 DIAGNOSIS — S02.2XXA FRACTURE OF NASAL BONES, INITIAL ENCOUNTER FOR CLOSED FRACTURE: Chronic | ICD-10-CM

## 2019-05-31 DIAGNOSIS — R93.89 ABNORMAL FINDINGS ON DIAGNOSTIC IMAGING OF OTHER SPECIFIED BODY STRUCTURES: ICD-10-CM

## 2019-05-31 DIAGNOSIS — D24.9 BENIGN NEOPLASM OF UNSPECIFIED BREAST: Chronic | ICD-10-CM

## 2019-05-31 DIAGNOSIS — Z90.710 ACQUIRED ABSENCE OF BOTH CERVIX AND UTERUS: Chronic | ICD-10-CM

## 2019-05-31 DIAGNOSIS — Z90.2 ACQUIRED ABSENCE OF LUNG [PART OF]: Chronic | ICD-10-CM

## 2019-05-31 DIAGNOSIS — S36.039A UNSPECIFIED LACERATION OF SPLEEN, INITIAL ENCOUNTER: Chronic | ICD-10-CM

## 2019-05-31 PROCEDURE — 71250 CT THORAX DX C-: CPT

## 2019-05-31 PROCEDURE — 71250 CT THORAX DX C-: CPT | Mod: 26

## 2019-06-03 ENCOUNTER — TRANSCRIPTION ENCOUNTER (OUTPATIENT)
Age: 64
End: 2019-06-03

## 2019-07-02 ENCOUNTER — NON-APPOINTMENT (OUTPATIENT)
Age: 64
End: 2019-07-02

## 2019-07-02 ENCOUNTER — APPOINTMENT (OUTPATIENT)
Dept: PULMONOLOGY | Facility: CLINIC | Age: 64
End: 2019-07-02
Payer: COMMERCIAL

## 2019-07-02 VITALS
OXYGEN SATURATION: 100 % | WEIGHT: 123 LBS | SYSTOLIC BLOOD PRESSURE: 110 MMHG | TEMPERATURE: 98.4 F | HEIGHT: 64 IN | BODY MASS INDEX: 21 KG/M2 | DIASTOLIC BLOOD PRESSURE: 60 MMHG | HEART RATE: 69 BPM | RESPIRATION RATE: 17 BRPM

## 2019-07-02 PROCEDURE — 99214 OFFICE O/P EST MOD 30 MIN: CPT | Mod: 25

## 2019-07-02 PROCEDURE — 94010 BREATHING CAPACITY TEST: CPT

## 2019-07-02 NOTE — ASSESSMENT
[FreeTextEntry1] : Ms. Pham is a 64 y/o female with a history of allergies, GERD, KIRA, and asthma. She is s/p VATS procedure with Dr. Feliciano scheduled for 7/12/17 c/w adenocarcinoma with acinar predominant complicated by splenic laceration and ICU stay. She is s/p chemotherapy for fallopian tube tumor. She is s/p breast surgery 10/18 and is now awaiting breast reconstruction surgery and additional plastic surgery. \par \par ************************************************preoperative pulmonary clearance************************************************\par  (last / final breast surgery)\par -at this point in time there are no absolute pulmonary contraindications to go forward with the planned procedure \par -at the time of surgery s/he should have optimal pain control, incentive spirometry, early ambulation, DVT and GI prophylaxis. \par \par Her history of chronic cough is multifactorial due to: (resolved)\par -mild persistent asthma\par -post nasal drip syndrome\par -GERD\par \par problem 1: mild persistent asthma\par -continue Advair 100 at 1 inhalation BID\par -continue Ventolin 2 puffs q6H, prn\par -Asthma is believed to be caused by inherited (genetic) and environmental factor, but its exact cause is unknown. Asthma may be triggered by allergens, lung infections, or irritants in the air. Asthma triggers are different for each person\par -Inhaler technique reviewed as well as oral hygiene techniques reviewed with patient. Avoidance of cold air, extremes of temperature, rescue inhaler should be used before exercise. Order of medication reviewed with patient. Recommended use of a cool mist humidifier in the bedroom.\par \par problem 2: post nasal drip syndrome\par -continue to use Zaditor eye drops\par -add Olopatadine 0.6% 1 sniff BID \par - Continue Atrovent 0.6% at 1 sniff/nostril, up to TID \par -continue to use OTC antihistamine - add Loratadine 10 mg QHS\par -Environmental measures for allergies were encouraged including mattress and pillow cover, air purifier, and environmental controls.\par \par problem 3: GERD\par -continue to use omeprazole 40 mg before breakfast\par \par -Rule of 2s: avoid eating too much, eating too late, eating too spicy, eating two hours before bed\par -Things to avoid including overeating, spicy foods, tight clothing, eating within three hours of bed, this list is not all inclusive. \par -For treatment of reflux, possible options discussed including diet control, H2 blockers, PPIs, as well as coating motility agents discussed as treatment options. Timing of meals and proximity of last meal to sleep were discussed. If symptoms persist, a formal gastrointestinal evaluation is needed.\par \par problem 4: OSAS \par -no longer present secondary to weight loss and positional sleep\par -Sleep apnea is associated with adverse clinical consequences which an affect most organ systems. Cardiovascular disease risk includes arrhythmias, atrial fibrillation, hypertension, coronary artery disease, and stroke. Metabolic disorders include diabetes type 2, non-alcoholic fatty liver disease. Mood disorder especially depression; and cognitive decline especially in the elderly. Associations with chronic reflux/Perkins’s esophagus some but not all inclusive. \par -Reasons to assess this include arousal consistent with hypopnea; respiratory events most prominent in REM sleep or supine position; therefore sleep staging and body position are important for accurate diagnosis and estimation of AHI. \par \par problem 5: stage IA lung cancer, adenocarcinoma\par -she is s/p segmentectomy\par -follow up chest CT with Dr. Momin - 12/19\par \par problem 6: pre-op clearance for breast reconstruction surgery/plastic surgery - **she is cleared for surgery from pulmonary stand point.**\par -at this point in time there are no absolute pulmonary contraindications to go forward with the planned procedure \par -at the time of surgery s/he should have optimal pain control, incentive spirometry, early ambulation, DVT and GI prophylaxis. \par \par problem 7: health maintenance\par - She has received an influenza vaccine 2018\par -recommended strep pneumonia vaccines: Prevnar-13 vaccine, followed by Pneumo vaccine 23 on year following\par -recommended early intervention for URIs\par -recommended osteoporosis evaluations\par -recommended early dermatological evaluations\par -recommended after the age of 50 to the age of 70, colonoscopy every 5 years\par -encouraged early intervention\par \par F/U in 3 months\par She is encouraged to call with any changes, concerns, or questions.

## 2019-07-02 NOTE — ADDENDUM
[FreeTextEntry1] : All medical record entries made by nichole Maxwell were at Dr. Naveen Dos Santos's, direction and personally dictated by me on 07/02/2019. I have reviewed the chart and agree that the record accurately reflects my personal performance of the history, physical exam, assessment and plan. I have also personally directed, reviewed, and agree with the discharge instructions.

## 2019-07-02 NOTE — PROCEDURE
[FreeTextEntry1] : PFT - spi reveals normal flows; FEV1 is 1.97 which is 80% of predicted, normal flow volume loop \par \par Chest CT (May 31, 2019) reveals stable exam. No new or enlarging pulmonary nodule. S/p PHYLLIS segmentectomy.

## 2019-07-02 NOTE — REASON FOR VISIT
[Follow-Up] : a follow-up visit [FreeTextEntry1] : abnormal chest CT, allergic rhinitis, asthma, GERD,  lung nodule, malignant neoplasm of Brent, non-allergic rhinitis, and KIRA

## 2019-07-02 NOTE — PHYSICAL EXAM
[General Appearance - Well Developed] : well developed [Normal Appearance] : normal appearance [Well Groomed] : well groomed [General Appearance - Well Nourished] : well nourished [No Deformities] : no deformities [General Appearance - In No Acute Distress] : no acute distress [Normal Conjunctiva] : the conjunctiva exhibited no abnormalities [Eyelids - No Xanthelasma] : the eyelids demonstrated no xanthelasmas [Normal Oropharynx] : normal oropharynx [Neck Appearance] : the appearance of the neck was normal [Neck Cervical Mass (___cm)] : no neck mass was observed [Jugular Venous Distention Increased] : there was no jugular-venous distention [Thyroid Diffuse Enlargement] : the thyroid was not enlarged [Thyroid Nodule] : there were no palpable thyroid nodules [Heart Rate And Rhythm] : heart rate and rhythm were normal [Murmurs] : no murmurs present [Heart Sounds] : normal S1 and S2 [Exaggerated Use Of Accessory Muscles For Inspiration] : no accessory muscle use [Respiration, Rhythm And Depth] : normal respiratory rhythm and effort [Auscultation Breath Sounds / Voice Sounds] : lungs were clear to auscultation bilaterally [Abdomen Tenderness] : non-tender [Abdomen Soft] : soft [Abdomen Mass (___ Cm)] : no abdominal mass palpated [Abnormal Walk] : normal gait [Gait - Sufficient For Exercise Testing] : the gait was sufficient for exercise testing [Nail Clubbing] : no clubbing of the fingernails [Cyanosis, Localized] : no localized cyanosis [Petechial Hemorrhages (___cm)] : no petechial hemorrhages [Skin Color & Pigmentation] : normal skin color and pigmentation [] : no rash [No Venous Stasis] : no venous stasis [No Skin Ulcers] : no skin ulcer [Skin Lesions] : no skin lesions [Deep Tendon Reflexes (DTR)] : deep tendon reflexes were 2+ and symmetric [No Xanthoma] : no  xanthoma was observed [Sensation] : the sensory exam was normal to light touch and pinprick [No Focal Deficits] : no focal deficits [Oriented To Time, Place, And Person] : oriented to person, place, and time [Impaired Insight] : insight and judgment were intact [Affect] : the affect was normal [II] : II [FreeTextEntry1] : I:E ratio 1:3; clear

## 2019-07-02 NOTE — HISTORY OF PRESENT ILLNESS
[FreeTextEntry1] : Ms. Pham is a 64 year old female with a history of abnormal chest CT, allergic rhinitis, asthma, GERD,  lung nodule, malignant neoplasm of Brent, non-allergic rhinitis, and KIRA presenting to the office today for a follow up visit. Her chief complaint is fatigue. \par -she will be going for her final breast surgery on July 30th\par -she notes that she has been feeling very fatigued due to work \par -she reports that she gained some weight, although she was under weight before\par -she notes that she has been getting constant itchy eyes due to her allergies. her sinuses have been very congested and she has had a PND. she adds that her allergies have been worse this year than in years past\par -she has been sleeping well, and taking Ambien prn\par \par -she denies any headaches, nausea, vomiting, fever, chills, sweats, chest pain, chest pressure, diarrhea, constipation, dysphagia, dizziness, leg swelling, leg pain, itchy ears, heartburn, reflux, or sour taste in the mouth.

## 2019-07-08 ENCOUNTER — TRANSCRIPTION ENCOUNTER (OUTPATIENT)
Age: 64
End: 2019-07-08

## 2019-07-08 ENCOUNTER — RX RENEWAL (OUTPATIENT)
Age: 64
End: 2019-07-08

## 2019-07-11 ENCOUNTER — OUTPATIENT (OUTPATIENT)
Dept: OUTPATIENT SERVICES | Facility: HOSPITAL | Age: 64
LOS: 1 days | End: 2019-07-11
Payer: COMMERCIAL

## 2019-07-11 VITALS
HEIGHT: 64 IN | SYSTOLIC BLOOD PRESSURE: 120 MMHG | TEMPERATURE: 98 F | DIASTOLIC BLOOD PRESSURE: 70 MMHG | WEIGHT: 123.9 LBS | OXYGEN SATURATION: 98 % | RESPIRATION RATE: 16 BRPM | HEART RATE: 58 BPM

## 2019-07-11 DIAGNOSIS — S36.039A UNSPECIFIED LACERATION OF SPLEEN, INITIAL ENCOUNTER: Chronic | ICD-10-CM

## 2019-07-11 DIAGNOSIS — D24.9 BENIGN NEOPLASM OF UNSPECIFIED BREAST: Chronic | ICD-10-CM

## 2019-07-11 DIAGNOSIS — Z84.81 FAMILY HISTORY OF CARRIER OF GENETIC DISEASE: ICD-10-CM

## 2019-07-11 DIAGNOSIS — S02.2XXA FRACTURE OF NASAL BONES, INITIAL ENCOUNTER FOR CLOSED FRACTURE: Chronic | ICD-10-CM

## 2019-07-11 DIAGNOSIS — Z98.890 OTHER SPECIFIED POSTPROCEDURAL STATES: Chronic | ICD-10-CM

## 2019-07-11 DIAGNOSIS — Z90.2 ACQUIRED ABSENCE OF LUNG [PART OF]: Chronic | ICD-10-CM

## 2019-07-11 DIAGNOSIS — Z90.710 ACQUIRED ABSENCE OF BOTH CERVIX AND UTERUS: Chronic | ICD-10-CM

## 2019-07-11 LAB
ALBUMIN SERPL ELPH-MCNC: 4.6 G/DL — SIGNIFICANT CHANGE UP (ref 3.3–5)
ALP SERPL-CCNC: 70 U/L — SIGNIFICANT CHANGE UP (ref 40–120)
ALT FLD-CCNC: 27 U/L — SIGNIFICANT CHANGE UP (ref 4–33)
ANION GAP SERPL CALC-SCNC: 12 MMO/L — SIGNIFICANT CHANGE UP (ref 7–14)
AST SERPL-CCNC: 25 U/L — SIGNIFICANT CHANGE UP (ref 4–32)
BILIRUB SERPL-MCNC: 0.4 MG/DL — SIGNIFICANT CHANGE UP (ref 0.2–1.2)
BUN SERPL-MCNC: 24 MG/DL — HIGH (ref 7–23)
CALCIUM SERPL-MCNC: 10.2 MG/DL — SIGNIFICANT CHANGE UP (ref 8.4–10.5)
CHLORIDE SERPL-SCNC: 100 MMOL/L — SIGNIFICANT CHANGE UP (ref 98–107)
CO2 SERPL-SCNC: 28 MMOL/L — SIGNIFICANT CHANGE UP (ref 22–31)
CREAT SERPL-MCNC: 0.72 MG/DL — SIGNIFICANT CHANGE UP (ref 0.5–1.3)
GLUCOSE SERPL-MCNC: 63 MG/DL — LOW (ref 70–99)
HCT VFR BLD CALC: 42.5 % — SIGNIFICANT CHANGE UP (ref 34.5–45)
HGB BLD-MCNC: 13.2 G/DL — SIGNIFICANT CHANGE UP (ref 11.5–15.5)
MCHC RBC-ENTMCNC: 27.7 PG — SIGNIFICANT CHANGE UP (ref 27–34)
MCHC RBC-ENTMCNC: 31.1 % — LOW (ref 32–36)
MCV RBC AUTO: 89.1 FL — SIGNIFICANT CHANGE UP (ref 80–100)
NRBC # FLD: 0 K/UL — SIGNIFICANT CHANGE UP (ref 0–0)
PLATELET # BLD AUTO: 192 K/UL — SIGNIFICANT CHANGE UP (ref 150–400)
PMV BLD: 10 FL — SIGNIFICANT CHANGE UP (ref 7–13)
POTASSIUM SERPL-MCNC: 4.3 MMOL/L — SIGNIFICANT CHANGE UP (ref 3.5–5.3)
POTASSIUM SERPL-SCNC: 4.3 MMOL/L — SIGNIFICANT CHANGE UP (ref 3.5–5.3)
PROT SERPL-MCNC: 7.4 G/DL — SIGNIFICANT CHANGE UP (ref 6–8.3)
RBC # BLD: 4.77 M/UL — SIGNIFICANT CHANGE UP (ref 3.8–5.2)
RBC # FLD: 13.3 % — SIGNIFICANT CHANGE UP (ref 10.3–14.5)
SODIUM SERPL-SCNC: 140 MMOL/L — SIGNIFICANT CHANGE UP (ref 135–145)
WBC # BLD: 5.45 K/UL — SIGNIFICANT CHANGE UP (ref 3.8–10.5)
WBC # FLD AUTO: 5.45 K/UL — SIGNIFICANT CHANGE UP (ref 3.8–10.5)

## 2019-07-11 PROCEDURE — 93010 ELECTROCARDIOGRAM REPORT: CPT

## 2019-07-11 RX ORDER — SODIUM CHLORIDE 9 MG/ML
1000 INJECTION, SOLUTION INTRAVENOUS
Refills: 0 | Status: DISCONTINUED | OUTPATIENT
Start: 2019-07-30 | End: 2019-08-15

## 2019-07-11 NOTE — H&P PST ADULT - NSICDXPROBLEM_GEN_ALL_CORE_FT
PROBLEM DIAGNOSES  Problem: Family history of carrier of genetic disease  Assessment and Plan:  Pt is scheduled for left nipple areolar reconstruction, bilateral fat grafting on 7/30/19.  Verbal and written pre op instructions reviewed with patient and pt able to verbalize understanding.   Verbal and written instructions given with chlorhexidine wash, pt able to verbalize understanding via teach back method. Pt to obtain medical eval as requested by surgeon, will request document. PROBLEM DIAGNOSES  Problem: Family history of carrier of genetic disease  Assessment and Plan:  Pt is scheduled for left nipple areolar reconstruction, bilateral fat grafting on 7/30/19.  Verbal and written pre op instructions reviewed with patient and pt able to verbalize understanding.   Verbal and written instructions given with chlorhexidine wash, pt able to verbalize understanding via teach back method. Pt to obtain medical eval as requested by surgeon, will request document.   OR booking notified of pt's KIRA status via fax.

## 2019-07-11 NOTE — H&P PST ADULT - NEGATIVE NEUROLOGICAL SYMPTOMS
no loss of sensation/no difficulty walking/no weakness/no transient paralysis/no paresthesias/no generalized seizures

## 2019-07-11 NOTE — H&P PST ADULT - NEGATIVE ENMT SYMPTOMS
no vertigo/no ear pain/no tinnitus/no throat pain/no dysphagia/no sinus symptoms/no nasal congestion/no dry mouth/no hearing difficulty

## 2019-07-11 NOTE — H&P PST ADULT - NSICDXPASTMEDICALHX_GEN_ALL_CORE_FT
PAST MEDICAL HISTORY:  Adenocarcinoma of lung, stage 1 Left upper lobe stage 1A    Anxiety     CAD (coronary artery disease) non obstructive 50% proximal LAD calcification    Depression     Disorder of shoulder right    Eczema     Fallopian tube cancer, carcinoma, left     GERD (gastroesophageal reflux disease)     Hyperlipidemia     Migraines     Mild asthma denies intubation or hospitalizations    Nasal bone fracture     Papilloma of breast right    Pulmonary nodule bilateral    PVC (premature ventricular contraction)     Seasonal allergies     Sleep apnea noncompliant in using CPAP machine  Asymtomatic - was never retested for sleep apnea

## 2019-07-11 NOTE — H&P PST ADULT - NSICDXPASTSURGICALHX_GEN_ALL_CORE_FT
PAST SURGICAL HISTORY:  History of breast surgery exchange of bilateral expanders for implants 3/19    Nasal bone fracture ORIF nasal fx- 2015    Papilloma of breast right breast excision & bx 2013    S/P adenoidectomy as a child    S/P arthroscopy of shoulder right April 2013    S/P blepharoplasty in 2008    S/P laparoscopic assisted vaginal hysterectomy (LAVH) Omentectomy, BSO - 2/2018  Chemotherapy x 4 months    S/P partial lobectomy of lung Left upper lobe - VATS - segmentectomy - 7/2017    Spleen laceration 7/2017 - transfused with 5 units PRBC PAST SURGICAL HISTORY:  History of breast surgery bilateral mastectomy with expanders,   exchange of bilateral expanders for implants 3/19    Nasal bone fracture ORIF nasal fx- 2015    Papilloma of breast right breast excision & bx 2013    S/P adenoidectomy as a child    S/P arthroscopy of shoulder right April 2013    S/P blepharoplasty in 2008    S/P laparoscopic assisted vaginal hysterectomy (LAVH) Omentectomy, BSO - 2/2018  Chemotherapy x 4 months    S/P partial lobectomy of lung Left upper lobe - VATS - segmentectomy - 7/2017    Spleen laceration 7/2017 - transfused with 5 units PRBC

## 2019-07-11 NOTE — H&P PST ADULT - ANESTHESIA, PREVIOUS REACTION, PROFILE
nausea/vomiting/severe.   " I felt paralyzed and felt like I could not breath before going under. " - 2015   N&V - severe  2017  Pt states  "Emend works best" Denies family hx of malignant hyperthermia

## 2019-07-11 NOTE — H&P PST ADULT - RS GEN PE MLT RESP DETAILS PC
airway patent/clear to auscultation bilaterally/respirations non-labored/breath sounds equal/no rhonchi/no wheezes/no rales

## 2019-07-11 NOTE — H&P PST ADULT - NEGATIVE OPHTHALMOLOGIC SYMPTOMS
no lacrimation L/no lacrimation R/no blurred vision L/no blurred vision R/no photophobia/no diplopia

## 2019-07-11 NOTE — H&P PST ADULT - HISTORY OF PRESENT ILLNESS
63 y/o female with h/o  asthma never intubated or hospitalized, multiple lung nodule since 2011 s/p left upper lobe wedge resection, left fallopian tube ca, BRAC 1 positive s/p b/l mastectomy and hysterectomy.  Pt is scheduled for left nipple areolar reconstruction, bilateral fat grafting on 7/30/19.

## 2019-07-11 NOTE — H&P PST ADULT - MUSCULOSKELETAL
detailed exam normal/ROM intact/normal strength/no joint swelling/no calf tenderness details… normal strength/ROM intact/no joint swelling/no calf tenderness

## 2019-07-11 NOTE — H&P PST ADULT - NEUROLOGICAL
detailed exam details… Alert & oriented; no sensory, motor or coordination deficits, normal reflexes

## 2019-07-18 ENCOUNTER — TRANSCRIPTION ENCOUNTER (OUTPATIENT)
Age: 64
End: 2019-07-18

## 2019-07-19 ENCOUNTER — TRANSCRIPTION ENCOUNTER (OUTPATIENT)
Age: 64
End: 2019-07-19

## 2019-07-30 ENCOUNTER — OUTPATIENT (OUTPATIENT)
Dept: OUTPATIENT SERVICES | Facility: HOSPITAL | Age: 64
LOS: 1 days | Discharge: HOME | End: 2019-07-30

## 2019-07-30 ENCOUNTER — RESULT REVIEW (OUTPATIENT)
Age: 64
End: 2019-07-30

## 2019-07-30 ENCOUNTER — OUTPATIENT (OUTPATIENT)
Dept: OUTPATIENT SERVICES | Facility: HOSPITAL | Age: 64
LOS: 1 days | Discharge: HOME | End: 2019-07-30
Payer: COMMERCIAL

## 2019-07-30 VITALS
DIASTOLIC BLOOD PRESSURE: 52 MMHG | OXYGEN SATURATION: 98 % | SYSTOLIC BLOOD PRESSURE: 98 MMHG | HEART RATE: 62 BPM | RESPIRATION RATE: 16 BRPM | TEMPERATURE: 98 F | HEIGHT: 64 IN | WEIGHT: 123.9 LBS

## 2019-07-30 VITALS
RESPIRATION RATE: 18 BRPM | SYSTOLIC BLOOD PRESSURE: 102 MMHG | OXYGEN SATURATION: 99 % | HEART RATE: 60 BPM | DIASTOLIC BLOOD PRESSURE: 57 MMHG

## 2019-07-30 DIAGNOSIS — Z84.81 FAMILY HISTORY OF CARRIER OF GENETIC DISEASE: ICD-10-CM

## 2019-07-30 DIAGNOSIS — D24.9 BENIGN NEOPLASM OF UNSPECIFIED BREAST: Chronic | ICD-10-CM

## 2019-07-30 DIAGNOSIS — S36.039A UNSPECIFIED LACERATION OF SPLEEN, INITIAL ENCOUNTER: Chronic | ICD-10-CM

## 2019-07-30 DIAGNOSIS — S02.2XXA FRACTURE OF NASAL BONES, INITIAL ENCOUNTER FOR CLOSED FRACTURE: Chronic | ICD-10-CM

## 2019-07-30 DIAGNOSIS — C57.00 MALIGNANT NEOPLASM OF UNSPECIFIED FALLOPIAN TUBE: ICD-10-CM

## 2019-07-30 DIAGNOSIS — Z90.710 ACQUIRED ABSENCE OF BOTH CERVIX AND UTERUS: Chronic | ICD-10-CM

## 2019-07-30 DIAGNOSIS — Z90.2 ACQUIRED ABSENCE OF LUNG [PART OF]: Chronic | ICD-10-CM

## 2019-07-30 DIAGNOSIS — Z98.890 OTHER SPECIFIED POSTPROCEDURAL STATES: Chronic | ICD-10-CM

## 2019-07-30 PROCEDURE — 19350 NIPPLE/AREOLA RECONSTRUCTION: CPT | Mod: RT

## 2019-07-30 PROCEDURE — 20926: CPT | Mod: 59,RT

## 2019-07-30 PROCEDURE — 88305 TISSUE EXAM BY PATHOLOGIST: CPT | Mod: 26

## 2019-07-30 PROCEDURE — 15877 SUCTION LIPECTOMY TRUNK: CPT

## 2019-07-30 PROCEDURE — 19380 REVJ RECONSTRUCTED BREAST: CPT | Mod: 50,59

## 2019-07-30 RX ORDER — TRAMADOL HYDROCHLORIDE 50 MG/1
50 TABLET ORAL ONCE
Refills: 0 | Status: DISCONTINUED | OUTPATIENT
Start: 2019-07-30 | End: 2019-07-30

## 2019-07-30 RX ORDER — OMEGA-3 ACID ETHYL ESTERS 1 G
1 CAPSULE ORAL
Qty: 0 | Refills: 0 | DISCHARGE

## 2019-07-30 RX ADMIN — TRAMADOL HYDROCHLORIDE 50 MILLIGRAM(S): 50 TABLET ORAL at 16:20

## 2019-07-30 NOTE — ASU DISCHARGE PLAN (ADULT/PEDIATRIC) - CALL YOUR DOCTOR IF YOU HAVE ANY OF THE FOLLOWING:
Pain not relieved by Medications/Bleeding that does not stop/Swelling that gets worse Swelling that gets worse/Pain not relieved by Medications/Unable to urinate/Nausea and vomiting that does not stop/Wound/Surgical Site with redness, or foul smelling discharge or pus/Bleeding that does not stop/Increased irritability or sluggishness/Inability to tolerate liquids or foods/Fever greater than (need to indicate Fahrenheit or Celsius)

## 2019-07-30 NOTE — BRIEF OPERATIVE NOTE - OPERATION/FINDINGS
Liposuction from bilateral flanks. Fat grafting to bilateral reconstructed breasts.  Crescentic lift of left areola.  Reduction of left nipple

## 2019-07-30 NOTE — ASU DISCHARGE PLAN (ADULT/PEDIATRIC) - CARE PROVIDER_API CALL
Keyur Wright)  Plastic Surgery  [address] [place] [address], [address]  [place] [address]  Phone: [phone]  Fax: [phone]  Follow Up Time:

## 2019-07-30 NOTE — ASU DISCHARGE PLAN (ADULT/PEDIATRIC) - ACTIVITY LEVEL
No excercise/No tub baths/No heavy lifting/No sports/gym No heavy lifting/No tub baths/No excercise/No sports/gym/No weight bearing

## 2019-07-30 NOTE — BRIEF OPERATIVE NOTE - NSICDXBRIEFPROCEDURE_GEN_ALL_CORE_FT
PROCEDURES:  Revision of reconstructed breast 30-Jul-2019 15:25:37  Smitha Chapa  Reconstruction of left nipple 30-Jul-2019 15:25:09  Smitha Chapa

## 2019-07-30 NOTE — ASU PATIENT PROFILE, ADULT - PSH
History of breast surgery  bilateral mastectomy with expanders,   exchange of bilateral expanders for implants 3/19  Nasal bone fracture  ORIF nasal fx- 2015  Papilloma of breast  right breast excision & bx 2013  S/P adenoidectomy  as a child  S/P arthroscopy of shoulder  right April 2013  S/P blepharoplasty  in 2008  S/P laparoscopic assisted vaginal hysterectomy (LAVH)  Omentectomy, BSO - 2/2018  Chemotherapy x 4 months  S/P partial lobectomy of lung  Left upper lobe - VATS - segmentectomy - 7/2017  Spleen laceration  7/2017 - transfused with 5 units PRBC

## 2019-07-30 NOTE — ASU DISCHARGE PLAN (ADULT/PEDIATRIC) - ASU DC SPECIAL INSTRUCTIONSFT
Follow all of Dr Wright's instructions.  Apply bacitracin to left nipple once per day.  Wear abdominal binder to decrease swelling; wear surgical bra for comfort.    Take medications as prescribed.  Please follow up with Dr. Wright within x1 week after discharge from the hospital. You may call [phone] to schedule an appointment.     Call Dr Wright's office with any questions.

## 2019-07-30 NOTE — ASU PATIENT PROFILE, ADULT - PMH
Adenocarcinoma of lung, stage 1  Left upper lobe stage 1A  Anxiety    CAD (coronary artery disease)  non obstructive 50% proximal LAD calcification  Depression    Disorder of shoulder  right  Eczema    Fallopian tube cancer, carcinoma, left    GERD (gastroesophageal reflux disease)    Hyperlipidemia    Migraines    Mild asthma  denies intubation or hospitalizations  Nasal bone fracture    Papilloma of breast  right  Pulmonary nodule  bilateral  PVC (premature ventricular contraction)    Seasonal allergies    Sleep apnea  noncompliant in using CPAP machine  Asymtomatic - was never retested for sleep apnea

## 2019-08-02 ENCOUNTER — RESULT REVIEW (OUTPATIENT)
Age: 64
End: 2019-08-02

## 2019-08-02 ENCOUNTER — APPOINTMENT (OUTPATIENT)
Dept: HEMATOLOGY ONCOLOGY | Facility: CLINIC | Age: 64
End: 2019-08-02
Payer: COMMERCIAL

## 2019-08-02 ENCOUNTER — APPOINTMENT (OUTPATIENT)
Dept: HEMATOLOGY ONCOLOGY | Facility: CLINIC | Age: 64
End: 2019-08-02

## 2019-08-02 VITALS
DIASTOLIC BLOOD PRESSURE: 59 MMHG | RESPIRATION RATE: 16 BRPM | TEMPERATURE: 98.4 F | WEIGHT: 123 LBS | OXYGEN SATURATION: 97 % | SYSTOLIC BLOOD PRESSURE: 95 MMHG | HEART RATE: 70 BPM | BODY MASS INDEX: 21.11 KG/M2

## 2019-08-02 LAB
HCT VFR BLD CALC: 40.8 % — SIGNIFICANT CHANGE UP (ref 34.5–45)
HGB BLD-MCNC: 13.3 G/DL — SIGNIFICANT CHANGE UP (ref 11.5–15.5)
MCHC RBC-ENTMCNC: 29.1 PG — SIGNIFICANT CHANGE UP (ref 27–34)
MCHC RBC-ENTMCNC: 32.6 G/DL — SIGNIFICANT CHANGE UP (ref 32–36)
MCV RBC AUTO: 89.4 FL — SIGNIFICANT CHANGE UP (ref 80–100)
PLATELET # BLD AUTO: 184 K/UL — SIGNIFICANT CHANGE UP (ref 150–400)
RBC # BLD: 4.57 M/UL — SIGNIFICANT CHANGE UP (ref 3.8–5.2)
RBC # FLD: 12.7 % — SIGNIFICANT CHANGE UP (ref 10.3–14.5)
SURGICAL PATHOLOGY STUDY: SIGNIFICANT CHANGE UP
WBC # BLD: 6.5 K/UL — SIGNIFICANT CHANGE UP (ref 3.8–10.5)
WBC # FLD AUTO: 6.5 K/UL — SIGNIFICANT CHANGE UP (ref 3.8–10.5)

## 2019-08-02 PROCEDURE — 99214 OFFICE O/P EST MOD 30 MIN: CPT

## 2019-08-05 LAB
ALBUMIN SERPL ELPH-MCNC: 4.8 G/DL
ALP BLD-CCNC: 68 U/L
ALT SERPL-CCNC: 57 U/L
ANION GAP SERPL CALC-SCNC: 12 MMOL/L
AST SERPL-CCNC: 39 U/L
BILIRUB SERPL-MCNC: 0.3 MG/DL
BUN SERPL-MCNC: 19 MG/DL
CALCIUM SERPL-MCNC: 10 MG/DL
CANCER AG125 SERPL-ACNC: 13 U/ML
CHLORIDE SERPL-SCNC: 103 MMOL/L
CO2 SERPL-SCNC: 29 MMOL/L
CREAT SERPL-MCNC: 1.07 MG/DL
GLUCOSE SERPL-MCNC: 107 MG/DL
POTASSIUM SERPL-SCNC: 4.5 MMOL/L
PROT SERPL-MCNC: 7 G/DL
SODIUM SERPL-SCNC: 144 MMOL/L

## 2019-08-06 NOTE — HISTORY OF PRESENT ILLNESS
[AJCC Stage: ____] : AJCC Stage: [unfilled] [Disease: _____________________] : Disease: [unfilled] [IC] : IC [Cardiovascular] : Cardiovascular [Constitutional] : Constitutional [ENT] : ENT [Dermatologic] : Dermatologic [Gastrointestinal] : Gastrointestinal [Endocrine] : Endocrine [Gynecologic] : Gynecologic [Genitourinary] : Genitourinary [Neurologic] : Neurologic [Musculoskeletal] : Musculoskeletal [Infectious] : Infectious [Pulmonary] : Pulmonary [Pain] : Pain [Hematologic] : Hematologic [de-identified] : Ms. Pham is a 62 year old  postmenopausal who presents for discussion regarding treatment for fallopian tube cancer, and she has BRCA 1 pathogenic mutation.\par \par Ms. Pham has a personal history of stage IA adenocarcinoma of the lung(eGFR positive) s/p left VATS (2017, [facility]) c/b splenic laceration requiring ICU stay, and breast papilloma s/p excision (2013, [facility]), and HGSIL s/p LEEP 1996. Past GYN history significant for one episode of postmenopausal bleeding at the age of 49 which was ultimately determined to be a menstrual cycle. No history of uterine fibroids or ovarian cysts. Was on oral contraception from her early 40s until just a few weeks ago. \par \par Ms. Pham has a pertinent family history significant for breast (paternal cousin, paternal aunt) and ovarian (paternal cousin) cancer. \par Due to her high risk, she opted to go for risk reducing BSO and double mastectomy on 1/9/18.\par The frozen section showed malignancy and she underwent full surgical staging and mastectomy was postponed.\par Pathology: stage IC fallopian tube cancer.\par Patient has fully recovered from surgery. [de-identified] : Patient is here for routine follow up, s/p breast surgery on Tuesday 7/30, overall recovering well but very sore.  She also complains of some sacral pain that radiates to her inner thighs for for about 2 weeks, denies trauma or similar pain in the past.  Constipated since surgery, taking Miralax with minimal relief. She is following up with surgeon on Monday.  Denies fever, chills, chest pain, SOB, abdominal pain, nausea, vomiting, diarrhea, urinary symptoms.  Neuropathy of feet is stable.

## 2019-08-06 NOTE — PHYSICAL EXAM
[Fully active, able to carry on all pre-disease performance without restriction] : Status 0 - Fully active, able to carry on all pre-disease performance without restriction [Normal] : affect appropriate [de-identified] : b/l reconstructed breast.

## 2019-08-06 NOTE — ASSESSMENT
[Curative] : Goals of care discussed with patient: Curative [Palliative Care Plan] : not applicable at this time [FreeTextEntry1] : 64 year old woman with h/o fallopian tube cancer in CR.\par Labs today.\par s/p prophylactic b/l mastectomy for BRCA1 gene mutation, final surgery was this week.\par IA adenocarcinoma of the lung - continue f/up with Dr. Dos Santos.\par RTC 4 months

## 2019-08-12 ENCOUNTER — TRANSCRIPTION ENCOUNTER (OUTPATIENT)
Age: 64
End: 2019-08-12

## 2019-08-13 ENCOUNTER — APPOINTMENT (OUTPATIENT)
Dept: HEMATOLOGY ONCOLOGY | Facility: CLINIC | Age: 64
End: 2019-08-13

## 2019-08-14 ENCOUNTER — RESULT REVIEW (OUTPATIENT)
Age: 64
End: 2019-08-14

## 2019-08-14 ENCOUNTER — APPOINTMENT (OUTPATIENT)
Dept: HEMATOLOGY ONCOLOGY | Facility: CLINIC | Age: 64
End: 2019-08-14
Payer: COMMERCIAL

## 2019-08-14 LAB
ALBUMIN SERPL ELPH-MCNC: 4.6 G/DL
ALP BLD-CCNC: 69 U/L
ALT SERPL-CCNC: 29 U/L
ANION GAP SERPL CALC-SCNC: 13 MMOL/L
AST SERPL-CCNC: 29 U/L
BILIRUB SERPL-MCNC: 0.5 MG/DL
BUN SERPL-MCNC: 19 MG/DL
CALCIUM SERPL-MCNC: 9.9 MG/DL
CHLORIDE SERPL-SCNC: 102 MMOL/L
CO2 SERPL-SCNC: 27 MMOL/L
CREAT SERPL-MCNC: 0.63 MG/DL
GLUCOSE SERPL-MCNC: 105 MG/DL
HCT VFR BLD CALC: 41.1 % — SIGNIFICANT CHANGE UP (ref 34.5–45)
HGB BLD-MCNC: 13.7 G/DL — SIGNIFICANT CHANGE UP (ref 11.5–15.5)
MCHC RBC-ENTMCNC: 29.7 PG — SIGNIFICANT CHANGE UP (ref 27–34)
MCHC RBC-ENTMCNC: 33.3 G/DL — SIGNIFICANT CHANGE UP (ref 32–36)
MCV RBC AUTO: 89.2 FL — SIGNIFICANT CHANGE UP (ref 80–100)
PLATELET # BLD AUTO: 190 K/UL — SIGNIFICANT CHANGE UP (ref 150–400)
POTASSIUM SERPL-SCNC: 4.5 MMOL/L
PROT SERPL-MCNC: 6.7 G/DL
RBC # BLD: 4.61 M/UL — SIGNIFICANT CHANGE UP (ref 3.8–5.2)
RBC # FLD: 12.9 % — SIGNIFICANT CHANGE UP (ref 10.3–14.5)
SODIUM SERPL-SCNC: 142 MMOL/L
WBC # BLD: 5 K/UL — SIGNIFICANT CHANGE UP (ref 3.8–10.5)
WBC # FLD AUTO: 5 K/UL — SIGNIFICANT CHANGE UP (ref 3.8–10.5)

## 2019-08-14 PROCEDURE — 99499A: CUSTOM | Mod: NC

## 2019-09-10 ENCOUNTER — APPOINTMENT (OUTPATIENT)
Dept: GYNECOLOGIC ONCOLOGY | Facility: CLINIC | Age: 64
End: 2019-09-10
Payer: COMMERCIAL

## 2019-09-10 VITALS
HEIGHT: 64 IN | DIASTOLIC BLOOD PRESSURE: 54 MMHG | WEIGHT: 124 LBS | SYSTOLIC BLOOD PRESSURE: 97 MMHG | BODY MASS INDEX: 21.17 KG/M2

## 2019-09-10 PROCEDURE — 99213 OFFICE O/P EST LOW 20 MIN: CPT

## 2019-09-10 RX ORDER — OLOPATADINE HYDROCHLORIDE 665 UG/1
0.6 SPRAY, METERED NASAL
Qty: 3 | Refills: 1 | Status: DISCONTINUED | COMMUNITY
Start: 2019-07-02 | End: 2019-09-10

## 2019-09-10 RX ORDER — IPRATROPIUM BROMIDE 42 UG/1
0.06 SPRAY NASAL 3 TIMES DAILY
Qty: 3 | Refills: 1 | Status: DISCONTINUED | COMMUNITY
Start: 2018-04-13 | End: 2019-09-10

## 2019-09-10 RX ORDER — AZELASTINE HYDROCHLORIDE 205.5 UG/1
0.15 SPRAY, METERED NASAL TWICE DAILY
Qty: 3 | Refills: 1 | Status: DISCONTINUED | COMMUNITY
Start: 2019-07-08 | End: 2019-09-10

## 2019-09-10 RX ORDER — IPRATROPIUM BROMIDE 42 UG/1
0.06 SPRAY NASAL 3 TIMES DAILY
Qty: 3 | Refills: 1 | Status: DISCONTINUED | COMMUNITY
Start: 2018-08-14 | End: 2019-09-10

## 2019-10-22 ENCOUNTER — NON-APPOINTMENT (OUTPATIENT)
Age: 64
End: 2019-10-22

## 2019-10-22 ENCOUNTER — APPOINTMENT (OUTPATIENT)
Dept: PULMONOLOGY | Facility: CLINIC | Age: 64
End: 2019-10-22
Payer: COMMERCIAL

## 2019-10-22 VITALS
HEIGHT: 63 IN | OXYGEN SATURATION: 96 % | BODY MASS INDEX: 21.79 KG/M2 | WEIGHT: 123 LBS | RESPIRATION RATE: 17 BRPM | SYSTOLIC BLOOD PRESSURE: 100 MMHG | DIASTOLIC BLOOD PRESSURE: 50 MMHG | HEART RATE: 73 BPM

## 2019-10-22 PROCEDURE — 99214 OFFICE O/P EST MOD 30 MIN: CPT | Mod: 25

## 2019-10-22 PROCEDURE — 94010 BREATHING CAPACITY TEST: CPT

## 2019-10-22 PROCEDURE — 95012 NITRIC OXIDE EXP GAS DETER: CPT

## 2019-10-22 NOTE — ASSESSMENT
[FreeTextEntry1] : Ms. Pham is a 63 y/o female with a history of allergies, GERD, KIRA, and asthma. She is s/p VATS procedure with Dr. Feliciano scheduled for 7/12/17 c/w adenocarcinoma with acinar predominant complicated by splenic laceration and ICU stay. She is s/p chemotherapy for fallopian tube tumor. She is s/p breast surgery 10/18 and is now s/p breast reconstruction surgery and additional plastic surgery. #1 issue is Alopecia - w/up negative. \par \par \par Her history of chronic cough is multifactorial due to: (resolved)\par -mild persistent asthma\par -post nasal drip syndrome\par -GERD\par \par problem 1: mild persistent asthma (stable)\par -continue Advair 100 at 1 inhalation BID\par -continue Ventolin 2 puffs q6H, prn\par -add Singulair 10 mg QHS \par -Asthma is believed to be caused by inherited (genetic) and environmental factor, but its exact cause is unknown. Asthma may be triggered by allergens, lung infections, or irritants in the air. Asthma triggers are different for each person\par -Inhaler technique reviewed as well as oral hygiene techniques reviewed with patient. Avoidance of cold air, extremes of temperature, rescue inhaler should be used before exercise. Order of medication reviewed with patient. Recommended use of a cool mist humidifier in the bedroom.\par \par problem 2: post nasal drip syndrome\par -continue to use Zaditor eye drops\par -continue Olopatadine 0.6% 1 sniff BID \par - Continue Atrovent 0.6% at 1 sniff/nostril, up to TID \par -continue to use OTC antihistamine - add Loratadine 10 mg QHS\par -Environmental measures for allergies were encouraged including mattress and pillow cover, air purifier, and environmental controls.\par \par problem 3: GERD\par -continue to use omeprazole 40 mg before breakfast\par \par -Rule of 2s: avoid eating too much, eating too late, eating too spicy, eating two hours before bed\par -Things to avoid including overeating, spicy foods, tight clothing, eating within three hours of bed, this list is not all inclusive. \par -For treatment of reflux, possible options discussed including diet control, H2 blockers, PPIs, as well as coating motility agents discussed as treatment options. Timing of meals and proximity of last meal to sleep were discussed. If symptoms persist, a formal gastrointestinal evaluation is needed.\par \par problem 4: OSAS \par -no longer present secondary to weight loss and positional sleep\par -Sleep apnea is associated with adverse clinical consequences which an affect most organ systems. Cardiovascular disease risk includes arrhythmias, atrial fibrillation, hypertension, coronary artery disease, and stroke. Metabolic disorders include diabetes type 2, non-alcoholic fatty liver disease. Mood disorder especially depression; and cognitive decline especially in the elderly. Associations with chronic reflux/Perkins’s esophagus some but not all inclusive. \par -Reasons to assess this include arousal consistent with hypopnea; respiratory events most prominent in REM sleep or supine position; therefore sleep staging and body position are important for accurate diagnosis and estimation of AHI. \par \par problem 5: stage IA lung cancer, adenocarcinoma\par -she is s/p segmentectomy\par -follow up chest CT with Dr. Momin - 12/19\par \par problem 6: health maintenance\par - She has received an influenza vaccine 2019\par -recommended strep pneumonia vaccines: Prevnar-13 vaccine, followed by Pneumo vaccine 23 on year following\par -recommended early intervention for URIs\par -recommended osteoporosis evaluations\par -recommended early dermatological evaluations\par -recommended after the age of 50 to the age of 70, colonoscopy every 5 years\par -encouraged early intervention\par \par F/U in 3 months\par She is encouraged to call with any changes, concerns, or questions.

## 2019-10-22 NOTE — HISTORY OF PRESENT ILLNESS
[FreeTextEntry1] : Ms. Pham is a 64 year old female with a history of abnormal chest CT, allergic rhinitis, asthma, GERD,  lung nodule, malignant neoplasm of Brent, non-allergic rhinitis, and KIRA presenting to the office today for a follow up visit. Her chief complaint is \par -states that her hair has been thinning and falling out, states it could be due to stress at work. \par -states she has not been using a special type of shampoo or other hair products. \par -reports she has followed up with Dermatology, who recommended Rogaine? \par -notes she has an appointment today for a stress test\par -states she has been wheezing recently, began taking Singulair for allergy type symptoms\par -states her reflux has been controlled with Omeprazole\par -notes she has no pending surgeries \par -reports she has not been getting enough sleep, requires Ambien to fall/stay asleep. \par -reports she is fatigued due to lack of adequate sleep\par -notes her appetite is stable\par -states she has not been exercising lately. \par \par \par \par -she denies any headaches, nausea, vomiting, fever, chills, sweats, chest pain, chest pressure, diarrhea, constipation, dysphagia, dizziness, leg swelling, leg pain, itchy eyes, itchy ears, heartburn, reflux, or sour taste in the mouth.

## 2019-10-22 NOTE — ADDENDUM
[FreeTextEntry1] : All medical record entries made by nichole Maxwell were at Dr. Naveen Dos Santos's direction and personally dictated by me on 10/22/2019. I have reviewed the chart and agree that the record accurately reflects my personal performance of the history, physical exam, assessment and plan. I have also personally directed, reviewed, and agree with the discharge instructions.

## 2019-10-22 NOTE — PHYSICAL EXAM
[Normal Appearance] : normal appearance [General Appearance - Well Developed] : well developed [Well Groomed] : well groomed [General Appearance - Well Nourished] : well nourished [No Deformities] : no deformities [General Appearance - In No Acute Distress] : no acute distress [Eyelids - No Xanthelasma] : the eyelids demonstrated no xanthelasmas [Normal Conjunctiva] : the conjunctiva exhibited no abnormalities [Normal Oropharynx] : normal oropharynx [Neck Appearance] : the appearance of the neck was normal [II] : II [Neck Cervical Mass (___cm)] : no neck mass was observed [Thyroid Diffuse Enlargement] : the thyroid was not enlarged [Jugular Venous Distention Increased] : there was no jugular-venous distention [Heart Rate And Rhythm] : heart rate and rhythm were normal [Thyroid Nodule] : there were no palpable thyroid nodules [Heart Sounds] : normal S1 and S2 [Murmurs] : no murmurs present [Respiration, Rhythm And Depth] : normal respiratory rhythm and effort [Exaggerated Use Of Accessory Muscles For Inspiration] : no accessory muscle use [Abdomen Soft] : soft [Auscultation Breath Sounds / Voice Sounds] : lungs were clear to auscultation bilaterally [Abnormal Walk] : normal gait [Abdomen Tenderness] : non-tender [Abdomen Mass (___ Cm)] : no abdominal mass palpated [Nail Clubbing] : no clubbing of the fingernails [Cyanosis, Localized] : no localized cyanosis [Gait - Sufficient For Exercise Testing] : the gait was sufficient for exercise testing [Petechial Hemorrhages (___cm)] : no petechial hemorrhages [Skin Color & Pigmentation] : normal skin color and pigmentation [No Skin Ulcers] : no skin ulcer [] : no rash [No Venous Stasis] : no venous stasis [Skin Lesions] : no skin lesions [Sensation] : the sensory exam was normal to light touch and pinprick [No Xanthoma] : no  xanthoma was observed [Deep Tendon Reflexes (DTR)] : deep tendon reflexes were 2+ and symmetric [No Focal Deficits] : no focal deficits [Affect] : the affect was normal [Impaired Insight] : insight and judgment were intact [Oriented To Time, Place, And Person] : oriented to person, place, and time [FreeTextEntry1] : I:E ratio 1:3; clear

## 2019-10-22 NOTE — PROCEDURE
[FreeTextEntry1] : PFT - spi reveals normal flows; FEV1 is 1.96 which is 82% of predicted, normal flow volume loop \par \par FENO was 18; a normal value being less than 25\par \par Fractional exhaled nitric oxide (FENO) is regarded as a simple, noninvasive method for assessing eosinophilic airway inflammation. Produced by a variety of cells within the lung, nitric oxide (NO) concentrations are generally low in healthy individuals. However, high concentrations of NO appear to be involved in nonspecific host defense mechanisms and chronic inflammatory diseases such as asthma. The American Thoracic Society (ATS) therefore has recommended using FENO to aid in the diagnosis and monitoring of eosinophilic airway inflammation and asthma, and for identifying steroid responsive individuals whose chronic respiratory symptoms may be caused by airway inflammation.

## 2019-10-23 ENCOUNTER — TRANSCRIPTION ENCOUNTER (OUTPATIENT)
Age: 64
End: 2019-10-23

## 2019-10-24 ENCOUNTER — OUTPATIENT (OUTPATIENT)
Dept: OUTPATIENT SERVICES | Facility: HOSPITAL | Age: 64
LOS: 1 days | Discharge: HOME | End: 2019-10-24

## 2019-10-24 DIAGNOSIS — S36.039A UNSPECIFIED LACERATION OF SPLEEN, INITIAL ENCOUNTER: Chronic | ICD-10-CM

## 2019-10-24 DIAGNOSIS — Z90.710 ACQUIRED ABSENCE OF BOTH CERVIX AND UTERUS: Chronic | ICD-10-CM

## 2019-10-24 DIAGNOSIS — S02.2XXA FRACTURE OF NASAL BONES, INITIAL ENCOUNTER FOR CLOSED FRACTURE: Chronic | ICD-10-CM

## 2019-10-24 DIAGNOSIS — C57.00 MALIGNANT NEOPLASM OF UNSPECIFIED FALLOPIAN TUBE: ICD-10-CM

## 2019-10-24 DIAGNOSIS — D24.9 BENIGN NEOPLASM OF UNSPECIFIED BREAST: Chronic | ICD-10-CM

## 2019-10-24 DIAGNOSIS — Z98.890 OTHER SPECIFIED POSTPROCEDURAL STATES: Chronic | ICD-10-CM

## 2019-10-24 DIAGNOSIS — Z90.2 ACQUIRED ABSENCE OF LUNG [PART OF]: Chronic | ICD-10-CM

## 2019-11-08 ENCOUNTER — RESULT REVIEW (OUTPATIENT)
Age: 64
End: 2019-11-08

## 2019-11-08 ENCOUNTER — APPOINTMENT (OUTPATIENT)
Dept: GYNECOLOGIC ONCOLOGY | Facility: CLINIC | Age: 64
End: 2019-11-08
Payer: COMMERCIAL

## 2019-11-08 ENCOUNTER — APPOINTMENT (OUTPATIENT)
Dept: HEMATOLOGY ONCOLOGY | Facility: CLINIC | Age: 64
End: 2019-11-08
Payer: COMMERCIAL

## 2019-11-08 VITALS
BODY MASS INDEX: 21.44 KG/M2 | SYSTOLIC BLOOD PRESSURE: 91 MMHG | HEART RATE: 69 BPM | DIASTOLIC BLOOD PRESSURE: 49 MMHG | WEIGHT: 121 LBS | HEIGHT: 63 IN

## 2019-11-08 DIAGNOSIS — G62.0 DRUG-INDUCED POLYNEUROPATHY: ICD-10-CM

## 2019-11-08 DIAGNOSIS — T45.1X5A DRUG-INDUCED POLYNEUROPATHY: ICD-10-CM

## 2019-11-08 LAB
ALBUMIN SERPL ELPH-MCNC: 4.5 G/DL
ALP BLD-CCNC: 68 U/L
ALT SERPL-CCNC: 25 U/L
ANION GAP SERPL CALC-SCNC: 12 MMOL/L
AST SERPL-CCNC: 24 U/L
BILIRUB SERPL-MCNC: 0.4 MG/DL
BUN SERPL-MCNC: 18 MG/DL
CALCIUM SERPL-MCNC: 9.8 MG/DL
CANCER AG125 SERPL-ACNC: 10 U/ML
CHLORIDE SERPL-SCNC: 104 MMOL/L
CO2 SERPL-SCNC: 27 MMOL/L
CREAT SERPL-MCNC: 0.7 MG/DL
GLUCOSE SERPL-MCNC: 77 MG/DL
HCT VFR BLD CALC: 39.5 % — SIGNIFICANT CHANGE UP (ref 34.5–45)
HGB BLD-MCNC: 13.5 G/DL — SIGNIFICANT CHANGE UP (ref 11.5–15.5)
MCHC RBC-ENTMCNC: 31.1 PG — SIGNIFICANT CHANGE UP (ref 27–34)
MCHC RBC-ENTMCNC: 34.2 G/DL — SIGNIFICANT CHANGE UP (ref 32–36)
MCV RBC AUTO: 91 FL — SIGNIFICANT CHANGE UP (ref 80–100)
PLATELET # BLD AUTO: 182 K/UL — SIGNIFICANT CHANGE UP (ref 150–400)
POTASSIUM SERPL-SCNC: 4.5 MMOL/L
PROT SERPL-MCNC: 6.8 G/DL
RBC # BLD: 4.34 M/UL — SIGNIFICANT CHANGE UP (ref 3.8–5.2)
RBC # FLD: 12.1 % — SIGNIFICANT CHANGE UP (ref 10.3–14.5)
SODIUM SERPL-SCNC: 143 MMOL/L
WBC # BLD: 5.1 K/UL — SIGNIFICANT CHANGE UP (ref 3.8–10.5)
WBC # FLD AUTO: 5.1 K/UL — SIGNIFICANT CHANGE UP (ref 3.8–10.5)

## 2019-11-08 PROCEDURE — 99214 OFFICE O/P EST MOD 30 MIN: CPT

## 2019-11-08 PROCEDURE — 99213 OFFICE O/P EST LOW 20 MIN: CPT

## 2019-11-08 NOTE — PHYSICAL EXAM
[Absent] : Adnexa(ae): Absent [de-identified] : well healed LSC scars [Normal] : Recto-Vaginal Exam: Normal [Fully active, able to carry on all pre-disease performance without restriction] : Status 0 - Fully active, able to carry on all pre-disease performance without restriction

## 2019-11-08 NOTE — HISTORY OF PRESENT ILLNESS
[FreeTextEntry1] : 63 yo woman with a BRCA 1 deleterious mutation. \par Initially scheduled for RR LSC BSO in conjunction with prophylactic mastectomy on 1/9/18. 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 \par She underwent a TLH, BSO, omentectomy, P&PA LND and staging and breast surgery was deferred. \par Postoperative recuperation was unremarkable. 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 \par \par She completed 6  cycles of Carbo and Taxol given on a q3 week schedule 2/5/18 - 5/24/18. \par \par  was 89 at diagnosis and is now normal.\par Imaging 1/4/19 was [facility]. \par \par She has a history of early stage lung cancer. \par She had her prophylactic mastectomy last October and pathology was all benign. Has permanent implants now.

## 2019-11-25 ENCOUNTER — TRANSCRIPTION ENCOUNTER (OUTPATIENT)
Age: 64
End: 2019-11-25

## 2019-11-26 ENCOUNTER — FORM ENCOUNTER (OUTPATIENT)
Age: 64
End: 2019-11-26

## 2019-11-27 ENCOUNTER — TRANSCRIPTION ENCOUNTER (OUTPATIENT)
Age: 64
End: 2019-11-27

## 2019-11-27 ENCOUNTER — OUTPATIENT (OUTPATIENT)
Dept: OUTPATIENT SERVICES | Facility: HOSPITAL | Age: 64
LOS: 1 days | End: 2019-11-27
Payer: COMMERCIAL

## 2019-11-27 ENCOUNTER — APPOINTMENT (OUTPATIENT)
Dept: CT IMAGING | Facility: IMAGING CENTER | Age: 64
End: 2019-11-27
Payer: COMMERCIAL

## 2019-11-27 DIAGNOSIS — D24.9 BENIGN NEOPLASM OF UNSPECIFIED BREAST: Chronic | ICD-10-CM

## 2019-11-27 DIAGNOSIS — C34.12 MALIGNANT NEOPLASM OF UPPER LOBE, LEFT BRONCHUS OR LUNG: ICD-10-CM

## 2019-11-27 DIAGNOSIS — Z98.890 OTHER SPECIFIED POSTPROCEDURAL STATES: Chronic | ICD-10-CM

## 2019-11-27 DIAGNOSIS — Z90.710 ACQUIRED ABSENCE OF BOTH CERVIX AND UTERUS: Chronic | ICD-10-CM

## 2019-11-27 DIAGNOSIS — Z87.891 PERSONAL HISTORY OF NICOTINE DEPENDENCE: ICD-10-CM

## 2019-11-27 DIAGNOSIS — S36.039A UNSPECIFIED LACERATION OF SPLEEN, INITIAL ENCOUNTER: Chronic | ICD-10-CM

## 2019-11-27 DIAGNOSIS — R93.89 ABNORMAL FINDINGS ON DIAGNOSTIC IMAGING OF OTHER SPECIFIED BODY STRUCTURES: ICD-10-CM

## 2019-11-27 DIAGNOSIS — S02.2XXA FRACTURE OF NASAL BONES, INITIAL ENCOUNTER FOR CLOSED FRACTURE: Chronic | ICD-10-CM

## 2019-11-27 DIAGNOSIS — Z90.2 ACQUIRED ABSENCE OF LUNG [PART OF]: Chronic | ICD-10-CM

## 2019-11-27 PROCEDURE — 71250 CT THORAX DX C-: CPT | Mod: 26

## 2019-11-27 PROCEDURE — 71250 CT THORAX DX C-: CPT

## 2019-12-01 ENCOUNTER — TRANSCRIPTION ENCOUNTER (OUTPATIENT)
Age: 64
End: 2019-12-01

## 2019-12-02 ENCOUNTER — TRANSCRIPTION ENCOUNTER (OUTPATIENT)
Age: 64
End: 2019-12-02

## 2019-12-03 ENCOUNTER — TRANSCRIPTION ENCOUNTER (OUTPATIENT)
Age: 64
End: 2019-12-03

## 2019-12-18 ENCOUNTER — APPOINTMENT (OUTPATIENT)
Dept: SURGERY | Facility: CLINIC | Age: 64
End: 2019-12-18
Payer: COMMERCIAL

## 2019-12-18 PROCEDURE — 19000K: CUSTOM | Mod: RT

## 2019-12-18 PROCEDURE — 99213K: CUSTOM | Mod: 25

## 2020-01-08 ENCOUNTER — APPOINTMENT (OUTPATIENT)
Dept: DERMATOLOGY | Facility: CLINIC | Age: 65
End: 2020-01-08
Payer: COMMERCIAL

## 2020-01-08 VITALS — WEIGHT: 123 LBS | HEIGHT: 64 IN | BODY MASS INDEX: 21 KG/M2

## 2020-01-08 PROCEDURE — 99203 OFFICE O/P NEW LOW 30 MIN: CPT

## 2020-01-08 NOTE — PHYSICAL EXAM
[Alert] : alert [Oriented x 3] : ~L oriented x 3 [Well Nourished] : well nourished [Conjunctiva Non-injected] : conjunctiva non-injected [No Edema] : no edema [No Visual Lymphadenopathy] : no visual  lymphadenopathy [No Clubbing] : no clubbing [No Chromhidrosis] : no chromhidrosis [No Bromhidrosis] : no bromhidrosis [FreeTextEntry3] : Mild decreased thinning and increased part width on the crown of the scalp. No follicular prominence, scale or erythema

## 2020-01-08 NOTE — HISTORY OF PRESENT ILLNESS
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 Started this past summer. Noted thinning and decreased density. Asymptomatic. Has been using Rogaine x 3 months with minimal improvement. Notes her hair returned with normal density after her chemotherapy. Recent labwork wnl. Otherwise, no new, evolving, or symptomatic skin lesions.

## 2020-01-08 NOTE — CONSULT LETTER
[Dear  ___] : Dear  [unfilled], [Consult Letter:] : I had the pleasure of evaluating your patient, [unfilled]. [Please see my note below.] : Please see my note below. [Sincerely,] : Sincerely, [Consult Closing:] : Thank you very much for allowing me to participate in the care of this patient.  If you have any questions, please do not hesitate to contact me. [FreeTextEntry3] : Ajay John MD\par [facility]

## 2020-01-27 ENCOUNTER — APPOINTMENT (OUTPATIENT)
Dept: ORTHOPEDIC SURGERY | Facility: CLINIC | Age: 65
End: 2020-01-27
Payer: COMMERCIAL

## 2020-01-27 VITALS
WEIGHT: 123 LBS | BODY MASS INDEX: 21 KG/M2 | HEIGHT: 64 IN | HEART RATE: 74 BPM | SYSTOLIC BLOOD PRESSURE: 99 MMHG | DIASTOLIC BLOOD PRESSURE: 62 MMHG

## 2020-01-27 DIAGNOSIS — M47.812 SPONDYLOSIS W/OUT MYELOPATHY OR RADICULOPATHY, CERVICAL REGION: ICD-10-CM

## 2020-01-27 DIAGNOSIS — M54.2 CERVICALGIA: ICD-10-CM

## 2020-01-27 PROCEDURE — 99204 OFFICE O/P NEW MOD 45 MIN: CPT

## 2020-01-27 PROCEDURE — 72100 X-RAY EXAM L-S SPINE 2/3 VWS: CPT

## 2020-01-27 RX ORDER — DICLOFENAC SODIUM 75 MG/1
75 TABLET, DELAYED RELEASE ORAL
Qty: 30 | Refills: 2 | Status: ACTIVE | COMMUNITY
Start: 2020-01-27 | End: 1900-01-01

## 2020-02-24 ENCOUNTER — APPOINTMENT (OUTPATIENT)
Dept: PULMONOLOGY | Facility: CLINIC | Age: 65
End: 2020-02-24
Payer: COMMERCIAL

## 2020-02-24 ENCOUNTER — NON-APPOINTMENT (OUTPATIENT)
Age: 65
End: 2020-02-24

## 2020-02-24 VITALS
SYSTOLIC BLOOD PRESSURE: 100 MMHG | OXYGEN SATURATION: 98 % | RESPIRATION RATE: 17 BRPM | BODY MASS INDEX: 21.85 KG/M2 | DIASTOLIC BLOOD PRESSURE: 60 MMHG | WEIGHT: 128 LBS | HEIGHT: 64 IN | HEART RATE: 85 BPM

## 2020-02-24 PROCEDURE — 99214 OFFICE O/P EST MOD 30 MIN: CPT | Mod: 25

## 2020-02-24 PROCEDURE — 94010 BREATHING CAPACITY TEST: CPT

## 2020-02-24 RX ORDER — SUCRALFATE 1 G/1
1 TABLET ORAL
Qty: 120 | Refills: 3 | Status: ACTIVE | COMMUNITY
Start: 2020-02-24 | End: 1900-01-01

## 2020-02-24 NOTE — ADDENDUM
[FreeTextEntry1] : Documented by Edenilson Bowman acting as a scribe for Dr. Naveen Dos Santos on 02/24/2020 \par \par All medical record entries made by the Scribe were at my, Dr. Naveen Dos Santos's, direction and personally dictated by me on 02/24/2020 . I have reviewed the chart and agree that the record accurately reflects my personal performance of the history, physical exam, assessment and plan. I have also personally directed, reviewed, and agree with the discharge instructions.

## 2020-02-24 NOTE — PROCEDURE
[FreeTextEntry1] : PFT reveals very mild obstructive dysfunction, with an FEV1 of  1.95L, which is 82% of predicted, with a normal flow volume loop

## 2020-02-24 NOTE — HISTORY OF PRESENT ILLNESS
[FreeTextEntry1] : Ms. Pham is a 64 year old female with a history of abnormal chest CT, allergic rhinitis, asthma, GERD,  lung nodule, malignant neoplasm of Brent, non-allergic rhinitis, and KIRA presenting to the office today for a follow up visit. Her chief complaint is reflux and high stress level. \par -she notes that she has been overworking. \par -she reports that she is being treated like a . \par -she states that she is disrespected at work. \par -All this is causing her feeling stressed. \par -she reports that her overall energy level is hard to explain since she feels very fatigue. \par -she has reflux throughout the day. \par -she uses reflux medications throughout the day. \par -she takes Pepcid 40mg twice a day and the reflux is still present. \par -she occasionally takes Tums to help with the reflux.\par -she notes that she is using the red light cap for her hair loss. \par -she has been taking a lot of vitamins. \par -she states that her breathing is under control. \par -no coughing or wheezing.\par -she notes that she has been going for Physical Therapy for her neck pain. \par -she reports that the physical therapy has been helping her. \par \par -She denies any chest pain, chest pressure, diarrhea, constipation, dysphagia, dizziness, leg swelling, leg pain, itchy eyes, itchy ears, myalgias or arthralgias.

## 2020-02-24 NOTE — PHYSICAL EXAM
[General Appearance - Well Developed] : well developed [General Appearance - Well Nourished] : well nourished [Well Groomed] : well groomed [Normal Appearance] : normal appearance [No Deformities] : no deformities [General Appearance - In No Acute Distress] : no acute distress [Eyelids - No Xanthelasma] : the eyelids demonstrated no xanthelasmas [Normal Conjunctiva] : the conjunctiva exhibited no abnormalities [Normal Oropharynx] : normal oropharynx [Neck Cervical Mass (___cm)] : no neck mass was observed [II] : II [Neck Appearance] : the appearance of the neck was normal [Thyroid Diffuse Enlargement] : the thyroid was not enlarged [Thyroid Nodule] : there were no palpable thyroid nodules [Jugular Venous Distention Increased] : there was no jugular-venous distention [Heart Rate And Rhythm] : heart rate and rhythm were normal [Murmurs] : no murmurs present [Heart Sounds] : normal S1 and S2 [Respiration, Rhythm And Depth] : normal respiratory rhythm and effort [Exaggerated Use Of Accessory Muscles For Inspiration] : no accessory muscle use [Auscultation Breath Sounds / Voice Sounds] : lungs were clear to auscultation bilaterally [Abdomen Tenderness] : non-tender [Abdomen Soft] : soft [Abdomen Mass (___ Cm)] : no abdominal mass palpated [Gait - Sufficient For Exercise Testing] : the gait was sufficient for exercise testing [Abnormal Walk] : normal gait [Nail Clubbing] : no clubbing of the fingernails [Petechial Hemorrhages (___cm)] : no petechial hemorrhages [Cyanosis, Localized] : no localized cyanosis [] : no rash [Skin Color & Pigmentation] : normal skin color and pigmentation [Skin Lesions] : no skin lesions [No Venous Stasis] : no venous stasis [No Skin Ulcers] : no skin ulcer [No Xanthoma] : no  xanthoma was observed [Deep Tendon Reflexes (DTR)] : deep tendon reflexes were 2+ and symmetric [Sensation] : the sensory exam was normal to light touch and pinprick [No Focal Deficits] : no focal deficits [Oriented To Time, Place, And Person] : oriented to person, place, and time [Affect] : the affect was normal [Impaired Insight] : insight and judgment were intact [FreeTextEntry1] : I:E ratio 1:3; clear

## 2020-02-24 NOTE — ASSESSMENT
[FreeTextEntry1] : Ms. Pham is a 65 y/o female with a history of allergies, GERD, KIRA, and asthma. She is s/p VATS procedure with Dr. Feliciano scheduled for 7/12/17 c/w adenocarcinoma with acinar predominant complicated by splenic laceration and ICU stay. She is s/p chemotherapy for fallopian tube tumor. She is s/p breast surgery 10/18 and is now s/p breast reconstruction surgery and additional plastic surgery. #1 issue is GERD / Stress.  \par \par \par Her history of chronic cough is multifactorial due to: (resolved)\par -mild persistent asthma\par -post nasal drip syndrome\par -GERD\par \par problem 1: mild persistent asthma (stable)\par -continue Advair 100 at 1 inhalation BID\par -continue Ventolin 2 puffs q6H, prn\par -add Singulair 10 mg QHS \par -Asthma is believed to be caused by inherited (genetic) and environmental factor, but its exact cause is unknown. Asthma may be triggered by allergens, lung infections, or irritants in the air. Asthma triggers are different for each person\par -Inhaler technique reviewed as well as oral hygiene techniques reviewed with patient. Avoidance of cold air, extremes of temperature, rescue inhaler should be used before exercise. Order of medication reviewed with patient. Recommended use of a cool mist humidifier in the bedroom.\par \par problem 2: post nasal drip syndrome\par -continue to use Zaditor eye drops\par -continue Olopatadine 0.6% 1 sniff BID \par - Continue Atrovent 0.6% at 1 sniff/nostril, up to TID \par -continue to use OTC antihistamine - add Loratadine 10 mg QHS\par -Environmental measures for allergies were encouraged including mattress and pillow cover, air purifier, and environmental controls.\par \par problem 3: GERD (active) \par -continue to use Pepcid 40mg BID / Carafate 1mg before meals QHS\par \par -Rule of 2s: avoid eating too much, eating too late, eating too spicy, eating two hours before bed\par -Things to avoid including overeating, spicy foods, tight clothing, eating within three hours of bed, this list is not all inclusive. \par -For treatment of reflux, possible options discussed including diet control, H2 blockers, PPIs, as well as coating motility agents discussed as treatment options. Timing of meals and proximity of last meal to sleep were discussed. If symptoms persist, a formal gastrointestinal evaluation is needed.\par \par problem 4: OSAS \par -no longer present secondary to weight loss and positional sleep\par -Sleep apnea is associated with adverse clinical consequences which an affect most organ systems. Cardiovascular disease risk includes arrhythmias, atrial fibrillation, hypertension, coronary artery disease, and stroke. Metabolic disorders include diabetes type 2, non-alcoholic fatty liver disease. Mood disorder especially depression; and cognitive decline especially in the elderly. Associations with chronic reflux/Perkins’s esophagus some but not all inclusive. \par -Reasons to assess this include arousal consistent with hypopnea; respiratory events most prominent in REM sleep or supine position; therefore sleep staging and body position are important for accurate diagnosis and estimation of AHI. \par \par problem 5: stage IA lung cancer, adenocarcinoma\par -she is s/p segmentectomy\par -follow up chest CT with Dr. Momin - next 4/2020.\par \par problem 6: health maintenance\par - She has received an influenza vaccine 2019\par -recommended strep pneumonia vaccines: Prevnar-13 vaccine, followed by Pneumo vaccine 23 on year following\par -recommended early intervention for URIs\par -recommended osteoporosis evaluations\par -recommended early dermatological evaluations\par -recommended after the age of 50 to the age of 70, colonoscopy every 5 years\par -encouraged early intervention\par \par F/U in 3 months\par She is encouraged to call with any changes, concerns, or questions.

## 2020-03-30 ENCOUNTER — TRANSCRIPTION ENCOUNTER (OUTPATIENT)
Age: 65
End: 2020-03-30

## 2020-04-01 ENCOUNTER — TRANSCRIPTION ENCOUNTER (OUTPATIENT)
Age: 65
End: 2020-04-01

## 2020-04-02 ENCOUNTER — OUTPATIENT (OUTPATIENT)
Dept: OUTPATIENT SERVICES | Facility: HOSPITAL | Age: 65
LOS: 1 days | Discharge: HOME | End: 2020-04-02

## 2020-04-02 DIAGNOSIS — S02.2XXA FRACTURE OF NASAL BONES, INITIAL ENCOUNTER FOR CLOSED FRACTURE: Chronic | ICD-10-CM

## 2020-04-02 DIAGNOSIS — Z98.890 OTHER SPECIFIED POSTPROCEDURAL STATES: Chronic | ICD-10-CM

## 2020-04-02 DIAGNOSIS — Z90.710 ACQUIRED ABSENCE OF BOTH CERVIX AND UTERUS: Chronic | ICD-10-CM

## 2020-04-02 DIAGNOSIS — C57.00 MALIGNANT NEOPLASM OF UNSPECIFIED FALLOPIAN TUBE: ICD-10-CM

## 2020-04-02 DIAGNOSIS — Z90.2 ACQUIRED ABSENCE OF LUNG [PART OF]: Chronic | ICD-10-CM

## 2020-04-02 DIAGNOSIS — S36.039A UNSPECIFIED LACERATION OF SPLEEN, INITIAL ENCOUNTER: Chronic | ICD-10-CM

## 2020-04-02 DIAGNOSIS — D24.9 BENIGN NEOPLASM OF UNSPECIFIED BREAST: Chronic | ICD-10-CM

## 2020-04-08 ENCOUNTER — APPOINTMENT (OUTPATIENT)
Dept: HEMATOLOGY ONCOLOGY | Facility: CLINIC | Age: 65
End: 2020-04-08

## 2020-04-09 ENCOUNTER — APPOINTMENT (OUTPATIENT)
Dept: GYNECOLOGIC ONCOLOGY | Facility: HOSPITAL | Age: 65
End: 2020-04-09

## 2020-04-09 ENCOUNTER — APPOINTMENT (OUTPATIENT)
Dept: GYNECOLOGIC ONCOLOGY | Facility: CLINIC | Age: 65
End: 2020-04-09
Payer: COMMERCIAL

## 2020-04-09 PROCEDURE — 99213 OFFICE O/P EST LOW 20 MIN: CPT

## 2020-04-10 ENCOUNTER — APPOINTMENT (OUTPATIENT)
Dept: HEMATOLOGY ONCOLOGY | Facility: CLINIC | Age: 65
End: 2020-04-10

## 2020-04-10 ENCOUNTER — APPOINTMENT (OUTPATIENT)
Dept: HEMATOLOGY ONCOLOGY | Facility: CLINIC | Age: 65
End: 2020-04-10
Payer: COMMERCIAL

## 2020-04-10 PROCEDURE — 99213 OFFICE O/P EST LOW 20 MIN: CPT

## 2020-04-21 NOTE — HISTORY OF PRESENT ILLNESS
[Disease: _____________________] : Disease: [unfilled] [AJCC Stage: ____] : AJCC Stage: [unfilled] [Home] : at home, [unfilled] , at the time of the visit. [Medical Office: ([facility])___] : at ~his/her~ medical office located in [place] [de-identified] : Ms. Pham is a 62 year old  postmenopausal who presents for discussion regarding treatment for fallopian tube cancer, and she has BRCA 1 pathogenic mutation.\par \par Ms. Pham has a personal history of stage IA adenocarcinoma of the lung(eGFR positive) s/p left VATS (2017, [facility]) c/b splenic laceration requiring ICU stay, and breast papilloma s/p excision (2013, [facility]), and HGSIL s/p LEEP 1996. Past GYN history significant for one episode of postmenopausal bleeding at the age of 49 which was ultimately determined to be a menstrual cycle. No history of uterine fibroids or ovarian cysts. Was on oral contraception from her early 40s until just a few weeks ago. \par \par Ms. Pham has a pertinent family history significant for breast (paternal cousin, paternal aunt) and ovarian (paternal cousin) cancer. \par Due to her high risk, she opted to go for risk reducing BSO and double mastectomy on 1/9/18.\par The frozen section showed malignancy and she underwent full surgical staging and mastectomy was postponed.\par Pathology: stage IC fallopian tube cancer.\par Patient has fully recovered from surgery. [de-identified] : Patient here for a routine visit. 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 Was treated empirically with antibiotics.\par She denies any fever, cough or SOB. (4) excellent

## 2020-04-25 ENCOUNTER — MESSAGE (OUTPATIENT)
Age: 65
End: 2020-04-25

## 2020-04-27 ENCOUNTER — TRANSCRIPTION ENCOUNTER (OUTPATIENT)
Age: 65
End: 2020-04-27

## 2020-04-30 ENCOUNTER — OUTPATIENT (OUTPATIENT)
Dept: OUTPATIENT SERVICES | Facility: HOSPITAL | Age: 65
LOS: 1 days | End: 2020-04-30
Payer: COMMERCIAL

## 2020-04-30 ENCOUNTER — APPOINTMENT (OUTPATIENT)
Dept: CT IMAGING | Facility: IMAGING CENTER | Age: 65
End: 2020-04-30
Payer: COMMERCIAL

## 2020-04-30 ENCOUNTER — RESULT REVIEW (OUTPATIENT)
Age: 65
End: 2020-04-30

## 2020-04-30 DIAGNOSIS — R93.89 ABNORMAL FINDINGS ON DIAGNOSTIC IMAGING OF OTHER SPECIFIED BODY STRUCTURES: ICD-10-CM

## 2020-04-30 DIAGNOSIS — S36.039A UNSPECIFIED LACERATION OF SPLEEN, INITIAL ENCOUNTER: Chronic | ICD-10-CM

## 2020-04-30 DIAGNOSIS — Z90.710 ACQUIRED ABSENCE OF BOTH CERVIX AND UTERUS: Chronic | ICD-10-CM

## 2020-04-30 DIAGNOSIS — Z90.2 ACQUIRED ABSENCE OF LUNG [PART OF]: Chronic | ICD-10-CM

## 2020-04-30 DIAGNOSIS — S02.2XXA FRACTURE OF NASAL BONES, INITIAL ENCOUNTER FOR CLOSED FRACTURE: Chronic | ICD-10-CM

## 2020-04-30 DIAGNOSIS — D24.9 BENIGN NEOPLASM OF UNSPECIFIED BREAST: Chronic | ICD-10-CM

## 2020-04-30 DIAGNOSIS — Z98.890 OTHER SPECIFIED POSTPROCEDURAL STATES: Chronic | ICD-10-CM

## 2020-04-30 PROCEDURE — 71250 CT THORAX DX C-: CPT

## 2020-04-30 PROCEDURE — 71250 CT THORAX DX C-: CPT | Mod: 26

## 2020-05-02 ENCOUNTER — APPOINTMENT (OUTPATIENT)
Dept: DISASTER EMERGENCY | Facility: CLINIC | Age: 65
End: 2020-05-02

## 2020-05-03 LAB
SARS-COV-2 IGG SERPL IA-ACNC: <0.1 INDEX
SARS-COV-2 IGG SERPL QL IA: NEGATIVE

## 2020-05-05 ENCOUNTER — APPOINTMENT (OUTPATIENT)
Dept: PULMONOLOGY | Facility: CLINIC | Age: 65
End: 2020-05-05
Payer: COMMERCIAL

## 2020-05-05 ENCOUNTER — TRANSCRIPTION ENCOUNTER (OUTPATIENT)
Age: 65
End: 2020-05-05

## 2020-05-05 PROCEDURE — 99214 OFFICE O/P EST MOD 30 MIN: CPT | Mod: 95

## 2020-05-05 NOTE — ASSESSMENT
[FreeTextEntry1] : Ms. Pham is a 64 y/o female with a history of allergies, GERD, KIRA, and asthma. She is s/p VATS procedure with Dr. Feliciano scheduled for 7/12/17 c/w adenocarcinoma with acinar predominant complicated by splenic laceration and ICU stay. She is s/p chemotherapy for fallopian tube tumor. She is s/p breast surgery 10/18 and is now s/p breast reconstruction surgery and additional plastic surgery. #1 issue is stress awaiting return to work (PST).   \par \par \par Her history of chronic cough is multifactorial due to: (resolved)\par -mild persistent asthma\par -post nasal drip syndrome\par -GERD\par \par problem 1: mild persistent asthma (stable)\par -continue Advair 100 at 1 inhalation BID\par -continue Ventolin 2 puffs q6H, prn\par -add Singulair 10 mg QHS \par -Asthma is believed to be caused by inherited (genetic) and environmental factor, but its exact cause is unknown. Asthma may be triggered by allergens, lung infections, or irritants in the air. Asthma triggers are different for each person\par -Inhaler technique reviewed as well as oral hygiene techniques reviewed with patient. Avoidance of cold air, extremes of temperature, rescue inhaler should be used before exercise. Order of medication reviewed with patient. Recommended use of a cool mist humidifier in the bedroom.\par \par problem 2: post nasal drip syndrome\par -continue to use Zaditor eye drops\par -continue Olopatadine 0.6% 1 sniff BID \par - Continue Atrovent 0.6% at 1 sniff/nostril, up to TID \par -continue to use OTC antihistamine - add Loratadine 10 mg QHS\par -Environmental measures for allergies were encouraged including mattress and pillow cover, air purifier, and environmental controls.\par \par problem 3: GERD (active) \par -continue to use Pepcid 40mg BID / Carafate 1mg before meals QHS\par \par -Rule of 2s: avoid eating too much, eating too late, eating too spicy, eating two hours before bed\par -Things to avoid including overeating, spicy foods, tight clothing, eating within three hours of bed, this list is not all inclusive. \par -For treatment of reflux, possible options discussed including diet control, H2 blockers, PPIs, as well as coating motility agents discussed as treatment options. Timing of meals and proximity of last meal to sleep were discussed. If symptoms persist, a formal gastrointestinal evaluation is needed.\par \par problem 4: OSAS \par -no longer present secondary to weight loss and positional sleep\par -Sleep apnea is associated with adverse clinical consequences which an affect most organ systems. Cardiovascular disease risk includes arrhythmias, atrial fibrillation, hypertension, coronary artery disease, and stroke. Metabolic disorders include diabetes type 2, non-alcoholic fatty liver disease. Mood disorder especially depression; and cognitive decline especially in the elderly. Associations with chronic reflux/Perkins’s esophagus some but not all inclusive. \par -Reasons to assess this include arousal consistent with hypopnea; respiratory events most prominent in REM sleep or supine position; therefore sleep staging and body position are important for accurate diagnosis and estimation of AHI. \par \par problem 5: stage IA lung cancer, adenocarcinoma\par -she is s/p segmentectomy\par -follow up chest CT with Dr. Momin - next 10/2020.\par \par Problem 6a: COVID-19 precautionary Immune Support Recommendations:\par -OTC Vitamin C 500mg BID \par -OTC Quercetin 250-500mg BID \par -OTC Zinc 75-100mg per day \par -OTC Melatonin 1 or 2mg a night \par -OTC Vitamin D 1-4000mg per day \par -OTC Tonic Water 8oz per day\par -Water 0.5-1 gallon per day\par \par problem 6: health maintenance\par - She has received an influenza vaccine 2019\par -recommended strep pneumonia vaccines: Prevnar-13 vaccine, followed by Pneumo vaccine 23 on year following\par -recommended early intervention for URIs\par -recommended osteoporosis evaluations\par -recommended early dermatological evaluations\par -recommended after the age of 50 to the age of 70, colonoscopy every 5 years\par -encouraged early intervention\par \par F/U in 3 months\par She is encouraged to call with any changes, concerns, or questions.

## 2020-05-05 NOTE — REASON FOR VISIT
[Follow-Up] : a follow-up visit [FreeTextEntry1] : video call- abnormal chest CT, allergic rhinitis, asthma, GERD,  lung nodule, malignant neoplasm of Brent, non-allergic rhinitis, and KIRA

## 2020-05-05 NOTE — ADDENDUM
[FreeTextEntry1] : Documented by Ashley Rasmussen acting as a scribe for Dr. Naveen Dos Santos on (05/05/2020).\par \par All medical record entries made by the Scribe were at my, Dr. Naveen Dos Santos's, direction and personally dictated by me on (05/05/2020). I have reviewed the chart and agree that the record accurately reflects my personal performance of the history, physical exam, assessment and plan. I have also personally directed, reviewed, and agree with the discharge instructions. \par \par \par \par

## 2020-05-05 NOTE — PROCEDURE
[FreeTextEntry1] : \par CT (4.20.2020) reveals s/p wedge resection in the left upper lobe. Thick linear opacity associated with the staple line is unchanged when compared to previous exam. Two small groundglass nodules in the right upper lobe are unchanged when compared to previous exam.

## 2020-05-05 NOTE — HISTORY OF PRESENT ILLNESS
[Medical Office: ([facility])___] : at the medical office located in  [Home] : at home, [unfilled] , at the time of the visit. [Patient] : the patient [Self] : self [FreeTextEntry2] : Cathy Pham [FreeTextEntry1] : Ms. Pham is a 65 year old female with a history of abnormal chest CT, allergic rhinitis, asthma, GERD,  lung nodule, malignant neoplasm of Brent, non-allergic rhinitis, and KIRA presenting to the office today for a follow up visit. Her chief complaint is\par - She is currently working but does not have physical contact with anyone \par - Concerned about going back to work (pre-surgical testing)\par - She is very stressed \par - She is not sleeping enough (4-5)\par - Heartburn/reflux is under control \par - denies any headaches, nausea, vomiting, fever, chills, sweats, chest pain, chest pressure, diarrhea, constipation, dysphagia, dizziness, leg swelling, leg pain, itchy eyes, itchy ears, heartburn, reflux, or sour taste in the mouth.\par \par \par

## 2020-05-17 NOTE — H&P PST ADULT - VASCULAR
Pt sitting on stetcher talking on her cell phone. Pt has no complaint at this time, states he feels fine. Pt denies chest pain and SOB. VSS. Will continue to monitor closely.   detailed exam

## 2020-05-27 ENCOUNTER — TRANSCRIPTION ENCOUNTER (OUTPATIENT)
Age: 65
End: 2020-05-27

## 2020-06-30 ENCOUNTER — APPOINTMENT (OUTPATIENT)
Dept: PULMONOLOGY | Facility: CLINIC | Age: 65
End: 2020-06-30
Payer: COMMERCIAL

## 2020-06-30 VITALS
SYSTOLIC BLOOD PRESSURE: 90 MMHG | OXYGEN SATURATION: 97 % | WEIGHT: 123 LBS | HEART RATE: 60 BPM | DIASTOLIC BLOOD PRESSURE: 65 MMHG | TEMPERATURE: 97.9 F | RESPIRATION RATE: 16 BRPM | BODY MASS INDEX: 21 KG/M2 | HEIGHT: 64 IN

## 2020-06-30 PROCEDURE — 99214 OFFICE O/P EST MOD 30 MIN: CPT

## 2020-07-01 ENCOUNTER — TRANSCRIPTION ENCOUNTER (OUTPATIENT)
Age: 65
End: 2020-07-01

## 2020-07-01 NOTE — ASSESSMENT
[FreeTextEntry1] : Ms. Pham is a 66 y/o female with a history of allergies, GERD, KIRA, and asthma. She is s/p VATS procedure with Dr. Feliciano scheduled for 7/12/17 c/w adenocarcinoma with acinar predominant complicated by splenic laceration and ICU stay. She is s/p chemotherapy for fallopian tube tumor. She is s/p breast surgery 10/18 and is now s/p breast reconstruction surgery and additional plastic surgery. #1 issue is reflux\par \par \par Her history of chronic cough is multifactorial due to: (resolved)\par -mild persistent asthma\par -post nasal drip syndrome\par -GERD\par \par problem 1: mild persistent asthma (stable)\par -continue Advair 100 at 1 inhalation BID\par -continue Ventolin 2 puffs q6H, prn\par -continue Singulair 10 mg QHS \par -Asthma is believed to be caused by inherited (genetic) and environmental factor, but its exact cause is unknown. Asthma may be triggered by allergens, lung infections, or irritants in the air. Asthma triggers are different for each person\par -Inhaler technique reviewed as well as oral hygiene techniques reviewed with patient. Avoidance of cold air, extremes of temperature, rescue inhaler should be used before exercise. Order of medication reviewed with patient. Recommended use of a cool mist humidifier in the bedroom.\par \par problem 2: post nasal drip syndrome\par -continue to use Zaditor eye drops\par -continue Olopatadine 0.6% 1 sniff BID \par - Continue Atrovent 0.6% at 1 sniff/nostril, up to TID \par -continue to use OTC antihistamine - add Loratadine 10 mg QHS\par -Environmental measures for allergies were encouraged including mattress and pillow cover, air purifier, and environmental controls.\par \par problem 3: GERD (active) #1\par -continue to use Pepcid 40mg BID / Carafate 1mg before meals QHS; Prilosec 40 AM\par \par -Rule of 2s: avoid eating too much, eating too late, eating too spicy, eating two hours before bed\par -Things to avoid including overeating, spicy foods, tight clothing, eating within three hours of bed, this list is not all inclusive. \par -For treatment of reflux, possible options discussed including diet control, H2 blockers, PPIs, as well as coating motility agents discussed as treatment options. Timing of meals and proximity of last meal to sleep were discussed. If symptoms persist, a formal gastrointestinal evaluation is needed.\par \par problem 4: OSAS \par -no longer present secondary to weight loss and positional sleep\par -Sleep apnea is associated with adverse clinical consequences which an affect most organ systems. Cardiovascular disease risk includes arrhythmias, atrial fibrillation, hypertension, coronary artery disease, and stroke. Metabolic disorders include diabetes type 2, non-alcoholic fatty liver disease. Mood disorder especially depression; and cognitive decline especially in the elderly. Associations with chronic reflux/Perkins’s esophagus some but not all inclusive. \par -Reasons to assess this include arousal consistent with hypopnea; respiratory events most prominent in REM sleep or supine position; therefore sleep staging and body position are important for accurate diagnosis and estimation of AHI. \par \par problem 5: stage IA lung cancer, adenocarcinoma\par -she is s/p segmentectomy\par -follow up chest CT with Dr. Momin - next 10/2020.\par \par Problem 6a: COVID-19 precautionary Immune Support Recommendations:\par -OTC Vitamin C 500mg BID \par -OTC Quercetin 250-500mg BID \par -OTC Zinc 75-100mg per day \par -OTC Melatonin 1 or 2mg a night \par -OTC Vitamin D 1-4000mg per day \par -OTC Tonic Water 8oz per day\par -Water 0.5-1 gallon per day\par \par problem 6: health maintenance\par -f/u ortho for hand surgery 10/2020 - [facility] \par - She has received an influenza vaccine 2019\par -recommended strep pneumonia vaccines: Prevnar-13 vaccine, followed by Pneumo vaccine 23 on year following\par -recommended early intervention for URIs\par -recommended osteoporosis evaluations\par -recommended early dermatological evaluations\par -recommended after the age of 50 to the age of 70, colonoscopy every 5 years\par -encouraged early intervention\par \par F/U in 3 months\par She is encouraged to call with any changes, concerns, or questions.

## 2020-07-01 NOTE — PROCEDURE
[FreeTextEntry1] : \par CT Chest no cont (4.30.2020) reveals - s/p wedge resection in the PHYLLIS. Thick linear opacity associated with the staple line is unchanged when compared to previous exam. Two small ground-glass nodules in the RUL are unchanged when compared to previous exam.

## 2020-07-01 NOTE — PHYSICAL EXAM
[No Acute Distress] : no acute distress [Well Groomed] : well groomed [Well Nourished] : well nourished [No Deformities] : no deformities [Normal Oropharynx] : normal oropharynx [Well Developed] : well developed [Normal Appearance] : normal appearance [I] : Mallampati Class: I [No Murmurs] : no murmurs [Normal Rate/Rhythm] : normal rate/rhythm [No Neck Mass] : no neck mass [Normal S1, S2] : normal s1, s2 [Clear to Auscultation Bilaterally] : clear to auscultation bilaterally [No Resp Distress] : no resp distress [No Abnormalities] : no abnormalities [Benign] : benign [Normal Gait] : normal gait [No Clubbing] : no clubbing [No Edema] : no edema [No Cyanosis] : no cyanosis [No Focal Deficits] : no focal deficits [Normal Color/ Pigmentation] : normal color/ pigmentation [FROM] : FROM [Oriented x3] : oriented x3 [Normal Affect] : normal affect [TextBox_68] : I:E ratio 1:3; clear

## 2020-07-01 NOTE — HISTORY OF PRESENT ILLNESS
[FreeTextEntry1] : Ms. Pham is a 65 year old female with a history of abnormal chest CT, allergic rhinitis, asthma, GERD,  lung nodule, malignant neoplasm of Brent, non-allergic rhinitis, and KIRA presenting to the office today for a follow up visit. Her chief complaint is reflux. Pt admits:\par \par -her reflux is worst at night\par -her SOB is under control\par -her allergy situation is better\par -her eating is fine \par -she has a basal joint orthoplasty surgery procedure coming up October 2020\par -not taking new medications, New vitamins like Vit C \par -she is doing everything she is supposed to to stay ahead \par -today she denies any headaches, nausea, vomiting, fever, chills, sweats, chest pain, chest pressure, diarrhea, constipation, dysphagia, dizziness, leg swelling, leg pain, itchy eyes, itchy ears, heartburn, reflux, or sour taste in the mouth.

## 2020-07-01 NOTE — ADDENDUM
[FreeTextEntry1] : ***amended by Bright Persaud on 7/1/2020*****\par \par Documented by Sudeep Walker acting as a scribe for Dr. Naveen Dos Santos on 06/30/2020.\par \par All medical record entries made by the Scribe were at my, Dr. Naveen Dos Santos's, direction and personally dictated by me on 06/30/2020. I have reviewed the chart and agree that the record accurately reflects my personal performance of the history, physical exam, assessment and plan. I have also personally directed, reviewed, and agree with the discharge instructions. \par

## 2020-07-17 ENCOUNTER — OUTPATIENT (OUTPATIENT)
Dept: OUTPATIENT SERVICES | Facility: HOSPITAL | Age: 65
LOS: 1 days | Discharge: HOME | End: 2020-07-17

## 2020-07-17 DIAGNOSIS — S02.2XXA FRACTURE OF NASAL BONES, INITIAL ENCOUNTER FOR CLOSED FRACTURE: Chronic | ICD-10-CM

## 2020-07-17 DIAGNOSIS — C57.00 MALIGNANT NEOPLASM OF UNSPECIFIED FALLOPIAN TUBE: ICD-10-CM

## 2020-07-17 DIAGNOSIS — S36.039A UNSPECIFIED LACERATION OF SPLEEN, INITIAL ENCOUNTER: Chronic | ICD-10-CM

## 2020-07-17 DIAGNOSIS — Z90.710 ACQUIRED ABSENCE OF BOTH CERVIX AND UTERUS: Chronic | ICD-10-CM

## 2020-07-17 DIAGNOSIS — Z90.2 ACQUIRED ABSENCE OF LUNG [PART OF]: Chronic | ICD-10-CM

## 2020-07-17 DIAGNOSIS — Z98.890 OTHER SPECIFIED POSTPROCEDURAL STATES: Chronic | ICD-10-CM

## 2020-07-17 DIAGNOSIS — D24.9 BENIGN NEOPLASM OF UNSPECIFIED BREAST: Chronic | ICD-10-CM

## 2020-07-21 ENCOUNTER — APPOINTMENT (OUTPATIENT)
Dept: HEMATOLOGY ONCOLOGY | Facility: CLINIC | Age: 65
End: 2020-07-21
Payer: COMMERCIAL

## 2020-07-21 ENCOUNTER — RESULT REVIEW (OUTPATIENT)
Age: 65
End: 2020-07-21

## 2020-07-21 ENCOUNTER — APPOINTMENT (OUTPATIENT)
Dept: GYNECOLOGIC ONCOLOGY | Facility: CLINIC | Age: 65
End: 2020-07-21
Payer: COMMERCIAL

## 2020-07-21 VITALS
SYSTOLIC BLOOD PRESSURE: 95 MMHG | DIASTOLIC BLOOD PRESSURE: 59 MMHG | HEART RATE: 80 BPM | HEIGHT: 64 IN | BODY MASS INDEX: 21.4 KG/M2 | WEIGHT: 125.38 LBS

## 2020-07-21 LAB
BASOPHILS # BLD AUTO: 0.03 K/UL — SIGNIFICANT CHANGE UP (ref 0–0.2)
BASOPHILS NFR BLD AUTO: 0.6 % — SIGNIFICANT CHANGE UP (ref 0–2)
EOSINOPHIL # BLD AUTO: 0.15 K/UL — SIGNIFICANT CHANGE UP (ref 0–0.5)
EOSINOPHIL NFR BLD AUTO: 2.9 % — SIGNIFICANT CHANGE UP (ref 0–6)
HCT VFR BLD CALC: 37.3 % — SIGNIFICANT CHANGE UP (ref 34.5–45)
HGB BLD-MCNC: 12 G/DL — SIGNIFICANT CHANGE UP (ref 11.5–15.5)
IMM GRANULOCYTES NFR BLD AUTO: 0.4 % — SIGNIFICANT CHANGE UP (ref 0–1.5)
LYMPHOCYTES # BLD AUTO: 1.73 K/UL — SIGNIFICANT CHANGE UP (ref 1–3.3)
LYMPHOCYTES # BLD AUTO: 33 % — SIGNIFICANT CHANGE UP (ref 13–44)
MCHC RBC-ENTMCNC: 29.1 PG — SIGNIFICANT CHANGE UP (ref 27–34)
MCHC RBC-ENTMCNC: 32.2 GM/DL — SIGNIFICANT CHANGE UP (ref 32–36)
MCV RBC AUTO: 90.3 FL — SIGNIFICANT CHANGE UP (ref 80–100)
MONOCYTES # BLD AUTO: 0.47 K/UL — SIGNIFICANT CHANGE UP (ref 0–0.9)
MONOCYTES NFR BLD AUTO: 9 % — SIGNIFICANT CHANGE UP (ref 2–14)
NEUTROPHILS # BLD AUTO: 2.85 K/UL — SIGNIFICANT CHANGE UP (ref 1.8–7.4)
NEUTROPHILS NFR BLD AUTO: 54.1 % — SIGNIFICANT CHANGE UP (ref 43–77)
NRBC # BLD: 0 /100 WBCS — SIGNIFICANT CHANGE UP (ref 0–0)
PLATELET # BLD AUTO: 185 K/UL — SIGNIFICANT CHANGE UP (ref 150–400)
RBC # BLD: 4.13 M/UL — SIGNIFICANT CHANGE UP (ref 3.8–5.2)
RBC # FLD: 13.2 % — SIGNIFICANT CHANGE UP (ref 10.3–14.5)
WBC # BLD: 5.25 K/UL — SIGNIFICANT CHANGE UP (ref 3.8–10.5)
WBC # FLD AUTO: 5.25 K/UL — SIGNIFICANT CHANGE UP (ref 3.8–10.5)

## 2020-07-21 PROCEDURE — 99213 OFFICE O/P EST LOW 20 MIN: CPT | Mod: 95

## 2020-07-21 PROCEDURE — 99213 OFFICE O/P EST LOW 20 MIN: CPT

## 2020-07-21 NOTE — PHYSICAL EXAM
[Absent] : Adnexa(ae): Absent [Normal] : Anus and perineum: Normal sphincter tone, no masses, no prolapse. [Fully active, able to carry on all pre-disease performance without restriction] : Status 0 - Fully active, able to carry on all pre-disease performance without restriction [de-identified] : LSC scars

## 2020-07-21 NOTE — HISTORY OF PRESENT ILLNESS
[FreeTextEntry1] : 64 yo woman with a BRCA 1 deleterious mutation. \par Initially scheduled for RR LSC BSO in conjunction with prophylactic mastectomy on 1/9/18. 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 \par She underwent a TLH, BSO, omentectomy, P&PA LND and staging and breast surgery was deferred. \par Postoperative recuperation was unremarkable. 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 \par \par She completed 6 cycles of Carbo and Taxol given on a q3 week schedule 2/5/18 - 5/24/18. \par \par  was 89 at diagnosis and is now 11.\par Imaging 1/4/19 was [facility]. \par \par She has a history of early stage lung cancer treated with surgery. Surgery was complicated by splenic laceration and ICU stay. \par She had her prophylactic mastectomy last October and pathology was all benign. Has permanent implants now. \par \par 4/9: Telemed visit: Patient is doing well. 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 (Usually works in [employer]). She has been social isolating. She is terrified about going back to her usual role and being exposed to COVID as she is in the high risk for severe disease group.  She reports no new disease associated symptoms such as cough, SOB, abdominal/pelvic pain, bleeding. She is taking Macrobid for a UTI. No culture but has urine dip sticks and results were consistent with UTI. Has had 3 UTIs over the last year or so. All responded to oral antibiotics. \par \par 7/21/20: Doing well. No new disease associated symptoms.  10 today.

## 2020-07-22 NOTE — HISTORY OF PRESENT ILLNESS
[Home] : at home, [unfilled] , at the time of the visit. [Medical Office: ([facility])___] : at the medical office located in  [Verbal consent obtained from patient] : the patient, [unfilled] [Disease: _____________________] : Disease: [unfilled] [AJCC Stage: ____] : AJCC Stage: [unfilled] [de-identified] : Ms. Pham is a 62 year old  postmenopausal who presents for discussion regarding treatment for fallopian tube cancer, and she has BRCA 1 pathogenic mutation.\par \par Ms. Pham has a personal history of stage IA adenocarcinoma of the lung(eGFR positive) s/p left VATS (2017, [facility]) c/b splenic laceration requiring ICU stay, and breast papilloma s/p excision (2013, [facility]), and HGSIL s/p LEEP 1996. Past GYN history significant for one episode of postmenopausal bleeding at the age of 49 which was ultimately determined to be a menstrual cycle. No history of uterine fibroids or ovarian cysts. Was on oral contraception from her early 40s until just a few weeks ago. \par \par Ms. Pham has a pertinent family history significant for breast (paternal cousin, paternal aunt) and ovarian (paternal cousin) cancer. \par Due to her high risk, she opted to go for risk reducing BSO and double mastectomy on 1/9/18.\par The frozen section showed malignancy and she underwent full surgical staging and mastectomy was postponed.\par Pathology: stage IC fallopian tube cancer.\par Patient has fully recovered from surgery. [de-identified] : Patient feels well, stressed about work load.\par She has no bowel or bladder issues.\par She had bloods drawn earlier in the morning.

## 2020-07-23 LAB
ALBUMIN SERPL ELPH-MCNC: 4.4 G/DL
ALP BLD-CCNC: 62 U/L
ALT SERPL-CCNC: 20 U/L
ANION GAP SERPL CALC-SCNC: 12 MMOL/L
AST SERPL-CCNC: 23 U/L
BILIRUB SERPL-MCNC: 0.5 MG/DL
BUN SERPL-MCNC: 23 MG/DL
CALCIUM SERPL-MCNC: 9.6 MG/DL
CANCER AG125 SERPL-ACNC: 10 U/ML
CHLORIDE SERPL-SCNC: 105 MMOL/L
CO2 SERPL-SCNC: 27 MMOL/L
CREAT SERPL-MCNC: 0.74 MG/DL
GLUCOSE SERPL-MCNC: 93 MG/DL
POTASSIUM SERPL-SCNC: 4.3 MMOL/L
PROT SERPL-MCNC: 6.1 G/DL
SODIUM SERPL-SCNC: 143 MMOL/L

## 2020-09-23 ENCOUNTER — OUTPATIENT (OUTPATIENT)
Dept: OUTPATIENT SERVICES | Facility: HOSPITAL | Age: 65
LOS: 1 days | End: 2020-09-23
Payer: COMMERCIAL

## 2020-09-23 VITALS
DIASTOLIC BLOOD PRESSURE: 58 MMHG | OXYGEN SATURATION: 98 % | SYSTOLIC BLOOD PRESSURE: 94 MMHG | RESPIRATION RATE: 16 BRPM | HEIGHT: 64 IN | WEIGHT: 126.1 LBS | TEMPERATURE: 97 F | HEART RATE: 56 BPM

## 2020-09-23 DIAGNOSIS — M19.90 UNSPECIFIED OSTEOARTHRITIS, UNSPECIFIED SITE: ICD-10-CM

## 2020-09-23 DIAGNOSIS — I25.10 ATHEROSCLEROTIC HEART DISEASE OF NATIVE CORONARY ARTERY WITHOUT ANGINA PECTORIS: ICD-10-CM

## 2020-09-23 DIAGNOSIS — M18.11 UNILATERAL PRIMARY OSTEOARTHRITIS OF FIRST CARPOMETACARPAL JOINT, RIGHT HAND: ICD-10-CM

## 2020-09-23 DIAGNOSIS — K21.9 GASTRO-ESOPHAGEAL REFLUX DISEASE WITHOUT ESOPHAGITIS: ICD-10-CM

## 2020-09-23 DIAGNOSIS — S36.039A UNSPECIFIED LACERATION OF SPLEEN, INITIAL ENCOUNTER: Chronic | ICD-10-CM

## 2020-09-23 DIAGNOSIS — Z90.2 ACQUIRED ABSENCE OF LUNG [PART OF]: Chronic | ICD-10-CM

## 2020-09-23 DIAGNOSIS — T78.40XA ALLERGY, UNSPECIFIED, INITIAL ENCOUNTER: ICD-10-CM

## 2020-09-23 DIAGNOSIS — J45.909 UNSPECIFIED ASTHMA, UNCOMPLICATED: ICD-10-CM

## 2020-09-23 DIAGNOSIS — Z90.710 ACQUIRED ABSENCE OF BOTH CERVIX AND UTERUS: Chronic | ICD-10-CM

## 2020-09-23 DIAGNOSIS — S02.2XXA FRACTURE OF NASAL BONES, INITIAL ENCOUNTER FOR CLOSED FRACTURE: Chronic | ICD-10-CM

## 2020-09-23 DIAGNOSIS — D24.9 BENIGN NEOPLASM OF UNSPECIFIED BREAST: Chronic | ICD-10-CM

## 2020-09-23 DIAGNOSIS — Z98.890 OTHER SPECIFIED POSTPROCEDURAL STATES: Chronic | ICD-10-CM

## 2020-09-23 DIAGNOSIS — G47.33 OBSTRUCTIVE SLEEP APNEA (ADULT) (PEDIATRIC): ICD-10-CM

## 2020-09-23 LAB
ALBUMIN SERPL ELPH-MCNC: 4.3 G/DL — SIGNIFICANT CHANGE UP (ref 3.3–5)
ALP SERPL-CCNC: 63 U/L — SIGNIFICANT CHANGE UP (ref 40–120)
ALT FLD-CCNC: 32 U/L — SIGNIFICANT CHANGE UP (ref 4–33)
ANION GAP SERPL CALC-SCNC: 11 MMO/L — SIGNIFICANT CHANGE UP (ref 7–14)
AST SERPL-CCNC: 29 U/L — SIGNIFICANT CHANGE UP (ref 4–32)
BILIRUB SERPL-MCNC: 0.3 MG/DL — SIGNIFICANT CHANGE UP (ref 0.2–1.2)
BUN SERPL-MCNC: 16 MG/DL — SIGNIFICANT CHANGE UP (ref 7–23)
CALCIUM SERPL-MCNC: 9.8 MG/DL — SIGNIFICANT CHANGE UP (ref 8.4–10.5)
CHLORIDE SERPL-SCNC: 104 MMOL/L — SIGNIFICANT CHANGE UP (ref 98–107)
CO2 SERPL-SCNC: 27 MMOL/L — SIGNIFICANT CHANGE UP (ref 22–31)
CREAT SERPL-MCNC: 0.64 MG/DL — SIGNIFICANT CHANGE UP (ref 0.5–1.3)
GLUCOSE SERPL-MCNC: 86 MG/DL — SIGNIFICANT CHANGE UP (ref 70–99)
HCT VFR BLD CALC: 40.1 % — SIGNIFICANT CHANGE UP (ref 34.5–45)
HGB BLD-MCNC: 12.2 G/DL — SIGNIFICANT CHANGE UP (ref 11.5–15.5)
MCHC RBC-ENTMCNC: 27.8 PG — SIGNIFICANT CHANGE UP (ref 27–34)
MCHC RBC-ENTMCNC: 30.4 % — LOW (ref 32–36)
MCV RBC AUTO: 91.3 FL — SIGNIFICANT CHANGE UP (ref 80–100)
NRBC # FLD: 0 K/UL — SIGNIFICANT CHANGE UP (ref 0–0)
PLATELET # BLD AUTO: 195 K/UL — SIGNIFICANT CHANGE UP (ref 150–400)
PMV BLD: 10.3 FL — SIGNIFICANT CHANGE UP (ref 7–13)
POTASSIUM SERPL-MCNC: 4.4 MMOL/L — SIGNIFICANT CHANGE UP (ref 3.5–5.3)
POTASSIUM SERPL-SCNC: 4.4 MMOL/L — SIGNIFICANT CHANGE UP (ref 3.5–5.3)
PROT SERPL-MCNC: 6.6 G/DL — SIGNIFICANT CHANGE UP (ref 6–8.3)
RBC # BLD: 4.39 M/UL — SIGNIFICANT CHANGE UP (ref 3.8–5.2)
RBC # FLD: 13.5 % — SIGNIFICANT CHANGE UP (ref 10.3–14.5)
SODIUM SERPL-SCNC: 142 MMOL/L — SIGNIFICANT CHANGE UP (ref 135–145)
WBC # BLD: 4.96 K/UL — SIGNIFICANT CHANGE UP (ref 3.8–10.5)
WBC # FLD AUTO: 4.96 K/UL — SIGNIFICANT CHANGE UP (ref 3.8–10.5)

## 2020-09-23 PROCEDURE — 93010 ELECTROCARDIOGRAM REPORT: CPT

## 2020-09-23 RX ORDER — SUCRALFATE 1 G
0 TABLET ORAL
Qty: 0 | Refills: 0 | DISCHARGE

## 2020-09-23 RX ORDER — BACITRACIN ZINC 500 UNIT/G
1 OINTMENT IN PACKET (EA) TOPICAL
Qty: 0 | Refills: 0 | DISCHARGE

## 2020-09-23 RX ORDER — ZOLPIDEM TARTRATE 10 MG/1
1 TABLET ORAL
Qty: 0 | Refills: 0 | DISCHARGE

## 2020-09-23 RX ORDER — TRAZODONE HCL 50 MG
1 TABLET ORAL
Qty: 0 | Refills: 0 | DISCHARGE

## 2020-09-23 RX ORDER — TRAMADOL HYDROCHLORIDE 50 MG/1
1 TABLET ORAL
Qty: 0 | Refills: 0 | DISCHARGE

## 2020-09-23 NOTE — H&P PST ADULT - NSICDXPROBLEM_GEN_ALL_CORE_FT
PROBLEM DIAGNOSES  Problem: Osteoarthritis  Assessment and Plan: Right Hand , Basal Joint Arthroplasty  Pre op instructions including Hibiclens with teach back ; reviewed with pt pt verbalized good understanding   Pt to Dr Quick for pre op medical clearance    Problem: Asthma  Assessment and Plan: Pt to use inhaler dos  Recent evaluation Dr medina  OR Booking notified via fax    Problem: Allergy  Assessment and Plan: Codeine , Adhesives  OR Booking notified via fax    Problem: CAD (coronary artery disease)  Assessment and Plan: Pt to Dr Blas ; per pt to stop asa 81 mg pre op as per Dr Blas     Problem: GERD (gastroesophageal reflux disease)  Assessment and Plan: Pt to take Omeprazole dos        PROBLEM DIAGNOSES  Problem: Osteoarthritis  Assessment and Plan: Right Hand , Basal Joint Arthroplasty  Pre op instructions including Hibiclens with teach back ; reviewed with pt pt verbalized good understanding   Pt to Dr Quick for pre op medical clearance    Problem: Asthma  Assessment and Plan: Pt to use inhaler dos  Recent evaluation Dr medina  OR Booking notified via fax    Problem: Allergy  Assessment and Plan: Codeine , Adhesives  OR Booking notified via fax    Problem: CAD (coronary artery disease)  Assessment and Plan: Pt to Dr Blas ; per pt to stop asa 81 mg  1 week pre op as per Dr Blas     Problem: GERD (gastroesophageal reflux disease)  Assessment and Plan: Pt to take Omeprazole dos        PROBLEM DIAGNOSES  Problem: Osteoarthritis  Assessment and Plan: Right Hand , Basal Joint Arthroplasty  Pre op instructions including Hibiclens with teach back ; reviewed with pt pt verbalized good understanding of pre op instructions  Pt to Dr Quick for pre op medical clearance    Problem: Asthma  Assessment and Plan: Pt to use inhaler dos  Recent evaluation Dr medina      Problem: Allergy  Assessment and Plan: Codeine , Adhesives  OR Booking notified via fax    Problem: CAD (coronary artery disease)  Assessment and Plan: Pt to Dr Blas ; per pt to stop asa 81 mg  1 week pre op as per Dr Blas   Awaiting recent cardiac evaluation     Problem: GERD (gastroesophageal reflux disease)  Assessment and Plan: Pt to take Omeprazole dos     Problem: KIRA (obstructive sleep apnea)  Assessment and Plan: KIRA Precautions  OR Booking notified via fax

## 2020-09-23 NOTE — H&P PST ADULT - NSICDXPASTMEDICALHX_GEN_ALL_CORE_FT
PAST MEDICAL HISTORY:  Adenocarcinoma of lung, stage 1 Left upper lobe stage 1A per pt multiple nodules last c/t scan 5/2020    Anxiety     CAD (coronary artery disease) non obstructive 50% proximal LAD calcification    Depression     Disorder of shoulder right 2018    Eczema posterior right knee    Fallopian tube cancer, carcinoma, left tx chemo  completed 5/18    GERD (gastroesophageal reflux disease)     Hyperlipidemia     Migraines     Mild asthma denies intubation or hospitalizations    Nasal bone fracture     Papilloma of breast right    Pulmonary nodule bilateral    PVC (premature ventricular contraction)     Seasonal allergies     Sleep apnea denies use  CPAP machine       PAST MEDICAL HISTORY:  Adenocarcinoma of lung, stage 1 Left upper lobe stage 1A per pt multiple nodules last c/t scan 5/2020    Anxiety     CAD (coronary artery disease) non obstructive  calcification    Depression     Disorder of shoulder right 2018    Eczema posterior right knee    Fallopian tube cancer, carcinoma, left tx chemo  completed 5/18    GERD (gastroesophageal reflux disease)     Hyperlipidemia     Migraines     Mild asthma denies intubation or hospitalizations    Nasal bone fracture     Papilloma of breast right    Pulmonary nodule bilateral    PVC (premature ventricular contraction)     Seasonal allergies     Sleep apnea denies use  CPAP machine

## 2020-09-23 NOTE — H&P PST ADULT - MUSCULOSKELETAL COMMENTS
right hand 1st digit- thumb joint, osteoarthritis bilateral hands ; right hand 1st digit- thumb joint, osteoarthritis ; see hpi

## 2020-09-23 NOTE — H&P PST ADULT - NEGATIVE NEUROLOGICAL SYMPTOMS
no transient paralysis/no weakness/no generalized seizures/no loss of sensation/no difficulty walking/no paresthesias

## 2020-09-23 NOTE — H&P PST ADULT - CORNEAL ABRASION RISK
history of prior corneal abrasion/Right eye "during surgery 2/2018, and also 3/2019" prn for dry eyes / h/o corneal abrasion with several surgeries/history of prior corneal abrasion

## 2020-09-23 NOTE — H&P PST ADULT - MUSCULOSKELETAL
details… detailed exam ROM intact/normal strength/no joint swelling/no calf tenderness ROM intact/right hand thumb  joint/no joint swelling/normal strength/no calf tenderness no joint swelling/decreased ROM due to pain/right hand thumb  joint

## 2020-09-23 NOTE — H&P PST ADULT - HISTORY OF PRESENT ILLNESS
Pt is a 65 y.o.  y/o female with h/o  asthma never intubated or hospitalized, multiple lung nodule since 2011 s/p left upper lobe segmentectomy , left fallopian tube ca, BRAC 1 positive s/p b/l mastectomy and hysterectomy.  Pt s/p  left nipple areolar reconstruction, bilateral fat grafting on 7/19. Pt reports pain right right hand @ " thumb joint " first noted 2016 ; s/p Injection x 3 ; pt f/u with surgeon ; pt now presents for Right Hand Basal Joint Arthroplasty

## 2020-09-23 NOTE — H&P PST ADULT - NSICDXPASTSURGICALHX_GEN_ALL_CORE_FT
PAST SURGICAL HISTORY:  History of breast surgery bilateral mastectomy with expanders,   exchange of bilateral expanders for implants 3/19  Left Nipple Areola Reconstructions, fat Grafting 7/19    Nasal bone fracture ORIF nasal fx- 2015    Papilloma of breast right breast excision & bx 2013    S/P adenoidectomy as a child    S/P arthroscopy of shoulder right April 2013    S/P blepharoplasty in 2008    S/P laparoscopic assisted vaginal hysterectomy (LAVH) Omentectomy, BSO - 2/2018  Chemotherapy x 4 months    S/P partial lobectomy of lung Left upper lobe - VATS - segmentectomy - 7/2017    Spleen laceration 7/2017 - transfused with 5 units PRBC

## 2020-09-23 NOTE — H&P PST ADULT - RS GEN PE MLT RESP DETAILS PC
airway patent/no wheezes/breath sounds equal/no rales/no rhonchi/clear to auscultation bilaterally/respirations non-labored

## 2020-09-23 NOTE — H&P PST ADULT - NEGATIVE OPHTHALMOLOGIC SYMPTOMS
no lacrimation L/no diplopia/no photophobia/no blurred vision L/no blurred vision R/no lacrimation R

## 2020-09-23 NOTE — H&P PST ADULT - NEGATIVE ENMT SYMPTOMS
no dry mouth/no throat pain/no dysphagia/no vertigo/no ear pain/no nasal congestion/no hearing difficulty/no tinnitus/no sinus symptoms

## 2020-09-25 DIAGNOSIS — Z01.812 ENCOUNTER FOR PREPROCEDURAL LABORATORY EXAMINATION: ICD-10-CM

## 2020-09-25 PROBLEM — I25.10 ATHEROSCLEROTIC HEART DISEASE OF NATIVE CORONARY ARTERY WITHOUT ANGINA PECTORIS: Chronic | Status: ACTIVE | Noted: 2017-07-03

## 2020-09-25 PROBLEM — C57.02: Chronic | Status: ACTIVE | Noted: 2018-10-12

## 2020-09-25 PROBLEM — L30.9 DERMATITIS, UNSPECIFIED: Chronic | Status: ACTIVE | Noted: 2017-07-03

## 2020-09-25 PROBLEM — C34.90 MALIGNANT NEOPLASM OF UNSPECIFIED PART OF UNSPECIFIED BRONCHUS OR LUNG: Chronic | Status: ACTIVE | Noted: 2017-12-28

## 2020-09-27 LAB — SARS-COV-2 N GENE NPH QL NAA+PROBE: NOT DETECTED

## 2020-09-29 DIAGNOSIS — Z01.818 ENCOUNTER FOR OTHER PREPROCEDURAL EXAMINATION: ICD-10-CM

## 2020-09-30 ENCOUNTER — APPOINTMENT (OUTPATIENT)
Dept: PULMONOLOGY | Facility: CLINIC | Age: 65
End: 2020-09-30
Payer: COMMERCIAL

## 2020-09-30 VITALS
SYSTOLIC BLOOD PRESSURE: 120 MMHG | HEIGHT: 64 IN | OXYGEN SATURATION: 98 % | HEART RATE: 64 BPM | RESPIRATION RATE: 16 BRPM | BODY MASS INDEX: 21.34 KG/M2 | WEIGHT: 125 LBS | TEMPERATURE: 98.4 F | DIASTOLIC BLOOD PRESSURE: 60 MMHG

## 2020-09-30 DIAGNOSIS — Z01.818 ENCOUNTER FOR OTHER PREPROCEDURAL EXAMINATION: ICD-10-CM

## 2020-09-30 PROCEDURE — 99214 OFFICE O/P EST MOD 30 MIN: CPT | Mod: 25

## 2020-09-30 PROCEDURE — 94729 DIFFUSING CAPACITY: CPT

## 2020-09-30 PROCEDURE — 94726 PLETHYSMOGRAPHY LUNG VOLUMES: CPT

## 2020-09-30 PROCEDURE — 94010 BREATHING CAPACITY TEST: CPT

## 2020-09-30 RX ORDER — ALBUTEROL SULFATE 90 UG/1
108 (90 BASE) AEROSOL, METERED RESPIRATORY (INHALATION)
Qty: 3 | Refills: 1 | Status: DISCONTINUED | COMMUNITY
Start: 2019-03-11 | End: 2020-09-30

## 2020-09-30 NOTE — ASSESSMENT
[FreeTextEntry1] : The plan for the patient is as follows:\par \par 1. Preoperative Clearance:\par - Ms. Pham is a 65 year old, former smoking, female.  She has history of allergies, GERD, asthma, VATS from  7/2017 c/w adenocarcinoma, chemotherapy for fallopian tube tumor, and breast surgery.  Patient's vital signs at visit today were WNL and her PFT's are at her baseline.  She is currently asymptomatic of any pulmonary symptoms.  She is considered low risk for post operative pulmonary issues.  \par \par 2: Mild persistent asthma: \par - Continue Ventolin 2 puffs Q6H PRN.\par - Add Advair (generic version RX'ed) 100-50 mcg/dose at 1 inhalation BID, rinse and gargle post use. \par \par Patient to have follow up visit with Dr. Dos Santos as scheduled for 11/3/20.\par Patient to call with further questions or concerns.\par Patient verbalizes understanding and is agreeable. \par

## 2020-09-30 NOTE — PROCEDURE
[FreeTextEntry1] : PFT's performed in office:\par FEV1: 74% predicted\par [id number]%: 33 % predicted\par DLCO: 83% predicted.\par \par There is a moderate obstructive lung defect.  The airway obstruction is confirmed by the decrease in flow rate at 50% & 75% of the flow volume curve.  Lung Volumes are WNL.  Diffusion capacity is WNL. \par \par Showed comparison of 3/2019 PFTs with today's and explained that she would likely benefit from maintenance inhaler.  Patient was on Advair in the past, but after discontinuing it she remained asymptomatic and felt as though she no longer needed it.

## 2020-09-30 NOTE — HISTORY OF PRESENT ILLNESS
[Former] : former [TextBox_4] : Ms. Pham is a 65 year old female with a history of abnormal chest CT, allergic rhinitis, asthma, GERD, lung nodule, malignant neoplasm of Brent, non-allergic rhinitis, and KIRA presenting to the office today for a follow up visit. \par \par Her chief concern is obtaining Pre-Op clearance for her upcoming procedure. \par \par She states she is having a right wrist arthroplasty on 10/5/20 with Dr. Foreign Gillespie and her PCP, Dr. Nicolas Frank, is requiring her to have pulmonary pre-operative clearance r/t her underlying history of Asthma.  Patient states she is in her normal state of health.  Patient is not currently on maintenance inhaler therapy and notes she has not needed her PRN/Rescue inhaler since the summer.  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  She states she has noticed if she switches her mask through out her shift she finds benefit.  She notes she has not needed her Ventolin in regards to symptoms.  She states they resolve on their own. \par \par She denies fever/chills, fatigue, decreased appetite, nasal or sinus congestion, cough, SOB @ rest or exertion, chest tightness, wheezing, or any other symptoms at this time.

## 2020-09-30 NOTE — PHYSICAL EXAM
[No Acute Distress] : no acute distress [Normal Oropharynx] : normal oropharynx [I] : Mallampati Class: I [No Neck Mass] : no neck mass [Normal Appearance] : normal appearance [Normal S1, S2] : normal s1, s2 [Normal Rate/Rhythm] : normal rate/rhythm [No Murmurs] : no murmurs [No Resp Distress] : no resp distress [No Abnormalities] : no abnormalities [Clear to Auscultation Bilaterally] : clear to auscultation bilaterally [Normal Gait] : normal gait [No Clubbing] : no clubbing [No Cyanosis] : no cyanosis [No Edema] : no edema [FROM] : FROM [Normal Color/ Pigmentation] : normal color/ pigmentation [No Focal Deficits] : no focal deficits [Oriented x3] : oriented x3 [Normal Affect] : normal affect

## 2020-10-01 ENCOUNTER — APPOINTMENT (OUTPATIENT)
Dept: DISASTER EMERGENCY | Facility: CLINIC | Age: 65
End: 2020-10-01

## 2020-10-01 LAB — SARS-COV-2 N GENE NPH QL NAA+PROBE: NOT DETECTED

## 2020-10-02 ENCOUNTER — APPOINTMENT (OUTPATIENT)
Dept: DISASTER EMERGENCY | Facility: CLINIC | Age: 65
End: 2020-10-02

## 2020-10-02 VITALS
OXYGEN SATURATION: 100 % | DIASTOLIC BLOOD PRESSURE: 51 MMHG | SYSTOLIC BLOOD PRESSURE: 100 MMHG | RESPIRATION RATE: 16 BRPM | HEIGHT: 64 IN | WEIGHT: 126.1 LBS | TEMPERATURE: 99 F | HEART RATE: 65 BPM

## 2020-10-04 ENCOUNTER — TRANSCRIPTION ENCOUNTER (OUTPATIENT)
Age: 65
End: 2020-10-04

## 2020-10-05 ENCOUNTER — RESULT REVIEW (OUTPATIENT)
Age: 65
End: 2020-10-05

## 2020-10-05 ENCOUNTER — OUTPATIENT (OUTPATIENT)
Dept: OUTPATIENT SERVICES | Facility: HOSPITAL | Age: 65
LOS: 1 days | Discharge: HOME | End: 2020-10-05
Payer: COMMERCIAL

## 2020-10-05 VITALS — TEMPERATURE: 97 F | RESPIRATION RATE: 15 BRPM

## 2020-10-05 DIAGNOSIS — Z90.2 ACQUIRED ABSENCE OF LUNG [PART OF]: Chronic | ICD-10-CM

## 2020-10-05 DIAGNOSIS — S36.039A UNSPECIFIED LACERATION OF SPLEEN, INITIAL ENCOUNTER: Chronic | ICD-10-CM

## 2020-10-05 DIAGNOSIS — D24.9 BENIGN NEOPLASM OF UNSPECIFIED BREAST: Chronic | ICD-10-CM

## 2020-10-05 DIAGNOSIS — M18.11 UNILATERAL PRIMARY OSTEOARTHRITIS OF FIRST CARPOMETACARPAL JOINT, RIGHT HAND: ICD-10-CM

## 2020-10-05 DIAGNOSIS — Z90.710 ACQUIRED ABSENCE OF BOTH CERVIX AND UTERUS: Chronic | ICD-10-CM

## 2020-10-05 DIAGNOSIS — Z98.890 OTHER SPECIFIED POSTPROCEDURAL STATES: Chronic | ICD-10-CM

## 2020-10-05 DIAGNOSIS — S02.2XXA FRACTURE OF NASAL BONES, INITIAL ENCOUNTER FOR CLOSED FRACTURE: Chronic | ICD-10-CM

## 2020-10-05 PROCEDURE — 88311 DECALCIFY TISSUE: CPT | Mod: 26

## 2020-10-05 PROCEDURE — 88304 TISSUE EXAM BY PATHOLOGIST: CPT | Mod: 26

## 2020-10-05 RX ORDER — TRAMADOL HYDROCHLORIDE 50 MG/1
1 TABLET ORAL
Qty: 16 | Refills: 0
Start: 2020-10-05 | End: 2020-10-08

## 2020-10-05 RX ORDER — SODIUM FLUORIDE 1.1 G/100G
0 GEL ORAL
Qty: 0 | Refills: 0 | DISCHARGE

## 2020-10-05 RX ORDER — ALPRAZOLAM 0.25 MG
1 TABLET ORAL
Qty: 0 | Refills: 0 | DISCHARGE

## 2020-10-05 RX ORDER — SIMVASTATIN 20 MG/1
1 TABLET, FILM COATED ORAL
Qty: 0 | Refills: 0 | DISCHARGE

## 2020-10-05 RX ORDER — ALBUTEROL 90 UG/1
2 AEROSOL, METERED ORAL
Qty: 0 | Refills: 0 | DISCHARGE

## 2020-10-05 RX ORDER — TRAMADOL HYDROCHLORIDE 50 MG/1
1 TABLET ORAL
Qty: 18 | Refills: 0
Start: 2020-10-05 | End: 2020-10-08

## 2020-10-05 RX ORDER — FLUTICASONE PROPIONATE AND SALMETEROL 50; 250 UG/1; UG/1
1 POWDER ORAL; RESPIRATORY (INHALATION)
Qty: 0 | Refills: 0 | DISCHARGE

## 2020-10-05 RX ORDER — ASPIRIN/CALCIUM CARB/MAGNESIUM 324 MG
0 TABLET ORAL
Qty: 0 | Refills: 0 | DISCHARGE

## 2020-10-05 RX ORDER — SUCRALFATE 1 G
1 TABLET ORAL
Qty: 0 | Refills: 0 | DISCHARGE

## 2020-10-05 RX ORDER — ALBUTEROL 90 UG/1
0 AEROSOL, METERED ORAL
Qty: 0 | Refills: 0 | DISCHARGE

## 2020-10-05 RX ORDER — ASCORBIC ACID 60 MG
1 TABLET,CHEWABLE ORAL
Qty: 0 | Refills: 0 | DISCHARGE

## 2020-10-05 RX ORDER — UBIDECARENONE 100 MG
0 CAPSULE ORAL
Qty: 0 | Refills: 0 | DISCHARGE

## 2020-10-05 RX ORDER — FAMOTIDINE 10 MG/ML
1 INJECTION INTRAVENOUS
Qty: 0 | Refills: 0 | DISCHARGE

## 2020-10-05 RX ORDER — OMEPRAZOLE 10 MG/1
1 CAPSULE, DELAYED RELEASE ORAL
Qty: 0 | Refills: 0 | DISCHARGE

## 2020-10-05 NOTE — ASU DISCHARGE PLAN (ADULT/PEDIATRIC) - CARE PROVIDER_API CALL
Foreign Gillespie)  Orthopaedic Surgery; Surgery of the Hand  [address], [address]  [place] [address]  Phone: [phone]  Fax: [phone]  Follow Up Time:

## 2020-10-06 ENCOUNTER — TRANSCRIPTION ENCOUNTER (OUTPATIENT)
Age: 65
End: 2020-10-06

## 2020-10-12 ENCOUNTER — TRANSCRIPTION ENCOUNTER (OUTPATIENT)
Age: 65
End: 2020-10-12

## 2020-10-12 ENCOUNTER — RESULT REVIEW (OUTPATIENT)
Age: 65
End: 2020-10-12

## 2020-10-12 ENCOUNTER — OUTPATIENT (OUTPATIENT)
Dept: OUTPATIENT SERVICES | Facility: HOSPITAL | Age: 65
LOS: 1 days | End: 2020-10-12
Payer: COMMERCIAL

## 2020-10-12 ENCOUNTER — APPOINTMENT (OUTPATIENT)
Dept: CT IMAGING | Facility: IMAGING CENTER | Age: 65
End: 2020-10-12
Payer: COMMERCIAL

## 2020-10-12 DIAGNOSIS — R93.89 ABNORMAL FINDINGS ON DIAGNOSTIC IMAGING OF OTHER SPECIFIED BODY STRUCTURES: ICD-10-CM

## 2020-10-12 DIAGNOSIS — S02.2XXA FRACTURE OF NASAL BONES, INITIAL ENCOUNTER FOR CLOSED FRACTURE: Chronic | ICD-10-CM

## 2020-10-12 DIAGNOSIS — S36.039A UNSPECIFIED LACERATION OF SPLEEN, INITIAL ENCOUNTER: Chronic | ICD-10-CM

## 2020-10-12 DIAGNOSIS — Z98.890 OTHER SPECIFIED POSTPROCEDURAL STATES: Chronic | ICD-10-CM

## 2020-10-12 DIAGNOSIS — D24.9 BENIGN NEOPLASM OF UNSPECIFIED BREAST: Chronic | ICD-10-CM

## 2020-10-12 DIAGNOSIS — Z90.2 ACQUIRED ABSENCE OF LUNG [PART OF]: Chronic | ICD-10-CM

## 2020-10-12 DIAGNOSIS — Z90.710 ACQUIRED ABSENCE OF BOTH CERVIX AND UTERUS: Chronic | ICD-10-CM

## 2020-10-12 PROCEDURE — 71250 CT THORAX DX C-: CPT | Mod: 26

## 2020-10-12 PROCEDURE — 71250 CT THORAX DX C-: CPT

## 2020-10-14 ENCOUNTER — TRANSCRIPTION ENCOUNTER (OUTPATIENT)
Age: 65
End: 2020-10-14

## 2020-10-17 LAB — SURGICAL PATHOLOGY STUDY: SIGNIFICANT CHANGE UP

## 2020-10-29 ENCOUNTER — APPOINTMENT (OUTPATIENT)
Dept: PULMONOLOGY | Facility: CLINIC | Age: 65
End: 2020-10-29
Payer: COMMERCIAL

## 2020-10-29 VITALS
BODY MASS INDEX: 21.34 KG/M2 | WEIGHT: 125 LBS | DIASTOLIC BLOOD PRESSURE: 60 MMHG | SYSTOLIC BLOOD PRESSURE: 110 MMHG | HEART RATE: 68 BPM | HEIGHT: 64 IN | OXYGEN SATURATION: 98 % | RESPIRATION RATE: 17 BRPM | TEMPERATURE: 97.2 F

## 2020-10-29 PROCEDURE — 99214 OFFICE O/P EST MOD 30 MIN: CPT

## 2020-10-29 PROCEDURE — 99072 ADDL SUPL MATRL&STAF TM PHE: CPT

## 2020-10-29 NOTE — HISTORY OF PRESENT ILLNESS
[FreeTextEntry1] : Ms. Pham is a 65 year old female with a history of abnormal chest CT, allergic rhinitis, asthma, GERD,  lung nodule, malignant neoplasm of Brent, non-allergic rhinitis, and KIRA presenting to the office today for a follow up visit. Her chief complaint is \par \par -she notes currently on medical leave from work for right wrist issue until late 11/2020\par -she notes energy levels are high\par -she notes regular bowel movements \par -she notes sleeping well\par -she notes sleeping 7 hours a night on average\par -she notes exercising regularly walking\par -she notes back discomfort improved\par -she notes SOB exacerbated by N95 mask use\par -she denies visual issues\par -she notes senses of smell and taste are good \par \par \par -denies any chest pain, chest pressure, diarrhea, constipation, dysphagia, dizziness, leg swelling, leg pain, myalgias, arthralgias, itchy eyes, itchy ears, heartburn, reflux, or sour taste in the mouth.\par \par

## 2020-10-29 NOTE — ADDENDUM
[FreeTextEntry1] : Documented by Bright Persaud acting as a scribe for Dr. Naveen Dos Santos on 10/29/2020.\par \par All medical record entries made by the Scribe were at my, Dr. Naveen Dos Santos's, direction and personally dictated by me on 10/29/2020 . I have reviewed the chart and agree that the record accurately reflects my personal performance of the history, physical exam, assessment and plan. I have also personally directed, reviewed, and agree with the discharge instructions. \par

## 2020-10-29 NOTE — PROCEDURE
[FreeTextEntry1] : CT Chest (10.12.2020) reveals  status post upper lobe wedge resection without evidence of new or recurrent disease.

## 2020-10-29 NOTE — ASSESSMENT
[FreeTextEntry1] : Ms. Pham is a 64 y/o female with a history of allergies, GERD, KIRA, and asthma. She is s/p VATS procedure with Dr. Feliciano scheduled for 7/12/17 c/w adenocarcinoma with acinar predominant complicated by splenic laceration and ICU stay. She is s/p chemotherapy for fallopian tube tumor. She is s/p breast surgery 10/18 and is now s/p breast reconstruction surgery and s/p additional plastic surgery. #1 in R wrist recovery\par \par \par Her history of chronic cough is multifactorial due to: (resolved)\par -mild persistent asthma\par -post nasal drip syndrome\par -GERD\par \par problem 1: mild persistent asthma (stable)\par -continue Advair 100 at 1 inhalation BID\par -continue Ventolin 2 puffs q6H, prn\par -continue Singulair 10 mg QHS \par -Asthma is believed to be caused by inherited (genetic) and environmental factor, but its exact cause is unknown. Asthma may be triggered by allergens, lung infections, or irritants in the air. Asthma triggers are different for each person\par -Inhaler technique reviewed as well as oral hygiene techniques reviewed with patient. Avoidance of cold air, extremes of temperature, rescue inhaler should be used before exercise. Order of medication reviewed with patient. Recommended use of a cool mist humidifier in the bedroom.\par \par problem 2: post nasal drip syndrome (stable) \par -continue to use Zaditor eye drops\par -continue Olopatadine 0.6% 1 sniff BID \par - Continue Atrovent 0.6% at 1 sniff/nostril, up to TID \par -continue to use OTC antihistamine - add Loratadine 10 mg QHS\par -Environmental measures for allergies were encouraged including mattress and pillow cover, air purifier, and environmental controls.\par \par problem 3: GERD (active) #1\par -continue to use Pepcid 40mg BID / Carafate 1mg before meals QHS; Prilosec 40 AM\par \par -Rule of 2s: avoid eating too much, eating too late, eating too spicy, eating two hours before bed\par -Things to avoid including overeating, spicy foods, tight clothing, eating within three hours of bed, this list is not all inclusive. \par -For treatment of reflux, possible options discussed including diet control, H2 blockers, PPIs, as well as coating motility agents discussed as treatment options. Timing of meals and proximity of last meal to sleep were discussed. If symptoms persist, a formal gastrointestinal evaluation is needed.\par \par problem 4: OSAS \par -no longer present secondary to weight loss and positional sleep\par -Sleep apnea is associated with adverse clinical consequences which an affect most organ systems. Cardiovascular disease risk includes arrhythmias, atrial fibrillation, hypertension, coronary artery disease, and stroke. Metabolic disorders include diabetes type 2, non-alcoholic fatty liver disease. Mood disorder especially depression; and cognitive decline especially in the elderly. Associations with chronic reflux/Perkins’s esophagus some but not all inclusive. \par -Reasons to assess this include arousal consistent with hypopnea; respiratory events most prominent in REM sleep or supine position; therefore sleep staging and body position are important for accurate diagnosis and estimation of AHI. \par \par problem 5: stage IA lung cancer, adenocarcinoma\par -she is s/p segmentectomy\par -follow up chest CT with Dr. Momin (last 10/2020, next 4/2021)\par \par Problem 6a: COVID-19 precautionary Immune Support Recommendations:\par -OTC Vitamin C 500mg BID \par -OTC Quercetin 250-500mg BID \par -OTC Zinc 75-100mg per day \par -OTC Melatonin 1 or 2mg a night \par -OTC Vitamin D 1-4000mg per day \par -OTC Tonic Water 8oz per day\par -Water 0.5-1 gallon per day\par \par problem 6: health maintenance \par - She has received an influenza vaccine 2020\par -recommended strep pneumonia vaccines: Prevnar-13 vaccine, followed by Pneumo vaccine 23 on year following\par -recommended early intervention for URIs\par -recommended osteoporosis evaluations\par -recommended early dermatological evaluations\par -recommended after the age of 50 to the age of 70, colonoscopy every 5 years\par -encouraged early intervention\par \par F/U in 3 months\par She is encouraged to call with any changes, concerns, or questions.

## 2020-10-29 NOTE — PHYSICAL EXAM
[No Acute Distress] : no acute distress [Well Nourished] : well nourished [Well Groomed] : well groomed [No Deformities] : no deformities [Well Developed] : well developed [Normal Oropharynx] : normal oropharynx [Normal Appearance] : normal appearance [No Neck Mass] : no neck mass [Normal Rate/Rhythm] : normal rate/rhythm [Normal S1, S2] : normal s1, s2 [No Murmurs] : no murmurs [No Resp Distress] : no resp distress [Clear to Auscultation Bilaterally] : clear to auscultation bilaterally [No Abnormalities] : no abnormalities [Benign] : benign [Normal Gait] : normal gait [No Clubbing] : no clubbing [No Cyanosis] : no cyanosis [No Edema] : no edema [FROM] : FROM [Normal Color/ Pigmentation] : normal color/ pigmentation [No Focal Deficits] : no focal deficits [Oriented x3] : oriented x3 [Normal Affect] : normal affect [II] : Mallampati Class: II [TextBox_68] : I:E ratio 1:3; clear

## 2020-11-03 ENCOUNTER — APPOINTMENT (OUTPATIENT)
Dept: PULMONOLOGY | Facility: CLINIC | Age: 65
End: 2020-11-03

## 2020-12-08 ENCOUNTER — TRANSCRIPTION ENCOUNTER (OUTPATIENT)
Age: 65
End: 2020-12-08

## 2020-12-14 ENCOUNTER — APPOINTMENT (OUTPATIENT)
Dept: SURGERY | Facility: CLINIC | Age: 65
End: 2020-12-14
Payer: COMMERCIAL

## 2020-12-14 ENCOUNTER — APPOINTMENT (OUTPATIENT)
Dept: DERMATOLOGY | Facility: CLINIC | Age: 65
End: 2020-12-14
Payer: COMMERCIAL

## 2020-12-14 VITALS — BODY MASS INDEX: 21.85 KG/M2 | HEIGHT: 64 IN | WEIGHT: 128 LBS

## 2020-12-14 DIAGNOSIS — L65.9 NONSCARRING HAIR LOSS, UNSPECIFIED: ICD-10-CM

## 2020-12-14 DIAGNOSIS — L28.0 LICHEN SIMPLEX CHRONICUS: ICD-10-CM

## 2020-12-14 PROCEDURE — 99072 ADDL SUPL MATRL&STAF TM PHE: CPT

## 2020-12-14 PROCEDURE — 99214 OFFICE O/P EST MOD 30 MIN: CPT

## 2020-12-14 PROCEDURE — 99213K: CUSTOM

## 2020-12-14 RX ORDER — CLOBETASOL PROPIONATE 0.5 MG/G
0.05 OINTMENT TOPICAL
Qty: 1 | Refills: 3 | Status: ACTIVE | COMMUNITY
Start: 2020-12-14 | End: 1900-01-01

## 2020-12-16 ENCOUNTER — RX RENEWAL (OUTPATIENT)
Age: 65
End: 2020-12-16

## 2021-01-01 NOTE — ASU PREOP CHECKLIST - NS PREOP CHK HIBICLENS NA
Delivery Notes    Age: 0 days Corrected Gest. Age:  38w 3d   Sex: male Admit Attending: Gil Zimmerman MD   GOSIA:  Gestational Age: 38w3d BW: 2930 g (6 lb 7.4 oz)     Maternal Information:     Mother's Name: Juana Ibrahim   Age: 31 y.o.     ABO Type   Date Value Ref Range Status   2021 O  Final     RH type   Date Value Ref Range Status   2021 Positive  Final     Antibody Screen   Date Value Ref Range Status   2021 Negative  Final     External Gonorrhea Screen   Date Value Ref Range Status   2021 Negative  Final     External Chlamydia Screen   Date Value Ref Range Status   2021 Negative  Final     External RPR   Date Value Ref Range Status   2021 Negative  Final     External Rubella Qual   Date Value Ref Range Status   2021 Immune  Final      External Hepatitis B Surface Ag   Date Value Ref Range Status   2021 Negative  Final     External HIV Antibody   Date Value Ref Range Status   2021 Non-Reactive  Final     External Hepatitis C Ab   Date Value Ref Range Status   2021 Negative  Final     External Strep Group B Ag   Date Value Ref Range Status   2021 Positive  Final      Amphetamine Screen, Urine   Date Value Ref Range Status   2021 Negative Negative Final     Barbiturates Screen, Urine   Date Value Ref Range Status   2021 Negative Negative Final     Benzodiazepine Screen, Urine   Date Value Ref Range Status   2021 Negative Negative Final     Methadone Screen, Urine   Date Value Ref Range Status   2021 Negative Negative Final     Phencyclidine (PCP), Urine   Date Value Ref Range Status   2021 Negative Negative Final     Opiate Screen   Date Value Ref Range Status   2021 Negative Negative Final     THC, Screen, Urine   Date Value Ref Range Status   2021 Positive (A) Negative Final     Propoxyphene Screen   Date Value Ref Range Status   2021 Negative Negative Final     Buprenorphine, Screen, Urine    Date Value Ref Range Status   2021 Negative Negative Final     Methamphetamine, Ur   Date Value Ref Range Status   2021 Negative Negative Final     Oxycodone Screen, Urine   Date Value Ref Range Status   2021 Negative Negative Final     Tricyclic Antidepressants Screen   Date Value Ref Range Status   2021 Negative Negative Final          GBS: @lLASTLAB(STREPGPB)@       Patient Active Problem List   Diagnosis   • Pregnancy   • Normal labor         Mother's Past Medical and Social History:     Maternal /Para:      Maternal PMH:    Past Medical History:   Diagnosis Date   • Abnormal Pap smear of cervix    • Asthma    • Herpes     type 1 and type 2   • Ovarian cyst    • Pregnant    • Pregnant    • Urogenital trichomoniasis     positive 21 and 21        Maternal Social History:    Social History     Socioeconomic History   • Marital status: Single     Spouse name: Not on file   • Number of children: Not on file   • Years of education: Not on file   • Highest education level: Not on file   Tobacco Use   • Smoking status: Former Smoker     Packs/day: 0.50     Types: Cigarettes     Quit date: 2017     Years since quittin.3   • Smokeless tobacco: Never Used   Substance and Sexual Activity   • Alcohol use: No   • Drug use: No   • Sexual activity: Defer        Mother's Current Medications     Meds Administered:    lidocaine-EPINEPHrine (XYLOCAINE W/EPI) 1.5 %-[id number] injection     Date Action Dose Route User    2021 0723 Given 3 mL Epidural Raymond Barone CRNA      ropivacaine (NAROPIN) 200 mg in 100 mL epidural     Date Action Dose Route User    2021 0731 New Bag 10 mL/hr Epidural Raymond Barone CRNA      ePHEDrine injection 10 mg     Date Action Dose Route User    2021 0811 Given 10 mg Intravenous Marsha Tubbs RN    2021 0752 Given 10 mg Intravenous Marsha Tubbs RN      hetastarch 6% in 0.9% NaCl infusion 500 mL (HESPAN) infusion  500 mL     Date Action Dose Route User    2021 1131 New Bag 500 mL Intravenous Masrha Tubbs RN      lactated ringers bolus 1,000 mL     Date Action Dose Route User    2021 0657 New Bag 1,000 mL Intravenous Ebony Deleon RN      lactated ringers infusion     Date Action Dose Route User    2021 0750 New Bag 125 mL/hr Intravenous Marsha Tubbs RN    2021 2335 New Bag 125 mL/hr Intravenous Alla Ojeda RN      ondansetron (ZOFRAN) injection 4 mg     Date Action Dose Route User    2021 0714 Given 4 mg Intravenous Ebony Deleon RN      oxytocin (PITOCIN) 30 units in 0.9% sodium chloride 500 mL (premix)     Date Action Dose Route User    2021 1309 Rate/Dose Change 999 mL/hr Intravenous Marsha Tubbs RN      oxytocin (PITOCIN) 30 units in 0.9% sodium chloride 500 mL (premix)     Date Action Dose Route User    2021 1350 New Bag 250 mL/hr Intravenous Marsha Tubbs RN      oxytocin (PITOCIN) 30 units in 0.9% sodium chloride 500 mL (premix)     Date Action Dose Route User    2021 1232 Rate/Dose Change 12 filiberto-units/min Intravenous Marsha Tubbs RN    2021 1159 Rate/Dose Change 10 filiberto-units/min Intravenous Marsha Tubbs, ZAHEER    2021 1030 Rate/Dose Change 8 filiberto-units/min Intravenous Marsha Tubbs RN    2021 0630 Rate/Dose Change 6 filiberto-units/min Intravenous Ebony Deleon, RN    2021 0559 Rate/Dose Change 4 filiberto-units/min Intravenous Ebony Deleon, RN    2021 0524 New Bag 2 filiberto-units/min Intravenous Ebony Deleon RN      penicillin G potassium 5 Million Units in sodium chloride 0.9 % 100 mL IVPB-VTB     Date Action Dose Route User    2021 2343 Given 5 Million Units Intravenous Alla Ojeda RN      penicillin G in iso-osmotic dextrose IVPB 3 million units (premix)     Date Action Dose Route User    2021 0802 New Bag 3 Million Units Intravenous Marsha Tubbs, RN     2021 0359 New Bag 3 Million Units Intravenous Ebony Deleon RN      ropivacaine (NAROPIN) 0.2 % injection     Date Action Dose Route User    2021 0728 Given 5 mL Epidural Raymond Barone CRNA      ropivacaine (NAROPIN) 0.5 % injection     Date Action Dose Route User    2021 0728 Given 5 mL Epidural Raymond Barone CRNA           Labor Information:     Labor Events      labor: No Induction:       Steroids?  None Reason for Induction:      Rupture date:  2021 Labor Complications:  Abruptio Placenta   Rupture time:  8:30 AM Additional Complications:      Rupture type:  spontaneous rupture of membranes    Fluid Color:  Clear    Antibiotics during Labor?  Yes      Anesthesia     Method: Epidural       Delivery Information for Emilia Ibrahim     YOB: 2020 Delivery Clinician:  YASMINE KANG   Time of birth:  1:07 PM Delivery type: Vaginal, Spontaneous   Forceps:     Vacuum:No      Breech:      Presentation/position: Vertex;         Observations, Comments::    Indication for C/Section:       Priority for C/Section:         Delivery Complications:       APGAR SCORES           APGARS  One minute Five minutes Ten minutes Fifteen minutes Twenty minutes   Skin color: 0   1             Heart rate: 2   2             Grimace: 2   2              Muscle tone: 2   2              Breathin   2              Totals: 8   9                Resuscitation     Method: Tactile Stimulation   Comment:       Suction: bulb syringe   O2 Duration:     Percentage O2 used:         Delivery Summary:     Called by delivering OB to attend  Spontaneous Vaginal Delivery for non reassuring fetal status out of concern for possible abruption with decreased heart rate in baby. 38w 3d gestation. Labor was spontaneous. ROM x 4 hrs. Amniotic fluid was Clear.  Resuscitation included stimulation and oral suctioning.  Physical exam was normal. The infant was transferred to  nursery.   At birth, infant with good tone and cry at perineum.  Infant bulb suctioned there, copious blood present.  Infant brought to warmer with good tone and cry.  Heart rate > 100.  Infant bulb suctioned with return of blood from mouth and nares.  Routine resuscitation given.  Infant with good oxygen saturations > 90% before 5 minutes.  Umbilical cord arterial gas: 7.18/57/16.8/-7.7, Umbilical venous cord gas: 7.23/49/20.7/-7  ADT notified to obtain ABG on baby.    Quentin Zaragoza MD  2021  13:54 CDT     #1:

## 2021-01-05 NOTE — ASU PREOP CHECKLIST - HAIR REMOVAL
Immediate Post OP Note    PreOp Diagnosis: Malignant Melanoma of Right Occipital Scalp    PostOp Diagnosis: same    Procedure(s):  BIOPSY, LYMPH NODE, SENTINEL - RIGHT  OCCIPITAL ( RADIONUCLIDE WITH IMAGING) - Wound Class: Clean  WIDE EXCISION, LESION - POSTERIOR SCALP MALIGNANT MELANOMA - Wound Class: Clean  FLAP GRAFT - AFTER MELANOMA EXCISION ... - Wound Class: Clean    Surgeon(s):  CHRISTOFER Delaney Jr., M.D.    Anesthesiologist/Type of Anesthesia:  Anesthesiologist: Dunia Porter M.D./General    Surgical Staff:  Assistant: CHA Frazier  Circulator: Rita Campos R.N.  Scrub Person: Damian hTo    Specimens removed if any:  ID Type Source Tests Collected by Time Destination   A : right occipital sentinal lymph node  Other Other PATHOLOGY SPECIMEN Tom Camargo M.D. 1/5/2021  2:49 PM    B : malignant melanoma of the right posterior occipitus. short superior towards forhead, long lateral  Other Other PATHOLOGY SPECIMEN Tom Camargo M.D. 1/5/2021  3:00 PM    C : additional margins- superior lateral suture marks new margin  Other Other PATHOLOGY SPECIMEN Tom Camargo M.D. 1/5/2021  3:29 PM        Estimated Blood Loss: 100 cc's    Findings: previous melanoma site identified in right occiput and 2cm minimal margin obtained around previous biopsy site, additional superior medial margin obtained; occipital sentinel nodes removed    Complications: no apparent complications        1/5/2021 3:46 PM Tom Camargo M.D.    
hair removal not indicated

## 2021-01-21 ENCOUNTER — OUTPATIENT (OUTPATIENT)
Dept: OUTPATIENT SERVICES | Facility: HOSPITAL | Age: 66
LOS: 1 days | Discharge: HOME | End: 2021-01-21

## 2021-01-21 DIAGNOSIS — D24.9 BENIGN NEOPLASM OF UNSPECIFIED BREAST: Chronic | ICD-10-CM

## 2021-01-21 DIAGNOSIS — Z90.710 ACQUIRED ABSENCE OF BOTH CERVIX AND UTERUS: Chronic | ICD-10-CM

## 2021-01-21 DIAGNOSIS — Z98.890 OTHER SPECIFIED POSTPROCEDURAL STATES: Chronic | ICD-10-CM

## 2021-01-21 DIAGNOSIS — Z90.2 ACQUIRED ABSENCE OF LUNG [PART OF]: Chronic | ICD-10-CM

## 2021-01-21 DIAGNOSIS — C57.00 MALIGNANT NEOPLASM OF UNSPECIFIED FALLOPIAN TUBE: ICD-10-CM

## 2021-01-21 DIAGNOSIS — S02.2XXA FRACTURE OF NASAL BONES, INITIAL ENCOUNTER FOR CLOSED FRACTURE: Chronic | ICD-10-CM

## 2021-01-21 DIAGNOSIS — S36.039A UNSPECIFIED LACERATION OF SPLEEN, INITIAL ENCOUNTER: Chronic | ICD-10-CM

## 2021-01-25 ENCOUNTER — TRANSCRIPTION ENCOUNTER (OUTPATIENT)
Age: 66
End: 2021-01-25

## 2021-01-26 ENCOUNTER — RESULT REVIEW (OUTPATIENT)
Age: 66
End: 2021-01-26

## 2021-01-26 ENCOUNTER — APPOINTMENT (OUTPATIENT)
Dept: HEMATOLOGY ONCOLOGY | Facility: CLINIC | Age: 66
End: 2021-01-26
Payer: COMMERCIAL

## 2021-01-26 ENCOUNTER — APPOINTMENT (OUTPATIENT)
Dept: GYNECOLOGIC ONCOLOGY | Facility: CLINIC | Age: 66
End: 2021-01-26
Payer: COMMERCIAL

## 2021-01-26 VITALS
WEIGHT: 125 LBS | BODY MASS INDEX: 21.34 KG/M2 | HEIGHT: 64 IN | SYSTOLIC BLOOD PRESSURE: 93 MMHG | HEART RATE: 69 BPM | DIASTOLIC BLOOD PRESSURE: 60 MMHG

## 2021-01-26 LAB
BASOPHILS # BLD AUTO: 0.03 K/UL — SIGNIFICANT CHANGE UP (ref 0–0.2)
BASOPHILS NFR BLD AUTO: 0.6 % — SIGNIFICANT CHANGE UP (ref 0–2)
EOSINOPHIL # BLD AUTO: 0.18 K/UL — SIGNIFICANT CHANGE UP (ref 0–0.5)
EOSINOPHIL NFR BLD AUTO: 3.5 % — SIGNIFICANT CHANGE UP (ref 0–6)
HCT VFR BLD CALC: 37 % — SIGNIFICANT CHANGE UP (ref 34.5–45)
HGB BLD-MCNC: 12 G/DL — SIGNIFICANT CHANGE UP (ref 11.5–15.5)
IMM GRANULOCYTES NFR BLD AUTO: 0.2 % — SIGNIFICANT CHANGE UP (ref 0–1.5)
LYMPHOCYTES # BLD AUTO: 2.06 K/UL — SIGNIFICANT CHANGE UP (ref 1–3.3)
LYMPHOCYTES # BLD AUTO: 40.4 % — SIGNIFICANT CHANGE UP (ref 13–44)
MCHC RBC-ENTMCNC: 28.5 PG — SIGNIFICANT CHANGE UP (ref 27–34)
MCHC RBC-ENTMCNC: 32.4 G/DL — SIGNIFICANT CHANGE UP (ref 32–36)
MCV RBC AUTO: 87.9 FL — SIGNIFICANT CHANGE UP (ref 80–100)
MONOCYTES # BLD AUTO: 0.47 K/UL — SIGNIFICANT CHANGE UP (ref 0–0.9)
MONOCYTES NFR BLD AUTO: 9.2 % — SIGNIFICANT CHANGE UP (ref 2–14)
NEUTROPHILS # BLD AUTO: 2.35 K/UL — SIGNIFICANT CHANGE UP (ref 1.8–7.4)
NEUTROPHILS NFR BLD AUTO: 46.1 % — SIGNIFICANT CHANGE UP (ref 43–77)
NRBC # BLD: 0 /100 WBCS — SIGNIFICANT CHANGE UP (ref 0–0)
PLATELET # BLD AUTO: 207 K/UL — SIGNIFICANT CHANGE UP (ref 150–400)
RBC # BLD: 4.21 M/UL — SIGNIFICANT CHANGE UP (ref 3.8–5.2)
RBC # FLD: 13.4 % — SIGNIFICANT CHANGE UP (ref 10.3–14.5)
WBC # BLD: 5.1 K/UL — SIGNIFICANT CHANGE UP (ref 3.8–10.5)
WBC # FLD AUTO: 5.1 K/UL — SIGNIFICANT CHANGE UP (ref 3.8–10.5)

## 2021-01-26 PROCEDURE — 99072 ADDL SUPL MATRL&STAF TM PHE: CPT

## 2021-01-26 PROCEDURE — 99213 OFFICE O/P EST LOW 20 MIN: CPT | Mod: 95

## 2021-01-26 PROCEDURE — 99213 OFFICE O/P EST LOW 20 MIN: CPT

## 2021-01-26 NOTE — PHYSICAL EXAM
[Absent] : Adnexa(ae): Absent [Normal] : Recto-Vaginal Exam: Normal [Fully active, able to carry on all pre-disease performance without restriction] : Status 0 - Fully active, able to carry on all pre-disease performance without restriction [de-identified] : LSC scars

## 2021-01-26 NOTE — HISTORY OF PRESENT ILLNESS
[FreeTextEntry1] : 66 yo woman with a BRCA 1 deleterious mutation. \par Initially scheduled for RR LSC BSO in conjunction with prophylactic mastectomy on 1/9/18. 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 \par She underwent a TLH, BSO, omentectomy, P&PA LND and staging and breast surgery was deferred. \par Postoperative recuperation was unremarkable. 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 \par \par She completed 6 cycles of Carbo and Taxol given on a q3 week schedule 2/5/18 - 5/24/18. \par \par  was 89 at diagnosis and is now 11.\par Imaging 1/4/19 was [facility]. \par \par She has a history of early stage lung cancer treated with surgery. Surgery was complicated by splenic laceration and ICU stay. \par She had her prophylactic mastectomy last October and pathology was all benign. Has permanent implants now. \par \par 4/9: Telemed visit: Patient is doing well. 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 (Usually works in [employer]). She has been social isolating. She is terrified about going back to her usual role and being exposed to COVID as she is in the high risk for severe disease group.  She reports no new disease associated symptoms such as cough, SOB, abdominal/pelvic pain, bleeding. She is taking Macrobid for a UTI. No culture but has urine dip sticks and results were consistent with UTI. Has had 3 UTIs over the last year or so. All responded to oral antibiotics. \par \par 7/21/20: Doing well. No new disease associated symptoms.  10 today. \par \par 1/26/21: Doing well. No new disease associated symptoms.  drawn today, results pending.

## 2021-01-27 NOTE — HISTORY OF PRESENT ILLNESS
[Disease: _____________________] : Disease: [unfilled] [AJCC Stage: ____] : AJCC Stage: [unfilled] [Home] : at home, [unfilled] , at the time of the visit. [Medical Office: ([facility])___] : at the medical office located in  [Verbal consent obtained from patient] : the patient, [unfilled] [de-identified] : Ms. Pham is a 62 year old  postmenopausal who presents for discussion regarding treatment for fallopian tube cancer, and she has BRCA 1 pathogenic mutation.\par \par Ms. Pham has a personal history of stage IA adenocarcinoma of the lung(eGFR positive) s/p left VATS (2017, [facility]) c/b splenic laceration requiring ICU stay, and breast papilloma s/p excision (2013, [facility]), and HGSIL s/p LEEP 1996. Past GYN history significant for one episode of postmenopausal bleeding at the age of 49 which was ultimately determined to be a menstrual cycle. No history of uterine fibroids or ovarian cysts. Was on oral contraception from her early 40s until just a few weeks ago. \par \par Ms. Pham has a pertinent family history significant for breast (paternal cousin, paternal aunt) and ovarian (paternal cousin) cancer. \par Due to her high risk, she opted to go for risk reducing BSO and double mastectomy on 1/9/18.\par The frozen section showed malignancy and she underwent full surgical staging and mastectomy was postponed.\par Pathology: stage IC fallopian tube cancer.\par Patient has fully recovered from surgery. [de-identified] : Patient feel swell .\par She is working full time. She states her hair never grew back fully and she has some hair thinning / bald spots for which she is using Rogaine.

## 2021-01-28 LAB
ALBUMIN SERPL ELPH-MCNC: 4.3 G/DL
ALP BLD-CCNC: 63 U/L
ALT SERPL-CCNC: 20 U/L
ANION GAP SERPL CALC-SCNC: 12 MMOL/L
AST SERPL-CCNC: 22 U/L
BILIRUB SERPL-MCNC: 0.6 MG/DL
BUN SERPL-MCNC: 20 MG/DL
CALCIUM SERPL-MCNC: 9.5 MG/DL
CANCER AG125 SERPL-ACNC: 10 U/ML
CHLORIDE SERPL-SCNC: 105 MMOL/L
CO2 SERPL-SCNC: 23 MMOL/L
CREAT SERPL-MCNC: 0.72 MG/DL
GLUCOSE SERPL-MCNC: 88 MG/DL
POTASSIUM SERPL-SCNC: 4.4 MMOL/L
PROT SERPL-MCNC: 6.1 G/DL
SODIUM SERPL-SCNC: 139 MMOL/L

## 2021-02-08 ENCOUNTER — TRANSCRIPTION ENCOUNTER (OUTPATIENT)
Age: 66
End: 2021-02-08

## 2021-02-13 ENCOUNTER — APPOINTMENT (OUTPATIENT)
Dept: RADIOLOGY | Facility: IMAGING CENTER | Age: 66
End: 2021-02-13
Payer: COMMERCIAL

## 2021-02-13 ENCOUNTER — OUTPATIENT (OUTPATIENT)
Dept: OUTPATIENT SERVICES | Facility: HOSPITAL | Age: 66
LOS: 1 days | End: 2021-02-13
Payer: COMMERCIAL

## 2021-02-13 DIAGNOSIS — Z90.710 ACQUIRED ABSENCE OF BOTH CERVIX AND UTERUS: Chronic | ICD-10-CM

## 2021-02-13 DIAGNOSIS — Z98.890 OTHER SPECIFIED POSTPROCEDURAL STATES: Chronic | ICD-10-CM

## 2021-02-13 DIAGNOSIS — S02.2XXA FRACTURE OF NASAL BONES, INITIAL ENCOUNTER FOR CLOSED FRACTURE: Chronic | ICD-10-CM

## 2021-02-13 DIAGNOSIS — S36.039A UNSPECIFIED LACERATION OF SPLEEN, INITIAL ENCOUNTER: Chronic | ICD-10-CM

## 2021-02-13 DIAGNOSIS — Z90.2 ACQUIRED ABSENCE OF LUNG [PART OF]: Chronic | ICD-10-CM

## 2021-02-13 DIAGNOSIS — Z00.8 ENCOUNTER FOR OTHER GENERAL EXAMINATION: ICD-10-CM

## 2021-02-13 DIAGNOSIS — D24.9 BENIGN NEOPLASM OF UNSPECIFIED BREAST: Chronic | ICD-10-CM

## 2021-02-13 PROCEDURE — 73630 X-RAY EXAM OF FOOT: CPT | Mod: 26,LT

## 2021-02-13 PROCEDURE — 73630 X-RAY EXAM OF FOOT: CPT

## 2021-03-10 ENCOUNTER — APPOINTMENT (OUTPATIENT)
Dept: PULMONOLOGY | Facility: CLINIC | Age: 66
End: 2021-03-10
Payer: COMMERCIAL

## 2021-03-10 VITALS
WEIGHT: 125 LBS | RESPIRATION RATE: 17 BRPM | OXYGEN SATURATION: 98 % | HEIGHT: 64 IN | BODY MASS INDEX: 21.34 KG/M2 | DIASTOLIC BLOOD PRESSURE: 60 MMHG | SYSTOLIC BLOOD PRESSURE: 110 MMHG | HEART RATE: 71 BPM | TEMPERATURE: 97.9 F

## 2021-03-10 PROCEDURE — 99214 OFFICE O/P EST MOD 30 MIN: CPT

## 2021-03-10 PROCEDURE — 99072 ADDL SUPL MATRL&STAF TM PHE: CPT

## 2021-03-10 RX ORDER — OLOPATADINE HYDROCHLORIDE 2 MG/ML
0.2 SOLUTION OPHTHALMIC
Qty: 3 | Refills: 1 | Status: ACTIVE | COMMUNITY
Start: 2019-07-02 | End: 1900-01-01

## 2021-03-10 NOTE — ADDENDUM
[FreeTextEntry1] : Documented by Ebonie Khalil acting as a scribe for Dr. Naveen Dos Santos on 03/10/2021.\par \par All medical record entries made by the Scribe were at my, Dr. Naveen Dos Santos's, direction and personally dictated by me on 03/10/2021. I have reviewed the chart and agree that the record accurately reflects my personal performance of the history, physical exam, assessment and plan. I have also personally directed, reviewed, and agree with the discharge instructions.

## 2021-03-10 NOTE — REVIEW OF SYSTEMS
[Negative] : Endocrine [Sinus Problems] : sinus problems [Cough] : no cough [Chest Tightness] : no chest tightness [Wheezing] : no wheezing [Chest Discomfort] : no chest discomfort [GERD] : no gerd

## 2021-03-10 NOTE — ASSESSMENT
[FreeTextEntry1] : Ms. Pham is a 64 y/o female with a history of allergies, GERD, KIRA, and asthma. She is s/p VATS procedure with Dr. Feliciano scheduled for 7/12/17 c/w adenocarcinoma with acinar predominant complicated by splenic laceration and ICU stay. She is s/p chemotherapy for fallopian tube tumor. She is s/p breast surgery 10/18 and is now s/p breast reconstruction surgery and s/p additional plastic surgery. #1 issue is work issues / poor sleep \par \par \par Her history of chronic cough is multifactorial due to: (resolved)\par -mild persistent asthma\par -post nasal drip syndrome\par -GERD\par \par problem 1: mild persistent asthma (stable)\par -continue Advair 100 at 1 inhalation BID\par -continue Ventolin 2 puffs q6H, prn\par -continue Singulair 10 mg QHS \par -Asthma is believed to be caused by inherited (genetic) and environmental factor, but its exact cause is unknown. Asthma may be triggered by allergens, lung infections, or irritants in the air. Asthma triggers are different for each person\par -Inhaler technique reviewed as well as oral hygiene techniques reviewed with patient. Avoidance of cold air, extremes of temperature, rescue inhaler should be used before exercise. Order of medication reviewed with patient. Recommended use of a cool mist humidifier in the bedroom.\par \par problem 2: post nasal drip syndrome (stable) \par -continue to use Zaditor eye drops\par -continue Olopatadine 0.6% 1 sniff BID \par - Continue Atrovent 0.6% at 1 sniff/nostril, up to TID \par -continue to use OTC antihistamine - add Loratadine 10 mg QHS\par -Environmental measures for allergies were encouraged including mattress and pillow cover, air purifier, and environmental controls.\par \par problem 3: GERD (controlled) \par -continue to use Pepcid 40mg BID / Carafate 1mg before meals QHS; Prilosec 40 AM\par \par -Rule of 2s: avoid eating too much, eating too late, eating too spicy, eating two hours before bed\par -Things to avoid including overeating, spicy foods, tight clothing, eating within three hours of bed, this list is not all inclusive. \par -For treatment of reflux, possible options discussed including diet control, H2 blockers, PPIs, as well as coating motility agents discussed as treatment options. Timing of meals and proximity of last meal to sleep were discussed. If symptoms persist, a formal gastrointestinal evaluation is needed.\par \par problem 4: OSAS \par -no longer present secondary to weight loss and positional sleep\par -Sleep apnea is associated with adverse clinical consequences which an affect most organ systems. Cardiovascular disease risk includes arrhythmias, atrial fibrillation, hypertension, coronary artery disease, and stroke. Metabolic disorders include diabetes type 2, non-alcoholic fatty liver disease. Mood disorder especially depression; and cognitive decline especially in the elderly. Associations with chronic reflux/Perkins’s esophagus some but not all inclusive. \par -Reasons to assess this include arousal consistent with hypopnea; respiratory events most prominent in REM sleep or supine position; therefore sleep staging and body position are important for accurate diagnosis and estimation of AHI. \par \par problem 5: stage IA lung cancer, adenocarcinoma\par -she is s/p segmentectomy\par -follow up chest CT with Dr. Momin (last 10/2020, next 4/2021)\par \par Problem 6a: COVID-19 precautionary Immune Support Recommendations:\par -OTC Vitamin C 500mg BID \par -OTC Quercetin 250-500mg BID \par -OTC Zinc 75-100mg per day \par -OTC Melatonin 1 or 2mg a night \par -OTC Vitamin D 1-4000mg per day \par -OTC Tonic Water 8oz per day\par -Water 0.5-1 gallon per day\par \par problem 6: health maintenance \par - s/p Moderna vaccine \par - She has received an influenza vaccine 2020\par -recommended strep pneumonia vaccines: Prevnar-13 vaccine, followed by Pneumo vaccine 23 on year following\par -recommended early intervention for URIs\par -recommended osteoporosis evaluations\par -recommended early dermatological evaluations\par -recommended after the age of 50 to the age of 70, colonoscopy every 5 years\par -encouraged early intervention\par \par F/U in 3 months\par She is encouraged to call with any changes, concerns, or questions.

## 2021-03-10 NOTE — HISTORY OF PRESENT ILLNESS
[FreeTextEntry1] : Ms. Pham is a 65 year old female with a history of abnormal chest CT, allergic rhinitis, asthma, GERD,  lung nodule, malignant neoplasm of [place], non-allergic rhinitis, and KIRA presenting to the office today for a follow up visit. Her chief complaint is poor sleep \par \par - she states she is very exhausted \par - she notes she was at work from 8am-10:30pm and that her workplace is toxic\par - she states she is unhappy at work \par - she states she sleeps 5 hours that is restful and uninterrupted and takes ambien \par - she notes she feels agitated when breathing especially when talking \par - she states she does not dirnk enough water\par - she notes she uses the K95 and not the N95 at work as she cant breathe\par - she notes her eyes run, and sneezes when wearing a mask \par - she notes her \par - she notes she has a calcification of her Achilles tendon and was recommended a device that she does not use by Dr. Segura; she will see\par - she states her reflux is controlled\par - she states she is s/p a moderna vaccine\par -she denies any chest pain, chest pressure, sour taste in the mouth, leg swelling, leg pain, heartburn, reflux

## 2021-03-14 ENCOUNTER — RX RENEWAL (OUTPATIENT)
Age: 66
End: 2021-03-14

## 2021-04-07 ENCOUNTER — OUTPATIENT (OUTPATIENT)
Dept: OUTPATIENT SERVICES | Facility: HOSPITAL | Age: 66
LOS: 1 days | End: 2021-04-07
Payer: COMMERCIAL

## 2021-04-07 ENCOUNTER — APPOINTMENT (OUTPATIENT)
Dept: CT IMAGING | Facility: IMAGING CENTER | Age: 66
End: 2021-04-07
Payer: COMMERCIAL

## 2021-04-07 DIAGNOSIS — S36.039A UNSPECIFIED LACERATION OF SPLEEN, INITIAL ENCOUNTER: Chronic | ICD-10-CM

## 2021-04-07 DIAGNOSIS — D24.9 BENIGN NEOPLASM OF UNSPECIFIED BREAST: Chronic | ICD-10-CM

## 2021-04-07 DIAGNOSIS — Z98.890 OTHER SPECIFIED POSTPROCEDURAL STATES: Chronic | ICD-10-CM

## 2021-04-07 DIAGNOSIS — R93.89 ABNORMAL FINDINGS ON DIAGNOSTIC IMAGING OF OTHER SPECIFIED BODY STRUCTURES: ICD-10-CM

## 2021-04-07 DIAGNOSIS — Z90.710 ACQUIRED ABSENCE OF BOTH CERVIX AND UTERUS: Chronic | ICD-10-CM

## 2021-04-07 DIAGNOSIS — S02.2XXA FRACTURE OF NASAL BONES, INITIAL ENCOUNTER FOR CLOSED FRACTURE: Chronic | ICD-10-CM

## 2021-04-07 DIAGNOSIS — Z90.2 ACQUIRED ABSENCE OF LUNG [PART OF]: Chronic | ICD-10-CM

## 2021-04-07 DIAGNOSIS — Z00.8 ENCOUNTER FOR OTHER GENERAL EXAMINATION: ICD-10-CM

## 2021-04-07 PROCEDURE — 71250 CT THORAX DX C-: CPT | Mod: 26

## 2021-04-07 PROCEDURE — 71250 CT THORAX DX C-: CPT

## 2021-04-08 ENCOUNTER — NON-APPOINTMENT (OUTPATIENT)
Age: 66
End: 2021-04-08

## 2021-05-21 NOTE — REVIEW OF SYSTEMS
Spoke to pt and advised of US results. Repeat carotid us in 1 year. Pt verbalized understanding.    [Negative] : Allergic/Immunologic

## 2021-06-11 ENCOUNTER — RX RENEWAL (OUTPATIENT)
Age: 66
End: 2021-06-11

## 2021-06-11 RX ORDER — OMEPRAZOLE 40 MG/1
40 CAPSULE, DELAYED RELEASE ORAL
Qty: 90 | Refills: 1 | Status: ACTIVE | COMMUNITY
Start: 2020-06-30 | End: 1900-01-01

## 2021-06-17 ENCOUNTER — OUTPATIENT (OUTPATIENT)
Dept: OUTPATIENT SERVICES | Facility: HOSPITAL | Age: 66
LOS: 1 days | Discharge: HOME | End: 2021-06-17

## 2021-06-17 DIAGNOSIS — C57.00 MALIGNANT NEOPLASM OF UNSPECIFIED FALLOPIAN TUBE: ICD-10-CM

## 2021-06-17 DIAGNOSIS — Z98.890 OTHER SPECIFIED POSTPROCEDURAL STATES: Chronic | ICD-10-CM

## 2021-06-17 DIAGNOSIS — S02.2XXA FRACTURE OF NASAL BONES, INITIAL ENCOUNTER FOR CLOSED FRACTURE: Chronic | ICD-10-CM

## 2021-06-17 DIAGNOSIS — D24.9 BENIGN NEOPLASM OF UNSPECIFIED BREAST: Chronic | ICD-10-CM

## 2021-06-17 DIAGNOSIS — Z90.2 ACQUIRED ABSENCE OF LUNG [PART OF]: Chronic | ICD-10-CM

## 2021-06-17 DIAGNOSIS — S36.039A UNSPECIFIED LACERATION OF SPLEEN, INITIAL ENCOUNTER: Chronic | ICD-10-CM

## 2021-06-17 DIAGNOSIS — Z90.710 ACQUIRED ABSENCE OF BOTH CERVIX AND UTERUS: Chronic | ICD-10-CM

## 2021-06-20 NOTE — PATIENT PROFILE ADULT. - AS SC BRADEN NUTRITION
Letter by Beka Ashley MD at      Author: Beka Ashley MD Service: -- Author Type: --    Filed:  Encounter Date: 2/10/2020 Status: (Other)         Denis Moreno  [address] [place] [address]             February 10, 2020         Dear Mr. Moreno,    Below are the results from your recent visit:    Resulted Orders   Urinalysis-UC if Indicated   Result Value Ref Range    Color, UA Yellow Colorless, Yellow, Straw, Light Yellow    Clarity, UA Clear Clear    Glucose, UA Negative Negative    Bilirubin, UA Negative Negative    Ketones, UA Negative Negative    Specific Gravity, UA 1.025 1.005 - 1.030    Blood, UA Negative Negative    pH, UA 5.5 5.0 - 8.0    Protein, UA Negative Negative mg/dL    Urobilinogen, UA 0.2 E.U./dL 0.2 E.U./dL, 1.0 E.U./dL    Nitrite, UA Negative Negative    Leukocytes, UA Trace (!) Negative    Bacteria, UA Few (!) None Seen hpf    RBC, UA None Seen None Seen, 0-2 hpf    WBC, UA 0-5 None Seen, 0-5 hpf    Squam Epithel, UA None Seen None Seen, 0-5 lpf    Narrative    Urine Culture ordered based on [facility] criteria   Culture, Urine   Result Value Ref Range    Culture No Growth        Urinalysis and urine culture are negative.  Hence, your urinary tract infection is resolved.    Thank you.    Please call with questions or contact us using [facility].    Sincerely,        Electronically signed by Beka Ashley MD       
(3) adequate

## 2021-06-21 ENCOUNTER — APPOINTMENT (OUTPATIENT)
Dept: HEMATOLOGY ONCOLOGY | Facility: CLINIC | Age: 66
End: 2021-06-21

## 2021-06-21 ENCOUNTER — RESULT REVIEW (OUTPATIENT)
Age: 66
End: 2021-06-21

## 2021-06-21 LAB
BASOPHILS # BLD AUTO: 0.04 K/UL — SIGNIFICANT CHANGE UP (ref 0–0.2)
BASOPHILS NFR BLD AUTO: 0.8 % — SIGNIFICANT CHANGE UP (ref 0–2)
EOSINOPHIL # BLD AUTO: 0.14 K/UL — SIGNIFICANT CHANGE UP (ref 0–0.5)
EOSINOPHIL NFR BLD AUTO: 2.9 % — SIGNIFICANT CHANGE UP (ref 0–6)
HCT VFR BLD CALC: 38.9 % — SIGNIFICANT CHANGE UP (ref 34.5–45)
HGB BLD-MCNC: 12.5 G/DL — SIGNIFICANT CHANGE UP (ref 11.5–15.5)
IMM GRANULOCYTES NFR BLD AUTO: 0.2 % — SIGNIFICANT CHANGE UP (ref 0–1.5)
LYMPHOCYTES # BLD AUTO: 1.67 K/UL — SIGNIFICANT CHANGE UP (ref 1–3.3)
LYMPHOCYTES # BLD AUTO: 34.7 % — SIGNIFICANT CHANGE UP (ref 13–44)
MCHC RBC-ENTMCNC: 28.7 PG — SIGNIFICANT CHANGE UP (ref 27–34)
MCHC RBC-ENTMCNC: 32.1 G/DL — SIGNIFICANT CHANGE UP (ref 32–36)
MCV RBC AUTO: 89.4 FL — SIGNIFICANT CHANGE UP (ref 80–100)
MONOCYTES # BLD AUTO: 0.43 K/UL — SIGNIFICANT CHANGE UP (ref 0–0.9)
MONOCYTES NFR BLD AUTO: 8.9 % — SIGNIFICANT CHANGE UP (ref 2–14)
NEUTROPHILS # BLD AUTO: 2.52 K/UL — SIGNIFICANT CHANGE UP (ref 1.8–7.4)
NEUTROPHILS NFR BLD AUTO: 52.5 % — SIGNIFICANT CHANGE UP (ref 43–77)
NRBC # BLD: 0 /100 WBCS — SIGNIFICANT CHANGE UP (ref 0–0)
PLATELET # BLD AUTO: 192 K/UL — SIGNIFICANT CHANGE UP (ref 150–400)
RBC # BLD: 4.35 M/UL — SIGNIFICANT CHANGE UP (ref 3.8–5.2)
RBC # FLD: 13.4 % — SIGNIFICANT CHANGE UP (ref 10.3–14.5)
WBC # BLD: 4.81 K/UL — SIGNIFICANT CHANGE UP (ref 3.8–10.5)
WBC # FLD AUTO: 4.81 K/UL — SIGNIFICANT CHANGE UP (ref 3.8–10.5)

## 2021-06-22 ENCOUNTER — APPOINTMENT (OUTPATIENT)
Dept: HEMATOLOGY ONCOLOGY | Facility: CLINIC | Age: 66
End: 2021-06-22
Payer: COMMERCIAL

## 2021-06-22 ENCOUNTER — APPOINTMENT (OUTPATIENT)
Dept: DERMATOLOGY | Facility: CLINIC | Age: 66
End: 2021-06-22
Payer: COMMERCIAL

## 2021-06-22 VITALS
RESPIRATION RATE: 16 BRPM | BODY MASS INDEX: 21.34 KG/M2 | SYSTOLIC BLOOD PRESSURE: 103 MMHG | HEART RATE: 70 BPM | OXYGEN SATURATION: 97 % | TEMPERATURE: 97.3 F | DIASTOLIC BLOOD PRESSURE: 61 MMHG | HEIGHT: 64 IN | WEIGHT: 125 LBS

## 2021-06-22 VITALS — WEIGHT: 125 LBS | HEIGHT: 64 IN | BODY MASS INDEX: 21.34 KG/M2

## 2021-06-22 DIAGNOSIS — L98.8 OTHER SPECIFIED DISORDERS OF THE SKIN AND SUBCUTANEOUS TISSUE: ICD-10-CM

## 2021-06-22 LAB
ALBUMIN SERPL ELPH-MCNC: 4.2 G/DL
ALP BLD-CCNC: 67 U/L
ALT SERPL-CCNC: 20 U/L
ANION GAP SERPL CALC-SCNC: 11 MMOL/L
AST SERPL-CCNC: 21 U/L
BILIRUB SERPL-MCNC: 0.5 MG/DL
BUN SERPL-MCNC: 19 MG/DL
CALCIUM SERPL-MCNC: 9.4 MG/DL
CANCER AG125 SERPL-ACNC: 10 U/ML
CHLORIDE SERPL-SCNC: 105 MMOL/L
CO2 SERPL-SCNC: 25 MMOL/L
CREAT SERPL-MCNC: 0.66 MG/DL
GLUCOSE SERPL-MCNC: 94 MG/DL
POTASSIUM SERPL-SCNC: 4.6 MMOL/L
PROT SERPL-MCNC: 6.3 G/DL
SODIUM SERPL-SCNC: 142 MMOL/L

## 2021-06-22 PROCEDURE — 99214 OFFICE O/P EST MOD 30 MIN: CPT

## 2021-06-22 PROCEDURE — 99072 ADDL SUPL MATRL&STAF TM PHE: CPT

## 2021-06-22 PROCEDURE — 99213 OFFICE O/P EST LOW 20 MIN: CPT

## 2021-06-22 RX ORDER — TRETINOIN 1 MG/G
0.1 CREAM TOPICAL
Qty: 1 | Refills: 2 | Status: ACTIVE | COMMUNITY
Start: 2021-06-22 | End: 1900-01-01

## 2021-06-23 NOTE — HISTORY OF PRESENT ILLNESS
[Disease: _____________________] : Disease: [unfilled] [AJCC Stage: ____] : AJCC Stage: [unfilled] [de-identified] : Ms. Pham is a 62 year old  postmenopausal who presents for discussion regarding treatment for fallopian tube cancer, and she has BRCA 1 pathogenic mutation.\par \par Ms. Pham has a personal history of stage IA adenocarcinoma of the lung(eGFR positive) s/p left VATS (2017, [facility]) c/b splenic laceration requiring ICU stay, and breast papilloma s/p excision (2013, [facility]), and HGSIL s/p LEEP 1996. Past GYN history significant for one episode of postmenopausal bleeding at the age of 49 which was ultimately determined to be a menstrual cycle. No history of uterine fibroids or ovarian cysts. Was on oral contraception from her early 40s until just a few weeks ago. \par \par Ms. Pham has a pertinent family history significant for breast (paternal cousin, paternal aunt) and ovarian (paternal cousin) cancer. \par Due to her high risk, she opted to go for risk reducing BSO and double mastectomy on 1/9/18.\par The frozen section showed malignancy and she underwent full surgical staging and mastectomy was postponed.\par Pathology: stage IC fallopian tube cancer.\par Patient has fully recovered from surgery. [de-identified] : Patient is here for routine follow up, she is feeling well.  No new complaints.  Labs done yesterday.  She continues to follow up with Dr. French (breast surgeon) and Dr. Dos Santos (pulmonary).  She went to dermatology today for annual skin check.  She is due for colonoscopy, was to be done in 3 years due to polyps as per patient.

## 2021-07-14 ENCOUNTER — APPOINTMENT (OUTPATIENT)
Dept: PULMONOLOGY | Facility: CLINIC | Age: 66
End: 2021-07-14
Payer: COMMERCIAL

## 2021-07-14 ENCOUNTER — NON-APPOINTMENT (OUTPATIENT)
Age: 66
End: 2021-07-14

## 2021-07-14 VITALS
HEIGHT: 64 IN | TEMPERATURE: 97.1 F | RESPIRATION RATE: 16 BRPM | OXYGEN SATURATION: 98 % | BODY MASS INDEX: 21.34 KG/M2 | SYSTOLIC BLOOD PRESSURE: 100 MMHG | WEIGHT: 125 LBS | DIASTOLIC BLOOD PRESSURE: 60 MMHG | HEART RATE: 65 BPM

## 2021-07-14 PROCEDURE — 94010 BREATHING CAPACITY TEST: CPT

## 2021-07-14 PROCEDURE — 99072 ADDL SUPL MATRL&STAF TM PHE: CPT

## 2021-07-14 PROCEDURE — 95012 NITRIC OXIDE EXP GAS DETER: CPT

## 2021-07-14 PROCEDURE — 99214 OFFICE O/P EST MOD 30 MIN: CPT | Mod: 25

## 2021-07-14 NOTE — ASSESSMENT
[FreeTextEntry1] : Ms. Pham is a 65 y/o female with a history of allergies, GERD, KIRA, and asthma. She is s/p VATS procedure with Dr. Feliciano scheduled for 7/12/17 c/w adenocarcinoma with acinar predominant complicated by splenic laceration and ICU stay. She is s/p chemotherapy for fallopian tube tumor. She is s/p breast surgery 10/18 and is now s/p breast reconstruction surgery and s/p additional plastic surgery. #1 issue is work issues / poor sleep (stress) \par \par \par Her history of chronic cough is multifactorial due to: (resolved)\par -mild persistent asthma\par -post nasal drip syndrome\par -GERD\par \par problem 1: mild persistent asthma (stable)\par -continue Advair 100 at 1 inhalation BID\par -continue Ventolin 2 puffs q6H, prn\par -continue Singulair 10 mg QHS \par -Asthma is believed to be caused by inherited (genetic) and environmental factor, but its exact cause is unknown. Asthma may be triggered by allergens, lung infections, or irritants in the air. Asthma triggers are different for each person\par -Inhaler technique reviewed as well as oral hygiene techniques reviewed with patient. Avoidance of cold air, extremes of temperature, rescue inhaler should be used before exercise. Order of medication reviewed with patient. Recommended use of a cool mist humidifier in the bedroom.\par \par problem 2: post nasal drip syndrome (stable) \par -continue to use Zaditor eye drops\par -continue Olopatadine 0.6% 1 sniff BID \par - Continue Atrovent 0.6% at 1 sniff/nostril, up to TID \par -continue to use OTC antihistamine - add Loratadine 10 mg QHS\par -Environmental measures for allergies were encouraged including mattress and pillow cover, air purifier, and environmental controls.\par \par problem 3: GERD (controlled) \par -continue to use Pepcid 40mg BID / Carafate 1mg before meals QHS; Prilosec 40 AM\par \par -Rule of 2s: avoid eating too much, eating too late, eating too spicy, eating two hours before bed\par -Things to avoid including overeating, spicy foods, tight clothing, eating within three hours of bed, this list is not all inclusive. \par -For treatment of reflux, possible options discussed including diet control, H2 blockers, PPIs, as well as coating motility agents discussed as treatment options. Timing of meals and proximity of last meal to sleep were discussed. If symptoms persist, a formal gastrointestinal evaluation is needed.\par \par problem 4: OSAS \par -no longer present secondary to weight loss and positional sleep\par -Sleep apnea is associated with adverse clinical consequences which an affect most organ systems. Cardiovascular disease risk includes arrhythmias, atrial fibrillation, hypertension, coronary artery disease, and stroke. Metabolic disorders include diabetes type 2, non-alcoholic fatty liver disease. Mood disorder especially depression; and cognitive decline especially in the elderly. Associations with chronic reflux/Perkins’s esophagus some but not all inclusive. \par -Reasons to assess this include arousal consistent with hypopnea; respiratory events most prominent in REM sleep or supine position; therefore sleep staging and body position are important for accurate diagnosis and estimation of AHI. \par \par problem 5: stage IA lung cancer, adenocarcinoma\par -she is s/p segmentectomy\par -follow up chest CT with Dr. Momin (last 10/2020, next 10/2021)\par \par Problem 6a: COVID-19 precautionary Immune Support Recommendations: s/p moderna vaccine x2 \par -OTC Vitamin C 500mg BID \par -OTC Quercetin 250-500mg BID \par -OTC Zinc 75-100mg per day \par -OTC Melatonin 1 or 2mg a night \par -OTC Vitamin D 1-4000mg per day \par -OTC Tonic Water 8oz per day\par -Water 0.5-1 gallon per day\par \par problem 6: health maintenance \par - recommended Borage and Flaxseed oil for eczema \par - s/p Moderna vaccine \par - She has received an influenza vaccine 2020\par -recommended strep pneumonia vaccines: Prevnar-13 vaccine, followed by Pneumo vaccine 23 on year following\par -recommended early intervention for URIs\par -recommended osteoporosis evaluations\par -recommended early dermatological evaluations\par -recommended after the age of 50 to the age of 70, colonoscopy every 5 years\par -encouraged early intervention\par \par F/U in 3 months\par She is encouraged to call with any changes, concerns, or questions.

## 2021-07-14 NOTE — HISTORY OF PRESENT ILLNESS
[FreeTextEntry1] : Ms. Pham is a 66 year old female with a history of abnormal chest CT, allergic rhinitis, asthma, GERD,  lung nodule, malignant neoplasm of Brent, non-allergic rhinitis, and KIRA presenting to the office today for a follow up visit. Her chief complaint is\par - she has been doing well \par - she has been dealing with her allergies, her nose has been running a lot and her eyes are irritated \par - she has been using omeprazole for her heart burn, otherwise its controlled \par - her energy level has been 7 out of 10 \par - she notes she has not been getting enough sleep, 5 hours a night on average. \par - she feels more energetic on the weekends. \par - she has not had much opportunity to exercise. \par - she has been having muscle aches / pains ; neck, back, and knees. \par - a lot of polyarthritis issues in the last 6 months \par - she had been going to physical therapy but then COVID hit. \par - she has been experiencing nerve pain in her lower back. \par - bowels are regular \par - she has been experiencing eczema; using a steroid ointment. \par - s/p Moderna vaccine x2 \par - has had some wheezing; using her inhaler \par - denies taking any new medications, vitamins, or supplements. \par - She  denies any visual issues, headaches, nausea, vomiting, fever, chills, sweats, chest pains, chest pressure, diarrhea, constipation, dysphagia, myalgia, dizziness, leg swelling, leg pain, itchy eyes, itchy ears, heartburn, reflux, or sour taste in the mouth.

## 2021-07-14 NOTE — REVIEW OF SYSTEMS
[Sinus Problems] : sinus problems [Negative] : Endocrine [Cough] : no cough [Chest Tightness] : no chest tightness [Wheezing] : no wheezing [Chest Discomfort] : no chest discomfort [GERD] : no gerd

## 2021-07-14 NOTE — ADDENDUM
[FreeTextEntry1] : Documented by Cara Rodarte acting as a scribe for Dr. Naveen Dos Santos on 07/14/2021 \par \par All medical record entries made by the Scribe were at my, Dr. Naveen Dos Santos's, direction and personally dictated by me on 07/14/2021 . I have reviewed the chart and agree that the record accurately reflects my personal performance of the history, physical exam, assessment and plan. I have also personally directed, reviewed, and agree with the discharge instructions.

## 2021-07-14 NOTE — PROCEDURE
[FreeTextEntry1] : CT (APR.8.2021) IMPRESSION: stable exam. \par \par PFT reveals very mild obstructive dysfunction flows, with an FEV1 of   1.87L, which is  81% of predicted, with normal flow volume loop\par \par  FENO was 26; normal value being less than 25\par Fractional exhaled nitric oxide (FENO) is regarded as a simple, noninvasive method for assessing eosinophilic airway inflammation. Produced by a variety of cells within the lung, nitric oxide (NO) concentrations are generally low in healthy individuals. However, high concentrations of NO appear to be involved in nonspecific host defense mechanisms and chronic inflammatory diseases such as asthma. The American Thoracic Society (ATS) therefore has recommended using FENO to aid in the diagnosis and monitoring of eosinophilic airway inflammation and asthma, and for identifying steroid responsive individuals whose chronic respiratory symptoms may be airway inflammation.

## 2021-07-27 ENCOUNTER — APPOINTMENT (OUTPATIENT)
Dept: GYNECOLOGIC ONCOLOGY | Facility: CLINIC | Age: 66
End: 2021-07-27
Payer: COMMERCIAL

## 2021-07-27 VITALS
DIASTOLIC BLOOD PRESSURE: 64 MMHG | HEART RATE: 70 BPM | SYSTOLIC BLOOD PRESSURE: 108 MMHG | HEIGHT: 64 IN | WEIGHT: 123 LBS | BODY MASS INDEX: 21 KG/M2

## 2021-07-27 PROCEDURE — 99072 ADDL SUPL MATRL&STAF TM PHE: CPT

## 2021-07-27 PROCEDURE — 99213 OFFICE O/P EST LOW 20 MIN: CPT

## 2021-07-27 NOTE — HISTORY OF PRESENT ILLNESS
[FreeTextEntry1] : 65 yo woman with a BRCA 1 deleterious mutation. \par Initially scheduled for RR LSC BSO in conjunction with prophylactic mastectomy on 1/9/18. 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 \par She underwent a TLH, BSO, omentectomy, P&PA LND and staging and breast surgery was deferred. \par Postoperative recuperation was unremarkable. 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 \par \par She completed 6  cycles of Carbo and Taxol given on a q3 week schedule 2/5/18 - 5/24/18. \par \par  was 89 at diagnosis and is now normal.\par Imaging 1/4/19 was [facility]. \par \par She has a history of early stage lung cancer. \par She had her prophylactic mastectomy last October and pathology was all benign. Has permanent implants now.  Principal Discharge DX:	Pneumonia

## 2021-10-04 ENCOUNTER — APPOINTMENT (OUTPATIENT)
Dept: CT IMAGING | Facility: IMAGING CENTER | Age: 66
End: 2021-10-04
Payer: COMMERCIAL

## 2021-10-04 ENCOUNTER — TRANSCRIPTION ENCOUNTER (OUTPATIENT)
Age: 66
End: 2021-10-04

## 2021-10-04 ENCOUNTER — NON-APPOINTMENT (OUTPATIENT)
Age: 66
End: 2021-10-04

## 2021-10-04 ENCOUNTER — OUTPATIENT (OUTPATIENT)
Dept: OUTPATIENT SERVICES | Facility: HOSPITAL | Age: 66
LOS: 1 days | End: 2021-10-04
Payer: COMMERCIAL

## 2021-10-04 DIAGNOSIS — C34.12 MALIGNANT NEOPLASM OF UPPER LOBE, LEFT BRONCHUS OR LUNG: ICD-10-CM

## 2021-10-04 DIAGNOSIS — Z98.890 OTHER SPECIFIED POSTPROCEDURAL STATES: Chronic | ICD-10-CM

## 2021-10-04 DIAGNOSIS — Z90.710 ACQUIRED ABSENCE OF BOTH CERVIX AND UTERUS: Chronic | ICD-10-CM

## 2021-10-04 DIAGNOSIS — Z00.8 ENCOUNTER FOR OTHER GENERAL EXAMINATION: ICD-10-CM

## 2021-10-04 DIAGNOSIS — Z90.2 ACQUIRED ABSENCE OF LUNG [PART OF]: Chronic | ICD-10-CM

## 2021-10-04 DIAGNOSIS — S02.2XXA FRACTURE OF NASAL BONES, INITIAL ENCOUNTER FOR CLOSED FRACTURE: Chronic | ICD-10-CM

## 2021-10-04 DIAGNOSIS — S36.039A UNSPECIFIED LACERATION OF SPLEEN, INITIAL ENCOUNTER: Chronic | ICD-10-CM

## 2021-10-04 DIAGNOSIS — R93.89 ABNORMAL FINDINGS ON DIAGNOSTIC IMAGING OF OTHER SPECIFIED BODY STRUCTURES: ICD-10-CM

## 2021-10-04 DIAGNOSIS — D24.9 BENIGN NEOPLASM OF UNSPECIFIED BREAST: Chronic | ICD-10-CM

## 2021-10-04 PROCEDURE — 71250 CT THORAX DX C-: CPT | Mod: 26

## 2021-10-04 PROCEDURE — 71250 CT THORAX DX C-: CPT

## 2021-10-25 ENCOUNTER — APPOINTMENT (OUTPATIENT)
Dept: ORTHOPEDIC SURGERY | Facility: CLINIC | Age: 66
End: 2021-10-25
Payer: COMMERCIAL

## 2021-10-25 VITALS
DIASTOLIC BLOOD PRESSURE: 64 MMHG | SYSTOLIC BLOOD PRESSURE: 97 MMHG | HEART RATE: 75 BPM | BODY MASS INDEX: 21.34 KG/M2 | WEIGHT: 125 LBS | HEIGHT: 64 IN

## 2021-10-25 DIAGNOSIS — M60.9 MYOSITIS, UNSPECIFIED: ICD-10-CM

## 2021-10-25 DIAGNOSIS — M51.36 OTHER INTERVERTEBRAL DISC DEGENERATION, LUMBAR REGION: ICD-10-CM

## 2021-10-25 PROCEDURE — 72100 X-RAY EXAM L-S SPINE 2/3 VWS: CPT

## 2021-10-25 PROCEDURE — 99214 OFFICE O/P EST MOD 30 MIN: CPT | Mod: 25

## 2021-10-25 PROCEDURE — 20552 NJX 1/MLT TRIGGER POINT 1/2: CPT

## 2021-11-16 ENCOUNTER — APPOINTMENT (OUTPATIENT)
Dept: PULMONOLOGY | Facility: CLINIC | Age: 66
End: 2021-11-16
Payer: COMMERCIAL

## 2021-11-16 VITALS
DIASTOLIC BLOOD PRESSURE: 60 MMHG | WEIGHT: 125 LBS | TEMPERATURE: 97.1 F | HEIGHT: 61 IN | HEART RATE: 69 BPM | OXYGEN SATURATION: 98 % | RESPIRATION RATE: 16 BRPM | BODY MASS INDEX: 23.6 KG/M2 | SYSTOLIC BLOOD PRESSURE: 110 MMHG

## 2021-11-16 DIAGNOSIS — J31.0 CHRONIC RHINITIS: ICD-10-CM

## 2021-11-16 PROCEDURE — 99214 OFFICE O/P EST MOD 30 MIN: CPT

## 2021-11-16 NOTE — ADDENDUM
[FreeTextEntry1] : Documented by Rosendo Kauffman acting as a scribe for Dr. Naveen Dos Santos on (11/16/2021).\par \par All medical record entries made by the Scribe were at my, Dr. Naveen Dos Santos's, direction and personally dictated by me on (11/16/2021). I have reviewed the chart and agree that the record accurately reflects my personal performance of the history, physical exam, assessment and plan. I have also personally directed, reviewed, and agree with the discharge instructions.\par

## 2021-11-16 NOTE — HISTORY OF PRESENT ILLNESS
[FreeTextEntry1] : Ms. Pham is a 66 year old female with a history of abnormal chest CT, allergic rhinitis, asthma, GERD,  lung nodule, malignant neoplasm of Brent, non-allergic rhinitis, and KIRA presenting to the office today for a follow up visit. Her chief complaint is\par -she notes ringing in her ears for the past month\par -she notes her weight is stable\par -she notes she is thinking about getting a Peloton for exercise\par -she notes SOB only when she wears a mask\par -she notes she does not drink enough water\par -she notes sleeping fine but sometimes gets only a few hours of sleep due to working late and getting up early\par - S/p Covid 19 vaccine (Moderna) x2 \par \par patient denies any headaches, nausea, vomiting, fever, chills, sweats, chest pain, chest pressure, palpitations, coughing, wheezing, fatigue, diarrhea, constipation, dysphagia, myalgias, dizziness, leg swelling, leg pain, itchy eyes, itchy ears, heartburn, reflux or sour taste in the mouth

## 2021-11-16 NOTE — PHYSICAL EXAM
[No Acute Distress] : no acute distress [Well Nourished] : well nourished [Well Groomed] : well groomed [No Deformities] : no deformities [Well Developed] : well developed [Normal Oropharynx] : normal oropharynx [I] : Mallampati Class: I [Normal Appearance] : normal appearance [No Neck Mass] : no neck mass [Normal Rate/Rhythm] : normal rate/rhythm [Normal S1, S2] : normal s1, s2 [No Murmurs] : no murmurs [No Resp Distress] : no resp distress [Clear to Auscultation Bilaterally] : clear to auscultation bilaterally [No Abnormalities] : no abnormalities [Benign] : benign [Normal Gait] : normal gait [No Clubbing] : no clubbing [No Cyanosis] : no cyanosis [No Edema] : no edema [FROM] : FROM [Normal Color/ Pigmentation] : normal color/ pigmentation [No Focal Deficits] : no focal deficits [Oriented x3] : oriented x3 [Normal Affect] : normal affect [TextBox_68] : I:E ratio 1:3; clear

## 2021-11-16 NOTE — PROCEDURE
[FreeTextEntry1] : CT Chest (10/4/2021) revealed Status post left upper lobe wedge resection with stable postsurgical changes. Opacity along the chain sutures is unchanged from 2019. Subcentimeter indistinct groundglass nodules within the right upper lobe (series 5 images 20, 47) are unchanged. No new or enlarging nodule.\par \par \par -Images and procedures reviewed in detail and discussed with patient.

## 2021-11-16 NOTE — ASSESSMENT
[FreeTextEntry1] : Ms. Pham is a 67 y/o female with a history of allergies, GERD, KIRA, and asthma. She is s/p VATS procedure with Dr. Feliciano scheduled for 7/12/17 c/w adenocarcinoma with acinar predominant complicated by splenic laceration and ICU stay. She is s/p chemotherapy for fallopian tube tumor. She is s/p breast surgery 10/18 and is now s/p breast reconstruction surgery and s/p additional plastic surgery. #1 issue is work issues / poor sleep (stress) (still)\par \par \par Her history of chronic cough is multifactorial due to: (resolved)\par -mild persistent asthma\par -post nasal drip syndrome\par -GERD\par \par problem 1: mild persistent asthma (stable)\par -continue Advair 100 at 1 inhalation BID\par -continue Ventolin 2 puffs q6H, prn\par -continue Singulair 10 mg QHS \par -Asthma is believed to be caused by inherited (genetic) and environmental factor, but its exact cause is unknown. Asthma may be triggered by allergens, lung infections, or irritants in the air. Asthma triggers are different for each person\par -Inhaler technique reviewed as well as oral hygiene techniques reviewed with patient. Avoidance of cold air, extremes of temperature, rescue inhaler should be used before exercise. Order of medication reviewed with patient. Recommended use of a cool mist humidifier in the bedroom.\par \par problem 2: post nasal drip syndrome (stable) \par -continue to use Zaditor eye drops\par -continue Olopatadine 0.6% 1 sniff BID \par - Continue Atrovent 0.6% at 1 sniff/nostril, up to TID \par -continue to use OTC antihistamine - add Loratadine 10 mg QHS\par -Environmental measures for allergies were encouraged including mattress and pillow cover, air purifier, and environmental controls.\par \par problem 3: GERD (controlled) \par -continue to use Pepcid 40mg BID / Carafate 1mg before meals QHS; Prilosec 40 AM\par \par -Rule of 2s: avoid eating too much, eating too late, eating too spicy, eating two hours before bed\par -Things to avoid including overeating, spicy foods, tight clothing, eating within three hours of bed, this list is not all inclusive. \par -For treatment of reflux, possible options discussed including diet control, H2 blockers, PPIs, as well as coating motility agents discussed as treatment options. Timing of meals and proximity of last meal to sleep were discussed. If symptoms persist, a formal gastrointestinal evaluation is needed.\par \par problem 4: OSAS \par -no longer present secondary to weight loss and positional sleep\par -Sleep apnea is associated with adverse clinical consequences which an affect most organ systems. Cardiovascular disease risk includes arrhythmias, atrial fibrillation, hypertension, coronary artery disease, and stroke. Metabolic disorders include diabetes type 2, non-alcoholic fatty liver disease. Mood disorder especially depression; and cognitive decline especially in the elderly. Associations with chronic reflux/Perkins’s esophagus some but not all inclusive. \par -Reasons to assess this include arousal consistent with hypopnea; respiratory events most prominent in REM sleep or supine position; therefore sleep staging and body position are important for accurate diagnosis and estimation of AHI. \par \par problem 5: stage IA lung cancer, adenocarcinoma\par -she is s/p segmentectomy\par -follow up chest CT with Dr. Momin (last 10/2021, next 10/2022)\par \par Problem 6a: COVID-19 precautionary Immune Support Recommendations: s/p moderna vaccine x3\par -OTC Vitamin C 500mg BID \par -OTC Quercetin 250-500mg BID \par -OTC Zinc 75-100mg per day \par -OTC Melatonin 1 or 2mg a night \par -OTC Vitamin D 1-4000mg per day \par -OTC Tonic Water 8oz per day\par -Water 0.5-1 gallon per day\par \par problem 6: health maintenance \par - recommended Borage and Flaxseed oil for eczema \par - She has received an influenza vaccine 2021\par -recommended strep pneumonia vaccines: Prevnar-13 vaccine, followed by Pneumo vaccine 23 on year following\par -recommended early intervention for URIs\par -recommended osteoporosis evaluations\par -recommended early dermatological evaluations\par -recommended after the age of 50 to the age of 70, colonoscopy every 5 years\par -encouraged early intervention\par \par F/U in 3 months\par She is encouraged to call with any changes, concerns, or questions.

## 2021-11-18 NOTE — H&P PST ADULT - TEACHING/LEARNING LEARNING PREFERENCES
Patient attended a VIRTUAL Medically Supervised Weight Loss New Patient Orientation today where we discussed:  - New Direction Very Low and the Low Calorie diet details  - Medical Supervision  - Nutrition education  - Cost of Meal Replacements  Policies and compliance required for program enrollment. verbal instruction/written material

## 2021-11-24 ENCOUNTER — APPOINTMENT (OUTPATIENT)
Dept: UROLOGY | Facility: CLINIC | Age: 66
End: 2021-11-24
Payer: COMMERCIAL

## 2021-11-24 DIAGNOSIS — R39.9 UNSPECIFIED SYMPTOMS AND SIGNS INVOLVING THE GENITOURINARY SYSTEM: ICD-10-CM

## 2021-11-24 DIAGNOSIS — R39.89 OTHER SYMPTOMS AND SIGNS INVOLVING THE GENITOURINARY SYSTEM: ICD-10-CM

## 2021-11-24 PROCEDURE — 99204 OFFICE O/P NEW MOD 45 MIN: CPT | Mod: 25

## 2021-11-24 PROCEDURE — 51798 US URINE CAPACITY MEASURE: CPT

## 2021-11-24 NOTE — REVIEW OF SYSTEMS
[Negative] : Heme/Lymph [Dry Eyes] : dryness of the eyes [Constipation] : constipation [Heartburn] : heartburn [Urine Infection (bladder/kidney)] : bladder/kidney infection [Told you have blood in urine on a urine test] : told blood was present in a urine test [Bladder pressure] : experiences bladder pressure [Anxiety] : anxiety [see HPI] : see HPI [FreeTextEntry6] : frequency

## 2021-11-24 NOTE — HISTORY OF PRESENT ILLNESS
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

## 2021-11-24 NOTE — ASSESSMENT
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

## 2021-11-29 ENCOUNTER — TRANSCRIPTION ENCOUNTER (OUTPATIENT)
Age: 66
End: 2021-11-29

## 2021-11-29 LAB
APPEARANCE: ABNORMAL
BACTERIA UR CULT: NORMAL
BACTERIA: NEGATIVE
BILIRUBIN URINE: NEGATIVE
BLOOD URINE: ABNORMAL
COLOR: YELLOW
GLUCOSE QUALITATIVE U: NEGATIVE
HYALINE CASTS: 0 /LPF
KETONES URINE: NEGATIVE
LEUKOCYTE ESTERASE URINE: NEGATIVE
MICROSCOPIC-UA: NORMAL
NITRITE URINE: NEGATIVE
PH URINE: 5.5
PROTEIN URINE: NEGATIVE
RED BLOOD CELLS URINE: 10 /HPF
SPECIFIC GRAVITY URINE: >=1.03
SQUAMOUS EPITHELIAL CELLS: 2 /HPF
UROBILINOGEN URINE: NORMAL
WHITE BLOOD CELLS URINE: 1 /HPF

## 2021-11-30 ENCOUNTER — TRANSCRIPTION ENCOUNTER (OUTPATIENT)
Age: 66
End: 2021-11-30

## 2021-11-30 ENCOUNTER — APPOINTMENT (OUTPATIENT)
Dept: CT IMAGING | Facility: IMAGING CENTER | Age: 66
End: 2021-11-30
Payer: COMMERCIAL

## 2021-11-30 ENCOUNTER — OUTPATIENT (OUTPATIENT)
Dept: OUTPATIENT SERVICES | Facility: HOSPITAL | Age: 66
LOS: 1 days | End: 2021-11-30
Payer: COMMERCIAL

## 2021-11-30 DIAGNOSIS — Z90.2 ACQUIRED ABSENCE OF LUNG [PART OF]: Chronic | ICD-10-CM

## 2021-11-30 DIAGNOSIS — Z98.890 OTHER SPECIFIED POSTPROCEDURAL STATES: Chronic | ICD-10-CM

## 2021-11-30 DIAGNOSIS — Z90.710 ACQUIRED ABSENCE OF BOTH CERVIX AND UTERUS: Chronic | ICD-10-CM

## 2021-11-30 DIAGNOSIS — S02.2XXA FRACTURE OF NASAL BONES, INITIAL ENCOUNTER FOR CLOSED FRACTURE: Chronic | ICD-10-CM

## 2021-11-30 DIAGNOSIS — D24.9 BENIGN NEOPLASM OF UNSPECIFIED BREAST: Chronic | ICD-10-CM

## 2021-11-30 DIAGNOSIS — S36.039A UNSPECIFIED LACERATION OF SPLEEN, INITIAL ENCOUNTER: Chronic | ICD-10-CM

## 2021-11-30 DIAGNOSIS — R31.29 OTHER MICROSCOPIC HEMATURIA: ICD-10-CM

## 2021-11-30 DIAGNOSIS — Z00.8 ENCOUNTER FOR OTHER GENERAL EXAMINATION: ICD-10-CM

## 2021-11-30 PROCEDURE — 74178 CT ABD&PLV WO CNTR FLWD CNTR: CPT

## 2021-11-30 PROCEDURE — 82565 ASSAY OF CREATININE: CPT

## 2021-11-30 PROCEDURE — 74178 CT ABD&PLV WO CNTR FLWD CNTR: CPT | Mod: 26

## 2021-12-06 ENCOUNTER — APPOINTMENT (OUTPATIENT)
Dept: UROLOGY | Facility: CLINIC | Age: 66
End: 2021-12-06
Payer: COMMERCIAL

## 2021-12-06 PROCEDURE — 52000 CYSTOURETHROSCOPY: CPT

## 2021-12-07 ENCOUNTER — OUTPATIENT (OUTPATIENT)
Dept: OUTPATIENT SERVICES | Facility: HOSPITAL | Age: 66
LOS: 1 days | Discharge: HOME | End: 2021-12-07

## 2021-12-07 DIAGNOSIS — D24.9 BENIGN NEOPLASM OF UNSPECIFIED BREAST: Chronic | ICD-10-CM

## 2021-12-07 DIAGNOSIS — C57.00 MALIGNANT NEOPLASM OF UNSPECIFIED FALLOPIAN TUBE: ICD-10-CM

## 2021-12-07 DIAGNOSIS — S02.2XXA FRACTURE OF NASAL BONES, INITIAL ENCOUNTER FOR CLOSED FRACTURE: Chronic | ICD-10-CM

## 2021-12-07 DIAGNOSIS — Z90.2 ACQUIRED ABSENCE OF LUNG [PART OF]: Chronic | ICD-10-CM

## 2021-12-07 DIAGNOSIS — Z90.710 ACQUIRED ABSENCE OF BOTH CERVIX AND UTERUS: Chronic | ICD-10-CM

## 2021-12-07 DIAGNOSIS — Z98.890 OTHER SPECIFIED POSTPROCEDURAL STATES: Chronic | ICD-10-CM

## 2021-12-07 DIAGNOSIS — S36.039A UNSPECIFIED LACERATION OF SPLEEN, INITIAL ENCOUNTER: Chronic | ICD-10-CM

## 2021-12-08 ENCOUNTER — RESULT REVIEW (OUTPATIENT)
Age: 66
End: 2021-12-08

## 2021-12-08 ENCOUNTER — APPOINTMENT (OUTPATIENT)
Dept: HEMATOLOGY ONCOLOGY | Facility: CLINIC | Age: 66
End: 2021-12-08
Payer: COMMERCIAL

## 2021-12-08 VITALS
TEMPERATURE: 97.7 F | WEIGHT: 124.34 LBS | BODY MASS INDEX: 21.23 KG/M2 | SYSTOLIC BLOOD PRESSURE: 99 MMHG | HEIGHT: 64.17 IN | OXYGEN SATURATION: 99 % | HEART RATE: 67 BPM | RESPIRATION RATE: 16 BRPM | DIASTOLIC BLOOD PRESSURE: 62 MMHG

## 2021-12-08 LAB
BASOPHILS # BLD AUTO: 0.03 K/UL — SIGNIFICANT CHANGE UP (ref 0–0.2)
BASOPHILS NFR BLD AUTO: 0.5 % — SIGNIFICANT CHANGE UP (ref 0–2)
EOSINOPHIL # BLD AUTO: 0.09 K/UL — SIGNIFICANT CHANGE UP (ref 0–0.5)
EOSINOPHIL NFR BLD AUTO: 1.6 % — SIGNIFICANT CHANGE UP (ref 0–6)
HCT VFR BLD CALC: 41.6 % — SIGNIFICANT CHANGE UP (ref 34.5–45)
HGB BLD-MCNC: 13.2 G/DL — SIGNIFICANT CHANGE UP (ref 11.5–15.5)
IMM GRANULOCYTES NFR BLD AUTO: 0.4 % — SIGNIFICANT CHANGE UP (ref 0–1.5)
LYMPHOCYTES # BLD AUTO: 1.87 K/UL — SIGNIFICANT CHANGE UP (ref 1–3.3)
LYMPHOCYTES # BLD AUTO: 33.9 % — SIGNIFICANT CHANGE UP (ref 13–44)
MCHC RBC-ENTMCNC: 28.9 PG — SIGNIFICANT CHANGE UP (ref 27–34)
MCHC RBC-ENTMCNC: 31.7 G/DL — LOW (ref 32–36)
MCV RBC AUTO: 91.2 FL — SIGNIFICANT CHANGE UP (ref 80–100)
MONOCYTES # BLD AUTO: 0.37 K/UL — SIGNIFICANT CHANGE UP (ref 0–0.9)
MONOCYTES NFR BLD AUTO: 6.7 % — SIGNIFICANT CHANGE UP (ref 2–14)
NEUTROPHILS # BLD AUTO: 3.13 K/UL — SIGNIFICANT CHANGE UP (ref 1.8–7.4)
NEUTROPHILS NFR BLD AUTO: 56.9 % — SIGNIFICANT CHANGE UP (ref 43–77)
NRBC # BLD: 0 /100 WBCS — SIGNIFICANT CHANGE UP (ref 0–0)
PLATELET # BLD AUTO: 216 K/UL — SIGNIFICANT CHANGE UP (ref 150–400)
RBC # BLD: 4.56 M/UL — SIGNIFICANT CHANGE UP (ref 3.8–5.2)
RBC # FLD: 13.7 % — SIGNIFICANT CHANGE UP (ref 10.3–14.5)
WBC # BLD: 5.51 K/UL — SIGNIFICANT CHANGE UP (ref 3.8–10.5)
WBC # FLD AUTO: 5.51 K/UL — SIGNIFICANT CHANGE UP (ref 3.8–10.5)

## 2021-12-08 PROCEDURE — 99213 OFFICE O/P EST LOW 20 MIN: CPT

## 2021-12-09 LAB
ALBUMIN SERPL ELPH-MCNC: 4.6 G/DL
ALP BLD-CCNC: 72 U/L
ALT SERPL-CCNC: 22 U/L
ANION GAP SERPL CALC-SCNC: 9 MMOL/L
AST SERPL-CCNC: 23 U/L
BILIRUB SERPL-MCNC: 0.2 MG/DL
BUN SERPL-MCNC: 15 MG/DL
CALCIUM SERPL-MCNC: 9.8 MG/DL
CANCER AG125 SERPL-ACNC: 11 U/ML
CHLORIDE SERPL-SCNC: 104 MMOL/L
CO2 SERPL-SCNC: 29 MMOL/L
CREAT SERPL-MCNC: 0.69 MG/DL
GLUCOSE SERPL-MCNC: 94 MG/DL
POTASSIUM SERPL-SCNC: 4.8 MMOL/L
PROT SERPL-MCNC: 6.6 G/DL
SODIUM SERPL-SCNC: 141 MMOL/L

## 2021-12-10 NOTE — HISTORY OF PRESENT ILLNESS
[Disease: _____________________] : Disease: [unfilled] [AJCC Stage: ____] : AJCC Stage: [unfilled] [de-identified] : Ms. Pham is a 62 year old  postmenopausal who presents for discussion regarding treatment for fallopian tube cancer, and she has BRCA 1 pathogenic mutation.\par \par Ms. Pham has a personal history of stage IA adenocarcinoma of the lung(eGFR positive) s/p left VATS (2017, [facility]) c/b splenic laceration requiring ICU stay, and breast papilloma s/p excision (2013, [facility]), and HGSIL s/p LEEP 1996. Past GYN history significant for one episode of postmenopausal bleeding at the age of 49 which was ultimately determined to be a menstrual cycle. No history of uterine fibroids or ovarian cysts. Was on oral contraception from her early 40s until just a few weeks ago. \par \par Ms. Pham has a pertinent family history significant for breast (paternal cousin, paternal aunt) and ovarian (paternal cousin) cancer. \par Due to her high risk, she opted to go for risk reducing BSO and double mastectomy on 1/9/18.\par The frozen section showed malignancy and she underwent full surgical staging and mastectomy was postponed.\par Pathology: stage IC fallopian tube cancer.\par Patient has fully recovered from surgery. [de-identified] : Patient here for a f/u visit.\par She feels well, has no  new complaints.\par She plans to retire next year.\par She is up to date with colonoscopy

## 2021-12-23 ENCOUNTER — RX RENEWAL (OUTPATIENT)
Age: 66
End: 2021-12-23

## 2022-01-19 ENCOUNTER — APPOINTMENT (OUTPATIENT)
Dept: SURGERY | Facility: CLINIC | Age: 67
End: 2022-01-19
Payer: COMMERCIAL

## 2022-01-19 PROCEDURE — 99213K: CUSTOM

## 2022-01-25 ENCOUNTER — APPOINTMENT (OUTPATIENT)
Dept: GYNECOLOGIC ONCOLOGY | Facility: CLINIC | Age: 67
End: 2022-01-25
Payer: COMMERCIAL

## 2022-01-25 VITALS
HEIGHT: 64.17 IN | HEART RATE: 67 BPM | BODY MASS INDEX: 21.34 KG/M2 | SYSTOLIC BLOOD PRESSURE: 106 MMHG | WEIGHT: 125 LBS | DIASTOLIC BLOOD PRESSURE: 65 MMHG

## 2022-01-25 PROCEDURE — 99213 OFFICE O/P EST LOW 20 MIN: CPT

## 2022-02-18 ENCOUNTER — RX RENEWAL (OUTPATIENT)
Age: 67
End: 2022-02-18

## 2022-02-18 RX ORDER — DICLOFENAC SODIUM 75 MG/1
75 TABLET, DELAYED RELEASE ORAL
Qty: 60 | Refills: 1 | Status: ACTIVE | COMMUNITY
Start: 2021-10-25 | End: 1900-01-01

## 2022-03-20 LAB — SARS-COV-2 N GENE NPH QL NAA+PROBE: NOT DETECTED

## 2022-03-22 ENCOUNTER — APPOINTMENT (OUTPATIENT)
Dept: PULMONOLOGY | Facility: CLINIC | Age: 67
End: 2022-03-22
Payer: COMMERCIAL

## 2022-03-22 VITALS
TEMPERATURE: 98.5 F | BODY MASS INDEX: 22.2 KG/M2 | HEIGHT: 64.17 IN | DIASTOLIC BLOOD PRESSURE: 60 MMHG | WEIGHT: 130 LBS | SYSTOLIC BLOOD PRESSURE: 110 MMHG | OXYGEN SATURATION: 98 % | RESPIRATION RATE: 16 BRPM | HEART RATE: 74 BPM

## 2022-03-22 DIAGNOSIS — Z23 ENCOUNTER FOR IMMUNIZATION: ICD-10-CM

## 2022-03-22 PROCEDURE — 94727 GAS DIL/WSHOT DETER LNG VOL: CPT

## 2022-03-22 PROCEDURE — 95012 NITRIC OXIDE EXP GAS DETER: CPT

## 2022-03-22 PROCEDURE — ZZZZZ: CPT

## 2022-03-22 PROCEDURE — 90732 PPSV23 VACC 2 YRS+ SUBQ/IM: CPT

## 2022-03-22 PROCEDURE — G0009: CPT

## 2022-03-22 PROCEDURE — 99214 OFFICE O/P EST MOD 30 MIN: CPT | Mod: 25

## 2022-03-22 PROCEDURE — 94729 DIFFUSING CAPACITY: CPT

## 2022-03-22 PROCEDURE — 94010 BREATHING CAPACITY TEST: CPT

## 2022-03-22 RX ORDER — FLUTICASONE PROPIONATE AND SALMETEROL 50; 100 UG/1; UG/1
100-50 POWDER RESPIRATORY (INHALATION)
Qty: 60 | Refills: 2 | Status: ACTIVE | COMMUNITY
Start: 2017-07-11 | End: 1900-01-01

## 2022-03-22 NOTE — PROCEDURE
[FreeTextEntry1] : Full PFT revealed mild obstruction, with a FEV1 of 1.94L, which is 83% of predicted, mild low lung volumes, and a diffusion of 16.4, which is 92% of predicted, with a normal flow volume loop \par  \par Feno was 22; a normal value being less than 25. Fractional exhaled nitric oxide (FENO) is regarded as a simple, noninvasive method for assessing eosinophilic airway inflammation. Produced by a variety of cells within the lung, nitric oxide (NO) concentrations are generally low in healthy individuals. However, high concentrations of NO appear to be involved in nonspecific host defense mechanisms and chronic inflammatory  diseases such as asthma. The American Thoracic Society (ATS) therefore recommended using FENO to aid in the diagnosis and monitoring of eosinophilic airway inflammation and asthma, and for identifying steroid responsive individuals whose chronic respiratory symptoms may be caused by airway inflammation

## 2022-03-22 NOTE — ASSESSMENT
[FreeTextEntry1] : Ms. Pham is a 67 y/o female with a history of allergies, GERD, KIRA, and asthma. She is s/p VATS procedure with Dr. Feliciano scheduled for 7/12/17 c/w adenocarcinoma with acinar predominant complicated by splenic laceration and ICU stay. She is s/p chemotherapy for fallopian tube tumor. She is s/p breast surgery 10/18 and is now s/p breast reconstruction surgery and s/p additional plastic surgery. #1 issue is pre-op for sinus procedure 4/2022- active asthma \par \par \par Her history of chronic cough is multifactorial due to: (resolved)\par -mild persistent asthma\par -post nasal drip syndrome\par -GERD\par \par problem 1: mild persistent asthma (active)\par -continue Advair 100 at 1 inhalation BID\par -continue Ventolin 2 puffs q6H, prn\par -continue Singulair 10 mg QHS \par -Asthma is believed to be caused by inherited (genetic) and environmental factor, but its exact cause is unknown. Asthma may be triggered by allergens, lung infections, or irritants in the air. Asthma triggers are different for each person\par -Inhaler technique reviewed as well as oral hygiene techniques reviewed with patient. Avoidance of cold air, extremes of temperature, rescue inhaler should be used before exercise. Order of medication reviewed with patient. Recommended use of a cool mist humidifier in the bedroom.\par \par problem 2: post nasal drip syndrome (stable) -  (4/2022)\par -continue to use Zaditor eye drops\par -continue Olopatadine 0.6% 1 sniff BID \par - Continue Atrovent 0.6% at 1 sniff/nostril, up to TID \par -continue to use OTC antihistamine - add Loratadine 10 mg QHS\par -Environmental measures for allergies were encouraged including mattress and pillow cover, air purifier, and environmental controls.\par \par problem 3: GERD (controlled) \par -continue to use Pepcid 40mg BID / Carafate 1mg before meals QHS; Prilosec 40 AM\par \par -Rule of 2s: avoid eating too much, eating too late, eating too spicy, eating two hours before bed\par -Things to avoid including overeating, spicy foods, tight clothing, eating within three hours of bed, this list is not all inclusive. \par -For treatment of reflux, possible options discussed including diet control, H2 blockers, PPIs, as well as coating motility agents discussed as treatment options. Timing of meals and proximity of last meal to sleep were discussed. If symptoms persist, a formal gastrointestinal evaluation is needed.\par \par problem 4: OSAS \par -no longer present secondary to weight loss and positional sleep\par -Sleep apnea is associated with adverse clinical consequences which an affect most organ systems. Cardiovascular disease risk includes arrhythmias, atrial fibrillation, hypertension, coronary artery disease, and stroke. Metabolic disorders include diabetes type 2, non-alcoholic fatty liver disease. Mood disorder especially depression; and cognitive decline especially in the elderly. Associations with chronic reflux/Perkins’s esophagus some but not all inclusive. \par -Reasons to assess this include arousal consistent with hypopnea; respiratory events most prominent in REM sleep or supine position; therefore sleep staging and body position are important for accurate diagnosis and estimation of AHI. \par \par problem 5: stage IA lung cancer, adenocarcinoma\par -she is s/p segmentectomy\par -follow up chest CT with Dr. Momin (last 10/2021, next 10/2022)\par \par Problem 6a: COVID-19 precautionary Immune Support Recommendations: s/p moderna vaccine x3\par -OTC Vitamin C 500mg BID \par -OTC Quercetin 250-500mg BID \par -OTC Zinc 75-100mg per day \par -OTC Melatonin 1 or 2mg a night \par -OTC Vitamin D 1-4000mg per day \par -OTC Tonic Water 8oz per day\par -Water 0.5-1 gallon per day\par \par problem 6: health maintenance \par - recommended Borage and Flaxseed oil for eczema \par - She has received an influenza vaccine 2021\par -recommended strep pneumonia vaccines: Prevnar-13 vaccine, followed by Pneumo vaccine 23 on year following\par -recommended early intervention for URIs\par -recommended osteoporosis evaluations\par -recommended early dermatological evaluations\par -recommended after the age of 50 to the age of 70, colonoscopy every 5 years\par -encouraged early intervention\par \par F/U in 3 months\par She is encouraged to call with any changes, concerns, or questions.

## 2022-03-22 NOTE — HISTORY OF PRESENT ILLNESS
[FreeTextEntry1] : Ms. Pham is a 66 year old female with a history of abnormal chest CT, allergic rhinitis, asthma, GERD,  lung nodule, malignant neoplasm of Brent, non-allergic rhinitis, and KIRA presenting to the office today for a follow up visit. Her chief complaint is\par - she notes getting ENT procedure in April \par - she notes a lot of nasal congestion \par - she notes her allergies are bothering her more recently \par - she notes sneezing a lot \par - she notes a lot of things are triggering it, including fragrance and dust\par - she notes not exercising much \par - she notes work is really busy and is planning on retiring or going part time\par - she notes energy is okay  when not working \par - she notes having the first pneumonia vaccine and wants the second \par - s/p covid 19 vaccine x3\par \par \par patient denies any headaches, nausea, vomiting, fever, chills, sweats, chest pain, chest pressure, palpitations, coughing, wheezing, fatigue, diarrhea, constipation, dysphagia, myalgias, dizziness, leg swelling, leg pain, itchy eyes, itchy ears, heartburn, reflux or sour taste in the mouth

## 2022-03-22 NOTE — ADDENDUM
[FreeTextEntry1] : Documented by Lopez Arias acting as a scribe for Dr. Naveen Dos Santos on  (03/22/2022).\par \par All medical record entries made by the Scribe were at my, Dr. Naveen Dos Santos's, direction and personally dictated by me on  (03/22/2022). I have reviewed the chart and agree that the record accurately reflects my personal performance of the history, physical exam, assessment and plan. I have also personally directed, reviewed, and agree with the discharge instructions. \par

## 2022-04-14 ENCOUNTER — TRANSCRIPTION ENCOUNTER (OUTPATIENT)
Age: 67
End: 2022-04-14

## 2022-04-19 ENCOUNTER — TRANSCRIPTION ENCOUNTER (OUTPATIENT)
Age: 67
End: 2022-04-19

## 2022-04-19 ENCOUNTER — APPOINTMENT (OUTPATIENT)
Dept: CT IMAGING | Facility: IMAGING CENTER | Age: 67
End: 2022-04-19
Payer: COMMERCIAL

## 2022-04-19 ENCOUNTER — OUTPATIENT (OUTPATIENT)
Dept: OUTPATIENT SERVICES | Facility: HOSPITAL | Age: 67
LOS: 1 days | End: 2022-04-19
Payer: COMMERCIAL

## 2022-04-19 DIAGNOSIS — Z90.2 ACQUIRED ABSENCE OF LUNG [PART OF]: Chronic | ICD-10-CM

## 2022-04-19 DIAGNOSIS — S36.039A UNSPECIFIED LACERATION OF SPLEEN, INITIAL ENCOUNTER: Chronic | ICD-10-CM

## 2022-04-19 DIAGNOSIS — Z00.8 ENCOUNTER FOR OTHER GENERAL EXAMINATION: ICD-10-CM

## 2022-04-19 DIAGNOSIS — R93.89 ABNORMAL FINDINGS ON DIAGNOSTIC IMAGING OF OTHER SPECIFIED BODY STRUCTURES: ICD-10-CM

## 2022-04-19 DIAGNOSIS — Z90.710 ACQUIRED ABSENCE OF BOTH CERVIX AND UTERUS: Chronic | ICD-10-CM

## 2022-04-19 DIAGNOSIS — S02.2XXA FRACTURE OF NASAL BONES, INITIAL ENCOUNTER FOR CLOSED FRACTURE: Chronic | ICD-10-CM

## 2022-04-19 DIAGNOSIS — D24.9 BENIGN NEOPLASM OF UNSPECIFIED BREAST: Chronic | ICD-10-CM

## 2022-04-19 DIAGNOSIS — Z98.890 OTHER SPECIFIED POSTPROCEDURAL STATES: Chronic | ICD-10-CM

## 2022-04-19 PROCEDURE — 71250 CT THORAX DX C-: CPT

## 2022-04-19 PROCEDURE — 71250 CT THORAX DX C-: CPT | Mod: 26

## 2022-04-21 ENCOUNTER — TRANSCRIPTION ENCOUNTER (OUTPATIENT)
Age: 67
End: 2022-04-21

## 2022-04-21 ENCOUNTER — NON-APPOINTMENT (OUTPATIENT)
Age: 67
End: 2022-04-21

## 2022-04-22 ENCOUNTER — TRANSCRIPTION ENCOUNTER (OUTPATIENT)
Age: 67
End: 2022-04-22

## 2022-06-01 ENCOUNTER — APPOINTMENT (OUTPATIENT)
Dept: ULTRASOUND IMAGING | Facility: CLINIC | Age: 67
End: 2022-06-01
Payer: COMMERCIAL

## 2022-06-01 PROCEDURE — 76775 US EXAM ABDO BACK WALL LIM: CPT

## 2022-06-06 ENCOUNTER — APPOINTMENT (OUTPATIENT)
Dept: UROLOGY | Facility: CLINIC | Age: 67
End: 2022-06-06

## 2022-06-21 ENCOUNTER — OUTPATIENT (OUTPATIENT)
Dept: OUTPATIENT SERVICES | Facility: HOSPITAL | Age: 67
LOS: 1 days | Discharge: HOME | End: 2022-06-21

## 2022-06-21 DIAGNOSIS — S36.039A UNSPECIFIED LACERATION OF SPLEEN, INITIAL ENCOUNTER: Chronic | ICD-10-CM

## 2022-06-21 DIAGNOSIS — S02.2XXA FRACTURE OF NASAL BONES, INITIAL ENCOUNTER FOR CLOSED FRACTURE: Chronic | ICD-10-CM

## 2022-06-21 DIAGNOSIS — D24.9 BENIGN NEOPLASM OF UNSPECIFIED BREAST: Chronic | ICD-10-CM

## 2022-06-21 DIAGNOSIS — Z98.890 OTHER SPECIFIED POSTPROCEDURAL STATES: Chronic | ICD-10-CM

## 2022-06-21 DIAGNOSIS — C57.00 MALIGNANT NEOPLASM OF UNSPECIFIED FALLOPIAN TUBE: ICD-10-CM

## 2022-06-21 DIAGNOSIS — Z90.710 ACQUIRED ABSENCE OF BOTH CERVIX AND UTERUS: Chronic | ICD-10-CM

## 2022-06-21 DIAGNOSIS — Z90.2 ACQUIRED ABSENCE OF LUNG [PART OF]: Chronic | ICD-10-CM

## 2022-06-28 ENCOUNTER — RESULT REVIEW (OUTPATIENT)
Age: 67
End: 2022-06-28

## 2022-06-28 ENCOUNTER — APPOINTMENT (OUTPATIENT)
Dept: HEMATOLOGY ONCOLOGY | Facility: CLINIC | Age: 67
End: 2022-06-28
Payer: COMMERCIAL

## 2022-06-28 VITALS
SYSTOLIC BLOOD PRESSURE: 99 MMHG | OXYGEN SATURATION: 98 % | WEIGHT: 126.52 LBS | BODY MASS INDEX: 21.6 KG/M2 | RESPIRATION RATE: 16 BRPM | DIASTOLIC BLOOD PRESSURE: 64 MMHG | HEART RATE: 66 BPM | HEIGHT: 64.13 IN | TEMPERATURE: 97.9 F

## 2022-06-28 LAB
BASOPHILS # BLD AUTO: 0.03 K/UL — SIGNIFICANT CHANGE UP (ref 0–0.2)
BASOPHILS NFR BLD AUTO: 0.8 % — SIGNIFICANT CHANGE UP (ref 0–2)
CANCER AG125 SERPL-ACNC: 10 U/ML
EOSINOPHIL # BLD AUTO: 0.14 K/UL — SIGNIFICANT CHANGE UP (ref 0–0.5)
EOSINOPHIL NFR BLD AUTO: 3.6 % — SIGNIFICANT CHANGE UP (ref 0–6)
HCT VFR BLD CALC: 39 % — SIGNIFICANT CHANGE UP (ref 34.5–45)
HGB BLD-MCNC: 12.6 G/DL — SIGNIFICANT CHANGE UP (ref 11.5–15.5)
IMM GRANULOCYTES NFR BLD AUTO: 0.3 % — SIGNIFICANT CHANGE UP (ref 0–1.5)
LYMPHOCYTES # BLD AUTO: 1.14 K/UL — SIGNIFICANT CHANGE UP (ref 1–3.3)
LYMPHOCYTES # BLD AUTO: 29.3 % — SIGNIFICANT CHANGE UP (ref 13–44)
MCHC RBC-ENTMCNC: 28.1 PG — SIGNIFICANT CHANGE UP (ref 27–34)
MCHC RBC-ENTMCNC: 32.3 G/DL — SIGNIFICANT CHANGE UP (ref 32–36)
MCV RBC AUTO: 87.1 FL — SIGNIFICANT CHANGE UP (ref 80–100)
MONOCYTES # BLD AUTO: 0.31 K/UL — SIGNIFICANT CHANGE UP (ref 0–0.9)
MONOCYTES NFR BLD AUTO: 8 % — SIGNIFICANT CHANGE UP (ref 2–14)
NEUTROPHILS # BLD AUTO: 2.26 K/UL — SIGNIFICANT CHANGE UP (ref 1.8–7.4)
NEUTROPHILS NFR BLD AUTO: 58 % — SIGNIFICANT CHANGE UP (ref 43–77)
NRBC # BLD: 0 /100 WBCS — SIGNIFICANT CHANGE UP (ref 0–0)
PLATELET # BLD AUTO: 196 K/UL — SIGNIFICANT CHANGE UP (ref 150–400)
RBC # BLD: 4.48 M/UL — SIGNIFICANT CHANGE UP (ref 3.8–5.2)
RBC # FLD: 13.5 % — SIGNIFICANT CHANGE UP (ref 10.3–14.5)
WBC # BLD: 3.89 K/UL — SIGNIFICANT CHANGE UP (ref 3.8–10.5)
WBC # FLD AUTO: 3.89 K/UL — SIGNIFICANT CHANGE UP (ref 3.8–10.5)

## 2022-06-28 PROCEDURE — 99213 OFFICE O/P EST LOW 20 MIN: CPT

## 2022-06-28 NOTE — HISTORY OF PRESENT ILLNESS
[Disease: _____________________] : Disease: [unfilled] [AJCC Stage: ____] : AJCC Stage: [unfilled] [de-identified] : Ms. Pham is a 62 year old  postmenopausal who presents for discussion regarding treatment for fallopian tube cancer, and she has BRCA 1 pathogenic mutation.\par \par Ms. Pham has a personal history of stage IA adenocarcinoma of the lung(eGFR positive) s/p left VATS (2017, [facility]) c/b splenic laceration requiring ICU stay, and breast papilloma s/p excision (2013, [facility]), and HGSIL s/p LEEP 1996. Past GYN history significant for one episode of postmenopausal bleeding at the age of 49 which was ultimately determined to be a menstrual cycle. No history of uterine fibroids or ovarian cysts. Was on oral contraception from her early 40s until just a few weeks ago. \par \par Ms. Pham has a pertinent family history significant for breast (paternal cousin, paternal aunt) and ovarian (paternal cousin) cancer. \par Due to her high risk, she opted to go for risk reducing BSO and double mastectomy on 1/9/18.\par The frozen section showed malignancy and she underwent full surgical staging and mastectomy was postponed.\par Pathology: stage IC fallopian tube cancer.\par Patient has fully recovered from surgery. [de-identified] : Patient feels well, still working full time.\par Appetite is normal. Denies any pain, bowel or bladder issues.

## 2022-06-29 LAB
ALBUMIN SERPL ELPH-MCNC: 4.4 G/DL
ALP BLD-CCNC: 75 U/L
ALT SERPL-CCNC: 18 U/L
ANION GAP SERPL CALC-SCNC: 13 MMOL/L
AST SERPL-CCNC: 22 U/L
BILIRUB SERPL-MCNC: 0.3 MG/DL
BUN SERPL-MCNC: 18 MG/DL
CALCIUM SERPL-MCNC: 9.5 MG/DL
CHLORIDE SERPL-SCNC: 105 MMOL/L
CHOLEST SERPL-MCNC: 138 MG/DL
CO2 SERPL-SCNC: 25 MMOL/L
CREAT SERPL-MCNC: 0.71 MG/DL
EGFR: 94 ML/MIN/1.73M2
GLUCOSE SERPL-MCNC: 109 MG/DL
HDLC SERPL-MCNC: 50 MG/DL
LDLC SERPL CALC-MCNC: 72 MG/DL
NONHDLC SERPL-MCNC: 88 MG/DL
POTASSIUM SERPL-SCNC: 4.4 MMOL/L
PROT SERPL-MCNC: 6.4 G/DL
SODIUM SERPL-SCNC: 143 MMOL/L
TRIGL SERPL-MCNC: 81 MG/DL

## 2022-07-19 ENCOUNTER — APPOINTMENT (OUTPATIENT)
Dept: GYNECOLOGIC ONCOLOGY | Facility: CLINIC | Age: 67
End: 2022-07-19

## 2022-07-19 VITALS
SYSTOLIC BLOOD PRESSURE: 95 MMHG | WEIGHT: 125 LBS | HEIGHT: 64 IN | BODY MASS INDEX: 21.34 KG/M2 | DIASTOLIC BLOOD PRESSURE: 61 MMHG | HEART RATE: 69 BPM

## 2022-07-19 PROCEDURE — 99213 OFFICE O/P EST LOW 20 MIN: CPT

## 2022-07-20 DIAGNOSIS — N95.2 POSTMENOPAUSAL ATROPHIC VAGINITIS: ICD-10-CM

## 2022-07-21 ENCOUNTER — OUTPATIENT (OUTPATIENT)
Dept: OUTPATIENT SERVICES | Facility: HOSPITAL | Age: 67
LOS: 1 days | End: 2022-07-21
Payer: COMMERCIAL

## 2022-07-21 ENCOUNTER — APPOINTMENT (OUTPATIENT)
Dept: RADIOLOGY | Facility: IMAGING CENTER | Age: 67
End: 2022-07-21

## 2022-07-21 DIAGNOSIS — Z90.2 ACQUIRED ABSENCE OF LUNG [PART OF]: Chronic | ICD-10-CM

## 2022-07-21 DIAGNOSIS — Z00.00 ENCOUNTER FOR GENERAL ADULT MEDICAL EXAMINATION WITHOUT ABNORMAL FINDINGS: ICD-10-CM

## 2022-07-21 DIAGNOSIS — Z90.710 ACQUIRED ABSENCE OF BOTH CERVIX AND UTERUS: Chronic | ICD-10-CM

## 2022-07-21 DIAGNOSIS — Z00.8 ENCOUNTER FOR OTHER GENERAL EXAMINATION: ICD-10-CM

## 2022-07-21 DIAGNOSIS — S02.2XXA FRACTURE OF NASAL BONES, INITIAL ENCOUNTER FOR CLOSED FRACTURE: Chronic | ICD-10-CM

## 2022-07-21 DIAGNOSIS — S36.039A UNSPECIFIED LACERATION OF SPLEEN, INITIAL ENCOUNTER: Chronic | ICD-10-CM

## 2022-07-21 DIAGNOSIS — D24.9 BENIGN NEOPLASM OF UNSPECIFIED BREAST: Chronic | ICD-10-CM

## 2022-07-21 DIAGNOSIS — Z98.890 OTHER SPECIFIED POSTPROCEDURAL STATES: Chronic | ICD-10-CM

## 2022-07-21 DIAGNOSIS — C57.00 MALIGNANT NEOPLASM OF UNSPECIFIED FALLOPIAN TUBE: ICD-10-CM

## 2022-07-21 PROCEDURE — 77085 DXA BONE DENSITY AXL VRT FX: CPT | Mod: 26

## 2022-07-21 PROCEDURE — 77085 DXA BONE DENSITY AXL VRT FX: CPT

## 2022-08-04 ENCOUNTER — APPOINTMENT (OUTPATIENT)
Dept: CT IMAGING | Facility: CLINIC | Age: 67
End: 2022-08-04

## 2022-08-04 ENCOUNTER — OUTPATIENT (OUTPATIENT)
Dept: OUTPATIENT SERVICES | Facility: HOSPITAL | Age: 67
LOS: 1 days | End: 2022-08-04
Payer: COMMERCIAL

## 2022-08-04 DIAGNOSIS — Z90.710 ACQUIRED ABSENCE OF BOTH CERVIX AND UTERUS: Chronic | ICD-10-CM

## 2022-08-04 DIAGNOSIS — Z00.8 ENCOUNTER FOR OTHER GENERAL EXAMINATION: ICD-10-CM

## 2022-08-04 DIAGNOSIS — S36.039A UNSPECIFIED LACERATION OF SPLEEN, INITIAL ENCOUNTER: Chronic | ICD-10-CM

## 2022-08-04 DIAGNOSIS — S02.2XXA FRACTURE OF NASAL BONES, INITIAL ENCOUNTER FOR CLOSED FRACTURE: Chronic | ICD-10-CM

## 2022-08-04 DIAGNOSIS — D24.9 BENIGN NEOPLASM OF UNSPECIFIED BREAST: Chronic | ICD-10-CM

## 2022-08-04 DIAGNOSIS — Z98.890 OTHER SPECIFIED POSTPROCEDURAL STATES: Chronic | ICD-10-CM

## 2022-08-04 DIAGNOSIS — Z90.2 ACQUIRED ABSENCE OF LUNG [PART OF]: Chronic | ICD-10-CM

## 2022-08-04 PROCEDURE — 75571 CT HRT W/O DYE W/CA TEST: CPT | Mod: 26

## 2022-08-04 PROCEDURE — 75571 CT HRT W/O DYE W/CA TEST: CPT

## 2022-08-23 ENCOUNTER — APPOINTMENT (OUTPATIENT)
Dept: CARDIOLOGY | Facility: HOSPITAL | Age: 67
End: 2022-08-23

## 2022-08-23 ENCOUNTER — OUTPATIENT (OUTPATIENT)
Dept: OUTPATIENT SERVICES | Facility: HOSPITAL | Age: 67
LOS: 1 days | End: 2022-08-23
Payer: COMMERCIAL

## 2022-08-23 DIAGNOSIS — I25.10 ATHEROSCLEROTIC HEART DISEASE OF NATIVE CORONARY ARTERY WITHOUT ANGINA PECTORIS: ICD-10-CM

## 2022-08-23 DIAGNOSIS — S02.2XXA FRACTURE OF NASAL BONES, INITIAL ENCOUNTER FOR CLOSED FRACTURE: Chronic | ICD-10-CM

## 2022-08-23 DIAGNOSIS — Z90.710 ACQUIRED ABSENCE OF BOTH CERVIX AND UTERUS: Chronic | ICD-10-CM

## 2022-08-23 DIAGNOSIS — D24.9 BENIGN NEOPLASM OF UNSPECIFIED BREAST: Chronic | ICD-10-CM

## 2022-08-23 DIAGNOSIS — Z90.2 ACQUIRED ABSENCE OF LUNG [PART OF]: Chronic | ICD-10-CM

## 2022-08-23 DIAGNOSIS — Z98.890 OTHER SPECIFIED POSTPROCEDURAL STATES: Chronic | ICD-10-CM

## 2022-08-23 DIAGNOSIS — S36.039A UNSPECIFIED LACERATION OF SPLEEN, INITIAL ENCOUNTER: Chronic | ICD-10-CM

## 2022-08-23 PROCEDURE — 75574 CT ANGIO HRT W/3D IMAGE: CPT | Mod: 26

## 2022-08-23 PROCEDURE — 75574 CT ANGIO HRT W/3D IMAGE: CPT

## 2022-08-30 ENCOUNTER — OUTPATIENT (OUTPATIENT)
Dept: OUTPATIENT SERVICES | Facility: HOSPITAL | Age: 67
LOS: 1 days | End: 2022-08-30
Payer: COMMERCIAL

## 2022-08-30 DIAGNOSIS — Z98.890 OTHER SPECIFIED POSTPROCEDURAL STATES: Chronic | ICD-10-CM

## 2022-08-30 DIAGNOSIS — D24.9 BENIGN NEOPLASM OF UNSPECIFIED BREAST: Chronic | ICD-10-CM

## 2022-08-30 DIAGNOSIS — S36.039A UNSPECIFIED LACERATION OF SPLEEN, INITIAL ENCOUNTER: Chronic | ICD-10-CM

## 2022-08-30 DIAGNOSIS — S02.2XXA FRACTURE OF NASAL BONES, INITIAL ENCOUNTER FOR CLOSED FRACTURE: Chronic | ICD-10-CM

## 2022-08-30 DIAGNOSIS — I25.10 ATHEROSCLEROTIC HEART DISEASE OF NATIVE CORONARY ARTERY WITHOUT ANGINA PECTORIS: ICD-10-CM

## 2022-08-30 DIAGNOSIS — Z90.2 ACQUIRED ABSENCE OF LUNG [PART OF]: Chronic | ICD-10-CM

## 2022-08-30 DIAGNOSIS — Z90.710 ACQUIRED ABSENCE OF BOTH CERVIX AND UTERUS: Chronic | ICD-10-CM

## 2022-08-30 PROCEDURE — 0504T: CPT

## 2022-08-30 PROCEDURE — 0503T: CPT

## 2022-08-30 PROCEDURE — 0502T: CPT

## 2022-09-08 NOTE — ASU PATIENT PROFILE, ADULT - NS PRO LAST MENSTRUAL
unknown Griseofulvin Pregnancy And Lactation Text: This medication is Pregnancy Category X and is known to cause serious birth defects. It is unknown if this medication is excreted in breast milk but breast feeding should be avoided.

## 2022-09-12 NOTE — ASU PATIENT PROFILE, ADULT - PMH
Assessment/Plan:       Diagnoses and all orders for this visit:      Essential hypertension  Comments:  Controlled on current medication  Type 2 diabetes mellitus with diabetic polyneuropathy, without long-term current use of insulin (HCC)  Comments:  Sugars are controlled at home  A1c is 5 6   Will decrease dose of amaryl to 1mg daily, continue metformin  Eye and foot exam up to date  Orders:  -     POCT hemoglobin A1c  -     glimepiride (AMARYL) 1 mg tablet; Take 1 tablet (1 mg total) by mouth daily with breakfast    Other iron deficiency anemia  Comments:  managed by heme-onc  Orders:  -     ferrous sulfate 324 (65 Fe) mg; Take 1 tablet on Monday, Wed and Friday    Essential (hemorrhagic) thrombocythemia ([address] )                Managed by heme-onc    Post-menopausal  -     DXA bone density spine hip and pelvis; Future        Subjective:      Patient ID: Carroll Mancia is a 79 y o  female  [facility] presents today for routine follow up visit  She states she feels tired occasionally, but otherwise is doing well  Her sugars and BP are well controlled at home  The following portions of the patient's history were reviewed and updated as appropriate: allergies, current medications, past family history, past medical history, past social history, past surgical history and problem list     Review of Systems   Constitutional: Negative  HENT: Negative  Eyes: Negative  Respiratory: Negative  Cardiovascular: Negative  Gastrointestinal: Negative  Endocrine: Negative  Genitourinary: Negative  Musculoskeletal: Negative  Skin: Negative  Allergic/Immunologic: Negative  Neurological: Negative  Hematological: Negative  Psychiatric/Behavioral: Negative            Objective:      /52   Pulse (!) 53   Temp (!) 97 3 °F (36 3 °C)   Resp 16   Ht 5' (1 524 m)   Wt 54 kg (119 lb)   LMP  (LMP Unknown)   SpO2 93%   BMI 23 24 kg/m²          Physical Exam  Constitutional: General: She is not in acute distress  Appearance: She is well-developed  She is not diaphoretic  HENT:      Head: Normocephalic and atraumatic  Cardiovascular:      Rate and Rhythm: Normal rate and regular rhythm  Heart sounds: Normal heart sounds  No murmur heard  No friction rub  No gallop  Pulmonary:      Effort: Pulmonary effort is normal  No respiratory distress  Breath sounds: Normal breath sounds  No wheezing or rales  Chest:      Chest wall: No tenderness  Musculoskeletal:         General: No deformity  Normal range of motion  Cervical back: Normal range of motion and neck supple  Skin:     General: Skin is warm and dry  Neurological:      Mental Status: She is alert and oriented to person, place, and time  Psychiatric:         Behavior: Behavior normal          Thought Content:  Thought content normal          Judgment: Judgment normal  Adenocarcinoma of lung, stage 1  Left upper lobe stage 1A  Anxiety    CAD (coronary artery disease)  non obstructive 50% proximal LAD stenosis  Depression    Disorder of shoulder  right  Eczema    Fallopian tube cancer, carcinoma, left    GERD (gastroesophageal reflux disease)    Hyperlipidemia    Migraines    Mild asthma  denies intubation or hospitalizations  Nasal bone fracture    Papilloma of breast  right  Pulmonary nodule  bilateral  PVC (premature ventricular contraction)    Seasonal allergies    Sleep apnea  noncompliant in using CPAP machine  Asymtomatic - was never retested for sleep apnea

## 2022-09-26 ENCOUNTER — APPOINTMENT (OUTPATIENT)
Dept: DERMATOLOGY | Facility: CLINIC | Age: 67
End: 2022-09-26

## 2022-09-26 DIAGNOSIS — L82.1 OTHER SEBORRHEIC KERATOSIS: ICD-10-CM

## 2022-09-26 DIAGNOSIS — Z12.83 ENCOUNTER FOR SCREENING FOR MALIGNANT NEOPLASM OF SKIN: ICD-10-CM

## 2022-09-26 PROCEDURE — 99213 OFFICE O/P EST LOW 20 MIN: CPT

## 2022-09-30 ENCOUNTER — NON-APPOINTMENT (OUTPATIENT)
Age: 67
End: 2022-09-30

## 2022-09-30 ENCOUNTER — APPOINTMENT (OUTPATIENT)
Dept: PULMONOLOGY | Facility: CLINIC | Age: 67
End: 2022-09-30

## 2022-09-30 VITALS
HEIGHT: 64 IN | OXYGEN SATURATION: 99 % | HEART RATE: 62 BPM | DIASTOLIC BLOOD PRESSURE: 60 MMHG | WEIGHT: 122 LBS | SYSTOLIC BLOOD PRESSURE: 110 MMHG | RESPIRATION RATE: 16 BRPM | TEMPERATURE: 97.9 F | BODY MASS INDEX: 20.83 KG/M2

## 2022-09-30 DIAGNOSIS — Z71.89 OTHER SPECIFIED COUNSELING: ICD-10-CM

## 2022-09-30 PROCEDURE — 95012 NITRIC OXIDE EXP GAS DETER: CPT

## 2022-09-30 PROCEDURE — 94010 BREATHING CAPACITY TEST: CPT

## 2022-09-30 PROCEDURE — 99214 OFFICE O/P EST MOD 30 MIN: CPT | Mod: 25

## 2022-09-30 NOTE — HISTORY OF PRESENT ILLNESS
Kenny Pillai MD [FreeTextEntry1] : Ms. Pham is a 67 year old female with a history of abnormal chest CT, allergic rhinitis, asthma, GERD,  lung nodule, malignant neoplasm of Brent, non-allergic rhinitis, and KIRA presenting to the office today for a follow up visit. Her chief complaint is\par - she notes feeling well in general\par - she notes getting the fifth COVID shot \par - she notes energy level is okay\par - she notes not exercising much  \par - she notes rhinitis\par - she denies difficulty swallowing\par - she notes bowels are regular\par - she denies any visual issues\par - she notes sense of smell and taste are okay\par - she notes getting CT angiogram \par -  she notes her asthma sometimes gets trigged easily lately \par \par \par \par - She denies any headaches, nausea, vomiting, fever, chills, sweats, chest pain, chest pressure, palpitations, SOB, coughing, wheezing, fatigue, diarrhea, constipation, dysphagia, myalgias, dizziness, leg swelling, leg pain, itchy eyes, itchy ears, heartburn, reflux, or sour taste in the mouth

## 2022-09-30 NOTE — ASSESSMENT
[FreeTextEntry1] : Ms. Pham is a 66 y/o female with a history of allergies, GERD, KIRA, and asthma. She is s/p VATS procedure with Dr. Feliciano scheduled for 7/12/17 c/w adenocarcinoma with acinar predominant complicated by splenic laceration and ICU stay. She is s/p chemotherapy for fallopian tube tumor. She is s/p breast surgery 10/18 and is now s/p breast reconstruction surgery and s/p additional plastic surgery. active asthma @times, abnormal Cardiac CT (LAD/RCA) \par \par \par Her history of chronic cough is multifactorial due to: (resolved)\par -mild persistent asthma\par -post nasal drip syndrome\par -GERD\par \par problem 1: mild persistent asthma (active)\par -continue Advair 100 at 1 inhalation BID\par -continue Ventolin 2 puffs q6H, prn\par -continue Singulair 10 mg QHS \par -Asthma is believed to be caused by inherited (genetic) and environmental factor, but its exact cause is unknown. Asthma may be triggered by allergens, lung infections, or irritants in the air. Asthma triggers are different for each person\par -Inhaler technique reviewed as well as oral hygiene techniques reviewed with patient. Avoidance of cold air, extremes of temperature, rescue inhaler should be used before exercise. Order of medication reviewed with patient. Recommended use of a cool mist humidifier in the bedroom.\par \par Problem 1A: Cardiac Dz-\par - Recommended Dr Cardona/Angie \par \par problem 2: post nasal drip syndrome (stable) -  (4/2022)\par -continue to use Zaditor eye drops\par -continue Olopatadine 0.6% 1 sniff BID \par - Continue Atrovent 0.6% at 1 sniff/nostril, up to TID \par -continue to use OTC antihistamine - add Loratadine 10 mg QHS\par -Environmental measures for allergies were encouraged including mattress and pillow cover, air purifier, and environmental controls.\par \par problem 3: GERD (controlled) \par -continue to use Pepcid 40mg BID / Carafate 1mg before meals QHS; Prilosec 40 AM\par \par -Rule of 2s: avoid eating too much, eating too late, eating too spicy, eating two hours before bed\par -Things to avoid including overeating, spicy foods, tight clothing, eating within three hours of bed, this list is not all inclusive. \par -For treatment of reflux, possible options discussed including diet control, H2 blockers, PPIs, as well as coating motility agents discussed as treatment options. Timing of meals and proximity of last meal to sleep were discussed. If symptoms persist, a formal gastrointestinal evaluation is needed.\par \par problem 4: OSAS \par -no longer present secondary to weight loss and positional sleep\par -Sleep apnea is associated with adverse clinical consequences which an affect most organ systems. Cardiovascular disease risk includes arrhythmias, atrial fibrillation, hypertension, coronary artery disease, and stroke. Metabolic disorders include diabetes type 2, non-alcoholic fatty liver disease. Mood disorder especially depression; and cognitive decline especially in the elderly. Associations with chronic reflux/Perkins’s esophagus some but not all inclusive. \par -Reasons to assess this include arousal consistent with hypopnea; respiratory events most prominent in REM sleep or supine position; therefore sleep staging and body position are important for accurate diagnosis and estimation of AHI. \par \par problem 5: stage IA lung cancer, adenocarcinoma\par -she is s/p segmentectomy\par -follow up chest CT with Dr. Momin (last 10/2021, next 10/2022)\par \par Problem 6a: COVID-19 precautionary Immune Support Recommendations: s/p moderna vaccine x3\par -OTC Vitamin C 500mg BID \par -OTC Quercetin 250-500mg BID \par -OTC Zinc 75-100mg per day \par -OTC Melatonin 1 or 2mg a night \par -OTC Vitamin D 1-4000mg per day \par -OTC Tonic Water 8oz per day\par -Water 0.5-1 gallon per day\par \par problem 6: health maintenance \par - Recommended SaNotize \par - recommended Borage and Flaxseed oil for eczema \par - She has received an influenza vaccine 2021\par -recommended strep pneumonia vaccines: Prevnar-13 vaccine, followed by Pneumo vaccine 23 on year following\par -recommended early intervention for URIs\par -recommended osteoporosis evaluations\par -recommended early dermatological evaluations\par -recommended after the age of 50 to the age of 70, colonoscopy every 5 years\par -encouraged early intervention\par \par F/U in 3 months\par She is encouraged to call with any changes, concerns, or questions.

## 2022-09-30 NOTE — ADDENDUM
[FreeTextEntry1] : Documented by Lopez Arias acting as a scribe for Dr. Naveen Dos Santos on (09/30/2022).\par \par All medical record entries made by the Scribe were at my, Dr. Naveen Dos Santos's, direction and personally dictated by me on (09/30/2022). I have reviewed the chart and agree that the record accurately reflects my personal performance of the history, physical exam, assessment and plan. I have personally directed, reviewed, and agree with the discharge instructions.

## 2022-09-30 NOTE — PROCEDURE
[FreeTextEntry1] : PFT reveals mild obstruction, with an FEV1 of  1.80L, which is 75% of predicted, with a normal flow volume loop \par \par FENO was 21; a normal value being less than 25\par Fractional exhaled nitric oxide (FENO) is regarded as a simple, noninvasive method for assessing eosinophilic airway inflammation. Produced by a variety of cells within the lung, nitric oxide (NO) concentrations are generally low in healthy individuals. However, high concentrations of NO appear to be involved in nonspecific host defense mechanisms and chronic inflammatory diseases such as asthma. The American Thoracic Society (ATS) therefore has recommended using FENO to aid in the diagnosis and monitoring of eosinophilic airway inflammation and asthma, and for identifying steroid responsive individuals whose chronic respiratory symptoms may be caused by airway inflammation. \par \par Cardiac CT (8/2022) revealed no severe stenosis, mod stenosis, calcified plaque of LAD, FFR positive overall no need. Calcium score 333

## 2022-10-12 ENCOUNTER — APPOINTMENT (OUTPATIENT)
Dept: CT IMAGING | Facility: IMAGING CENTER | Age: 67
End: 2022-10-12

## 2022-10-12 ENCOUNTER — TRANSCRIPTION ENCOUNTER (OUTPATIENT)
Age: 67
End: 2022-10-12

## 2022-10-12 ENCOUNTER — OUTPATIENT (OUTPATIENT)
Dept: OUTPATIENT SERVICES | Facility: HOSPITAL | Age: 67
LOS: 1 days | End: 2022-10-12
Payer: COMMERCIAL

## 2022-10-12 DIAGNOSIS — Z90.710 ACQUIRED ABSENCE OF BOTH CERVIX AND UTERUS: Chronic | ICD-10-CM

## 2022-10-12 DIAGNOSIS — D24.9 BENIGN NEOPLASM OF UNSPECIFIED BREAST: Chronic | ICD-10-CM

## 2022-10-12 DIAGNOSIS — Z90.2 ACQUIRED ABSENCE OF LUNG [PART OF]: Chronic | ICD-10-CM

## 2022-10-12 DIAGNOSIS — S36.039A UNSPECIFIED LACERATION OF SPLEEN, INITIAL ENCOUNTER: Chronic | ICD-10-CM

## 2022-10-12 DIAGNOSIS — S02.2XXA FRACTURE OF NASAL BONES, INITIAL ENCOUNTER FOR CLOSED FRACTURE: Chronic | ICD-10-CM

## 2022-10-12 DIAGNOSIS — Z87.891 PERSONAL HISTORY OF NICOTINE DEPENDENCE: ICD-10-CM

## 2022-10-12 DIAGNOSIS — Z98.890 OTHER SPECIFIED POSTPROCEDURAL STATES: Chronic | ICD-10-CM

## 2022-10-12 PROCEDURE — 71250 CT THORAX DX C-: CPT | Mod: 26

## 2022-10-12 PROCEDURE — 71250 CT THORAX DX C-: CPT

## 2022-10-14 ENCOUNTER — TRANSCRIPTION ENCOUNTER (OUTPATIENT)
Age: 67
End: 2022-10-14

## 2022-10-17 ENCOUNTER — TRANSCRIPTION ENCOUNTER (OUTPATIENT)
Age: 67
End: 2022-10-17

## 2022-10-19 ENCOUNTER — TRANSCRIPTION ENCOUNTER (OUTPATIENT)
Age: 67
End: 2022-10-19

## 2022-10-24 ENCOUNTER — TRANSCRIPTION ENCOUNTER (OUTPATIENT)
Age: 67
End: 2022-10-24

## 2022-10-24 ENCOUNTER — NON-APPOINTMENT (OUTPATIENT)
Age: 67
End: 2022-10-24

## 2022-10-25 ENCOUNTER — TRANSCRIPTION ENCOUNTER (OUTPATIENT)
Age: 67
End: 2022-10-25

## 2022-10-26 ENCOUNTER — TRANSCRIPTION ENCOUNTER (OUTPATIENT)
Age: 67
End: 2022-10-26

## 2022-11-08 ENCOUNTER — APPOINTMENT (OUTPATIENT)
Dept: THORACIC SURGERY | Facility: CLINIC | Age: 67
End: 2022-11-08

## 2022-11-08 ENCOUNTER — NON-APPOINTMENT (OUTPATIENT)
Age: 67
End: 2022-11-08

## 2022-11-08 VITALS
OXYGEN SATURATION: 97 % | HEART RATE: 56 BPM | BODY MASS INDEX: 21.34 KG/M2 | HEIGHT: 64 IN | WEIGHT: 125 LBS | SYSTOLIC BLOOD PRESSURE: 101 MMHG | DIASTOLIC BLOOD PRESSURE: 55 MMHG

## 2022-11-08 PROCEDURE — 99205 OFFICE O/P NEW HI 60 MIN: CPT

## 2022-11-11 NOTE — PHYSICAL EXAM
[General Appearance - Alert] : alert [General Appearance - In No Acute Distress] : in no acute distress [Sclera] : the sclera and conjunctiva were normal [PERRL With Normal Accommodation] : pupils were equal in size, round, and reactive to light [Extraocular Movements] : extraocular movements were intact [Outer Ear] : the ears and nose were normal in appearance [Oropharynx] : the oropharynx was normal [Neck Appearance] : the appearance of the neck was normal [Neck Cervical Mass (___cm)] : no neck mass was observed [Jugular Venous Distention Increased] : there was no jugular-venous distention [Thyroid Diffuse Enlargement] : the thyroid was not enlarged [Thyroid Nodule] : there were no palpable thyroid nodules [Auscultation Breath Sounds / Voice Sounds] : lungs were clear to auscultation bilaterally [Heart Rate And Rhythm] : heart rate was normal and rhythm regular [Heart Sounds] : normal S1 and S2 [Heart Sounds Gallop] : no gallops [Murmurs] : no murmurs [Heart Sounds Pericardial Friction Rub] : no pericardial rub [Examination Of The Chest] : the chest was normal in appearance [Chest Visual Inspection Thoracic Asymmetry] : no chest asymmetry [Diminished Respiratory Excursion] : normal chest expansion [2+] : left 2+ [No Abnormalities] : the abdominal aorta was not enlarged and no bruit was heard [Breast Appearance] : normal in appearance [Breast Palpation Mass] : no palpable masses [Bowel Sounds] : normal bowel sounds [Abdomen Soft] : soft [Abdomen Tenderness] : non-tender [Abdomen Mass (___ Cm)] : no abdominal mass palpated [Cervical Lymph Nodes Enlarged Posterior Bilaterally] : posterior cervical [Cervical Lymph Nodes Enlarged Anterior Bilaterally] : anterior cervical [Supraclavicular Lymph Nodes Enlarged Bilaterally] : supraclavicular [Axillary Lymph Nodes Enlarged Bilaterally] : axillary [Femoral Lymph Nodes Enlarged Bilaterally] : femoral [Inguinal Lymph Nodes Enlarged Bilaterally] : inguinal [No CVA Tenderness] : no ~M costovertebral angle tenderness [No Spinal Tenderness] : no spinal tenderness [Abnormal Walk] : normal gait [Nail Clubbing] : no clubbing  or cyanosis of the fingernails [Musculoskeletal - Swelling] : no joint swelling seen [Motor Tone] : muscle strength and tone were normal [Skin Color & Pigmentation] : normal skin color and pigmentation [Skin Turgor] : normal skin turgor [] : no rash [Deep Tendon Reflexes (DTR)] : deep tendon reflexes were 2+ and symmetric [Sensation] : the sensory exam was normal to light touch and pinprick [No Focal Deficits] : no focal deficits [Oriented To Time, Place, And Person] : oriented to person, place, and time [Impaired Insight] : insight and judgment were intact [Affect] : the affect was normal [Right Carotid Bruit] : no bruit heard over the right carotid [Left Carotid Bruit] : no bruit heard over the left carotid [Right Femoral Bruit] : no bruit heard over the right femoral artery [Left Femoral Bruit] : no bruit heard over the left femoral artery [FreeTextEntry1] : deferred

## 2022-11-11 NOTE — HISTORY OF PRESENT ILLNESS
[FreeTextEntry1] : Ms. 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 \par \par - s/p Lt VATS, Robotic-assisted PHYLLIS posterior segmentectomy, MLND on 7/12/17 by Dr. Feliciano. 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 \par \par POD# 2 pt developed hypotension, acute anemia and abd pain, admitted to ICU, CT abd/pelvic showed large hemoperitoneum from splenic source, no active bleeding. Stabilized after resuscitation w/ 4 units RBC transfusion, discharged on 7/20/17. Suture removed on 8/1/17. \par \par CT Chest on 10/12/22: \par - Post op changes\par - Stable 7 mm RUL groundglass nodules (images 22 and 46 series 2) \par - Scattered other smaller lung nodules are stable including 4 mm in the RML (image 74); Stable dating back to 2/24/2014 study.\par - New small sub 3 mm solid RUL  nodule on (image 41 series 2). Reassessment on repeat chest CT scan is recommended in no more than 6 months.\par \par Patient presents today for consultation for second opinion on CT Scan read. Self referred. Overall, she reports to be feeling well. Denies any chest pain, shortness of breath, cough, or hemoptysis. \par \par

## 2022-11-11 NOTE — CONSULT LETTER
[FreeTextEntry2] : Dr. Naveen Dos Santos (Pulm/Ref)\par Dr. Kimberly Velez (HemeOnc)\par Dr. Shelby Wilde (GynOnc)

## 2022-11-11 NOTE — ASSESSMENT
[FreeTextEntry1] : Ms. 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 \par \par - s/p Lt VATS, Robotic-assisted PHYLLIS posterior segmentectomy, MLND on 7/12/17 by Dr. Feliciano. 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 \par \par I have reviewed the patient's medical records and diagnostic images at time of this office consultation and have made the following recommendation:\par 1. There is a new RUL nodule noted in the most recent CT Chest, I recommend RTC in 3 months after repeat CT Chest without contrast (ordered by Dr. Dos Santos for 01/2023).\par 2. Continue follow up with Dr. Dos Santos.\par \par Recommendations reviewed with patient during this office visit, and all questions answered; Patient instructed on the importance of follow up and verbalizes understanding. \par \par I, CATHY Giles, personally performed the evaluation and management (E/M) services for this new patient. That E/M includes conducting the initial examination, assessing all conditions, and establishing the plan of care. Today, My ACP, Gabriel Arias/ Leesa Ritchie, was here to observe my evaluation and management services for this patient to be followed going forward.\par

## 2022-12-05 ENCOUNTER — NON-APPOINTMENT (OUTPATIENT)
Age: 67
End: 2022-12-05

## 2022-12-05 ENCOUNTER — APPOINTMENT (OUTPATIENT)
Dept: CARDIOLOGY | Facility: CLINIC | Age: 67
End: 2022-12-05
Payer: COMMERCIAL

## 2022-12-05 VITALS
SYSTOLIC BLOOD PRESSURE: 102 MMHG | WEIGHT: 125 LBS | OXYGEN SATURATION: 100 % | BODY MASS INDEX: 21.34 KG/M2 | HEIGHT: 64 IN | DIASTOLIC BLOOD PRESSURE: 58 MMHG | HEART RATE: 66 BPM

## 2022-12-05 PROCEDURE — 99402 PREV MED CNSL INDIV APPRX 30: CPT

## 2022-12-05 PROCEDURE — 99205 OFFICE O/P NEW HI 60 MIN: CPT | Mod: 25

## 2022-12-05 PROCEDURE — 93000 ELECTROCARDIOGRAM COMPLETE: CPT

## 2022-12-05 RX ORDER — SIMVASTATIN 40 MG/1
40 TABLET, FILM COATED ORAL
Refills: 0 | Status: DISCONTINUED | COMMUNITY
End: 2022-12-05

## 2022-12-07 LAB — APO LP(A) SERPL-MCNC: 13.6 NMOL/L

## 2022-12-08 ENCOUNTER — APPOINTMENT (OUTPATIENT)
Dept: CARDIOLOGY | Facility: CLINIC | Age: 67
End: 2022-12-08

## 2022-12-08 PROCEDURE — 97802 MEDICAL NUTRITION INDIV IN: CPT | Mod: 95

## 2022-12-09 ENCOUNTER — TRANSCRIPTION ENCOUNTER (OUTPATIENT)
Age: 67
End: 2022-12-09

## 2022-12-14 ENCOUNTER — APPOINTMENT (OUTPATIENT)
Dept: INTERNAL MEDICINE | Facility: CLINIC | Age: 67
End: 2022-12-14

## 2022-12-14 VITALS
SYSTOLIC BLOOD PRESSURE: 103 MMHG | BODY MASS INDEX: 21.34 KG/M2 | DIASTOLIC BLOOD PRESSURE: 63 MMHG | WEIGHT: 125 LBS | HEIGHT: 64 IN | OXYGEN SATURATION: 99 % | HEART RATE: 65 BPM | RESPIRATION RATE: 16 BRPM | TEMPERATURE: 97.3 F

## 2022-12-14 DIAGNOSIS — Z76.89 PERSONS ENCOUNTERING HEALTH SERVICES IN OTHER SPECIFIED CIRCUMSTANCES: ICD-10-CM

## 2022-12-14 PROCEDURE — 99203 OFFICE O/P NEW LOW 30 MIN: CPT

## 2022-12-14 NOTE — HEALTH RISK ASSESSMENT
[Fair] :  ~his/her~ mood as fair [Former] : Former [Yes] : Yes [Monthly or less (1 pt)] : Monthly or less (1 point) [1 or 2 (0 pts)] : 1 or 2 (0 points) [No falls in past year] : Patient reported no falls in the past year [0] : 2) Feeling down, depressed, or hopeless: Not at all (0) [PHQ-2 Negative - No further assessment needed] : PHQ-2 Negative - No further assessment needed [Patient reported colonoscopy was abnormal] : Patient reported colonoscopy was abnormal [HIV test declined] : HIV test declined [Hepatitis C test declined] : Hepatitis C test declined [None] : None [Alone] : lives alone [Employed] : employed [Graduate School] : graduate school [Single] : single [# Of Children ___] : has [unfilled] children [Feels Safe at Home] : Feels safe at home [Fully functional (bathing, dressing, toileting, transferring, walking, feeding)] : Fully functional (bathing, dressing, toileting, transferring, walking, feeding) [Fully functional (using the telephone, shopping, preparing meals, housekeeping, doing laundry, using] : Fully functional and needs no help or supervision to perform IADLs (using the telephone, shopping, preparing meals, housekeeping, doing laundry, using transportation, managing medications and managing finances) [Smoke Detector] : smoke detector [Carbon Monoxide Detector] : carbon monoxide detector [Seat Belt] :  uses seat belt [YearQuit] : 26 years ago [[id number]] : 0 [Change in mental status noted] : No change in mental status noted [Sexually Active] : not sexually active [Reports changes in hearing] : Reports no changes in hearing [Reports changes in vision] : Reports no changes in vision [Reports changes in dental health] : Reports no changes in dental health [Guns at Home] : no guns at home [MammogramComments] : bilateral mastectomy BRCA 1 [ColonoscopyDate] : 2021 [ColonoscopyComments] : polyps [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

## 2022-12-14 NOTE — HISTORY OF PRESENT ILLNESS
[FreeTextEntry1] : to establish care [de-identified] : 67 years old female coming today to establish care, no acute complaints h/o fallopian tube carcinoma, lung nodules , coronary calcifications

## 2022-12-14 NOTE — PLAN
[FreeTextEntry1] : * routine general labs done\par * age appropriate health maintenance recommendations reviewed, discussed including healthy diet, regular exercise\par * asthma stable, controlled on inhalers bronchodilators as needed\par * she follow up with oncologist and pulmonary for fallopian tube carcinoma and lung carcinoma\par * UTD with colon cancer screening, colonoscopy done last year , as per patient benign polyps\par * had bilateral prophylactic mastectomy due to BRCA 1\par * UTD with vaccinations for Pneumonia , influenza and COVID\par * she will check labs results in patient portal, call back with any questions or concerns\par * follow up one year.

## 2022-12-15 ENCOUNTER — TRANSCRIPTION ENCOUNTER (OUTPATIENT)
Age: 67
End: 2022-12-15

## 2022-12-15 LAB
25(OH)D3 SERPL-MCNC: 48.1 NG/ML
ALBUMIN SERPL ELPH-MCNC: 4.6 G/DL
ALP BLD-CCNC: 64 U/L
ALT SERPL-CCNC: 24 U/L
ANION GAP SERPL CALC-SCNC: 10 MMOL/L
APPEARANCE: CLEAR
AST SERPL-CCNC: 26 U/L
BACTERIA: NEGATIVE
BASOPHILS # BLD AUTO: 0.03 K/UL
BASOPHILS NFR BLD AUTO: 0.5 %
BILIRUB SERPL-MCNC: 0.5 MG/DL
BILIRUBIN URINE: NEGATIVE
BLOOD URINE: ABNORMAL
BUN SERPL-MCNC: 13 MG/DL
CALCIUM SERPL-MCNC: 9.3 MG/DL
CANCER AG125 SERPL-ACNC: 11 U/ML
CHLORIDE SERPL-SCNC: 105 MMOL/L
CHOLEST SERPL-MCNC: 124 MG/DL
CO2 SERPL-SCNC: 28 MMOL/L
COLOR: YELLOW
CREAT SERPL-MCNC: 0.66 MG/DL
EGFR: 97 ML/MIN/1.73M2
EOSINOPHIL # BLD AUTO: 0.18 K/UL
EOSINOPHIL NFR BLD AUTO: 3.3 %
ESTIMATED AVERAGE GLUCOSE: 117 MG/DL
GLUCOSE QUALITATIVE U: NEGATIVE
GLUCOSE SERPL-MCNC: 88 MG/DL
HBA1C MFR BLD HPLC: 5.7 %
HCT VFR BLD CALC: 39.9 %
HDLC SERPL-MCNC: 49 MG/DL
HGB BLD-MCNC: 12.5 G/DL
HYALINE CASTS: 0 /LPF
IMM GRANULOCYTES NFR BLD AUTO: 0 %
KETONES URINE: NEGATIVE
LDLC SERPL CALC-MCNC: 50 MG/DL
LEUKOCYTE ESTERASE URINE: NEGATIVE
LYMPHOCYTES # BLD AUTO: 1.6 K/UL
LYMPHOCYTES NFR BLD AUTO: 28.9 %
MAN DIFF?: NORMAL
MCHC RBC-ENTMCNC: 28.2 PG
MCHC RBC-ENTMCNC: 31.3 GM/DL
MCV RBC AUTO: 89.9 FL
MICROSCOPIC-UA: NORMAL
MONOCYTES # BLD AUTO: 0.44 K/UL
MONOCYTES NFR BLD AUTO: 8 %
NEUTROPHILS # BLD AUTO: 3.28 K/UL
NEUTROPHILS NFR BLD AUTO: 59.3 %
NITRITE URINE: NEGATIVE
NONHDLC SERPL-MCNC: 75 MG/DL
PH URINE: 5.5
PLATELET # BLD AUTO: 215 K/UL
POTASSIUM SERPL-SCNC: 4.6 MMOL/L
PROT SERPL-MCNC: 6.4 G/DL
PROTEIN URINE: NEGATIVE
RBC # BLD: 4.44 M/UL
RBC # FLD: 13.7 %
RED BLOOD CELLS URINE: 11 /HPF
SODIUM SERPL-SCNC: 144 MMOL/L
SPECIFIC GRAVITY URINE: 1.02
SQUAMOUS EPITHELIAL CELLS: 1 /HPF
T4 FREE SERPL-MCNC: 1.4 NG/DL
TRIGL SERPL-MCNC: 126 MG/DL
TSH SERPL-ACNC: 2.55 UIU/ML
UROBILINOGEN URINE: NORMAL
VIT B12 SERPL-MCNC: 1093 PG/ML
WBC # FLD AUTO: 5.53 K/UL
WHITE BLOOD CELLS URINE: 1 /HPF

## 2022-12-22 ENCOUNTER — TRANSCRIPTION ENCOUNTER (OUTPATIENT)
Age: 67
End: 2022-12-22

## 2023-01-01 NOTE — H&P PST ADULT - ANESTHESIA, PREVIOUS REACTION, PROFILE
severe.   " I felt paralyzed and felt like I could not breath before going under. " - 2015   N&V - severe  2017/nausea/vomiting
No

## 2023-01-07 ENCOUNTER — APPOINTMENT (OUTPATIENT)
Dept: CT IMAGING | Facility: IMAGING CENTER | Age: 68
End: 2023-01-07
Payer: COMMERCIAL

## 2023-01-07 ENCOUNTER — OUTPATIENT (OUTPATIENT)
Dept: OUTPATIENT SERVICES | Facility: HOSPITAL | Age: 68
LOS: 1 days | End: 2023-01-07
Payer: COMMERCIAL

## 2023-01-07 DIAGNOSIS — Z00.8 ENCOUNTER FOR OTHER GENERAL EXAMINATION: ICD-10-CM

## 2023-01-07 DIAGNOSIS — R93.89 ABNORMAL FINDINGS ON DIAGNOSTIC IMAGING OF OTHER SPECIFIED BODY STRUCTURES: ICD-10-CM

## 2023-01-07 PROCEDURE — 71250 CT THORAX DX C-: CPT

## 2023-01-07 PROCEDURE — 71250 CT THORAX DX C-: CPT | Mod: 26

## 2023-01-09 ENCOUNTER — TRANSCRIPTION ENCOUNTER (OUTPATIENT)
Age: 68
End: 2023-01-09

## 2023-01-10 ENCOUNTER — OUTPATIENT (OUTPATIENT)
Dept: OUTPATIENT SERVICES | Facility: HOSPITAL | Age: 68
LOS: 1 days | Discharge: HOME | End: 2023-01-10

## 2023-01-10 DIAGNOSIS — Z90.710 ACQUIRED ABSENCE OF BOTH CERVIX AND UTERUS: Chronic | ICD-10-CM

## 2023-01-10 DIAGNOSIS — Z90.2 ACQUIRED ABSENCE OF LUNG [PART OF]: Chronic | ICD-10-CM

## 2023-01-10 DIAGNOSIS — S02.2XXA FRACTURE OF NASAL BONES, INITIAL ENCOUNTER FOR CLOSED FRACTURE: Chronic | ICD-10-CM

## 2023-01-10 DIAGNOSIS — Z98.890 OTHER SPECIFIED POSTPROCEDURAL STATES: Chronic | ICD-10-CM

## 2023-01-10 DIAGNOSIS — D24.9 BENIGN NEOPLASM OF UNSPECIFIED BREAST: Chronic | ICD-10-CM

## 2023-01-10 DIAGNOSIS — C57.00 MALIGNANT NEOPLASM OF UNSPECIFIED FALLOPIAN TUBE: ICD-10-CM

## 2023-01-10 DIAGNOSIS — S36.039A UNSPECIFIED LACERATION OF SPLEEN, INITIAL ENCOUNTER: Chronic | ICD-10-CM

## 2023-01-11 ENCOUNTER — TRANSCRIPTION ENCOUNTER (OUTPATIENT)
Age: 68
End: 2023-01-11

## 2023-01-12 ENCOUNTER — APPOINTMENT (OUTPATIENT)
Dept: PULMONOLOGY | Facility: CLINIC | Age: 68
End: 2023-01-12

## 2023-01-12 ENCOUNTER — APPOINTMENT (OUTPATIENT)
Dept: PULMONOLOGY | Facility: CLINIC | Age: 68
End: 2023-01-12
Payer: COMMERCIAL

## 2023-01-12 ENCOUNTER — TRANSCRIPTION ENCOUNTER (OUTPATIENT)
Age: 68
End: 2023-01-12

## 2023-01-12 ENCOUNTER — APPOINTMENT (OUTPATIENT)
Dept: HEMATOLOGY ONCOLOGY | Facility: CLINIC | Age: 68
End: 2023-01-12

## 2023-01-12 VITALS
HEIGHT: 64 IN | OXYGEN SATURATION: 97 % | HEART RATE: 63 BPM | TEMPERATURE: 97.9 F | RESPIRATION RATE: 16 BRPM | DIASTOLIC BLOOD PRESSURE: 60 MMHG | SYSTOLIC BLOOD PRESSURE: 112 MMHG | WEIGHT: 125 LBS | BODY MASS INDEX: 21.34 KG/M2

## 2023-01-12 PROCEDURE — 94727 GAS DIL/WSHOT DETER LNG VOL: CPT

## 2023-01-12 PROCEDURE — 94010 BREATHING CAPACITY TEST: CPT

## 2023-01-12 PROCEDURE — ZZZZZ: CPT

## 2023-01-12 PROCEDURE — 94729 DIFFUSING CAPACITY: CPT

## 2023-01-12 PROCEDURE — 99214 OFFICE O/P EST MOD 30 MIN: CPT | Mod: 25

## 2023-01-12 PROCEDURE — 95012 NITRIC OXIDE EXP GAS DETER: CPT

## 2023-01-12 NOTE — DISCUSSION/SUMMARY
[FreeTextEntry1] : 1/7/2023 CHEST CT IMPRESSION:\par - Compared with 10/12/22 Imaging. \par Unchanged groundglass nodules up to 7 mm and solid nodules up to 4 mm.

## 2023-01-12 NOTE — PROCEDURE
[FreeTextEntry1] : PFT's performed in office show moderate obstructive lung defect. \par FEV1: 89% \par FEV1/FVC Ratio: 64% \par KFU96-26%: 42% \par DLCO: 85% \par \par Feno: 23 ppb; low. \par \par

## 2023-01-12 NOTE — ASSESSMENT
[FreeTextEntry1] : Ms. Pham is a 67 year old female with a history of abnormal chest CT, allergic rhinitis, asthma, GERD, lung nodule, malignant neoplasm of Brent, non-allergic rhinitis, and KIRA presenting to the office today for a follow up visit. 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 mild persistent asthma (active):\par -continue Advair 100 at 1 inhalation BID\par -continue Ventolin 2 puffs q6H, prn\par -continue Singulair 10 mg QHS \par \par 2. Abnormal Chest CT:\par - Follow-up CAT scan in 6 months.\par \par Patient to follow up with Dr. Dos Santos as scheduled.\par Patient to call with further questions and concerns.\par Patient verbalizes understanding of care plan and is agreeable.\par \par \par

## 2023-01-12 NOTE — HISTORY OF PRESENT ILLNESS
[TextBox_4] : Ms. Pham is a 67 year old female with a history of abnormal chest CT, allergic rhinitis, asthma, GERD, lung nodule, malignant neoplasm of Brent, non-allergic rhinitis, and KIRA presenting to the office today for a follow up visit. She is an NP. \par \par Her chief concern is discussing recent CAT scan results.\par \par Patient states she has not used Advair in quite sometime. \par She notes she only uses albuterol when needed.  She admits to increased sensitivity to fragrances & has noticed wheezing when triggered.  She states she will use albuterol when triggered and feels benefit.\par \par She admits to recent runny nose - seen by ENT and had procedure to shrink the turbinates. \par \par She states she has not been sleeping sleeping because she has been awaiting results.\par \par She denies fever/chills, decreased appetite, shortness of breath at rest or exertion, wheezing, cough, or chest tightness.

## 2023-01-12 NOTE — REASON FOR VISIT
[Follow-Up] : a follow-up visit [Abnormal CXR/ Chest CT] : an abnormal CXR/ chest CT [Pulmonary Nodules] : pulmonary nodules [Asthma] : asthma

## 2023-01-15 PROBLEM — R91.1 LUNG NODULE: Status: ACTIVE | Noted: 2017-06-28

## 2023-01-17 ENCOUNTER — APPOINTMENT (OUTPATIENT)
Dept: THORACIC SURGERY | Facility: CLINIC | Age: 68
End: 2023-01-17
Payer: COMMERCIAL

## 2023-01-17 ENCOUNTER — APPOINTMENT (OUTPATIENT)
Dept: GYNECOLOGIC ONCOLOGY | Facility: CLINIC | Age: 68
End: 2023-01-17
Payer: COMMERCIAL

## 2023-01-17 VITALS
WEIGHT: 125 LBS | SYSTOLIC BLOOD PRESSURE: 91 MMHG | HEART RATE: 68 BPM | BODY MASS INDEX: 21.34 KG/M2 | DIASTOLIC BLOOD PRESSURE: 57 MMHG | HEIGHT: 64 IN

## 2023-01-17 VITALS
HEART RATE: 71 BPM | OXYGEN SATURATION: 97 % | WEIGHT: 125 LBS | HEIGHT: 64 IN | DIASTOLIC BLOOD PRESSURE: 63 MMHG | BODY MASS INDEX: 21.34 KG/M2 | SYSTOLIC BLOOD PRESSURE: 94 MMHG | RESPIRATION RATE: 17 BRPM

## 2023-01-17 DIAGNOSIS — R91.1 SOLITARY PULMONARY NODULE: ICD-10-CM

## 2023-01-17 PROCEDURE — 99214 OFFICE O/P EST MOD 30 MIN: CPT

## 2023-01-17 PROCEDURE — 99213 OFFICE O/P EST LOW 20 MIN: CPT

## 2023-01-17 RX ORDER — ESTRADIOL 10 UG/1
10 TABLET, FILM COATED VAGINAL
Qty: 30 | Refills: 3 | Status: ACTIVE | COMMUNITY
Start: 2022-01-25 | End: 1900-01-01

## 2023-01-17 NOTE — ASSESSMENT
[FreeTextEntry1] : Ms. 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 \par \par - s/p Lt VATS, Robotic-assisted PHYLLIS posterior segmentectomy, MLND on 7/12/17 by Dr. Feliciano. Path revealed AdenoCA, acinar predominant, 1.1cm, all margins and LNs negative, [id number] Stg I A. \par \par I have reviewed the patient's medical records and diagnostic images at time of this office consultation and have made the following recommendation:\par 1. CT chest reviewed and explained to patient, stable scan, I recommended patient to return to office in 6 months with CT Chest without contrast. \par \par I, MANDY GilesIM, personally performed the evaluation and management (E/M) services for this established patient who follow up today with an existing condition.  That E/M includes conducting the examination, assessing all new/exacerbated/existing conditions, and establishing a plan of care.  Today, my ACP, JOSHUA BrookeC, was here to observe my evaluation and management services for this existing condition to be followed going forward.

## 2023-01-17 NOTE — CONSULT LETTER
[Dear  ___] : Dear  [unfilled], [Courtesy Letter:] : I had the pleasure of seeing your patient, [unfilled], in my office today. [Please see my note below.] : Please see my note below. [Sincerely,] : Sincerely, [FreeTextEntry2] : Dr. Naveen Dos Santos (Pulm/Ref)\par Dr. Kimberly Velez (HemeOnc)\par Dr. Shelby Wilde (GynOnc) [FreeTextEntry3] : Foreign Haile MD, FACS \par Chief, Division of Thoracic Surgery \par Director, Minimally Invasive Thoracic Surgery \par Department of Cardiovascular and Thoracic Surgery \par [facility] \par , Cardiovascular and Thoracic Surgery\par

## 2023-01-17 NOTE — HISTORY OF PRESENT ILLNESS
[FreeTextEntry1] : Ms. 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 \par \par - s/p Lt VATS, Robotic-assisted PHYLLIS posterior segmentectomy, MLND on 7/12/17 by Dr. Feliciano. 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 \par \par POD# 2 pt developed hypotension, acute anemia and abd pain, admitted to ICU, CT abd/pelvic showed large hemoperitoneum from splenic source, no active bleeding. Stabilized after resuscitation w/ 4 units RBC transfusion, discharged on 7/20/17. Suture removed on 8/1/17. \par \par CT Chest on 10/12/22: \par - Post op changes\par - Stable 7 mm RUL groundglass nodules (images 22 and 46 series 2) \par - Scattered other smaller lung nodules are stable including 4 mm in the RML (image 74); Stable dating back to 2/24/2014 study.\par - New small sub 3 mm solid RUL  nodule on (image 41 series 2). Reassessment on repeat chest CT scan is recommended in no more than 6 months.\par \par CT Chest on 1/7/23:\par - No endobronchial lesion. Unchanged left upper lobe wedge resection scarring. Unchanged multiple groundglass nodules in the right lung, largest 7 mm in the right upper lobe (2-23), and multiple sub-5 mm solid nodules, for example, previously new right upper lobe nodule (3-43). No pleural \par \par Patient is here today for a follow up. Patient denies shortness of breath, cough, chest pain, fever, chills.

## 2023-01-18 ENCOUNTER — APPOINTMENT (OUTPATIENT)
Dept: SURGERY | Facility: CLINIC | Age: 68
End: 2023-01-18
Payer: COMMERCIAL

## 2023-01-18 ENCOUNTER — APPOINTMENT (OUTPATIENT)
Dept: HEMATOLOGY ONCOLOGY | Facility: CLINIC | Age: 68
End: 2023-01-18
Payer: COMMERCIAL

## 2023-01-18 VITALS
DIASTOLIC BLOOD PRESSURE: 60 MMHG | HEIGHT: 63.98 IN | OXYGEN SATURATION: 99 % | TEMPERATURE: 97.7 F | SYSTOLIC BLOOD PRESSURE: 96 MMHG | WEIGHT: 124.56 LBS | HEART RATE: 74 BPM | RESPIRATION RATE: 16 BRPM | BODY MASS INDEX: 21.27 KG/M2

## 2023-01-18 PROCEDURE — 99213 OFFICE O/P EST LOW 20 MIN: CPT

## 2023-01-18 PROCEDURE — 99213K: CUSTOM

## 2023-01-20 NOTE — HISTORY OF PRESENT ILLNESS
[Disease: _____________________] : Disease: [unfilled] [AJCC Stage: ____] : AJCC Stage: [unfilled] [de-identified] : Ms. Pham is a 62 year old  postmenopausal who presents for discussion regarding treatment for fallopian tube cancer, and she has BRCA 1 pathogenic mutation.\par \par Ms. Pham has a personal history of stage IA adenocarcinoma of the lung(eGFR positive) s/p left VATS (2017, [facility]) c/b splenic laceration requiring ICU stay, and breast papilloma s/p excision (2013, [facility]), and HGSIL s/p LEEP 1996. Past GYN history significant for one episode of postmenopausal bleeding at the age of 49 which was ultimately determined to be a menstrual cycle. No history of uterine fibroids or ovarian cysts. Was on oral contraception from her early 40s until just a few weeks ago. \par \par Ms. Pham has a pertinent family history significant for breast (paternal cousin, paternal aunt) and ovarian (paternal cousin) cancer. \par Due to her high risk, she opted to go for risk reducing BSO and double mastectomy on 1/9/18.\par The frozen section showed malignancy and she underwent full surgical staging and mastectomy was postponed.\par Pathology: stage IC fallopian tube cancer.\par Patient has fully recovered from surgery. [de-identified] : Patient is here for routine follow up.\par She recently had repeat chest CT for pulmonary nodule and followed up with Dr. Haile, no change, will repeat scan in 6 months.\par She had CT angiogram which showed moderate occlusion and is following with cardiology. She saw nutritionist, eating low saturated fat diet.

## 2023-02-21 NOTE — PATIENT PROFILE ADULT. - --S/S CONSISTENT WITH FOLEY CATHETER ASSOCIATED UTI
[facility] BARIATRIC SURGERY  [address]  [address]  [facility] [address]  [phone]      Patient  Name:  Liliam Crooks  :  1999        Chief Complaint:  weight gain; unable to maintain weight loss    History of Present Illness:  Liliam Crooks is a 23 y.o. female who presents today for evaluation, education and consultation regarding bariatric and metabolic surgery. The patient is interested in sleeve gastrectomy with Dr. Kilpatrick.     Liliam has been overweight for at least 16 years, has been 35 pounds or more overweight for at least 16 years, has been 100 pounds or more overweight for 4 or more years and started dieting at age 14.      Previous diet attempts include: Low Carbohydrate, Low Fat, Calorie Counting and Fasting; Weight Watchers; Ionamin/Adipex.  The most weight Liliam lost was 20 pounds on barely eating but was only able to maintain that weight loss for 1 year.  Her maximum lifetime weight is 253 pounds.    As above, patient has been overweight for many years, with numerous failed dietary/weight loss attempts.  She now has obesity related comorbidities and as such has decided to pursue weight loss surgery.    Patient is pursuing sleeve gastrectomy to improve overall health.  She has a few friends status post gastric sleeve with Dr. Kilpatrick.    History of hypertension on lisinopril.  Former vapor.  Status post cholecystectomy due to gallstones last year.  Has occasional heartburn treated with Tums as needed.  Denies prior EGD or known H. pylori.  Denies other GI symptoms.    All other past medical, surgical, social and family history have been obtained and discussed as pertinent to bariatric surgery as below.     Pre-Procedure COVID-19 Questionnaire:    1.  Have you previously been tested for COVID-19?    []  No  [x]  Yes    2.  Were you ever positive for COVID-19?    []  No  [x]  Yes    3.  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    []  No  [x]  Yes    4.  Are you  symptomatic for COVID-19 as defined by the CDC (fever, cough)?  If so, when did symptoms begin?    [x]  No    []  Yes, symptoms began **    5.  Have you been hospitalized for COVID-19?  If so, were you in the ICU?  [x]  No    []  Yes, but not in the ICU    []  Yes, and I was in the ICU    6.  Are you a resident in a congregate (group care setting?)    [x]  No  []  Yes    7.  Are you pregnant?  [x]  No  []  Yes    8.  Are you vaccinated?    []  No  []  Yes, but only partially   [x]  Yes, fully         Past Medical History:   Diagnosis Date   • Gallstones     s/p gui   • Heartburn     no prior EGD, no prior h pylori, tums prn   • History of bronchitis    • History of ear infections    • Primary hypertension 2023     Past Surgical History:   Procedure Laterality Date   • ADENOIDECTOMY     •  SECTION Bilateral 2022    Procedure:  SECTION PRIMARY;  Surgeon: Nicki Mathews DO;  Location: [facility] LABOR DELIVERY;  Service: Obstetrics/Gynecology;  Laterality: Bilateral;   • CHOLECYSTECTOMY N/A 2022    Procedure: CHOLECYSTECTOMY LAPAROSCOPIC WITH DAVINCI ROBOT;  Surgeon: Cristino Welch MD;  Location: [facility] OR;  Service: Robotics - DaVinci;  Laterality: N/A;   • EAR TUBES         No Known Allergies    Current Outpatient Medications:   •  acetaminophen (TYLENOL) 325 MG tablet, Take 2 tablets by mouth Every 4 (Four) Hours As Needed for Mild Pain ., Disp: 30 tablet, Rfl: 0  •  ibuprofen (ADVIL,MOTRIN) 600 MG tablet, Take 1 tablet by mouth Every 6 (Six) Hours As Needed for Mild Pain ., Disp: 30 tablet, Rfl: 0  •  lisinopril (PRINIVIL,ZESTRIL) 5 MG tablet, Take 5 mg by mouth Daily., Disp: , Rfl:     Social History     Socioeconomic History   • Marital status: Single   Tobacco Use   • Smoking status: Never   • Smokeless tobacco: Never   Vaping Use   • Vaping Use: Former   • Quit date: 2021   • Substances: Nicotine, Flavoring   • Devices: Disposable, Pre-filled or  refillable cartridge   Substance and Sexual Activity   • Alcohol use: Yes     Comment: social-  3 drinks twice monthly   • Drug use: No   • Sexual activity: Defer     Birth control/protection: I.U.D.     Family History   Problem Relation Age of Onset   • No Known Problems Mother    • Hypertension Father    • Obesity Father    • Diabetes Maternal Aunt    • Diabetes Paternal Aunt    • Hypertension Maternal Grandmother    • Heart attack Maternal Grandmother 68   • Cancer Paternal Grandmother    • Hypertension Paternal Grandmother    • Hypertension Paternal Grandfather        Review of Systems:  Constitutional:  Reports fatigue, weight gain and denies fevers, chills.  HEENT:  denies headache, ear pain or loss of hearing, blurred or double vision, nasal discharge or sore throat.  Cardiovascular:  Reports HTN and denies HLD, CAD, Atrial Fib, hx heart disease, heart murmur, hx MI, chest pain, palpitations, edema, hx DVT.  Respiratory:  Reports none and denies dyspnea on exertion, shortness of breath , cough , wheezing, sleep apnea, asthma, COPD, hx PE.  Gastrointestinal:  Reports heartburn  S/p gui and denies dysphagia, nausea, vomiting, abdominal pain, IBS, diarrhea, constipation, melena, blood in stool,  liver disease, hx pancreatitis.  Genitourinary:  Reports none and denies history of  frequent UTI, incontinence, hematuria, dysuria, polyuria, polydipsia, renal insufficiency, renal failure.    Musculoskeletal:  Reports none and denies joint pain, fibromyalgia, arthritis and autoimmune disease.  Neurological:  Reports none and denies headaches, migraines, numbness /tingling, dizziness, confusion, seizure, stroke.  Psychiatric:  Reports none and denies depressed mood, hx depression, feeling anxious, hx anxiety, bipolar disorder, suicidal ideation, hx suicide attempt, hx self injury, hx substance abuse, eating disorder.  Endocrine:  Reports none and denies PCOS, endometriosis, glucose intolerance, diabetes, thyroid  disease, gout.  Hematologic:  Reports none and denies bruising, bleeding disorder, hx anemia, hx blood transfusion.  Skin:  Reports none and denies rashes, hx MRSA.      Physical Exam:  Vital Signs:  Weight: 112 kg (246 lb)   Body mass index is 38.53 kg/m².  Temp: 97.5 °F (36.4 °C)   Heart Rate: 91   BP: 132/80     Physical Exam  Vitals reviewed.   Constitutional:       Appearance: She is well-developed. She is obese.   HENT:      Head: Normocephalic.   Neck:      Thyroid: No thyromegaly.   Cardiovascular:      Rate and Rhythm: Normal rate and regular rhythm.      Heart sounds: Normal heart sounds.   Pulmonary:      Effort: Pulmonary effort is normal. No respiratory distress.      Breath sounds: Normal breath sounds. No wheezing.   Abdominal:      General: Bowel sounds are normal. There is no distension.      Palpations: Abdomen is soft.      Tenderness: There is no abdominal tenderness.      Comments: Low transverse scar  Lap scars  striae   Musculoskeletal:         General: Normal range of motion.      Cervical back: Normal range of motion and neck supple.   Skin:     General: Skin is warm and dry.   Neurological:      Mental Status: She is alert and oriented to person, place, and time.   Psychiatric:         Mood and Affect: Mood normal.         Behavior: Behavior normal.         Thought Content: Thought content normal.         Judgment: Judgment normal.         Patient Active Problem List   Diagnosis   • Weakness   • Influenza A   • Postpartum care following  delivery   • Gallstones   • Heartburn   • Primary hypertension       Assessment:    Liliam Crooks is a 23 y.o. year old female with medically complicated obesity pursuing sleeve gastrectomy.    Weight loss surgery is deemed medically necessary given the following obesity related comorbidities including hypertension with current Weight: 112 kg (246 lb) and Body mass index is 38.53 kg/m²..    Plan:  The consultation plan and program requirements  were reviewed with the patient.  The patient has been advised that a letter of medical support must be obtained from her primary care physician or referring provider. A psychological evaluation will be arranged.  A nutritional evaluation will be performed.  The patient was advised to start a high protein and low carbohydrate diet.  Necessary lifestyle modifications were discussed.  Instructions on how to access JAZMINE was given to the patient.  JAZMINE is an internet based educational video that explains the surgical procedure chosen and answers basic questions regarding that procedure.     Class 2 Severe Obesity (BMI >=35 and <=39.9). Obesity-related health conditions include the following: hypertension. Obesity is worsening. BMI is is above average; BMI management plan is completed. We discussed consulting a Bariatric surgeon.        Preoperative testing will include: CBC, CMP, Lipids, TSH, HgA1C, H.Pylori UBT, EKG, CXR, Pulmonary Function Testing and EGD     The risks and benefits of the upper endoscopy were discussed with the patient in detail and all questions were answered.  Possibility of perforation, bleeding, aspiration, and anesthesia reaction were reviewed.  Patient agrees to proceed.      Additional preop clearances required prior to surgery: Cardiology.      The patient has been educated on expected postoperative lifestyle changes, including commitment to high protein diet, vitamin regimen, and exercise program.  They are aware that support groups are encouraged for optimal weight loss results. Patient understands that bariatric surgery is not cosmetic surgery but rather a tool to help make a lifelong commitment to lifestyle changes including diet, exercise, behavior modifications, and healthy habits. The procedure was discussed with the patient and all questions were answered. The importance of avoiding ASA/ NSAIDS/ steroids/ tobacco/ hormones/ immunomodulators perioperatively was discussed.          Leandra Shaffer PA-C                                            no

## 2023-04-20 NOTE — PATIENT PROFILE ADULT - NSPROPTRIGHTNOTIFY_GEN_A_NUR
Pt is aaox4 on discharge. Pt given d/c instructions  Pt verbally understood  Pt sent to waiting room to be picked up. Pt is stable at this time. Current diet order meets estimated nutrient requirements declines

## 2023-05-18 ENCOUNTER — APPOINTMENT (OUTPATIENT)
Dept: PULMONOLOGY | Facility: CLINIC | Age: 68
End: 2023-05-18
Payer: COMMERCIAL

## 2023-05-18 ENCOUNTER — NON-APPOINTMENT (OUTPATIENT)
Age: 68
End: 2023-05-18

## 2023-05-18 VITALS
RESPIRATION RATE: 16 BRPM | TEMPERATURE: 97.4 F | HEIGHT: 63 IN | HEART RATE: 77 BPM | WEIGHT: 124 LBS | BODY MASS INDEX: 21.97 KG/M2 | SYSTOLIC BLOOD PRESSURE: 110 MMHG | OXYGEN SATURATION: 96 % | DIASTOLIC BLOOD PRESSURE: 68 MMHG

## 2023-05-18 PROCEDURE — 94010 BREATHING CAPACITY TEST: CPT

## 2023-05-18 PROCEDURE — 95012 NITRIC OXIDE EXP GAS DETER: CPT

## 2023-05-18 PROCEDURE — 99214 OFFICE O/P EST MOD 30 MIN: CPT | Mod: 25

## 2023-05-18 NOTE — PROCEDURE
[FreeTextEntry1] : PFT revealed normal flows, with a FEV1 of 1.91L, which is 84% of predicted, with a normal flow volume loop. -PFTs performed today for evaluation of asthma (05/18/2023) \par \par Ct Chest (1/7/23) revealed unchanged groundglass nodules up to 7 mm and solid nodules up to 4 mm.

## 2023-05-18 NOTE — HISTORY OF PRESENT ILLNESS
[FreeTextEntry1] : Ms. Pham is a 68 year old female with a history of abnormal chest CT, allergic rhinitis, asthma, GERD,  lung nodule, malignant neoplasm of Brent, non-allergic rhinitis, and KIRA presenting to the office today for a follow up visit. Her chief complaint is\par \par -she notes her energy levels are good \par -she notes exercising  (walking)\par -she notes allergy Sx \par -she notes using Flonase\par -she notes heartburn \par \par \par -patient denies any headaches, nausea, vomiting, fever, chills, sweats, chest pain, chest pressure, palpitations, coughing, wheezing, diarrhea, constipation, dysphagia, myalgias, dizziness, leg swelling, leg pain, itchy eyes, itchy ears

## 2023-05-18 NOTE — DISCUSSION/SUMMARY
[FreeTextEntry1] : Feno was 79; a normal value being less than 25. Fractional exhaled nitric oxide (FENO) is regarded as a simple, noninvasive method for assessing eosinophilic airway inflammation. Produced by a variety of cells within the lung, nitric oxide (NO) concentrations are generally low in healthy individuals. However, high concentrations of NO appear to be involved in nonspecific host defense mechanisms and chronic inflammatory  diseases such as asthma. The American Thoracic Society (ATS) therefore recommended using FENO to aid in the diagnosis and monitoring of eosinophilic airway inflammation and asthma, and for identifying steroid responsive individuals whose chronic respiratory symptoms may be caused by airway inflammation

## 2023-05-18 NOTE — ASSESSMENT
[FreeTextEntry1] : Ms. Pham is a 67 y/o female with a history of allergies, GERD, IKRA, and asthma. She is s/p VATS procedure with Dr. Feliciano scheduled for 7/12/17 c/w adenocarcinoma with acinar predominant complicated by splenic laceration and ICU stay. She is s/p chemotherapy for fallopian tube tumor, breast surgery 10/18 and is s/p breast reconstruction surgery and s/p additional plastic surgery. active asthma @times, abnormal Cardiac CT (LAD/RCA) - stable\par \par \par Her history of chronic cough is multifactorial due to: (resolved)\par -mild persistent asthma\par -post nasal drip syndrome\par -GERD\par \par problem 1: mild persistent asthma (quiet)\par -continue Advair 100 at 1 inhalation BID\par -continue Ventolin 2 puffs q6H, prn\par -continue Singulair 10 mg QHS \par -Asthma is believed to be caused by inherited (genetic) and environmental factor, but its exact cause is unknown. Asthma may be triggered by allergens, lung infections, or irritants in the air. Asthma triggers are different for each person\par -Inhaler technique reviewed as well as oral hygiene techniques reviewed with patient. Avoidance of cold air, extremes of temperature, rescue inhaler should be used before exercise. Order of medication reviewed with patient. Recommended use of a cool mist humidifier in the bedroom.\par \par Problem 1A: Cardiac Dz-\par - Recommended Dr Cardona/Angie \par \par problem 2: post nasal drip syndrome (stable) -  (4/2022) - active\par -continue to use Zaditor eye drops\par -continue Olopatadine 0.6% 1 sniff BID \par - Continue Atrovent 0.6% at 1 sniff/nostril, up to TID \par -continue to use OTC antihistamine - add Loratadine 10 mg QHS\par -Environmental measures for allergies were encouraged including mattress and pillow cover, air purifier, and environmental controls.\par \par problem 3: GERD (controlled) \par -continue to use Pepcid 40mg BID / Carafate 1mg before meals QHS; Prilosec 40 AM\par \par -Rule of 2s: avoid eating too much, eating too late, eating too spicy, eating two hours before bed\par -Things to avoid including overeating, spicy foods, tight clothing, eating within three hours of bed, this list is not all inclusive. \par -For treatment of reflux, possible options discussed including diet control, H2 blockers, PPIs, as well as coating motility agents discussed as treatment options. Timing of meals and proximity of last meal to sleep were discussed. If symptoms persist, a formal gastrointestinal evaluation is needed.\par \par problem 4: OSAS \par -no longer present secondary to weight loss and positional sleep\par -Sleep apnea is associated with adverse clinical consequences which an affect most organ systems. Cardiovascular disease risk includes arrhythmias, atrial fibrillation, hypertension, coronary artery disease, and stroke. Metabolic disorders include diabetes type 2, non-alcoholic fatty liver disease. Mood disorder especially depression; and cognitive decline especially in the elderly. Associations with chronic reflux/Perkins’s esophagus some but not all inclusive. \par -Reasons to assess this include arousal consistent with hypopnea; respiratory events most prominent in REM sleep or supine position; therefore sleep staging and body position are important for accurate diagnosis and estimation of AHI. \par \par problem 5: stage IA lung cancer, adenocarcinoma\par -she is s/p segmentectomy\par -follow up chest CT with Dr. Momin (last 10/2022, next 7/2023)\par \par Problem 6a: COVID-19 precautionary Immune Support Recommendations: s/p moderna vaccine x3\par -OTC Vitamin C 500mg BID \par -OTC Quercetin 250-500mg BID \par -OTC Zinc 75-100mg per day \par -OTC Melatonin 1 or 2mg a night \par -OTC Vitamin D 1-4000mg per day \par -OTC Tonic Water 8oz per day\par -Water 0.5-1 gallon per day\par \par problem 6: health maintenance \par - Recommended SaNotize \par - recommended Borage and Flaxseed oil for eczema \par - She has received an influenza vaccine 2021\par -recommended strep pneumonia vaccines: Prevnar-13 vaccine, followed by Pneumo vaccine 23 on year following\par -recommended early intervention for URIs\par -recommended osteoporosis evaluations\par -recommended early dermatological evaluations\par -recommended after the age of 50 to the age of 70, colonoscopy every 5 years\par -encouraged early intervention\par \par F/U in 3 months\par She is encouraged to call with any changes, concerns, or questions.

## 2023-05-18 NOTE — ADDENDUM
[FreeTextEntry1] : Documented by Alan Kauffman acting as a scribe for Dr. Naveen Dos Santos on 05/18/2023.\par \par All medical record entries made by the Scribe were at my, Dr. Naveen Dos Santos's, direction and personally dictated by me on 05/18/2023. I have reviewed the chart and agree that the record accurately reflects my personal performance of the history, physical exam, assessment and plan. I have also personally directed, reviewed, and agree with the discharge instructions.

## 2023-05-22 DIAGNOSIS — Z00.00 ENCOUNTER FOR GENERAL ADULT MEDICAL EXAMINATION W/OUT ABNORMAL FINDINGS: ICD-10-CM

## 2023-05-23 ENCOUNTER — APPOINTMENT (OUTPATIENT)
Dept: INTERNAL MEDICINE | Facility: CLINIC | Age: 68
End: 2023-05-23
Payer: COMMERCIAL

## 2023-05-23 VITALS
DIASTOLIC BLOOD PRESSURE: 62 MMHG | HEIGHT: 63 IN | SYSTOLIC BLOOD PRESSURE: 96 MMHG | WEIGHT: 125 LBS | BODY MASS INDEX: 22.15 KG/M2 | HEART RATE: 69 BPM | TEMPERATURE: 97.3 F | OXYGEN SATURATION: 98 %

## 2023-05-23 PROCEDURE — 99387 INIT PM E/M NEW PAT 65+ YRS: CPT

## 2023-05-23 PROCEDURE — 99397 PER PM REEVAL EST PAT 65+ YR: CPT

## 2023-05-23 NOTE — HEALTH RISK ASSESSMENT
[Good] : ~his/her~ current health as good [No] : No [No falls in past year] : Patient reported no falls in the past year [0] : 2) Feeling down, depressed, or hopeless: Not at all (0) [PHQ-2 Negative - No further assessment needed] : PHQ-2 Negative - No further assessment needed [Patient reported colonoscopy was normal] : Patient reported colonoscopy was normal [HIV test declined] : HIV test declined [Hepatitis C test declined] : Hepatitis C test declined [None] : None [Alone] : lives alone [Employed] : employed [Graduate School] : graduate school [] :  [# Of Children ___] : has [unfilled] children [Feels Safe at Home] : Feels safe at home [Neglect Or Abandonment] : neglect or abandonment [Fully functional (bathing, dressing, toileting, transferring, walking, feeding)] : Fully functional (bathing, dressing, toileting, transferring, walking, feeding) [Fully functional (using the telephone, shopping, preparing meals, housekeeping, doing laundry, using] : Fully functional and needs no help or supervision to perform IADLs (using the telephone, shopping, preparing meals, housekeeping, doing laundry, using transportation, managing medications and managing finances) [Smoke Detector] : smoke detector [Carbon Monoxide Detector] : carbon monoxide detector [Designated Healthcare Proxy] : Designated healthcare proxy [Name: ___] : Health Care Proxy's Name: [unfilled]  [Relationship: ___] : Relationship: [unfilled] [[id number]] : 0 [Change in mental status noted] : No change in mental status noted [Sexually Active] : not sexually active [Reports changes in hearing] : Reports no changes in hearing [Reports changes in vision] : Reports no changes in vision [Reports changes in dental health] : Reports no changes in dental health [Guns at Home] : no guns at home [Seat Belt] : does not use seat belt [MammogramDate] : b/l mastectomy [ColonoscopyDate] : 2022 [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

## 2023-05-23 NOTE — HISTORY OF PRESENT ILLNESS
[de-identified] : 68 years old female presents for annual physical exam, states doing well, offers no complaints

## 2023-05-23 NOTE — PLAN
[FreeTextEntry1] : * routine general labs done\par * age appropriate health maintenance recommendations reviewed, discussed including healthy diet, regular exercise\par * patient stable , f/u with oncologist and pulmonary medicine\par * f/u with cardiologist, had coronary CT chest with moderate atherosclerosis of RCA and LAD\par * started on rosuvastatin, refilled\par * low cholesterol, low triglycerides diet,dietary counseling given; dietary avoidance discussed; diet and exercise reviewed with patient\par * to call back for results\par * f/u six months

## 2023-05-24 LAB
25(OH)D3 SERPL-MCNC: 43.3 NG/ML
ALBUMIN SERPL ELPH-MCNC: 4.6 G/DL
ALP BLD-CCNC: 57 U/L
ALT SERPL-CCNC: 24 U/L
ANION GAP SERPL CALC-SCNC: 13 MMOL/L
APPEARANCE: CLEAR
AST SERPL-CCNC: 25 U/L
BACTERIA: ABNORMAL /HPF
BILIRUB SERPL-MCNC: 0.4 MG/DL
BILIRUBIN URINE: NEGATIVE
BLOOD URINE: ABNORMAL
BUN SERPL-MCNC: 19 MG/DL
CALCIUM SERPL-MCNC: 9.9 MG/DL
CANCER AG125 SERPL-ACNC: 9 U/ML
CAST: 0 /LPF
CHLORIDE SERPL-SCNC: 105 MMOL/L
CHOLEST SERPL-MCNC: 141 MG/DL
CO2 SERPL-SCNC: 25 MMOL/L
COLOR: YELLOW
CREAT SERPL-MCNC: 0.64 MG/DL
EGFR: 97 ML/MIN/1.73M2
EPITHELIAL CELLS: 2 /HPF
ESTIMATED AVERAGE GLUCOSE: 111 MG/DL
GLUCOSE QUALITATIVE U: NEGATIVE MG/DL
GLUCOSE SERPL-MCNC: 95 MG/DL
HBA1C MFR BLD HPLC: 5.5 %
HDLC SERPL-MCNC: 56 MG/DL
KETONES URINE: NEGATIVE MG/DL
LDLC SERPL CALC-MCNC: 64 MG/DL
LEUKOCYTE ESTERASE URINE: NEGATIVE
MICROSCOPIC-UA: NORMAL
NITRITE URINE: NEGATIVE
NONHDLC SERPL-MCNC: 85 MG/DL
PH URINE: 6
POTASSIUM SERPL-SCNC: 4.7 MMOL/L
PROT SERPL-MCNC: 6.8 G/DL
PROTEIN URINE: NEGATIVE MG/DL
RED BLOOD CELLS URINE: 2 /HPF
SODIUM SERPL-SCNC: 143 MMOL/L
SPECIFIC GRAVITY URINE: 1.02
T4 FREE SERPL-MCNC: 1.3 NG/DL
TRIGL SERPL-MCNC: 101 MG/DL
TSH SERPL-ACNC: 1.22 UIU/ML
UROBILINOGEN URINE: 0.2 MG/DL
VIT B12 SERPL-MCNC: 874 PG/ML
WHITE BLOOD CELLS URINE: 2 /HPF

## 2023-06-05 ENCOUNTER — APPOINTMENT (OUTPATIENT)
Dept: UROLOGY | Facility: CLINIC | Age: 68
End: 2023-06-05
Payer: COMMERCIAL

## 2023-06-05 DIAGNOSIS — N28.1 CYST OF KIDNEY, ACQUIRED: ICD-10-CM

## 2023-06-05 DIAGNOSIS — R31.29 OTHER MICROSCOPIC HEMATURIA: ICD-10-CM

## 2023-06-05 PROCEDURE — 99213 OFFICE O/P EST LOW 20 MIN: CPT

## 2023-06-05 NOTE — ASSESSMENT
[FreeTextEntry1] : patientwith hx of microhematuria\par no luts\par hx of ct urogram and cysto dec 2021: unremarkable cysto and ct with left 6 cm UP cyst wih thin septation .\par f/u MENDEZ after  6 M ( june 2022) showed cyst to be smple \par here for f/u:\par 1- urine clear with trace blood and 2 red cells \par 2- recommend MENDEZ - if again simple - no f/u needed

## 2023-06-05 NOTE — HISTORY OF PRESENT ILLNESS
[FreeTextEntry1] : patientwith hx of microhematuria\par no luts\par hx of ct urogram and cysto dec 2021: unremarkable cysto and ct with left 6 cm UP cyst wih thin septation .\par f/u MENDEZ after  6 M ( june 2022) showed cyst to be smple \par here for f/u:

## 2023-06-06 ENCOUNTER — NON-APPOINTMENT (OUTPATIENT)
Age: 68
End: 2023-06-06

## 2023-06-06 ENCOUNTER — OUTPATIENT (OUTPATIENT)
Dept: OUTPATIENT SERVICES | Facility: HOSPITAL | Age: 68
LOS: 1 days | End: 2023-06-06
Payer: COMMERCIAL

## 2023-06-06 ENCOUNTER — APPOINTMENT (OUTPATIENT)
Dept: ULTRASOUND IMAGING | Facility: IMAGING CENTER | Age: 68
End: 2023-06-06
Payer: COMMERCIAL

## 2023-06-06 ENCOUNTER — APPOINTMENT (OUTPATIENT)
Dept: CARDIOLOGY | Facility: CLINIC | Age: 68
End: 2023-06-06
Payer: COMMERCIAL

## 2023-06-06 VITALS
BODY MASS INDEX: 22.15 KG/M2 | HEART RATE: 57 BPM | OXYGEN SATURATION: 98 % | HEIGHT: 63 IN | SYSTOLIC BLOOD PRESSURE: 90 MMHG | WEIGHT: 125 LBS | DIASTOLIC BLOOD PRESSURE: 52 MMHG

## 2023-06-06 DIAGNOSIS — Z00.8 ENCOUNTER FOR OTHER GENERAL EXAMINATION: ICD-10-CM

## 2023-06-06 DIAGNOSIS — S02.2XXA FRACTURE OF NASAL BONES, INITIAL ENCOUNTER FOR CLOSED FRACTURE: Chronic | ICD-10-CM

## 2023-06-06 DIAGNOSIS — S36.039A UNSPECIFIED LACERATION OF SPLEEN, INITIAL ENCOUNTER: Chronic | ICD-10-CM

## 2023-06-06 DIAGNOSIS — N28.1 CYST OF KIDNEY, ACQUIRED: ICD-10-CM

## 2023-06-06 PROCEDURE — 99215 OFFICE O/P EST HI 40 MIN: CPT

## 2023-06-06 PROCEDURE — 93000 ELECTROCARDIOGRAM COMPLETE: CPT

## 2023-06-06 PROCEDURE — 76775 US EXAM ABDO BACK WALL LIM: CPT | Mod: 26

## 2023-06-06 PROCEDURE — 76775 US EXAM ABDO BACK WALL LIM: CPT

## 2023-06-07 ENCOUNTER — TRANSCRIPTION ENCOUNTER (OUTPATIENT)
Age: 68
End: 2023-06-07

## 2023-06-07 ENCOUNTER — APPOINTMENT (OUTPATIENT)
Dept: INTERNAL MEDICINE | Facility: CLINIC | Age: 68
End: 2023-06-07

## 2023-06-12 ENCOUNTER — APPOINTMENT (OUTPATIENT)
Dept: DERMATOLOGY | Facility: CLINIC | Age: 68
End: 2023-06-12
Payer: COMMERCIAL

## 2023-06-12 DIAGNOSIS — L57.8 OTHER SKIN CHANGES DUE TO CHRONIC EXPOSURE TO NONIONIZING RADIATION: ICD-10-CM

## 2023-06-12 DIAGNOSIS — D18.01 HEMANGIOMA OF SKIN AND SUBCUTANEOUS TISSUE: ICD-10-CM

## 2023-06-12 DIAGNOSIS — Z12.83 ENCOUNTER FOR SCREENING FOR MALIGNANT NEOPLASM OF SKIN: ICD-10-CM

## 2023-06-12 DIAGNOSIS — L81.4 OTHER MELANIN HYPERPIGMENTATION: ICD-10-CM

## 2023-06-12 DIAGNOSIS — D22.9 MELANOCYTIC NEVI, UNSPECIFIED: ICD-10-CM

## 2023-06-12 PROCEDURE — 99214 OFFICE O/P EST MOD 30 MIN: CPT

## 2023-06-12 RX ORDER — PIMECROLIMUS 10 MG/G
1 CREAM TOPICAL TWICE DAILY
Qty: 1 | Refills: 2 | Status: ACTIVE | COMMUNITY
Start: 2023-06-12 | End: 1900-01-01

## 2023-07-05 ENCOUNTER — APPOINTMENT (OUTPATIENT)
Dept: CT IMAGING | Facility: IMAGING CENTER | Age: 68
End: 2023-07-05
Payer: COMMERCIAL

## 2023-07-05 ENCOUNTER — RESULT REVIEW (OUTPATIENT)
Age: 68
End: 2023-07-05

## 2023-07-05 ENCOUNTER — OUTPATIENT (OUTPATIENT)
Dept: OUTPATIENT SERVICES | Facility: HOSPITAL | Age: 68
LOS: 1 days | End: 2023-07-05
Payer: COMMERCIAL

## 2023-07-05 DIAGNOSIS — S02.2XXA FRACTURE OF NASAL BONES, INITIAL ENCOUNTER FOR CLOSED FRACTURE: Chronic | ICD-10-CM

## 2023-07-05 DIAGNOSIS — S36.039A UNSPECIFIED LACERATION OF SPLEEN, INITIAL ENCOUNTER: Chronic | ICD-10-CM

## 2023-07-05 DIAGNOSIS — Z90.2 ACQUIRED ABSENCE OF LUNG [PART OF]: Chronic | ICD-10-CM

## 2023-07-05 DIAGNOSIS — D24.9 BENIGN NEOPLASM OF UNSPECIFIED BREAST: Chronic | ICD-10-CM

## 2023-07-05 DIAGNOSIS — Z00.8 ENCOUNTER FOR OTHER GENERAL EXAMINATION: ICD-10-CM

## 2023-07-05 DIAGNOSIS — Z98.890 OTHER SPECIFIED POSTPROCEDURAL STATES: Chronic | ICD-10-CM

## 2023-07-05 DIAGNOSIS — Z90.710 ACQUIRED ABSENCE OF BOTH CERVIX AND UTERUS: Chronic | ICD-10-CM

## 2023-07-05 PROCEDURE — 71250 CT THORAX DX C-: CPT | Mod: 26

## 2023-07-05 PROCEDURE — 71250 CT THORAX DX C-: CPT

## 2023-07-14 ENCOUNTER — NON-APPOINTMENT (OUTPATIENT)
Age: 68
End: 2023-07-14

## 2023-07-17 ENCOUNTER — TRANSCRIPTION ENCOUNTER (OUTPATIENT)
Age: 68
End: 2023-07-17

## 2023-07-25 ENCOUNTER — APPOINTMENT (OUTPATIENT)
Dept: THORACIC SURGERY | Facility: CLINIC | Age: 68
End: 2023-07-25
Payer: COMMERCIAL

## 2023-07-25 VITALS
OXYGEN SATURATION: 96 % | WEIGHT: 125 LBS | HEIGHT: 63 IN | BODY MASS INDEX: 22.15 KG/M2 | SYSTOLIC BLOOD PRESSURE: 87 MMHG | DIASTOLIC BLOOD PRESSURE: 46 MMHG | HEART RATE: 56 BPM

## 2023-07-25 PROCEDURE — 99213 OFFICE O/P EST LOW 20 MIN: CPT

## 2023-07-25 NOTE — HISTORY OF PRESENT ILLNESS
[FreeTextEntry1] : Ms. 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 \par \par - s/p Lt VATS, Robotic-assisted PHYLLIS posterior segmentectomy, MLND on 7/12/17 by Dr. Feliciano. Path revealed AdenoCA, acinar predominant, 1.1cm, all margins and LNs negative, [id number] Stg I A. \par \par POD# 2 pt developed hypotension, acute anemia and abd pain, admitted to ICU, CT abd/pelvic showed large hemoperitoneum from splenic source, no active bleeding. Stabilized after resuscitation w/ 4 units RBC transfusion, discharged on 7/20/17. Suture removed on 8/1/17. \par \par CT Chest on 10/12/22: \par - Post op changes\par - Stable 7 mm RUL groundglass nodules (images 22 and 46 series 2) \par - Scattered other smaller lung nodules are stable including 4 mm in the RML (image 74); Stable dating back to 2/24/2014 study.\par - New small sub 3 mm solid RUL  nodule on (image 41 series 2). Reassessment on repeat chest CT scan is recommended in no more than 6 months.\par \par CT Chest on 1/7/23:\par - No endobronchial lesion. Unchanged left upper lobe wedge resection scarring. Unchanged multiple groundglass nodules in the right lung, largest 7 mm in the right upper lobe (2-23), and multiple sub-5 mm solid nodules, for example, previously new right upper lobe nodule (3-43). No pleural effusion. \par \par CT Chest on 7/5/23: \par Lungs, airways and pleura: Status post left upper lobe wedge resection. There are bilateral nodules and opacities the largest of which measures:\par *  Within the right upper lobe is 6 mm ground glass opacity.\par *  Within the right upper lobe is a 4 mm noncalcified nodule.\par *  Within the left lower lobe is a 4 mm noncalcified nodule.\par The remainder of the lungs are clear. The airways are normal. The pleura is normal.\par - Partially imaged left hypodense renal lesion.\par - Bilateral breast implantation.\par \par Patient is here today for a follow up. Today, patient denies worsening SOB, chest pain, cough, hemoptysis, fever, chills, night sweats, lightheadedness or dizziness.\par

## 2023-07-25 NOTE — ASSESSMENT
[FreeTextEntry1] : Ms. 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 \par \par - s/p Lt VATS, Robotic-assisted PHYLLIS posterior segmentectomy, MLND on 7/12/17 by Dr. Feliciano. Path revealed AdenoCA, acinar predominant, 1.1cm, all margins and LNs negative, [id number] Stg I A. \par \par Here today with follow up imaging. \par \par I have reviewed the patient's medical records and diagnostic images at time of this office consultation and have made the following recommendation:\par 1. Stable lung nodules. Discussed returning to clinic in 6 months with follow up CT Chest, No Contrast. He is agreeable. \par \par Recommendations reviewed with patient during this office visit, and all questions answered; Patient instructed on the importance of follow up and verbalizes understanding.\par \par I, CATHY Giles, personally performed the evaluation and management (E/M) services for this established patient. That E/M includes conducting the examination, assessing all new/exacerbated conditions, and establishing a new plan of care. Today, My ACP, Leesa Ritchie, was here to observe my evaluation and management services for this patient to be followed going forward.\par \par

## 2023-07-25 NOTE — CONSULT LETTER
[Dear  ___] : Dear  [unfilled], [Courtesy Letter:] : I had the pleasure of seeing your patient, [unfilled], in my office today. [Please see my note below.] : Please see my note below. [Sincerely,] : Sincerely, [FreeTextEntry2] : Dr. Naveen Dos Santos (Pulm/Ref)\par Dr. Kimberly Velez (HemeOnc)\par Dr. Shelby Wilde (GynOnc) [FreeTextEntry3] : Foreign Haile MD, FACS \par Chief, Division of Thoracic Surgery \par Director, Minimally Invasive Thoracic Surgery \par Department of Cardiovascular and Thoracic Surgery \par [facility] \par , Cardiovascular and Thoracic Surgery\par

## 2023-07-25 NOTE — DATA REVIEWED
[FreeTextEntry1] : Independently reviewed the following:\par - CT Chest on 1/7/23\par - CT Chest on 7/5/23

## 2023-09-02 ENCOUNTER — NON-APPOINTMENT (OUTPATIENT)
Age: 68
End: 2023-09-02

## 2023-09-06 ENCOUNTER — APPOINTMENT (OUTPATIENT)
Dept: PULMONOLOGY | Facility: CLINIC | Age: 68
End: 2023-09-06
Payer: COMMERCIAL

## 2023-09-06 DIAGNOSIS — U07.1 COVID-19: ICD-10-CM

## 2023-09-06 DIAGNOSIS — R05.9 COUGH, UNSPECIFIED: ICD-10-CM

## 2023-09-06 PROCEDURE — 99213 OFFICE O/P EST LOW 20 MIN: CPT | Mod: 95

## 2023-09-06 RX ORDER — FLUTICASONE PROPIONATE 50 UG/1
50 SPRAY, METERED NASAL TWICE DAILY
Qty: 1 | Refills: 3 | Status: ACTIVE | COMMUNITY
Start: 2023-09-06 | End: 1900-01-01

## 2023-09-06 NOTE — ASSESSMENT
09/06/23 0646   Oxygen Therapy   SpO2 (!) 85 %   Pulse via Oximetry 96 beats per minute   O2 Device None (Room air)   O2 Flow Rate (L/min) (S)  0 L/min  (found on room air, placed back on O2 @ 1 L/min)     This RN found her on room air w/ O2 sat of 85%. She was placed back on O2 @ 1 L/min per nasal cannula. This RN will monitor for her response.     Electronically signed by Pawel Simon RN on 9/6/2023 at 6:50 AM [Curative] : Goals of care discussed with patient: Curative [Palliative Care Plan] : not applicable at this time

## 2023-09-07 NOTE — ASSESSMENT
[FreeTextEntry1] : Ms. Pham is a 68 year old female with a history of abnormal chest CT, allergic rhinitis, asthma, GERD, lung nodule, malignant neoplasm of PHYLLIS, non-allergic rhinitis, and KIRA presents via video for an acute visit.   1.COVID 19  -Advised patient to quarantine for 5 days from symptom onset.  -Recommended patient to stay hydrated  2. Asthma -continue Ventolin inhaler 2 puffs Q6H -continue Advair 100 1 inhalation: rinse & gargle after use -restart [id number] mg QHS  3.PND/Allergies -add Flonase nasal spray 1 spray BID -add Olopatadine nasal spray 1 spray BID  4.Cough -add Benzontate 200 mg PO TID PRN  Patient to follow up with Dr. Dos Santos on 11/20/2023. Patient to call with further questions and concerns. Patient verbalizes understanding of care plan and is agreeable. 
Self

## 2023-09-07 NOTE — REASON FOR VISIT
[Follow-Up] : a follow-up visit [Home] : at home, [unfilled] , at the time of the visit. [Medical Office: ([facility])___] : at the medical office located in  [Patient] : the patient [Asthma] : asthma [Cough] : cough [TextBox_44] : COVID 19

## 2023-09-07 NOTE — HISTORY OF PRESENT ILLNESS
[TextBox_4] : Ms. Pham is a 68 year old female with a history of abnormal chest CT, allergic rhinitis, asthma, GERD, lung nodule, malignant neoplasm of PHYLLIS, non-allergic rhinitis, and KIRA presents via video for an acute visit.   Her chief complaint is COVID 19 infection.   Patient reports she tested positive for COVID 19 infection on Sunday. She reports her symptoms started on Saturday. Patient was prescribed Paxlovid from [facility] urgent care and took 1 dose and experienced side effects. She reports GI side effects with Paxlovid and stopped. Patient reports she doesn't want to take Paxlovid. Patient's current symptoms are dry cough, fatigue, wheezing, nasal congestion, runny nose, postnasal drip, headache, and loss of appetite. She states she restarted albuterol inhaler, and Advair inhaler 2 days with some relief in symptoms.   Patient denies fever, chills, SOB @ rest or exertion, wheezing, or heartburn/reflux.

## 2023-09-07 NOTE — REVIEW OF SYSTEMS
[Fatigue] : fatigue [Nasal Congestion] : nasal congestion [Postnasal Drip] : postnasal drip [Cough] : cough [Negative] : Psychiatric [Fever] : no fever [Recent Wt Gain (___ Lbs)] : ~T no recent weight gain [EDS] : no EDS [Chills] : no chills [Poor Appetite] : no poor appetite [Recent Wt Loss (___ Lbs)] : ~T no recent weight loss [Dry Eyes] : no dry eyes [Ear Disturbance] : no ear disturbance [Epistaxis] : no epistaxis [Sore Throat] : no sore throat [Eye Irritation] : no eye irritation [Dry Mouth] : no dry mouth [Sinus Problems] : no sinus problems [Mouth Ulcers] : no mouth ulcers [Poor Dentition] : no poor dentition [Edentulous] : no edentulous [Hemoptysis] : no hemoptysis [Chest Tightness] : no chest tightness [Frequent URIs] : no frequent URIs [Sputum] : no sputum [Dyspnea] : no dyspnea [Pleuritic Pain] : no pleuritic pain [Wheezing] : no wheezing [A.M. Dry Mouth] : no a.m. dry mouth [SOB on Exertion] : no sob on exertion

## 2023-10-25 ENCOUNTER — APPOINTMENT (OUTPATIENT)
Dept: SURGERY | Facility: CLINIC | Age: 68
End: 2023-10-25
Payer: COMMERCIAL

## 2023-10-25 PROCEDURE — 99213K: CUSTOM

## 2023-11-15 ENCOUNTER — APPOINTMENT (OUTPATIENT)
Dept: PULMONOLOGY | Facility: CLINIC | Age: 68
End: 2023-11-15
Payer: COMMERCIAL

## 2023-11-15 VITALS
HEART RATE: 86 BPM | TEMPERATURE: 97.3 F | SYSTOLIC BLOOD PRESSURE: 86 MMHG | HEIGHT: 63 IN | WEIGHT: 126 LBS | BODY MASS INDEX: 22.32 KG/M2 | OXYGEN SATURATION: 98 % | RESPIRATION RATE: 16 BRPM | DIASTOLIC BLOOD PRESSURE: 62 MMHG

## 2023-11-15 PROCEDURE — 99214 OFFICE O/P EST MOD 30 MIN: CPT | Mod: 25

## 2023-11-15 PROCEDURE — 94727 GAS DIL/WSHOT DETER LNG VOL: CPT

## 2023-11-15 PROCEDURE — 94010 BREATHING CAPACITY TEST: CPT

## 2023-11-15 PROCEDURE — 94729 DIFFUSING CAPACITY: CPT

## 2023-11-15 RX ORDER — MONTELUKAST 10 MG/1
10 TABLET, FILM COATED ORAL
Qty: 30 | Refills: 2 | Status: ACTIVE | COMMUNITY
Start: 2019-10-22 | End: 1900-01-01

## 2023-11-15 RX ORDER — OLOPATADINE HYDROCHLORIDE 665 UG/1
0.6 SPRAY, METERED NASAL
Qty: 1 | Refills: 2 | Status: ACTIVE | COMMUNITY
Start: 2021-03-10 | End: 1900-01-01

## 2023-11-15 RX ORDER — FLUTICASONE PROPIONATE AND SALMETEROL 100; 50 UG/1; UG/1
100-50 POWDER RESPIRATORY (INHALATION)
Qty: 1 | Refills: 1 | Status: DISCONTINUED | COMMUNITY
Start: 2020-09-30 | End: 2023-11-15

## 2023-11-15 RX ORDER — BENZONATATE 200 MG/1
200 CAPSULE ORAL 3 TIMES DAILY
Qty: 60 | Refills: 0 | Status: DISCONTINUED | COMMUNITY
Start: 2023-09-06 | End: 2023-11-15

## 2023-11-20 ENCOUNTER — APPOINTMENT (OUTPATIENT)
Dept: PULMONOLOGY | Facility: CLINIC | Age: 68
End: 2023-11-20

## 2023-12-04 ENCOUNTER — APPOINTMENT (OUTPATIENT)
Dept: INTERNAL MEDICINE | Facility: CLINIC | Age: 68
End: 2023-12-04
Payer: COMMERCIAL

## 2023-12-04 VITALS
TEMPERATURE: 98 F | BODY MASS INDEX: 21.62 KG/M2 | OXYGEN SATURATION: 98 % | RESPIRATION RATE: 16 BRPM | SYSTOLIC BLOOD PRESSURE: 87 MMHG | WEIGHT: 122 LBS | HEIGHT: 63 IN | DIASTOLIC BLOOD PRESSURE: 51 MMHG | HEART RATE: 61 BPM

## 2023-12-04 PROCEDURE — 99214 OFFICE O/P EST MOD 30 MIN: CPT

## 2023-12-04 RX ORDER — ASPIRIN ENTERIC COATED TABLETS 81 MG 81 MG/1
81 TABLET, DELAYED RELEASE ORAL
Refills: 0 | Status: ACTIVE | COMMUNITY

## 2023-12-05 ENCOUNTER — APPOINTMENT (OUTPATIENT)
Dept: CARDIOLOGY | Facility: CLINIC | Age: 68
End: 2023-12-05
Payer: COMMERCIAL

## 2023-12-05 ENCOUNTER — NON-APPOINTMENT (OUTPATIENT)
Age: 68
End: 2023-12-05

## 2023-12-05 VITALS
RESPIRATION RATE: 18 BRPM | OXYGEN SATURATION: 97 % | SYSTOLIC BLOOD PRESSURE: 93 MMHG | HEART RATE: 66 BPM | DIASTOLIC BLOOD PRESSURE: 55 MMHG | BODY MASS INDEX: 21.68 KG/M2 | HEIGHT: 64 IN | WEIGHT: 127 LBS

## 2023-12-05 LAB
ALBUMIN SERPL ELPH-MCNC: 4.3 G/DL
ALP BLD-CCNC: 54 U/L
ALT SERPL-CCNC: 16 U/L
ANION GAP SERPL CALC-SCNC: 11 MMOL/L
AST SERPL-CCNC: 21 U/L
BILIRUB SERPL-MCNC: 0.4 MG/DL
BUN SERPL-MCNC: 18 MG/DL
CALCIUM SERPL-MCNC: 9.5 MG/DL
CANCER AG125 SERPL-ACNC: 10 U/ML
CHLORIDE SERPL-SCNC: 106 MMOL/L
CHOLEST SERPL-MCNC: 132 MG/DL
CO2 SERPL-SCNC: 25 MMOL/L
CREAT SERPL-MCNC: 0.7 MG/DL
EGFR: 95 ML/MIN/1.73M2
GLUCOSE SERPL-MCNC: 78 MG/DL
HDLC SERPL-MCNC: 58 MG/DL
LDLC SERPL CALC-MCNC: 59 MG/DL
NONHDLC SERPL-MCNC: 74 MG/DL
POTASSIUM SERPL-SCNC: 4.4 MMOL/L
PROT SERPL-MCNC: 6.5 G/DL
SODIUM SERPL-SCNC: 142 MMOL/L
TRIGL SERPL-MCNC: 76 MG/DL

## 2023-12-05 PROCEDURE — 93000 ELECTROCARDIOGRAM COMPLETE: CPT

## 2023-12-05 PROCEDURE — 99215 OFFICE O/P EST HI 40 MIN: CPT

## 2023-12-05 PROCEDURE — 93306 TTE W/DOPPLER COMPLETE: CPT

## 2024-01-02 PROBLEM — Z15.01 BRCA1 GENETIC CARRIER: Status: ACTIVE | Noted: 2017-11-09

## 2024-01-04 ENCOUNTER — APPOINTMENT (OUTPATIENT)
Dept: CT IMAGING | Facility: IMAGING CENTER | Age: 69
End: 2024-01-04
Payer: COMMERCIAL

## 2024-01-04 ENCOUNTER — OUTPATIENT (OUTPATIENT)
Dept: OUTPATIENT SERVICES | Facility: HOSPITAL | Age: 69
LOS: 1 days | End: 2024-01-04
Payer: COMMERCIAL

## 2024-01-04 DIAGNOSIS — S36.039A UNSPECIFIED LACERATION OF SPLEEN, INITIAL ENCOUNTER: Chronic | ICD-10-CM

## 2024-01-04 DIAGNOSIS — Z98.890 OTHER SPECIFIED POSTPROCEDURAL STATES: Chronic | ICD-10-CM

## 2024-01-04 DIAGNOSIS — S02.2XXA FRACTURE OF NASAL BONES, INITIAL ENCOUNTER FOR CLOSED FRACTURE: Chronic | ICD-10-CM

## 2024-01-04 DIAGNOSIS — R91.8 OTHER NONSPECIFIC ABNORMAL FINDING OF LUNG FIELD: ICD-10-CM

## 2024-01-04 DIAGNOSIS — Z90.710 ACQUIRED ABSENCE OF BOTH CERVIX AND UTERUS: Chronic | ICD-10-CM

## 2024-01-04 DIAGNOSIS — C34.92 MALIGNANT NEOPLASM OF UNSPECIFIED PART OF LEFT BRONCHUS OR LUNG: ICD-10-CM

## 2024-01-04 DIAGNOSIS — Z90.2 ACQUIRED ABSENCE OF LUNG [PART OF]: Chronic | ICD-10-CM

## 2024-01-04 PROCEDURE — 71250 CT THORAX DX C-: CPT

## 2024-01-04 PROCEDURE — 71250 CT THORAX DX C-: CPT | Mod: 26

## 2024-01-05 ENCOUNTER — TRANSCRIPTION ENCOUNTER (OUTPATIENT)
Age: 69
End: 2024-01-05

## 2024-01-08 ENCOUNTER — NON-APPOINTMENT (OUTPATIENT)
Age: 69
End: 2024-01-08

## 2024-01-09 ENCOUNTER — APPOINTMENT (OUTPATIENT)
Dept: GYNECOLOGIC ONCOLOGY | Facility: CLINIC | Age: 69
End: 2024-01-09
Payer: COMMERCIAL

## 2024-01-09 VITALS
WEIGHT: 122.13 LBS | SYSTOLIC BLOOD PRESSURE: 104 MMHG | BODY MASS INDEX: 20.96 KG/M2 | HEART RATE: 69 BPM | DIASTOLIC BLOOD PRESSURE: 70 MMHG

## 2024-01-09 DIAGNOSIS — Z15.09 GENETIC SUSCEPTIBILITY TO MALIGNANT NEOPLASM OF BREAST: ICD-10-CM

## 2024-01-09 DIAGNOSIS — Z15.01 GENETIC SUSCEPTIBILITY TO MALIGNANT NEOPLASM OF BREAST: ICD-10-CM

## 2024-01-09 PROCEDURE — 99213 OFFICE O/P EST LOW 20 MIN: CPT

## 2024-01-09 RX ORDER — ESTRADIOL 0.1 MG/G
0.1 CREAM VAGINAL
Qty: 1 | Refills: 2 | Status: ACTIVE | COMMUNITY
Start: 2022-07-20 | End: 1900-01-01

## 2024-01-16 ENCOUNTER — TRANSCRIPTION ENCOUNTER (OUTPATIENT)
Age: 69
End: 2024-01-16

## 2024-01-17 ENCOUNTER — TRANSCRIPTION ENCOUNTER (OUTPATIENT)
Age: 69
End: 2024-01-17

## 2024-01-20 ENCOUNTER — APPOINTMENT (OUTPATIENT)
Dept: CT IMAGING | Facility: IMAGING CENTER | Age: 69
End: 2024-01-20

## 2024-01-22 ENCOUNTER — OUTPATIENT (OUTPATIENT)
Dept: OUTPATIENT SERVICES | Facility: HOSPITAL | Age: 69
LOS: 1 days | Discharge: HOME | End: 2024-01-22

## 2024-01-22 DIAGNOSIS — Z98.890 OTHER SPECIFIED POSTPROCEDURAL STATES: Chronic | ICD-10-CM

## 2024-01-22 DIAGNOSIS — Z90.710 ACQUIRED ABSENCE OF BOTH CERVIX AND UTERUS: Chronic | ICD-10-CM

## 2024-01-22 DIAGNOSIS — C57.00 MALIGNANT NEOPLASM OF UNSPECIFIED FALLOPIAN TUBE: ICD-10-CM

## 2024-01-22 DIAGNOSIS — D24.9 BENIGN NEOPLASM OF UNSPECIFIED BREAST: Chronic | ICD-10-CM

## 2024-01-22 DIAGNOSIS — S02.2XXA FRACTURE OF NASAL BONES, INITIAL ENCOUNTER FOR CLOSED FRACTURE: Chronic | ICD-10-CM

## 2024-01-22 DIAGNOSIS — S36.039A UNSPECIFIED LACERATION OF SPLEEN, INITIAL ENCOUNTER: Chronic | ICD-10-CM

## 2024-01-22 DIAGNOSIS — Z90.2 ACQUIRED ABSENCE OF LUNG [PART OF]: Chronic | ICD-10-CM

## 2024-01-30 ENCOUNTER — APPOINTMENT (OUTPATIENT)
Dept: THORACIC SURGERY | Facility: CLINIC | Age: 69
End: 2024-01-30
Payer: COMMERCIAL

## 2024-01-30 ENCOUNTER — NON-APPOINTMENT (OUTPATIENT)
Age: 69
End: 2024-01-30

## 2024-01-30 DIAGNOSIS — R91.8 OTHER NONSPECIFIC ABNORMAL FINDING OF LUNG FIELD: ICD-10-CM

## 2024-01-30 PROCEDURE — 99442: CPT

## 2024-01-30 NOTE — CONSULT LETTER
[FreeTextEntry2] : Dr. Naveen Dos Santos (Pulm/Ref)\par  Dr. Kimberly Velez (HemeOnc)\par  Dr. Shelby Wilde (GynOnc) [FreeTextEntry3] : Foreign Haile MD, FACS \par  Chief, Division of Thoracic Surgery \par  Director, Minimally Invasive Thoracic Surgery \par  Department of Cardiovascular and Thoracic Surgery \par  [facility] \par  , Cardiovascular and Thoracic Surgery\par

## 2024-01-30 NOTE — ASSESSMENT
[FreeTextEntry1] : Ms. LOUISA MILLAN, 68 year old female, former smoker, w/ hx of mild persistent asthma, HLD, GERD, OSAS, anxiety, and lung CA (2017): s/p PHYLLIS Posterior segmentectomy c/b splenic laceration requiring ICU stay; Fallopian tube carcinoma (2018): s/p chemo, BSO, total laparoscopic hysterectomy, omentectomy as well as prophylactic mastectomy with reconstruction (positive for BRCA 1 mutation).  - s/p Lt VATS, Robotic-assisted PHYLLIS posterior segmentectomy, MLND on 7/12/17 by Dr. Feliciano. Path revealed AdenoCA, acinar predominant, 1.1cm, all margins and LNs negative, [id number] Stg I A.  Here today with follow up imaging.  I have independently reviewed the medical records and imaging at the time of this office consultation, and discussed the following interpretations with the patient: - CT Chest reviewed with patient, stable scan. I discussed she returns to clinic in 6 months with CT Chest without contrast. - Continue follow up with pulm.  Recommendations reviewed with patient during this office visit, and all questions answered; Patient instructed on the importance of follow up and verbalizes understanding.   I, CATHY Giles, personally performed the evaluation and management (E/M) services for this established patient. That E/M includes conducting the examination, assessing all new/exacerbated conditions, and establishing a new plan of care. Today, my ACP, Gabriel Arias NP, was here to observe my evaluation and management services for this new problem/exacerbated condition to be followed going forward.

## 2024-01-30 NOTE — HISTORY OF PRESENT ILLNESS
[FreeTextEntry1] : Ms. 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   - s/p Lt VATS, Robotic-assisted PHYLLIS posterior segmentectomy, MLND on 7/12/17 by Dr. Feliciano. Path revealed AdenoCA, acinar predominant, 1.1cm, all margins and LNs negative, [id number] Stg I A.   POD# 2 pt developed hypotension, acute anemia and abd pain, admitted to ICU, CT abd/pelvic showed large hemoperitoneum from splenic source, no active bleeding. Stabilized after resuscitation w/ 4 units RBC transfusion, discharged on 7/20/17. Suture removed on 8/1/17.   CT Chest on 10/12/22:  - Post op changes - Stable 7 mm RUL groundglass nodules (images 22 and 46 series 2)  - Scattered other smaller lung nodules are stable including 4 mm in the RML (image 74); Stable dating back to 2/24/2014 study. - New small sub 3 mm solid RUL  nodule on (image 41 series 2). Reassessment on repeat chest CT scan is recommended in no more than 6 months.  CT Chest on 1/7/23: - No endobronchial lesion. Unchanged left upper lobe wedge resection scarring. Unchanged multiple groundglass nodules in the right lung, largest 7 mm in the right upper lobe (2-23), and multiple sub-5 mm solid nodules, for example, previously new right upper lobe nodule (3-43). No pleural effusion.   CT Chest on 7/5/23:  Lungs, airways and pleura: Status post left upper lobe wedge resection. There are bilateral nodules and opacities the largest of which measures: *  Within the right upper lobe is 6 mm ground glass opacity. *  Within the right upper lobe is a 4 mm noncalcified nodule. *  Within the left lower lobe is a 4 mm noncalcified nodule. The remainder of the lungs are clear. The airways are normal. The pleura is normal. - Partially imaged left hypodense renal lesion. - Bilateral breast implantation.  CT Chest on 1/4/24:  - s/p PHYLLIS posterior segmentectomy with stable postoperative changes - Multiple solid and ground-glass pulmonary nodules measuring up to 0.5 cm bilaterally are unchanged compared to 7/5/2023; reference right apical 0.5 cm ground-glass nodule (image 21, series 2) and left lower lobe subpleural 0.4 cm solid nodule (image 95, series 2)  Patient is here today for a follow up. Overall, she reports to be feeling well. Denies any chest pain, shortness of breath, cough, or hemoptysis.

## 2024-01-31 ENCOUNTER — APPOINTMENT (OUTPATIENT)
Dept: HEMATOLOGY ONCOLOGY | Facility: CLINIC | Age: 69
End: 2024-01-31
Payer: COMMERCIAL

## 2024-01-31 VITALS
WEIGHT: 122.8 LBS | OXYGEN SATURATION: 99 % | TEMPERATURE: 97.2 F | RESPIRATION RATE: 17 BRPM | SYSTOLIC BLOOD PRESSURE: 110 MMHG | BODY MASS INDEX: 20.96 KG/M2 | HEIGHT: 63.98 IN | HEART RATE: 60 BPM | DIASTOLIC BLOOD PRESSURE: 69 MMHG

## 2024-01-31 DIAGNOSIS — C57.00 MALIGNANT NEOPLASM OF UNSPECIFIED FALLOPIAN TUBE: ICD-10-CM

## 2024-01-31 PROCEDURE — 99213 OFFICE O/P EST LOW 20 MIN: CPT

## 2024-02-01 PROBLEM — C57.00 FALLOPIAN TUBE CARCINOMA: Status: ACTIVE | Noted: 2018-01-18

## 2024-02-01 NOTE — HISTORY OF PRESENT ILLNESS
[Disease: _____________________] : Disease: [unfilled] [AJCC Stage: ____] : AJCC Stage: [unfilled] [de-identified] : Ms. Pham is a 62 year old  postmenopausal who presents for discussion regarding treatment for fallopian tube cancer, and she has BRCA 1 pathogenic mutation.\par  \par  Ms. Pham has a personal history of stage IA adenocarcinoma of the lung(eGFR positive) s/p left VATS (2017, [facility]) c/b splenic laceration requiring ICU stay, and breast papilloma s/p excision (2013, [facility]), and HGSIL s/p LEEP 1996. Past GYN history significant for one episode of postmenopausal bleeding at the age of 49 which was ultimately determined to be a menstrual cycle. No history of uterine fibroids or ovarian cysts. Was on oral contraception from her early 40s until just a few weeks ago. \par  \par  Ms. Pham has a pertinent family history significant for breast (paternal cousin, paternal aunt) and ovarian (paternal cousin) cancer. \par  Due to her high risk, she opted to go for risk reducing BSO and double mastectomy on 1/9/18.\par  The frozen section showed malignancy and she underwent full surgical staging and mastectomy was postponed.\par  Pathology: stage IC fallopian tube cancer.\par  Patient has fully recovered from surgery. [de-identified] : She is retiring in March. She has no new symptoms. She has some fatigue , which is chronic.

## 2024-02-02 NOTE — DISCHARGE NOTE ADULT - MEDICATION SUMMARY - MEDICATIONS TO STOP TAKING
I will STOP taking the medications listed below when I get home from the hospital:  None
 delivery, meeting all postoperative milestones.  Please follow-up with your OB doctor within 1-2 weeks.  You can resume a regular diet at home and may continue your prenatal vitamins as directed.  Please place nothing in the vagina for 6 weeks (no tampons, sex, douching, tub baths, swimming pools, etc).  If you have severe headaches and/or vision changes, heavy bleeding, or chest pain, please call your provider or go to the nearest Emergency Department.  Please call your OB with any signs of symptoms of infection including fever > 100.4 degrees, severe pain, malodorous vaginal discharge or heavy bleeding requiring more than 1-2 pads/hour.  You can take Motrin 600mg orally every 6 hours and Tylenol 1000mg orally every 6 hours for pain as needed.

## 2024-02-06 RX ORDER — ALBUTEROL SULFATE 90 UG/1
108 (90 BASE) AEROSOL, METERED RESPIRATORY (INHALATION)
Qty: 3 | Refills: 1 | Status: ACTIVE | COMMUNITY
Start: 2017-07-11 | End: 1900-01-01

## 2024-02-07 ENCOUNTER — APPOINTMENT (OUTPATIENT)
Dept: DERMATOLOGY | Facility: CLINIC | Age: 69
End: 2024-02-07
Payer: COMMERCIAL

## 2024-02-07 DIAGNOSIS — L30.9 DERMATITIS, UNSPECIFIED: ICD-10-CM

## 2024-02-07 DIAGNOSIS — L70.0 ACNE VULGARIS: ICD-10-CM

## 2024-02-07 DIAGNOSIS — L81.9 DISORDER OF PIGMENTATION, UNSPECIFIED: ICD-10-CM

## 2024-02-07 PROCEDURE — 99214 OFFICE O/P EST MOD 30 MIN: CPT

## 2024-02-07 RX ORDER — DOXYCYCLINE 100 MG/1
100 TABLET, FILM COATED ORAL
Qty: 60 | Refills: 3 | Status: ACTIVE | COMMUNITY
Start: 2024-02-07 | End: 1900-01-01

## 2024-02-07 RX ORDER — CLINDAMYCIN PHOSPHATE 10 MG/ML
1 LOTION TOPICAL TWICE DAILY
Qty: 1 | Refills: 3 | Status: ACTIVE | COMMUNITY
Start: 2024-02-07 | End: 1900-01-01

## 2024-02-07 NOTE — PHYSICAL EXAM
[FreeTextEntry3] : Focused skin exam performed  The relevant portions of the exam were performed today  AAOx3, NAD, well-appearing / pleasant Focused examination within normal limits with the exception of:  Pink cystic, inflammatory papules and pustules scattered on the chin, jawline, cheeks, with pink resolving papule on the right forehead.

## 2024-02-07 NOTE — HISTORY OF PRESENT ILLNESS
[FreeTextEntry1] : rpa: acne [de-identified] : 68F with a hx of BRCA1 with a hx of fallopian tube carcinoma s/p BSO and chemo as well as ppx b/l mastectomy, AND hx of lung adenocarcinoma s/p PHYLLIS segmentectomy, last seen June 2023 by Dr. Stevenson here for:  #Acne on the face x 2 months. Breaking out all throughout her face, deep painful pimples. No correlation seen. H/o cystic acne as a late teenager-early adult.  She has tried Cera Ve salicylic acid and azelaic acid (Yoselin's choice) without improvement.  Moisturizing with Hydro boost cream.  Alternates use tretinoin 0.1% compounded with niacinamide with tretinoin 0.1% cream qHS.  Patient feels well.   H/o #eczematous dermatitis on the chest. Dr. Stevenson prescribed pimecrolimus at . Now burns on her neck.   PO Medications: Rosuvastatin, baby aspirin Allergy to codeine.   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

## 2024-02-07 NOTE — ASSESSMENT
[FreeTextEntry1] : #Acne vulgaris, moderate, cystic, inflammatory flare on the face x 2 months, with #PIE and possibly early scarring New diagnosis with uncertain prognosis.  - No clear associated trigger. NO new medications (rosuvastatin, baby aspirin) - Hx of stage IC fallopian tube cancer, s/p chemo, hysterectomy, prophylactic mastectomy (BRCA 1 +) & adjuvant treatment in CR (last saw Dr. Harris 1/31/2024).  - Hx of lung adenocarcinoma s/p PHYLLIS segmentectomy. Pulm is monitoring nodules, imaging q 6 mos. [facility] 1/30/2024.  - 68yoF with new onset acne vulgaris, h/o cystic acne in late teens-early adulthood. Feels well, no acute stressors, no medication changes.  - Education, counseling.  - Start Doxycycline 100mg BID, taken with food and water x 3 mos. We have discussed the proper use and possible associated adverse effects, including GI distress, photosensitivity, hypersensitivity reaction, and esophageal ulceration.  Patient counseled not to lie down for at least 1 hour after taking the medication. - Start OTC benzoyl peroxide wash <5% qAM. Sample Cera Ve BPO 4% creamy wash provided. SED.  - Start Clindamycin 1% lotion BID to affected areas.  - C/w Tretinoin 0.1% cream at bedtime.    #PIE with early acne scarring - Discussed that it may take up to 1 year for scars to fully evolve. They may improve in appearance over time.  - Discussed cosmetic consultation with Dr. Dawit Copeland after acne flare has resolved.   #Eczematous dermatitis, chest, chronic, stable, NOT flaring today - Discussed nature and usual course - Discussed treatment options, expectations from treatment, and associated side effects - Stop elidel 1% cream given burning sensation.  - Given skin sensitivity, gentle skincare discussed at length. Samples of Vanicream products provided; they have worked well for the Patient in the past.   RTC 4-5 wks to f/u with Dr. Stevenson. Next FBSE with Dr. Stevenson due June 2024.

## 2024-02-08 ENCOUNTER — NON-APPOINTMENT (OUTPATIENT)
Age: 69
End: 2024-02-08

## 2024-02-08 ENCOUNTER — TRANSCRIPTION ENCOUNTER (OUTPATIENT)
Age: 69
End: 2024-02-08

## 2024-02-09 ENCOUNTER — NON-APPOINTMENT (OUTPATIENT)
Age: 69
End: 2024-02-09

## 2024-03-08 NOTE — H&P PST ADULT - NS PRO ABUSE SCREEN AFRAID ANYONE YN
for right total knee arthroplasty with MELISSA robot on 3/25/24 with Dr. Rollins.    PST labs sent per ERP protocol  Pt advised to drink 20 oz. Gatorade Zero or water 5 hours Pre-Op DOS to be completed prior to arrival at the hospital as per ERP Protocol; Pt verbalized teach back understanding.   Incentive spirometer instructions provided to pt with teach back demonstration   Pre procedure surgical scrub instructions discussed  Pre-op education provided - all questions answered
no

## 2024-03-16 ENCOUNTER — NON-APPOINTMENT (OUTPATIENT)
Age: 69
End: 2024-03-16

## 2024-03-20 ENCOUNTER — APPOINTMENT (OUTPATIENT)
Dept: DERMATOLOGY | Facility: CLINIC | Age: 69
End: 2024-03-20
Payer: COMMERCIAL

## 2024-03-20 PROCEDURE — 99214 OFFICE O/P EST MOD 30 MIN: CPT

## 2024-03-20 NOTE — HISTORY OF PRESENT ILLNESS
[FreeTextEntry1] : f/u moles  [de-identified] : 68F with a hx of BRCA1 with a hx of fallopian tube carcinoma s/p BSO and chemo as well as ppx b/l mastectomy, also hx of lung adenocarcinoma s/p lobectomy, last seen 2/2024,   by MARIZOL Chaudhary here for TBSE.   At [facility] also noted to have new acneiform eruption on face w/ cysts, prescribed BP/clinda and 3 month course of doxy. Has bene taking for  few weeks and lesions subsiding, haveing GI upset and taking a pepcid with each dose.  Still using tretinoin.   No personal hx of skin cancer, had a dysplastic nevus excised on the L posterior thigh ~15 years ago  Family Hx: Paternal aunt with melanoma

## 2024-04-15 ENCOUNTER — RX RENEWAL (OUTPATIENT)
Age: 69
End: 2024-04-15

## 2024-04-15 RX ORDER — ROSUVASTATIN CALCIUM 20 MG/1
20 TABLET, FILM COATED ORAL
Qty: 90 | Refills: 2 | Status: ACTIVE | COMMUNITY
Start: 2022-12-05 | End: 1900-01-01

## 2024-04-17 ENCOUNTER — APPOINTMENT (OUTPATIENT)
Dept: PULMONOLOGY | Facility: CLINIC | Age: 69
End: 2024-04-17
Payer: MEDICARE

## 2024-04-17 VITALS
TEMPERATURE: 97.1 F | OXYGEN SATURATION: 98 % | WEIGHT: 122 LBS | HEART RATE: 69 BPM | HEIGHT: 63 IN | RESPIRATION RATE: 17 BRPM | BODY MASS INDEX: 21.62 KG/M2 | DIASTOLIC BLOOD PRESSURE: 58 MMHG | SYSTOLIC BLOOD PRESSURE: 124 MMHG

## 2024-04-17 DIAGNOSIS — C34.12 MALIGNANT NEOPLASM OF UPPER LOBE, LEFT BRONCHUS OR LUNG: ICD-10-CM

## 2024-04-17 DIAGNOSIS — R93.89 ABNORMAL FINDINGS ON DIAGNOSTIC IMAGING OF OTHER SPECIFIED BODY STRUCTURES: ICD-10-CM

## 2024-04-17 DIAGNOSIS — R06.02 SHORTNESS OF BREATH: ICD-10-CM

## 2024-04-17 DIAGNOSIS — J45.909 UNSPECIFIED ASTHMA, UNCOMPLICATED: ICD-10-CM

## 2024-04-17 DIAGNOSIS — K21.9 GASTRO-ESOPHAGEAL REFLUX DISEASE W/OUT ESOPHAGITIS: ICD-10-CM

## 2024-04-17 DIAGNOSIS — J30.9 ALLERGIC RHINITIS, UNSPECIFIED: ICD-10-CM

## 2024-04-17 PROCEDURE — 95012 NITRIC OXIDE EXP GAS DETER: CPT

## 2024-04-17 PROCEDURE — 99214 OFFICE O/P EST MOD 30 MIN: CPT | Mod: 25

## 2024-04-17 PROCEDURE — 94010 BREATHING CAPACITY TEST: CPT

## 2024-04-17 NOTE — ASSESSMENT
[FreeTextEntry1] : Ms. Pham is a 70 y/o female with a history of allergies, GERD, KIRA, and asthma. She is s/p VATS procedure with Dr. Feliciano scheduled for 7/12/17 c/w adenocarcinoma with acinar predominant complicated by splenic laceration and ICU stay. She is s/p chemotherapy for fallopian tube tumor, breast surgery 10/18 and is s/p breast reconstruction surgery and s/p additional plastic surgery. active asthma @times, abnormal Cardiac CT (LAD/RCA) - stable (retired)  Her history of chronic cough is multifactorial due to: (resolved) -mild persistent asthma -post nasal drip syndrome -GERD  problem 1: mild persistent asthma (quiet) -continue Advair 100 at 1 inhalation BID -continue Ventolin 2 puffs q6H, prn -continue Singulair 10 mg QHS  -Asthma is believed to be caused by inherited (genetic) and environmental factor, but its exact cause is unknown. Asthma may be triggered by allergens, lung infections, or irritants in the air. Asthma triggers are different for each person -Inhaler technique reviewed as well as oral hygiene techniques reviewed with patient. Avoidance of cold air, extremes of temperature, rescue inhaler should be used before exercise. Order of medication reviewed with patient. Recommended use of a cool mist humidifier in the bedroom.  Problem 1A: Cardiac Dz- - Recommended Dr Cardona / Richar et al   problem 2: post nasal drip syndrome (stable) - Dr. Ley (4/2022) - active -continue to use Zaditor eye drops -continue Olopatadine 0.6% 1 sniff BID  - Continue Atrovent 0.6% at 1 sniff/nostril, up to TID  -continue to use OTC antihistamine - add Loratadine 10 mg QHS -Environmental measures for allergies were encouraged including mattress and pillow cover, air purifier, and environmental controls.  problem 3: GERD (controlled)  -continue to use Pepcid 40mg BID / Carafate 1mg before meals QHS; Prilosec 40 AM  -Rule of 2s: avoid eating too much, eating too late, eating too spicy, eating two hours before bed -Things to avoid including overeating, spicy foods, tight clothing, eating within three hours of bed, this list is not all inclusive.  -For treatment of reflux, possible options discussed including diet control, H2 blockers, PPIs, as well as coating motility agents discussed as treatment options. Timing of meals and proximity of last meal to sleep were discussed. If symptoms persist, a formal gastrointestinal evaluation is needed.  problem 4: OSAS  -no longer present secondary to weight loss and positional sleep -Sleep apnea is associated with adverse clinical consequences which an affect most organ systems. Cardiovascular disease risk includes arrhythmias, atrial fibrillation, hypertension, coronary artery disease, and stroke. Metabolic disorders include diabetes type 2, non-alcoholic fatty liver disease. Mood disorder especially depression; and cognitive decline especially in the elderly. Associations with chronic reflux/Perkins's esophagus some but not all inclusive.  -Reasons to assess this include arousal consistent with hypopnea; respiratory events most prominent in REM sleep or supine position; therefore sleep staging and body position are important for accurate diagnosis and estimation of AHI.   problem 5: stage IA lung cancer, adenocarcinoma -she is s/p segmentectomy -follow up chest CT with Dr. Momin (next 7/2024)  Problem 6a: COVID-19 precautionary Immune Support Recommendations: s/p moderna vaccine x3 -OTC Vitamin C 500mg BID  -OTC Quercetin 250-500mg BID  -OTC Zinc 75-100mg per day  -OTC Melatonin 1 or 2mg a night  -OTC Vitamin D 1-4000mg per day  -OTC Tonic Water 8oz per day -Water 0.5-1 gallon per day  problem 6: health maintenance  - Recommended SaNotize  - recommended Borage and Flaxseed oil for eczema  - She has received an influenza vaccine 2021 -recommended strep pneumonia vaccines: Prevnar-13 vaccine, followed by Pneumo vaccine 23 on year following -recommended early intervention for URIs -recommended osteoporosis evaluations -recommended early dermatological evaluations -recommended after the age of 50 to the age of 70, colonoscopy every 5 years -encouraged early intervention  F/U in 6 months She is encouraged to call with any changes, concerns, or questions.

## 2024-04-17 NOTE — ADDENDUM
[FreeTextEntry1] : Documented by Yvan Stephenson acting as a scribe for Dr. Naveen Dos Santos on 04/17/2024.   All medical record entries made by the Scribe were at my, Dr. Naveen Dos Santos's, direction and personally dictated by me on 04/17/2024. I have reviewed the chart and agree that the record accurately reflects my personal performance of the history, physical exam, assessment and plan. I have also personally directed, reviewed, and agree with the discharge instructions.

## 2024-04-17 NOTE — PROCEDURE
[FreeTextEntry1] : PFT reveals mild obstruction at mid to low volumes, with an FEV1 of 1.85 L, which is 81% of predicted, with a normal flow volume loop. PFTs were performed to evaluate for SOB  FENO was 25; a normal value being less than 25 Fractional exhaled nitric oxide (FENO) is regarded as a simple, noninvasive method for assessing eosinophilic airway inflammation. Produced by a variety of cells within the lung, nitric oxide (NO) concentrations are generally low in healthy individuals. However, high concentrations of NO appear to be involved in nonspecific host defense mechanisms and chronic inflammatory diseases such as asthma. The American Thoracic Society (ATS) therefore has strongly recommended using FENO to aid in the assessment, management, and long-term monitoring of eosinophilic airway inflammation and asthma, and for identifying steroid responsive individuals whose chronic respiratory symptoms may be caused by airway inflammation. In their 2011 clinical practice guideline, the ATS emphasizes the importance of using FENO.

## 2024-04-17 NOTE — HISTORY OF PRESENT ILLNESS
[FreeTextEntry1] : Ms. Pham is a 69 year old female with a history of abnormal chest CT, allergic rhinitis, asthma, GERD, lung nodule, malignant neoplasm of PHYLLIS, non-allergic rhinitis, and KIRA presenting to the office today for a follow up visit. Her chief complaint is  - she notes retiring to a [employer] position - she notes getting 7-8 hours of sleep - she notes taking vitamin D3 and C - she notes constant rhinitis  - she notes having a nasal cauterization done with Dr. Ley  -she denies any headaches, nausea, emesis, fever, chills, sweats, chest pain, chest pressure, coughing, wheezing, palpitations, constipation, diarrhea, vertigo, dysphagia, heartburn, reflux, itchy eyes, itchy ears, leg swelling, leg pain, arthralgias, myalgias, hoarseness, or sour taste in the mouth.

## 2024-05-07 PROBLEM — M79.10 MYALGIA: Status: ACTIVE | Noted: 2021-10-25

## 2024-05-08 ENCOUNTER — APPOINTMENT (OUTPATIENT)
Dept: CARDIOLOGY | Facility: CLINIC | Age: 69
End: 2024-05-08
Payer: MEDICARE

## 2024-05-08 VITALS — DIASTOLIC BLOOD PRESSURE: 58 MMHG | OXYGEN SATURATION: 98 % | HEART RATE: 60 BPM | SYSTOLIC BLOOD PRESSURE: 84 MMHG

## 2024-05-08 DIAGNOSIS — M79.10 MYALGIA, UNSPECIFIED SITE: ICD-10-CM

## 2024-05-08 DIAGNOSIS — G47.33 OBSTRUCTIVE SLEEP APNEA (ADULT) (PEDIATRIC): ICD-10-CM

## 2024-05-08 PROCEDURE — 99215 OFFICE O/P EST HI 40 MIN: CPT

## 2024-05-08 PROCEDURE — G2211 COMPLEX E/M VISIT ADD ON: CPT

## 2024-05-09 NOTE — H&P PST ADULT - GENERAL
details…
For information on Fall & Injury Prevention, visit: [email or web address] OR  [email or web address] OR  https://www.cdc.gov/steadi/patient.html

## 2024-06-04 NOTE — H&P PST ADULT - GASTROINTESTINAL
Patient Specific Counseling (Will Not Stick From Patient To Patient): Warts are caused by HPV.  They can be spread through direct contact. Some warts will resolve within 2-3 years but may take longer. \\nTreatment options were discussed.\\nThe best interval for treating warts is every 2-4 weeks.\\nIt can take multiple in-office treatments to eradicate warts. Detail Level: Zone details… detailed exam

## 2024-06-12 ENCOUNTER — APPOINTMENT (OUTPATIENT)
Dept: INTERNAL MEDICINE | Facility: CLINIC | Age: 69
End: 2024-06-12
Payer: MEDICARE

## 2024-06-12 VITALS
HEIGHT: 63 IN | DIASTOLIC BLOOD PRESSURE: 66 MMHG | SYSTOLIC BLOOD PRESSURE: 103 MMHG | TEMPERATURE: 97.8 F | HEART RATE: 65 BPM | OXYGEN SATURATION: 99 % | RESPIRATION RATE: 17 BRPM | BODY MASS INDEX: 21.62 KG/M2 | WEIGHT: 122 LBS

## 2024-06-12 DIAGNOSIS — E78.5 HYPERLIPIDEMIA, UNSPECIFIED: ICD-10-CM

## 2024-06-12 DIAGNOSIS — C34.92 MALIGNANT NEOPLASM OF UNSPECIFIED PART OF LEFT BRONCHUS OR LUNG: ICD-10-CM

## 2024-06-12 DIAGNOSIS — Z00.00 ENCOUNTER FOR GENERAL ADULT MEDICAL EXAMINATION W/OUT ABNORMAL FINDINGS: ICD-10-CM

## 2024-06-12 PROCEDURE — G0402 INITIAL PREVENTIVE EXAM: CPT

## 2024-06-12 NOTE — PHYSICAL EXAM
[No Acute Distress] : no acute distress [Well Nourished] : well nourished [Well Developed] : well developed [Well-Appearing] : well-appearing [Normal Sclera/Conjunctiva] : normal sclera/conjunctiva [PERRL] : pupils equal round and reactive to light [EOMI] : extraocular movements intact [Normal Outer Ear/Nose] : the outer ears and nose were normal in appearance [Normal Oropharynx] : the oropharynx was normal [No Lymphadenopathy] : no lymphadenopathy [No JVD] : no jugular venous distention [Supple] : supple [Thyroid Normal, No Nodules] : the thyroid was normal and there were no nodules present [No Respiratory Distress] : no respiratory distress  [No Accessory Muscle Use] : no accessory muscle use [Clear to Auscultation] : lungs were clear to auscultation bilaterally [Normal Rate] : normal rate  [Regular Rhythm] : with a regular rhythm [Normal S1, S2] : normal S1 and S2 [No Murmur] : no murmur heard [No Carotid Bruits] : no carotid bruits [No Abdominal Bruit] : a ~M bruit was not heard ~T in the abdomen [No Varicosities] : no varicosities [Pedal Pulses Present] : the pedal pulses are present [No Edema] : there was no peripheral edema [No Palpable Aorta] : no palpable aorta [No Extremity Clubbing/Cyanosis] : no extremity clubbing/cyanosis [Soft] : abdomen soft [Non Tender] : non-tender [Non-distended] : non-distended [No Masses] : no abdominal mass palpated [Normal Bowel Sounds] : normal bowel sounds [No HSM] : no HSM [Normal Posterior Cervical Nodes] : no posterior cervical lymphadenopathy [Normal Anterior Cervical Nodes] : no anterior cervical lymphadenopathy [No CVA Tenderness] : no CVA  tenderness [No Spinal Tenderness] : no spinal tenderness [No Joint Swelling] : no joint swelling [Grossly Normal Strength/Tone] : grossly normal strength/tone [No Rash] : no rash [Coordination Grossly Intact] : coordination grossly intact [No Focal Deficits] : no focal deficits [Normal Gait] : normal gait [Deep Tendon Reflexes (DTR)] : deep tendon reflexes were 2+ and symmetric [Normal Affect] : the affect was normal [Normal Insight/Judgement] : insight and judgment were intact

## 2024-06-12 NOTE — HEALTH RISK ASSESSMENT
[Good] : ~his/her~  mood as  good [No] : No [No falls in past year] : Patient reported no falls in the past year [0] : 2) Feeling down, depressed, or hopeless: Not at all (0) [PHQ-2 Negative - No further assessment needed] : PHQ-2 Negative - No further assessment needed [HIV test declined] : HIV test declined [Hepatitis C test declined] : Hepatitis C test declined [None] : None [Alone] : lives alone [Retired] : retired [] :  [# Of Children ___] : has [unfilled] children Prior , uterine surgery, or multiple laparotomies [Feels Safe at Home] : Feels safe at home [Fully functional (bathing, dressing, toileting, transferring, walking, feeding)] : Fully functional (bathing, dressing, toileting, transferring, walking, feeding) [Fully functional (using the telephone, shopping, preparing meals, housekeeping, doing laundry, using] : Fully functional and needs no help or supervision to perform IADLs (using the telephone, shopping, preparing meals, housekeeping, doing laundry, using transportation, managing medications and managing finances) [Smoke Detector] : smoke detector [Carbon Monoxide Detector] : carbon monoxide detector [Seat Belt] :  uses seat belt [[id number]] : 0 [Change in mental status noted] : No change in mental status noted [Sexually Active] : not sexually active [Reports changes in hearing] : Reports no changes in hearing [Reports changes in vision] : Reports no changes in vision [Reports changes in dental health] : Reports no changes in dental health [MammogramComments] : h/o bilateral mastectomy

## 2024-06-13 ENCOUNTER — TRANSCRIPTION ENCOUNTER (OUTPATIENT)
Age: 69
End: 2024-06-13

## 2024-06-13 LAB
25(OH)D3 SERPL-MCNC: 72.4 NG/ML
ALBUMIN SERPL ELPH-MCNC: 4.4 G/DL
ALP BLD-CCNC: 60 U/L
ALT SERPL-CCNC: 21 U/L
ANION GAP SERPL CALC-SCNC: 12 MMOL/L
APPEARANCE: CLEAR
AST SERPL-CCNC: 24 U/L
BACTERIA: ABNORMAL /HPF
BASOPHILS # BLD AUTO: 0.03 K/UL
BASOPHILS NFR BLD AUTO: 0.7 %
BILIRUB SERPL-MCNC: 0.4 MG/DL
BILIRUBIN URINE: NEGATIVE
BLOOD URINE: NEGATIVE
BUN SERPL-MCNC: 21 MG/DL
CALCIUM SERPL-MCNC: 9.6 MG/DL
CAST: 1 /LPF
CHLORIDE SERPL-SCNC: 101 MMOL/L
CHOLEST SERPL-MCNC: 120 MG/DL
CO2 SERPL-SCNC: 25 MMOL/L
COLOR: YELLOW
CREAT SERPL-MCNC: 0.68 MG/DL
EGFR: 95 ML/MIN/1.73M2
EOSINOPHIL # BLD AUTO: 0.13 K/UL
EOSINOPHIL NFR BLD AUTO: 2.9 %
EPITHELIAL CELLS: 6 /HPF
ESTIMATED AVERAGE GLUCOSE: 111 MG/DL
GLUCOSE QUALITATIVE U: NEGATIVE MG/DL
GLUCOSE SERPL-MCNC: 87 MG/DL
HBA1C MFR BLD HPLC: 5.5 %
HCT VFR BLD CALC: 42.4 %
HDLC SERPL-MCNC: 56 MG/DL
HGB BLD-MCNC: 12.3 G/DL
IMM GRANULOCYTES NFR BLD AUTO: 0.2 %
KETONES URINE: NEGATIVE MG/DL
LDLC SERPL CALC-MCNC: 52 MG/DL
LEUKOCYTE ESTERASE URINE: ABNORMAL
LYMPHOCYTES # BLD AUTO: 1.61 K/UL
LYMPHOCYTES NFR BLD AUTO: 36.2 %
MAN DIFF?: NORMAL
MCHC RBC-ENTMCNC: 27.3 PG
MCHC RBC-ENTMCNC: 29 GM/DL
MCV RBC AUTO: 94.2 FL
MICROSCOPIC-UA: NORMAL
MONOCYTES # BLD AUTO: 0.31 K/UL
MONOCYTES NFR BLD AUTO: 7 %
NEUTROPHILS # BLD AUTO: 2.36 K/UL
NEUTROPHILS NFR BLD AUTO: 53 %
NITRITE URINE: NEGATIVE
NONHDLC SERPL-MCNC: 64 MG/DL
PH URINE: 7.5
PLATELET # BLD AUTO: 210 K/UL
POTASSIUM SERPL-SCNC: 4.6 MMOL/L
PROT SERPL-MCNC: 6.5 G/DL
PROTEIN URINE: NORMAL MG/DL
RBC # BLD: 4.5 M/UL
RBC # FLD: 14.6 %
RED BLOOD CELLS URINE: 9 /HPF
REVIEW: NORMAL
SODIUM SERPL-SCNC: 138 MMOL/L
SPECIFIC GRAVITY URINE: 1.03
T4 FREE SERPL-MCNC: 1.3 NG/DL
TRIGL SERPL-MCNC: 56 MG/DL
TSH SERPL-ACNC: 1.89 UIU/ML
UROBILINOGEN URINE: 1 MG/DL
VIT B12 SERPL-MCNC: 956 PG/ML
WBC # FLD AUTO: 4.45 K/UL
WHITE BLOOD CELLS URINE: 0 /HPF

## 2024-06-19 ENCOUNTER — APPOINTMENT (OUTPATIENT)
Dept: DERMATOLOGY | Facility: CLINIC | Age: 69
End: 2024-06-19
Payer: MEDICARE

## 2024-06-19 PROCEDURE — 99204 OFFICE O/P NEW MOD 45 MIN: CPT

## 2024-06-19 RX ORDER — TRETINOIN 1 MG/G
0.1 CREAM TOPICAL
Qty: 1 | Refills: 3 | Status: ACTIVE | COMMUNITY
Start: 2023-06-12 | End: 1900-01-01

## 2024-06-19 NOTE — HISTORY OF PRESENT ILLNESS
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   No personal hx of skin cancer, had a dysplastic nevus excised on the L posterior thigh ~15 years ago  Family Hx: Paternal aunt with melanoma

## 2024-06-19 NOTE — PHYSICAL EXAM
[FreeTextEntry3] : A complete skin examination was performed of the scalp/hair, head/face, conjunctivae/lids, lips/teeth/gums, neck, digits/nails, right and left axilla, right and left upper and lower extremities, chest, abdomen, back, buttocks and groin area. No bromohidrosis. No hyperhidrosis.   Notable findings are documented below: - multiple brown papules and macules, tan macules, cherry red papules on trunk and extremities, all benign appearing on dermoscopy
FAMILY HISTORY:  No pertinent family history in first degree relatives

## 2024-06-19 NOTE — ASSESSMENT
[FreeTextEntry1] : # acneiform eruption, resolved s/p 1 month doxy c/w tretinoin 0.1% cream qHS. SED cam c/w clinda PRN flare   #dysplastic nevus excised on the L posterior thigh ~15 years ago  NER, CTM  #benign nevi #lentigines Discussed nature and course Reviewed benign features Suggest monitoring for changes, patient in agreement  Screening for skin cancer -ABCDEs of melanoma, sun safety, and self-skin check reviewed -Counseled pt to notify us of any changing or concerning lesions - Advised sun protection, SPF 30 or higher daily and reapply often, sun protective clothing - TBSE performed today, with 0 lesions concerning for malignancy - Next TBSE due 1 year PAST MEDICAL HISTORY:  Cholecystitis     H/O fever 8/17    Non Hodgkin's lymphoma     Retained intrauterine contraceptive device in pregnancy, unspecified trimester for heavy bleeding and uterine fibroids

## 2024-07-17 ENCOUNTER — TRANSCRIPTION ENCOUNTER (OUTPATIENT)
Age: 69
End: 2024-07-17

## 2024-07-17 ENCOUNTER — APPOINTMENT (OUTPATIENT)
Dept: CT IMAGING | Facility: IMAGING CENTER | Age: 69
End: 2024-07-17
Payer: MEDICARE

## 2024-07-17 ENCOUNTER — OUTPATIENT (OUTPATIENT)
Dept: OUTPATIENT SERVICES | Facility: HOSPITAL | Age: 69
LOS: 1 days | End: 2024-07-17
Payer: MEDICARE

## 2024-07-17 DIAGNOSIS — D24.9 BENIGN NEOPLASM OF UNSPECIFIED BREAST: Chronic | ICD-10-CM

## 2024-07-17 DIAGNOSIS — Z90.710 ACQUIRED ABSENCE OF BOTH CERVIX AND UTERUS: Chronic | ICD-10-CM

## 2024-07-17 DIAGNOSIS — Z90.2 ACQUIRED ABSENCE OF LUNG [PART OF]: Chronic | ICD-10-CM

## 2024-07-17 DIAGNOSIS — S02.2XXA FRACTURE OF NASAL BONES, INITIAL ENCOUNTER FOR CLOSED FRACTURE: Chronic | ICD-10-CM

## 2024-07-17 DIAGNOSIS — Z98.890 OTHER SPECIFIED POSTPROCEDURAL STATES: Chronic | ICD-10-CM

## 2024-07-17 DIAGNOSIS — S36.039A UNSPECIFIED LACERATION OF SPLEEN, INITIAL ENCOUNTER: Chronic | ICD-10-CM

## 2024-07-17 DIAGNOSIS — R93.89 ABNORMAL FINDINGS ON DIAGNOSTIC IMAGING OF OTHER SPECIFIED BODY STRUCTURES: ICD-10-CM

## 2024-07-17 PROCEDURE — 71250 CT THORAX DX C-: CPT | Mod: 26

## 2024-07-17 PROCEDURE — 71250 CT THORAX DX C-: CPT

## 2024-07-22 ENCOUNTER — APPOINTMENT (OUTPATIENT)
Dept: ORTHOPEDIC SURGERY | Facility: CLINIC | Age: 69
End: 2024-07-22
Payer: MEDICARE

## 2024-07-22 VITALS — HEIGHT: 64 IN | BODY MASS INDEX: 20.83 KG/M2 | WEIGHT: 122 LBS

## 2024-07-22 DIAGNOSIS — M51.36 OTHER INTERVERTEBRAL DISC DEGENERATION, LUMBAR REGION: ICD-10-CM

## 2024-07-22 DIAGNOSIS — M54.50 LOW BACK PAIN, UNSPECIFIED: ICD-10-CM

## 2024-07-22 DIAGNOSIS — S39.012A STRAIN OF MUSCLE, FASCIA AND TENDON OF LOWER BACK, INITIAL ENCOUNTER: ICD-10-CM

## 2024-07-22 PROCEDURE — 72100 X-RAY EXAM L-S SPINE 2/3 VWS: CPT

## 2024-07-22 PROCEDURE — 99204 OFFICE O/P NEW MOD 45 MIN: CPT

## 2024-07-22 NOTE — PHYSICAL EXAM
[Normal] : Gait: normal [Bowens's Sign] : negative Bowens's sign [Pronator Drift] : negative pronator drift [SLR] : negative straight leg raise [de-identified] : 5 out of 5 motor strength, sensation is intact and symmetrical full range of motion flexion extension and rotation, no palpatory tenderness full range of motion of hips knees shoulders and elbows (all four extremities), no atrophy, negative straight leg raise, no pathological reflexes, no swelling, normal ambulation, no apparent distress skin is intact, no palpable lymph nodes, no upper or lower extremity instability, alert and oriented x3 and normal mood. Normal finger-to nose test.  No upper motor neuron findings. [de-identified] : XR AP Lat Lumbar 07/22/2024-Lumbar degenerative disc disease-reviewed with patient.

## 2024-07-22 NOTE — ADDENDUM
[FreeTextEntry1] : This note was written by Ramon Scott on 07/22/2024 acting as scribe for Dr. Mike Rooney M.D.  I, Mike Rooney MD, have read and attest that all the information, medical decision making and discharge instructions within are true and accurate.

## 2024-07-22 NOTE — HISTORY OF PRESENT ILLNESS
[Improving] : improving [de-identified] : 69 year old female presents for initial evaluation of lower back spasms for the past week. She states she was packing clothes in a hunched position in her attic, and carried heavy bags of clothes. She states that the pain and spasms radiates to the left buttock.  She felt a cold sensation in her leg last week, which resolved. Bending aggravated the pain. She also noted getting up from a laying position would trigger the spasms.  She took diclofenac and ibuprofen which helped. Rest and ice also helped.  Has not tried PT or chiropractic care. Denies YVETTE. She is feeling better, but she still notes that she has pain.  PMHx: HLD  She is retired.  No fever, chills, sweats, nausea/vomiting. No bowel or bladder dysfunction, no recent weight loss or gain. No night pain. This history is in addition to the intake form that I personally reviewed.

## 2024-07-22 NOTE — DISCUSSION/SUMMARY
[de-identified] : Lumbar strain and sprain. Lumbar degenerative disc disease. Feeling better. Discussed all options. F/U PRN. All options discussed including rest, medicine, home exercise, acupuncture, Chiropractic care, Physical Therapy, Pain management, and last resort surgery. All questions were answered, all alternatives discussed, and the patient is in complete agreement with the treatment plan which the patient contributed to and discussed with me through the shared decision-making process. Follow-up appointment as instructed. Any issues and the patient will call or come in sooner.

## 2024-08-08 ENCOUNTER — APPOINTMENT (OUTPATIENT)
Dept: DERMATOLOGY | Facility: CLINIC | Age: 69
End: 2024-08-08

## 2024-08-08 PROCEDURE — 99213 OFFICE O/P EST LOW 20 MIN: CPT

## 2024-10-09 ENCOUNTER — OUTPATIENT (OUTPATIENT)
Dept: OUTPATIENT SERVICES | Facility: HOSPITAL | Age: 69
LOS: 1 days | End: 2024-10-09
Payer: MEDICARE

## 2024-10-09 ENCOUNTER — RESULT REVIEW (OUTPATIENT)
Age: 69
End: 2024-10-09

## 2024-10-09 ENCOUNTER — APPOINTMENT (OUTPATIENT)
Dept: MRI IMAGING | Facility: IMAGING CENTER | Age: 69
End: 2024-10-09

## 2024-10-09 DIAGNOSIS — D24.9 BENIGN NEOPLASM OF UNSPECIFIED BREAST: Chronic | ICD-10-CM

## 2024-10-09 DIAGNOSIS — Z00.8 ENCOUNTER FOR OTHER GENERAL EXAMINATION: ICD-10-CM

## 2024-10-09 DIAGNOSIS — Z90.710 ACQUIRED ABSENCE OF BOTH CERVIX AND UTERUS: Chronic | ICD-10-CM

## 2024-10-09 DIAGNOSIS — S02.2XXA FRACTURE OF NASAL BONES, INITIAL ENCOUNTER FOR CLOSED FRACTURE: Chronic | ICD-10-CM

## 2024-10-09 DIAGNOSIS — S36.039A UNSPECIFIED LACERATION OF SPLEEN, INITIAL ENCOUNTER: Chronic | ICD-10-CM

## 2024-10-09 DIAGNOSIS — Z90.2 ACQUIRED ABSENCE OF LUNG [PART OF]: Chronic | ICD-10-CM

## 2024-10-09 DIAGNOSIS — Z98.890 OTHER SPECIFIED POSTPROCEDURAL STATES: Chronic | ICD-10-CM

## 2024-10-09 PROCEDURE — 73721 MRI JNT OF LWR EXTRE W/O DYE: CPT

## 2024-10-09 PROCEDURE — 73721 MRI JNT OF LWR EXTRE W/O DYE: CPT | Mod: 26,LT

## 2024-10-14 ENCOUNTER — APPOINTMENT (OUTPATIENT)
Dept: SURGERY | Facility: CLINIC | Age: 69
End: 2024-10-14
Payer: MEDICARE

## 2024-10-14 PROCEDURE — 99213K: CUSTOM

## 2024-10-23 ENCOUNTER — APPOINTMENT (OUTPATIENT)
Dept: PULMONOLOGY | Facility: CLINIC | Age: 69
End: 2024-10-23
Payer: MEDICARE

## 2024-10-23 VITALS
TEMPERATURE: 98 F | RESPIRATION RATE: 17 BRPM | SYSTOLIC BLOOD PRESSURE: 126 MMHG | BODY MASS INDEX: 20.91 KG/M2 | HEART RATE: 66 BPM | OXYGEN SATURATION: 99 % | HEIGHT: 63 IN | WEIGHT: 118 LBS | DIASTOLIC BLOOD PRESSURE: 80 MMHG

## 2024-10-23 DIAGNOSIS — J45.909 UNSPECIFIED ASTHMA, UNCOMPLICATED: ICD-10-CM

## 2024-10-23 DIAGNOSIS — K21.9 GASTRO-ESOPHAGEAL REFLUX DISEASE W/OUT ESOPHAGITIS: ICD-10-CM

## 2024-10-23 DIAGNOSIS — R93.89 ABNORMAL FINDINGS ON DIAGNOSTIC IMAGING OF OTHER SPECIFIED BODY STRUCTURES: ICD-10-CM

## 2024-10-23 DIAGNOSIS — J30.9 ALLERGIC RHINITIS, UNSPECIFIED: ICD-10-CM

## 2024-10-23 DIAGNOSIS — C34.12 MALIGNANT NEOPLASM OF UPPER LOBE, LEFT BRONCHUS OR LUNG: ICD-10-CM

## 2024-10-23 DIAGNOSIS — R06.02 SHORTNESS OF BREATH: ICD-10-CM

## 2024-10-23 PROCEDURE — ZZZZZ: CPT

## 2024-10-23 PROCEDURE — 99214 OFFICE O/P EST MOD 30 MIN: CPT | Mod: 25

## 2024-10-23 PROCEDURE — 94729 DIFFUSING CAPACITY: CPT

## 2024-10-23 PROCEDURE — 94727 GAS DIL/WSHOT DETER LNG VOL: CPT

## 2024-10-23 PROCEDURE — 95012 NITRIC OXIDE EXP GAS DETER: CPT

## 2024-10-23 PROCEDURE — 94010 BREATHING CAPACITY TEST: CPT

## 2024-11-04 ENCOUNTER — APPOINTMENT (OUTPATIENT)
Dept: CARDIOLOGY | Facility: CLINIC | Age: 69
End: 2024-11-04
Payer: MEDICARE

## 2024-11-04 ENCOUNTER — NON-APPOINTMENT (OUTPATIENT)
Age: 69
End: 2024-11-04

## 2024-11-04 VITALS
BODY MASS INDEX: 21.62 KG/M2 | HEART RATE: 54 BPM | WEIGHT: 122 LBS | DIASTOLIC BLOOD PRESSURE: 60 MMHG | SYSTOLIC BLOOD PRESSURE: 96 MMHG | HEIGHT: 63 IN

## 2024-11-04 DIAGNOSIS — E78.5 HYPERLIPIDEMIA, UNSPECIFIED: ICD-10-CM

## 2024-11-04 DIAGNOSIS — G47.33 OBSTRUCTIVE SLEEP APNEA (ADULT) (PEDIATRIC): ICD-10-CM

## 2024-11-04 PROCEDURE — 99214 OFFICE O/P EST MOD 30 MIN: CPT

## 2024-11-04 PROCEDURE — 93000 ELECTROCARDIOGRAM COMPLETE: CPT

## 2024-11-04 PROCEDURE — G2211 COMPLEX E/M VISIT ADD ON: CPT

## 2024-11-13 ENCOUNTER — OUTPATIENT (OUTPATIENT)
Dept: OUTPATIENT SERVICES | Facility: HOSPITAL | Age: 69
LOS: 1 days | End: 2024-11-13
Payer: MEDICARE

## 2024-11-13 ENCOUNTER — APPOINTMENT (OUTPATIENT)
Dept: MRI IMAGING | Facility: IMAGING CENTER | Age: 69
End: 2024-11-13

## 2024-11-13 DIAGNOSIS — Z00.8 ENCOUNTER FOR OTHER GENERAL EXAMINATION: ICD-10-CM

## 2024-11-13 DIAGNOSIS — S36.039A UNSPECIFIED LACERATION OF SPLEEN, INITIAL ENCOUNTER: Chronic | ICD-10-CM

## 2024-11-13 DIAGNOSIS — D24.9 BENIGN NEOPLASM OF UNSPECIFIED BREAST: Chronic | ICD-10-CM

## 2024-11-13 DIAGNOSIS — Z98.890 OTHER SPECIFIED POSTPROCEDURAL STATES: Chronic | ICD-10-CM

## 2024-11-13 DIAGNOSIS — Z90.710 ACQUIRED ABSENCE OF BOTH CERVIX AND UTERUS: Chronic | ICD-10-CM

## 2024-11-13 DIAGNOSIS — S02.2XXA FRACTURE OF NASAL BONES, INITIAL ENCOUNTER FOR CLOSED FRACTURE: Chronic | ICD-10-CM

## 2024-11-13 DIAGNOSIS — Z90.2 ACQUIRED ABSENCE OF LUNG [PART OF]: Chronic | ICD-10-CM

## 2024-11-13 PROCEDURE — 74183 MRI ABD W/O CNTR FLWD CNTR: CPT | Mod: 26

## 2024-11-13 PROCEDURE — A9585: CPT

## 2024-11-13 PROCEDURE — 74183 MRI ABD W/O CNTR FLWD CNTR: CPT

## 2024-12-02 ENCOUNTER — APPOINTMENT (OUTPATIENT)
Dept: INTERNAL MEDICINE | Facility: CLINIC | Age: 69
End: 2024-12-02
Payer: MEDICARE

## 2024-12-02 VITALS
SYSTOLIC BLOOD PRESSURE: 93 MMHG | DIASTOLIC BLOOD PRESSURE: 56 MMHG | BODY MASS INDEX: 21.26 KG/M2 | OXYGEN SATURATION: 100 % | HEIGHT: 63 IN | RESPIRATION RATE: 17 BRPM | HEART RATE: 63 BPM | WEIGHT: 120 LBS | TEMPERATURE: 98.1 F

## 2024-12-02 DIAGNOSIS — N39.3 STRESS INCONTINENCE (FEMALE) (MALE): ICD-10-CM

## 2024-12-02 DIAGNOSIS — E78.5 HYPERLIPIDEMIA, UNSPECIFIED: ICD-10-CM

## 2024-12-02 DIAGNOSIS — C34.92 MALIGNANT NEOPLASM OF UNSPECIFIED PART OF LEFT BRONCHUS OR LUNG: ICD-10-CM

## 2024-12-02 LAB
ALBUMIN SERPL ELPH-MCNC: 4.2 G/DL
ALP BLD-CCNC: 65 U/L
ALT SERPL-CCNC: 19 U/L
ANION GAP SERPL CALC-SCNC: 9 MMOL/L
APPEARANCE: CLEAR
AST SERPL-CCNC: 25 U/L
BACTERIA: ABNORMAL /HPF
BASOPHILS # BLD AUTO: 0.04 K/UL
BASOPHILS NFR BLD AUTO: 0.8 %
BILIRUB SERPL-MCNC: 0.4 MG/DL
BILIRUBIN URINE: NEGATIVE
BLOOD URINE: ABNORMAL
BUN SERPL-MCNC: 24 MG/DL
CALCIUM OXALATE CRYSTALS: PRESENT
CALCIUM SERPL-MCNC: 9.3 MG/DL
CANCER AG125 SERPL-ACNC: 9 U/ML
CAST: 3 /LPF
CHLORIDE SERPL-SCNC: 104 MMOL/L
CHOLEST SERPL-MCNC: 128 MG/DL
CO2 SERPL-SCNC: 28 MMOL/L
COLOR: YELLOW
CREAT SERPL-MCNC: 0.7 MG/DL
EGFR: 94 ML/MIN/1.73M2
EOSINOPHIL # BLD AUTO: 0.22 K/UL
EOSINOPHIL NFR BLD AUTO: 4.2 %
EPITHELIAL CELLS: 6 /HPF
GLUCOSE QUALITATIVE U: NEGATIVE MG/DL
GLUCOSE SERPL-MCNC: 91 MG/DL
HCT VFR BLD CALC: 44.1 %
HDLC SERPL-MCNC: 59 MG/DL
HGB BLD-MCNC: 13.6 G/DL
IMM GRANULOCYTES NFR BLD AUTO: 0.2 %
KETONES URINE: NEGATIVE MG/DL
LDLC SERPL CALC-MCNC: 55 MG/DL
LEUKOCYTE ESTERASE URINE: NEGATIVE
LYMPHOCYTES # BLD AUTO: 1.59 K/UL
LYMPHOCYTES NFR BLD AUTO: 30.4 %
MAN DIFF?: NORMAL
MCHC RBC-ENTMCNC: 28 PG
MCHC RBC-ENTMCNC: 30.8 G/DL
MCV RBC AUTO: 90.9 FL
MICROSCOPIC-UA: NORMAL
MONOCYTES # BLD AUTO: 0.49 K/UL
MONOCYTES NFR BLD AUTO: 9.4 %
MUCUS: PRESENT
NEUTROPHILS # BLD AUTO: 2.88 K/UL
NEUTROPHILS NFR BLD AUTO: 55 %
NITRITE URINE: NEGATIVE
NONHDLC SERPL-MCNC: 69 MG/DL
PH URINE: 5.5
PLATELET # BLD AUTO: 216 K/UL
POTASSIUM SERPL-SCNC: 4.9 MMOL/L
PROT SERPL-MCNC: 6.5 G/DL
PROTEIN URINE: NORMAL MG/DL
RBC # BLD: 4.85 M/UL
RBC # FLD: 14.2 %
RED BLOOD CELLS URINE: 7 /HPF
REVIEW: NORMAL
SODIUM SERPL-SCNC: 141 MMOL/L
SPECIFIC GRAVITY URINE: >1.03
TRIGL SERPL-MCNC: 69 MG/DL
UROBILINOGEN URINE: 1 MG/DL
WBC # FLD AUTO: 5.23 K/UL
WHITE BLOOD CELLS URINE: 0 /HPF

## 2024-12-02 PROCEDURE — G2211 COMPLEX E/M VISIT ADD ON: CPT

## 2024-12-02 PROCEDURE — 99214 OFFICE O/P EST MOD 30 MIN: CPT

## 2024-12-10 ENCOUNTER — APPOINTMENT (OUTPATIENT)
Dept: SURGICAL ONCOLOGY | Facility: CLINIC | Age: 69
End: 2024-12-10

## 2024-12-10 ENCOUNTER — NON-APPOINTMENT (OUTPATIENT)
Age: 69
End: 2024-12-10

## 2024-12-10 VITALS
OXYGEN SATURATION: 98 % | WEIGHT: 120 LBS | DIASTOLIC BLOOD PRESSURE: 59 MMHG | HEART RATE: 65 BPM | HEIGHT: 63 IN | RESPIRATION RATE: 17 BRPM | SYSTOLIC BLOOD PRESSURE: 95 MMHG | BODY MASS INDEX: 21.26 KG/M2

## 2024-12-10 PROCEDURE — 99215 OFFICE O/P EST HI 40 MIN: CPT

## 2024-12-18 ENCOUNTER — OFFICE (OUTPATIENT)
Dept: URBAN - METROPOLITAN AREA CLINIC 90 | Facility: CLINIC | Age: 69
Setting detail: OPHTHALMOLOGY
End: 2024-12-18
Payer: MEDICARE

## 2024-12-18 ENCOUNTER — NON-APPOINTMENT (OUTPATIENT)
Age: 69
End: 2024-12-18

## 2024-12-18 DIAGNOSIS — H20.00: ICD-10-CM

## 2024-12-18 PROCEDURE — 92002 INTRM OPH EXAM NEW PATIENT: CPT | Performed by: STUDENT IN AN ORGANIZED HEALTH CARE EDUCATION/TRAINING PROGRAM

## 2024-12-18 ASSESSMENT — CONFRONTATIONAL VISUAL FIELD TEST (CVF)
OS_FINDINGS: FULL
OD_FINDINGS: FULL

## 2024-12-18 ASSESSMENT — VISUAL ACUITY
OS_BCVA: 20/30
OD_BCVA: 20/40

## 2024-12-18 ASSESSMENT — TONOMETRY: OD_IOP_MMHG: 11

## 2024-12-23 ENCOUNTER — RX ONLY (RX ONLY)
Age: 69
End: 2024-12-23

## 2024-12-23 ENCOUNTER — OFFICE (OUTPATIENT)
Facility: LOCATION | Age: 69
Setting detail: OPHTHALMOLOGY
End: 2024-12-23
Payer: MEDICARE

## 2024-12-23 DIAGNOSIS — H25.89: ICD-10-CM

## 2024-12-23 DIAGNOSIS — H20.00: ICD-10-CM

## 2024-12-23 DIAGNOSIS — H35.362: ICD-10-CM

## 2024-12-23 PROCEDURE — 92012 INTRM OPH EXAM EST PATIENT: CPT | Performed by: STUDENT IN AN ORGANIZED HEALTH CARE EDUCATION/TRAINING PROGRAM

## 2024-12-23 ASSESSMENT — KERATOMETRY
OS_K2POWER_DIOPTERS: 46.00
OS_K1POWER_DIOPTERS: 45.50
OD_AXISANGLE_DEGREES: 105
OD_K2POWER_DIOPTERS: 46.00
OD_K1POWER_DIOPTERS: 44.75
OS_AXISANGLE_DEGREES: 153

## 2024-12-23 ASSESSMENT — REFRACTION_AUTOREFRACTION
OS_CYLINDER: -0.50
OD_CYLINDER: -0.50
OD_SPHERE: +2.00
OD_AXIS: 091
OS_AXIS: 074
OS_SPHERE: +1.00

## 2024-12-23 ASSESSMENT — VISUAL ACUITY
OS_BCVA: 20/30+2
OD_BCVA: 20/25-1

## 2024-12-23 ASSESSMENT — CONFRONTATIONAL VISUAL FIELD TEST (CVF)
OS_FINDINGS: FULL
OD_FINDINGS: FULL

## 2025-01-06 ENCOUNTER — OFFICE (OUTPATIENT)
Facility: LOCATION | Age: 70
Setting detail: OPHTHALMOLOGY
End: 2025-01-06
Payer: MEDICARE

## 2025-01-06 DIAGNOSIS — H25.13: ICD-10-CM

## 2025-01-06 DIAGNOSIS — H35.363: ICD-10-CM

## 2025-01-06 DIAGNOSIS — H25.89: ICD-10-CM

## 2025-01-06 DIAGNOSIS — H20.00: ICD-10-CM

## 2025-01-06 PROCEDURE — 92134 CPTRZ OPH DX IMG PST SGM RTA: CPT | Performed by: STUDENT IN AN ORGANIZED HEALTH CARE EDUCATION/TRAINING PROGRAM

## 2025-01-06 PROCEDURE — 92014 COMPRE OPH EXAM EST PT 1/>: CPT | Performed by: STUDENT IN AN ORGANIZED HEALTH CARE EDUCATION/TRAINING PROGRAM

## 2025-01-06 ASSESSMENT — CONFRONTATIONAL VISUAL FIELD TEST (CVF)
OD_FINDINGS: FULL
OS_FINDINGS: FULL

## 2025-01-06 ASSESSMENT — REFRACTION_AUTOREFRACTION
OD_CYLINDER: -0.50
OS_AXIS: 074
OD_SPHERE: +2.00
OS_SPHERE: +1.00
OD_AXIS: 091
OS_CYLINDER: -0.50

## 2025-01-06 ASSESSMENT — KERATOMETRY
OS_K2POWER_DIOPTERS: 46.00
OS_AXISANGLE_DEGREES: 153
OD_K1POWER_DIOPTERS: 44.75
OD_K2POWER_DIOPTERS: 46.00
OD_AXISANGLE_DEGREES: 105
OS_K1POWER_DIOPTERS: 45.50

## 2025-01-06 ASSESSMENT — VISUAL ACUITY
OD_BCVA: 20/25-1
OS_BCVA: 20/30+2

## 2025-01-08 ENCOUNTER — APPOINTMENT (OUTPATIENT)
Dept: GYNECOLOGIC ONCOLOGY | Facility: CLINIC | Age: 70
End: 2025-01-08

## 2025-01-08 ENCOUNTER — NON-APPOINTMENT (OUTPATIENT)
Age: 70
End: 2025-01-08

## 2025-01-08 VITALS
BODY MASS INDEX: 21.26 KG/M2 | OXYGEN SATURATION: 97 % | HEART RATE: 70 BPM | TEMPERATURE: 97.4 F | HEIGHT: 63 IN | SYSTOLIC BLOOD PRESSURE: 109 MMHG | DIASTOLIC BLOOD PRESSURE: 67 MMHG | WEIGHT: 120 LBS

## 2025-01-08 DIAGNOSIS — C34.12 MALIGNANT NEOPLASM OF UPPER LOBE, LEFT BRONCHUS OR LUNG: ICD-10-CM

## 2025-01-08 DIAGNOSIS — Z15.01 GENETIC SUSCEPTIBILITY TO MALIGNANT NEOPLASM OF BREAST: ICD-10-CM

## 2025-01-08 DIAGNOSIS — Z15.09 GENETIC SUSCEPTIBILITY TO MALIGNANT NEOPLASM OF BREAST: ICD-10-CM

## 2025-01-08 DIAGNOSIS — C57.00 MALIGNANT NEOPLASM OF UNSPECIFIED FALLOPIAN TUBE: ICD-10-CM

## 2025-01-08 PROCEDURE — G2211 COMPLEX E/M VISIT ADD ON: CPT

## 2025-01-08 PROCEDURE — 99203 OFFICE O/P NEW LOW 30 MIN: CPT

## 2025-01-08 PROCEDURE — 99459 PELVIC EXAMINATION: CPT

## 2025-01-09 ENCOUNTER — RX RENEWAL (OUTPATIENT)
Age: 70
End: 2025-01-09

## 2025-01-17 ENCOUNTER — TRANSCRIPTION ENCOUNTER (OUTPATIENT)
Age: 70
End: 2025-01-17

## 2025-01-17 ENCOUNTER — OUTPATIENT (OUTPATIENT)
Dept: OUTPATIENT SERVICES | Facility: HOSPITAL | Age: 70
LOS: 1 days | End: 2025-01-17
Payer: MEDICARE

## 2025-01-17 ENCOUNTER — APPOINTMENT (OUTPATIENT)
Dept: CT IMAGING | Facility: IMAGING CENTER | Age: 70
End: 2025-01-17

## 2025-01-17 ENCOUNTER — APPOINTMENT (OUTPATIENT)
Dept: CT IMAGING | Facility: IMAGING CENTER | Age: 70
End: 2025-01-17
Payer: MEDICARE

## 2025-01-17 DIAGNOSIS — Z98.890 OTHER SPECIFIED POSTPROCEDURAL STATES: Chronic | ICD-10-CM

## 2025-01-17 DIAGNOSIS — Z90.2 ACQUIRED ABSENCE OF LUNG [PART OF]: Chronic | ICD-10-CM

## 2025-01-17 DIAGNOSIS — D24.9 BENIGN NEOPLASM OF UNSPECIFIED BREAST: Chronic | ICD-10-CM

## 2025-01-17 DIAGNOSIS — S02.2XXA FRACTURE OF NASAL BONES, INITIAL ENCOUNTER FOR CLOSED FRACTURE: Chronic | ICD-10-CM

## 2025-01-17 DIAGNOSIS — S36.039A UNSPECIFIED LACERATION OF SPLEEN, INITIAL ENCOUNTER: Chronic | ICD-10-CM

## 2025-01-17 DIAGNOSIS — Z90.710 ACQUIRED ABSENCE OF BOTH CERVIX AND UTERUS: Chronic | ICD-10-CM

## 2025-01-17 PROCEDURE — 71260 CT THORAX DX C+: CPT

## 2025-01-17 PROCEDURE — 74177 CT ABD & PELVIS W/CONTRAST: CPT

## 2025-01-17 PROCEDURE — 71260 CT THORAX DX C+: CPT | Mod: 26

## 2025-01-17 PROCEDURE — 74177 CT ABD & PELVIS W/CONTRAST: CPT | Mod: 26

## 2025-01-24 ENCOUNTER — OUTPATIENT (OUTPATIENT)
Dept: OUTPATIENT SERVICES | Facility: HOSPITAL | Age: 70
LOS: 1 days | Discharge: HOME | End: 2025-01-24

## 2025-01-24 DIAGNOSIS — C57.00 MALIGNANT NEOPLASM OF UNSPECIFIED FALLOPIAN TUBE: ICD-10-CM

## 2025-01-24 DIAGNOSIS — Z90.710 ACQUIRED ABSENCE OF BOTH CERVIX AND UTERUS: Chronic | ICD-10-CM

## 2025-01-24 DIAGNOSIS — S02.2XXA FRACTURE OF NASAL BONES, INITIAL ENCOUNTER FOR CLOSED FRACTURE: Chronic | ICD-10-CM

## 2025-01-24 DIAGNOSIS — Z90.2 ACQUIRED ABSENCE OF LUNG [PART OF]: Chronic | ICD-10-CM

## 2025-01-24 DIAGNOSIS — S36.039A UNSPECIFIED LACERATION OF SPLEEN, INITIAL ENCOUNTER: Chronic | ICD-10-CM

## 2025-01-24 DIAGNOSIS — Z98.890 OTHER SPECIFIED POSTPROCEDURAL STATES: Chronic | ICD-10-CM

## 2025-01-24 DIAGNOSIS — D24.9 BENIGN NEOPLASM OF UNSPECIFIED BREAST: Chronic | ICD-10-CM

## 2025-01-27 ENCOUNTER — APPOINTMENT (OUTPATIENT)
Dept: UROGYNECOLOGY | Facility: CLINIC | Age: 70
End: 2025-01-27
Payer: MEDICARE

## 2025-01-27 VITALS
DIASTOLIC BLOOD PRESSURE: 62 MMHG | HEIGHT: 63 IN | WEIGHT: 120 LBS | HEART RATE: 65 BPM | BODY MASS INDEX: 21.26 KG/M2 | SYSTOLIC BLOOD PRESSURE: 97 MMHG

## 2025-01-27 DIAGNOSIS — R35.0 FREQUENCY OF MICTURITION: ICD-10-CM

## 2025-01-27 DIAGNOSIS — R39.15 URGENCY OF URINATION: ICD-10-CM

## 2025-01-27 DIAGNOSIS — N39.3 STRESS INCONTINENCE (FEMALE) (MALE): ICD-10-CM

## 2025-01-27 DIAGNOSIS — N39.41 URGE INCONTINENCE: ICD-10-CM

## 2025-01-27 LAB
BILIRUB UR QL STRIP: NORMAL
CLARITY UR: CLEAR
COLLECTION METHOD: NORMAL
GLUCOSE UR-MCNC: NORMAL
HCG UR QL: 0.2 EU/DL
HGB UR QL STRIP.AUTO: NORMAL
KETONES UR-MCNC: NORMAL
LEUKOCYTE ESTERASE UR QL STRIP: NORMAL
NITRITE UR QL STRIP: NORMAL
PH UR STRIP: 5.5
PROT UR STRIP-MCNC: 30
SP GR UR STRIP: 1.03

## 2025-01-27 PROCEDURE — 99459 PELVIC EXAMINATION: CPT

## 2025-01-27 PROCEDURE — 99204 OFFICE O/P NEW MOD 45 MIN: CPT | Mod: 25

## 2025-01-27 PROCEDURE — 51701 INSERT BLADDER CATHETER: CPT

## 2025-01-28 LAB
APPEARANCE: CLEAR
BACTERIA: NEGATIVE /HPF
BILIRUBIN URINE: NEGATIVE
BLOOD URINE: ABNORMAL
COLOR: YELLOW
GLUCOSE QUALITATIVE U: NEGATIVE
KETONES URINE: NEGATIVE
LEUKOCYTE ESTERASE URINE: NEGATIVE
MICROSCOPIC-UA: NORMAL
NITRITE URINE: NEGATIVE
PH URINE: 6
PROTEIN URINE: ABNORMAL
RED BLOOD CELLS URINE: 5 /HPF
SPECIFIC GRAVITY URINE: >=1.03
SQUAMOUS EPITHELIAL CELLS: PRESENT
URINE COMMENTS: NORMAL
UROBILINOGEN URINE: NORMAL
WHITE BLOOD CELLS URINE: 2 /HPF

## 2025-01-29 ENCOUNTER — RX RENEWAL (OUTPATIENT)
Age: 70
End: 2025-01-29

## 2025-01-29 LAB — BACTERIA UR CULT: NORMAL

## 2025-02-04 ENCOUNTER — APPOINTMENT (OUTPATIENT)
Dept: HEMATOLOGY ONCOLOGY | Facility: CLINIC | Age: 70
End: 2025-02-04
Payer: MEDICARE

## 2025-02-04 VITALS
BODY MASS INDEX: 21.09 KG/M2 | TEMPERATURE: 97.7 F | OXYGEN SATURATION: 98 % | SYSTOLIC BLOOD PRESSURE: 93 MMHG | RESPIRATION RATE: 16 BRPM | HEART RATE: 74 BPM | WEIGHT: 119.05 LBS | DIASTOLIC BLOOD PRESSURE: 50 MMHG

## 2025-02-04 DIAGNOSIS — C57.00 MALIGNANT NEOPLASM OF UNSPECIFIED FALLOPIAN TUBE: ICD-10-CM

## 2025-02-04 PROCEDURE — 99203 OFFICE O/P NEW LOW 30 MIN: CPT

## 2025-02-11 ENCOUNTER — APPOINTMENT (OUTPATIENT)
Dept: DERMATOLOGY | Facility: CLINIC | Age: 70
End: 2025-02-11
Payer: MEDICARE

## 2025-02-11 DIAGNOSIS — L70.0 ACNE VULGARIS: ICD-10-CM

## 2025-02-11 DIAGNOSIS — L65.9 NONSCARRING HAIR LOSS, UNSPECIFIED: ICD-10-CM

## 2025-02-11 PROCEDURE — G2211 COMPLEX E/M VISIT ADD ON: CPT

## 2025-02-11 PROCEDURE — 99214 OFFICE O/P EST MOD 30 MIN: CPT

## 2025-02-11 RX ORDER — MINOXIDIL 2.5 MG/1
2.5 TABLET ORAL
Qty: 15 | Refills: 5 | Status: ACTIVE | COMMUNITY
Start: 2025-02-11 | End: 1900-01-01

## 2025-02-13 ENCOUNTER — OUTPATIENT (OUTPATIENT)
Dept: OUTPATIENT SERVICES | Facility: HOSPITAL | Age: 70
LOS: 1 days | End: 2025-02-13
Payer: MEDICARE

## 2025-02-13 ENCOUNTER — APPOINTMENT (OUTPATIENT)
Dept: UROGYNECOLOGY | Facility: CLINIC | Age: 70
End: 2025-02-13

## 2025-02-13 DIAGNOSIS — D24.9 BENIGN NEOPLASM OF UNSPECIFIED BREAST: Chronic | ICD-10-CM

## 2025-02-13 DIAGNOSIS — S36.039A UNSPECIFIED LACERATION OF SPLEEN, INITIAL ENCOUNTER: Chronic | ICD-10-CM

## 2025-02-13 DIAGNOSIS — Z90.2 ACQUIRED ABSENCE OF LUNG [PART OF]: Chronic | ICD-10-CM

## 2025-02-13 DIAGNOSIS — Z90.710 ACQUIRED ABSENCE OF BOTH CERVIX AND UTERUS: Chronic | ICD-10-CM

## 2025-02-13 DIAGNOSIS — Z98.890 OTHER SPECIFIED POSTPROCEDURAL STATES: Chronic | ICD-10-CM

## 2025-02-13 DIAGNOSIS — N39.3 STRESS INCONTINENCE (FEMALE) (MALE): ICD-10-CM

## 2025-02-13 DIAGNOSIS — S02.2XXA FRACTURE OF NASAL BONES, INITIAL ENCOUNTER FOR CLOSED FRACTURE: Chronic | ICD-10-CM

## 2025-02-13 DIAGNOSIS — Z01.818 ENCOUNTER FOR OTHER PREPROCEDURAL EXAMINATION: ICD-10-CM

## 2025-02-13 LAB
BILIRUB UR QL STRIP: NORMAL
CLARITY UR: NORMAL
COLLECTION METHOD: NORMAL
GLUCOSE UR-MCNC: 100
HCG UR QL: 1 EU/DL
HGB UR QL STRIP.AUTO: NORMAL
KETONES UR-MCNC: NORMAL
LEUKOCYTE ESTERASE UR QL STRIP: NORMAL
NITRITE UR QL STRIP: NORMAL
PH UR STRIP: 6
PROT UR STRIP-MCNC: NORMAL
SP GR UR STRIP: 1.03

## 2025-02-13 PROCEDURE — 51797 INTRAABDOMINAL PRESSURE TEST: CPT | Mod: 26

## 2025-02-13 PROCEDURE — 51784 ANAL/URINARY MUSCLE STUDY: CPT

## 2025-02-13 PROCEDURE — 51729 CYSTOMETROGRAM W/VP&UP: CPT

## 2025-02-13 PROCEDURE — 51784 ANAL/URINARY MUSCLE STUDY: CPT | Mod: 26

## 2025-02-13 PROCEDURE — 51729 CYSTOMETROGRAM W/VP&UP: CPT | Mod: 26

## 2025-02-13 PROCEDURE — 51797 INTRAABDOMINAL PRESSURE TEST: CPT

## 2025-02-14 ENCOUNTER — TRANSCRIPTION ENCOUNTER (OUTPATIENT)
Age: 70
End: 2025-02-14

## 2025-02-14 LAB
APPEARANCE: CLEAR
BACTERIA: ABNORMAL /HPF
BILIRUBIN URINE: NEGATIVE
BLOOD URINE: ABNORMAL
COLOR: YELLOW
GLUCOSE QUALITATIVE U: NEGATIVE
KETONES URINE: NEGATIVE
LEUKOCYTE ESTERASE URINE: NEGATIVE
MICROSCOPIC-UA: NORMAL
NITRITE URINE: NEGATIVE
PH URINE: 6
PROTEIN URINE: ABNORMAL
RED BLOOD CELLS URINE: 5 /HPF
SPECIFIC GRAVITY URINE: 1.02
SQUAMOUS EPITHELIAL CELLS: PRESENT
UROBILINOGEN URINE: NORMAL
WHITE BLOOD CELLS URINE: 0 /HPF

## 2025-02-16 LAB — BACTERIA UR CULT: NORMAL

## 2025-02-18 ENCOUNTER — APPOINTMENT (OUTPATIENT)
Dept: INTERNAL MEDICINE | Facility: CLINIC | Age: 70
End: 2025-02-18
Payer: MEDICARE

## 2025-02-18 VITALS
RESPIRATION RATE: 16 BRPM | WEIGHT: 119 LBS | HEART RATE: 78 BPM | BODY MASS INDEX: 21.09 KG/M2 | SYSTOLIC BLOOD PRESSURE: 126 MMHG | DIASTOLIC BLOOD PRESSURE: 66 MMHG | OXYGEN SATURATION: 100 % | HEIGHT: 63 IN | TEMPERATURE: 97.6 F

## 2025-02-18 DIAGNOSIS — R31.29 OTHER MICROSCOPIC HEMATURIA: ICD-10-CM

## 2025-02-18 DIAGNOSIS — R80.9 PROTEINURIA, UNSPECIFIED: ICD-10-CM

## 2025-02-18 PROCEDURE — 99214 OFFICE O/P EST MOD 30 MIN: CPT

## 2025-02-18 PROCEDURE — G2211 COMPLEX E/M VISIT ADD ON: CPT

## 2025-02-19 ENCOUNTER — TRANSCRIPTION ENCOUNTER (OUTPATIENT)
Age: 70
End: 2025-02-19

## 2025-02-19 LAB
ANION GAP SERPL CALC-SCNC: 12 MMOL/L
APPEARANCE: CLEAR
BACTERIA: NEGATIVE /HPF
BILIRUBIN URINE: NEGATIVE
BLOOD URINE: NEGATIVE
BUN SERPL-MCNC: 15 MG/DL
CALCIUM SERPL-MCNC: 9.5 MG/DL
CAST: 0 /LPF
CHLORIDE SERPL-SCNC: 103 MMOL/L
CO2 SERPL-SCNC: 27 MMOL/L
COLOR: YELLOW
CREAT SERPL-MCNC: 0.76 MG/DL
EGFR: 85 ML/MIN/1.73M2
EPITHELIAL CELLS: 0 /HPF
GLUCOSE QUALITATIVE U: NEGATIVE MG/DL
KETONES URINE: NEGATIVE MG/DL
LEUKOCYTE ESTERASE URINE: NEGATIVE
MICROSCOPIC-UA: NORMAL
NITRITE URINE: NEGATIVE
PH URINE: 7
POTASSIUM SERPL-SCNC: 4.7 MMOL/L
PROTEIN URINE: NEGATIVE MG/DL
RED BLOOD CELLS URINE: 2 /HPF
SODIUM SERPL-SCNC: 141 MMOL/L
SPECIFIC GRAVITY URINE: 1.01
UROBILINOGEN URINE: 0.2 MG/DL
WHITE BLOOD CELLS URINE: 0 /HPF

## 2025-03-12 ENCOUNTER — TRANSCRIPTION ENCOUNTER (OUTPATIENT)
Age: 70
End: 2025-03-12

## 2025-04-14 ENCOUNTER — APPOINTMENT (OUTPATIENT)
Dept: UROGYNECOLOGY | Facility: CLINIC | Age: 70
End: 2025-04-14
Payer: MEDICARE

## 2025-04-14 VITALS
BODY MASS INDEX: 21.62 KG/M2 | WEIGHT: 122 LBS | HEIGHT: 63 IN | HEART RATE: 75 BPM | SYSTOLIC BLOOD PRESSURE: 93 MMHG | DIASTOLIC BLOOD PRESSURE: 58 MMHG

## 2025-04-14 DIAGNOSIS — N39.3 STRESS INCONTINENCE (FEMALE) (MALE): ICD-10-CM

## 2025-04-14 PROCEDURE — 99214 OFFICE O/P EST MOD 30 MIN: CPT

## 2025-04-23 ENCOUNTER — TRANSCRIPTION ENCOUNTER (OUTPATIENT)
Age: 70
End: 2025-04-23

## 2025-04-23 ENCOUNTER — APPOINTMENT (OUTPATIENT)
Dept: PULMONOLOGY | Facility: CLINIC | Age: 70
End: 2025-04-23
Payer: MEDICARE

## 2025-04-23 VITALS
DIASTOLIC BLOOD PRESSURE: 48 MMHG | HEIGHT: 63 IN | SYSTOLIC BLOOD PRESSURE: 90 MMHG | TEMPERATURE: 97.2 F | HEART RATE: 69 BPM | RESPIRATION RATE: 16 BRPM | BODY MASS INDEX: 21.26 KG/M2 | WEIGHT: 120 LBS | OXYGEN SATURATION: 98 %

## 2025-04-23 DIAGNOSIS — K21.9 GASTRO-ESOPHAGEAL REFLUX DISEASE W/OUT ESOPHAGITIS: ICD-10-CM

## 2025-04-23 DIAGNOSIS — R06.02 SHORTNESS OF BREATH: ICD-10-CM

## 2025-04-23 DIAGNOSIS — R93.89 ABNORMAL FINDINGS ON DIAGNOSTIC IMAGING OF OTHER SPECIFIED BODY STRUCTURES: ICD-10-CM

## 2025-04-23 DIAGNOSIS — J31.0 CHRONIC RHINITIS: ICD-10-CM

## 2025-04-23 DIAGNOSIS — C34.92 MALIGNANT NEOPLASM OF UNSPECIFIED PART OF LEFT BRONCHUS OR LUNG: ICD-10-CM

## 2025-04-23 DIAGNOSIS — G47.33 OBSTRUCTIVE SLEEP APNEA (ADULT) (PEDIATRIC): ICD-10-CM

## 2025-04-23 DIAGNOSIS — J45.909 UNSPECIFIED ASTHMA, UNCOMPLICATED: ICD-10-CM

## 2025-04-23 PROCEDURE — 99214 OFFICE O/P EST MOD 30 MIN: CPT | Mod: 25

## 2025-04-23 PROCEDURE — 95012 NITRIC OXIDE EXP GAS DETER: CPT

## 2025-04-23 PROCEDURE — 94010 BREATHING CAPACITY TEST: CPT

## 2025-04-23 RX ORDER — OMEPRAZOLE AND SODIUM BICARBONATE 40; 1680 MG/1; MG/1
40 POWDER, FOR SUSPENSION ORAL
Refills: 0 | Status: ACTIVE | COMMUNITY

## 2025-04-25 RX ORDER — ALBUTEROL SULFATE 2.5 MG/3ML
(2.5 MG/3ML) SOLUTION RESPIRATORY (INHALATION)
Qty: 120 | Refills: 1 | Status: ACTIVE | COMMUNITY
Start: 2025-04-25 | End: 1900-01-01

## 2025-04-25 NOTE — DISCUSSION/SUMMARY
[FreeTextEntry1] : Feno was 79; a normal value being less than 25. Fractional exhaled nitric oxide (FENO) is regarded as a simple, noninvasive method for assessing eosinophilic airway inflammation. Produced by a variety of cells within the lung, nitric oxide (NO) concentrations are generally low in healthy individuals. However, high concentrations of NO appear to be involved in nonspecific host defense mechanisms and chronic inflammatory  diseases such as asthma. The American Thoracic Society (ATS) therefore recommended using FENO to aid in the diagnosis and monitoring of eosinophilic airway inflammation and asthma, and for identifying steroid responsive individuals whose chronic respiratory symptoms may be caused by airway inflammation 
normal...

## 2025-04-28 ENCOUNTER — TRANSCRIPTION ENCOUNTER (OUTPATIENT)
Age: 70
End: 2025-04-28

## 2025-04-28 RX ORDER — ALBUTEROL SULFATE 90 UG/1
108 (90 BASE) INHALANT RESPIRATORY (INHALATION)
Qty: 1 | Refills: 2 | Status: ACTIVE | COMMUNITY
Start: 2025-04-28 | End: 1900-01-01

## 2025-05-05 ENCOUNTER — APPOINTMENT (OUTPATIENT)
Dept: CARDIOLOGY | Facility: CLINIC | Age: 70
End: 2025-05-05
Payer: MEDICARE

## 2025-05-05 ENCOUNTER — NON-APPOINTMENT (OUTPATIENT)
Age: 70
End: 2025-05-05

## 2025-05-05 VITALS
HEART RATE: 59 BPM | WEIGHT: 120 LBS | SYSTOLIC BLOOD PRESSURE: 102 MMHG | DIASTOLIC BLOOD PRESSURE: 60 MMHG | OXYGEN SATURATION: 96 % | BODY MASS INDEX: 21.26 KG/M2

## 2025-05-05 DIAGNOSIS — E78.5 HYPERLIPIDEMIA, UNSPECIFIED: ICD-10-CM

## 2025-05-05 PROCEDURE — G2211 COMPLEX E/M VISIT ADD ON: CPT

## 2025-05-05 PROCEDURE — G0537: CPT

## 2025-05-05 PROCEDURE — 99214 OFFICE O/P EST MOD 30 MIN: CPT

## 2025-05-05 PROCEDURE — 93000 ELECTROCARDIOGRAM COMPLETE: CPT

## 2025-05-07 ENCOUNTER — APPOINTMENT (OUTPATIENT)
Dept: CARDIOLOGY | Facility: CLINIC | Age: 70
End: 2025-05-07
Payer: MEDICARE

## 2025-05-07 PROCEDURE — 93306 TTE W/DOPPLER COMPLETE: CPT

## 2025-05-28 ENCOUNTER — TRANSCRIPTION ENCOUNTER (OUTPATIENT)
Age: 70
End: 2025-05-28

## 2025-06-16 ENCOUNTER — APPOINTMENT (OUTPATIENT)
Dept: INTERNAL MEDICINE | Facility: CLINIC | Age: 70
End: 2025-06-16
Payer: MEDICARE

## 2025-06-16 VITALS
BODY MASS INDEX: 20.38 KG/M2 | HEIGHT: 63 IN | TEMPERATURE: 97.7 F | SYSTOLIC BLOOD PRESSURE: 90 MMHG | WEIGHT: 115 LBS | HEART RATE: 59 BPM | OXYGEN SATURATION: 99 % | DIASTOLIC BLOOD PRESSURE: 56 MMHG

## 2025-06-16 PROCEDURE — G0439: CPT

## 2025-06-17 LAB
25(OH)D3 SERPL-MCNC: 34.7 NG/ML
ALBUMIN SERPL ELPH-MCNC: 4.5 G/DL
ALP BLD-CCNC: 77 U/L
ALT SERPL-CCNC: 40 U/L
ANION GAP SERPL CALC-SCNC: 15 MMOL/L
APPEARANCE: CLEAR
AST SERPL-CCNC: 38 U/L
BACTERIA: NEGATIVE /HPF
BASOPHILS # BLD AUTO: 0.04 K/UL
BASOPHILS NFR BLD AUTO: 0.8 %
BILIRUB SERPL-MCNC: 0.4 MG/DL
BILIRUBIN URINE: NEGATIVE
BLOOD URINE: ABNORMAL
BUN SERPL-MCNC: 15 MG/DL
CALCIUM SERPL-MCNC: 9.7 MG/DL
CANCER AG125 SERPL-ACNC: 11 U/ML
CAST: 0 /LPF
CHLORIDE SERPL-SCNC: 102 MMOL/L
CHOLEST SERPL-MCNC: 133 MG/DL
CO2 SERPL-SCNC: 25 MMOL/L
COLOR: NORMAL
CREAT SERPL-MCNC: 0.69 MG/DL
EGFRCR SERPLBLD CKD-EPI 2021: 94 ML/MIN/1.73M2
EOSINOPHIL # BLD AUTO: 0.19 K/UL
EOSINOPHIL NFR BLD AUTO: 3.9 %
EPITHELIAL CELLS: 3 /HPF
ESTIMATED AVERAGE GLUCOSE: 111 MG/DL
FERRITIN SERPL-MCNC: 120 NG/ML
GLUCOSE QUALITATIVE U: NEGATIVE MG/DL
GLUCOSE SERPL-MCNC: 96 MG/DL
HBA1C MFR BLD HPLC: 5.5 %
HCT VFR BLD CALC: 40.7 %
HDLC SERPL-MCNC: 53 MG/DL
HGB BLD-MCNC: 13 G/DL
IMM GRANULOCYTES NFR BLD AUTO: 0.2 %
IRON SATN MFR SERPL: 15 %
IRON SERPL-MCNC: 44 UG/DL
KETONES URINE: NEGATIVE MG/DL
LDLC SERPL-MCNC: 64 MG/DL
LEUKOCYTE ESTERASE URINE: ABNORMAL
LYMPHOCYTES # BLD AUTO: 1.35 K/UL
LYMPHOCYTES NFR BLD AUTO: 27.6 %
MAN DIFF?: NORMAL
MCHC RBC-ENTMCNC: 28.2 PG
MCHC RBC-ENTMCNC: 31.9 G/DL
MCV RBC AUTO: 88.3 FL
MICROSCOPIC-UA: NORMAL
MONOCYTES # BLD AUTO: 0.63 K/UL
MONOCYTES NFR BLD AUTO: 12.9 %
NEUTROPHILS # BLD AUTO: 2.68 K/UL
NEUTROPHILS NFR BLD AUTO: 54.6 %
NITRITE URINE: NEGATIVE
NONHDLC SERPL-MCNC: 80 MG/DL
PH URINE: 5.5
PLATELET # BLD AUTO: 193 K/UL
POTASSIUM SERPL-SCNC: 4.1 MMOL/L
PROT SERPL-MCNC: 6.7 G/DL
PROTEIN URINE: NORMAL MG/DL
RBC # BLD: 4.61 M/UL
RBC # FLD: 14.5 %
RED BLOOD CELLS URINE: 5 /HPF
SODIUM SERPL-SCNC: 141 MMOL/L
SPECIFIC GRAVITY URINE: 1.02
T4 FREE SERPL-MCNC: 1.1 NG/DL
TIBC SERPL-MCNC: 300 UG/DL
TRIGL SERPL-MCNC: 84 MG/DL
TSH SERPL-ACNC: 2.45 UIU/ML
UIBC SERPL-MCNC: 256 UG/DL
UROBILINOGEN URINE: 1 MG/DL
VIT B12 SERPL-MCNC: 988 PG/ML
WBC # FLD AUTO: 4.9 K/UL
WHITE BLOOD CELLS URINE: 0 /HPF

## 2025-06-30 ENCOUNTER — RX RENEWAL (OUTPATIENT)
Age: 70
End: 2025-06-30

## 2025-07-11 ENCOUNTER — TRANSCRIPTION ENCOUNTER (OUTPATIENT)
Age: 70
End: 2025-07-11

## 2025-07-14 ENCOUNTER — TRANSCRIPTION ENCOUNTER (OUTPATIENT)
Age: 70
End: 2025-07-14

## 2025-07-15 ENCOUNTER — APPOINTMENT (OUTPATIENT)
Dept: ULTRASOUND IMAGING | Facility: CLINIC | Age: 70
End: 2025-07-15

## 2025-07-15 PROCEDURE — 76700 US EXAM ABDOM COMPLETE: CPT | Mod: 26

## 2025-07-17 ENCOUNTER — OUTPATIENT (OUTPATIENT)
Dept: OUTPATIENT SERVICES | Facility: HOSPITAL | Age: 70
LOS: 1 days | End: 2025-07-17
Payer: MEDICARE

## 2025-07-17 VITALS
HEIGHT: 63 IN | SYSTOLIC BLOOD PRESSURE: 99 MMHG | TEMPERATURE: 98 F | OXYGEN SATURATION: 97 % | DIASTOLIC BLOOD PRESSURE: 64 MMHG | RESPIRATION RATE: 15 BRPM | HEART RATE: 70 BPM | WEIGHT: 115.08 LBS

## 2025-07-17 DIAGNOSIS — Z90.710 ACQUIRED ABSENCE OF BOTH CERVIX AND UTERUS: Chronic | ICD-10-CM

## 2025-07-17 DIAGNOSIS — S02.2XXA FRACTURE OF NASAL BONES, INITIAL ENCOUNTER FOR CLOSED FRACTURE: Chronic | ICD-10-CM

## 2025-07-17 DIAGNOSIS — S36.039A UNSPECIFIED LACERATION OF SPLEEN, INITIAL ENCOUNTER: Chronic | ICD-10-CM

## 2025-07-17 DIAGNOSIS — Z98.890 OTHER SPECIFIED POSTPROCEDURAL STATES: Chronic | ICD-10-CM

## 2025-07-17 DIAGNOSIS — N39.3 STRESS INCONTINENCE (FEMALE) (MALE): ICD-10-CM

## 2025-07-17 DIAGNOSIS — D24.9 BENIGN NEOPLASM OF UNSPECIFIED BREAST: Chronic | ICD-10-CM

## 2025-07-17 DIAGNOSIS — Z01.818 ENCOUNTER FOR OTHER PREPROCEDURAL EXAMINATION: ICD-10-CM

## 2025-07-17 DIAGNOSIS — Z90.2 ACQUIRED ABSENCE OF LUNG [PART OF]: Chronic | ICD-10-CM

## 2025-07-17 LAB
ANION GAP SERPL CALC-SCNC: 13 MMOL/L — SIGNIFICANT CHANGE UP (ref 5–17)
BUN SERPL-MCNC: 18 MG/DL — SIGNIFICANT CHANGE UP (ref 7–23)
CALCIUM SERPL-MCNC: 10 MG/DL — SIGNIFICANT CHANGE UP (ref 8.4–10.5)
CHLORIDE SERPL-SCNC: 101 MMOL/L — SIGNIFICANT CHANGE UP (ref 96–108)
CO2 SERPL-SCNC: 27 MMOL/L — SIGNIFICANT CHANGE UP (ref 22–31)
CREAT SERPL-MCNC: 0.75 MG/DL — SIGNIFICANT CHANGE UP (ref 0.5–1.3)
EGFR: 86 ML/MIN/1.73M2 — SIGNIFICANT CHANGE UP
EGFR: 86 ML/MIN/1.73M2 — SIGNIFICANT CHANGE UP
GLUCOSE SERPL-MCNC: 93 MG/DL — SIGNIFICANT CHANGE UP (ref 70–99)
POTASSIUM SERPL-MCNC: 3.9 MMOL/L — SIGNIFICANT CHANGE UP (ref 3.5–5.3)
POTASSIUM SERPL-SCNC: 3.9 MMOL/L — SIGNIFICANT CHANGE UP (ref 3.5–5.3)
SODIUM SERPL-SCNC: 141 MMOL/L — SIGNIFICANT CHANGE UP (ref 135–145)

## 2025-07-17 PROCEDURE — G0463: CPT

## 2025-07-17 PROCEDURE — 80048 BASIC METABOLIC PNL TOTAL CA: CPT

## 2025-07-17 PROCEDURE — 87086 URINE CULTURE/COLONY COUNT: CPT

## 2025-07-17 NOTE — H&P PST ADULT - NSICDXPASTMEDICALHX_GEN_ALL_CORE_FT
PAST MEDICAL HISTORY:  Adenocarcinoma of lung, stage 1 Left upper lobe stage 1A per pt multiple nodules last c/t scan 5/2020    Anxiety     CAD (coronary artery disease) non obstructive  calcification    Depression     Disorder of shoulder right 2018    Eczema posterior right knee    Fallopian tube cancer, carcinoma, left tx chemo  completed 5/18    GERD (gastroesophageal reflux disease)     Hyperlipidemia     Migraines     Mild asthma denies intubation or hospitalizations    Nasal bone fracture     Papilloma of breast right    Pulmonary nodule bilateral    PVC (premature ventricular contraction)     Seasonal allergies     Sleep apnea denies use  CPAP machine       PAST MEDICAL HISTORY:  Adenocarcinoma of lung, stage 1 Left upper lobe stage 1A per pt multiple nodules last c/t scan 5/2020    Anxiety     CAD (coronary artery disease) non obstructive  calcification    Depression     Disorder of shoulder right 2018    Eczema posterior right knee    Fallopian tube cancer, carcinoma, left tx chemo  completed 5/18    GERD (gastroesophageal reflux disease)     H/O alopecia     Hyperlipidemia     Migraines     Mild asthma denies intubation or hospitalizations    Nasal bone fracture     Papilloma of breast right    Pulmonary nodule bilateral    PVC (premature ventricular contraction)     Seasonal allergies     Sleep apnea denies use  CPAP machine

## 2025-07-17 NOTE — H&P PST ADULT - HISTORY OF PRESENT ILLNESS
stress incoontinence- sneeze, jump  Iris presents for follow up and discussion of urodynamic test results. She underwent urodynamics which revealed Stress urinary incontinence. We discussed that although her test was negative for detrusor overactivity, she may still have an overactive bladder. We reviewed her symptoms and URD results extensively. Today she reports worsening of her JUAN, very bothersome. She is in dance class and her JUAN is very bothersome. She started behavioral and fluid modifications and reports improvement in her urgency. She reports still has some urgency however this is not bothersome.  ?  h/o BRCA 1 deleterious mutation.  Initially scheduled for RR LSC BSO in conjunction with prophylactic mastectomy on 1/9/18.  An 8 mm fallopian tube cancer was identified at the left tubal fimbria.  She underwent a TLH, BSO, omentectomy, P&PA LND and staging and breast surgery was deferred.  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  ?  She completed 6 cycles of Carbo and Taxol given on a q3 week schedule 2/5/18 - 5/24/18.  ?  ?  She has a history of early stage lung cancer treated with surgery. Surgery was complicated by splenic laceration and ICU stay.  She had her prophylactic mastectomy October 2018 and pathology was all benign.  ?   = 9 on 12/2024  CT chest wnl 1/4/24Pt is a 65 y.o.  y/o female with h/o  asthma never intubated or hospitalized, multiple lung nodule since 2011 s/p left upper lobe segmentectomy , left fallopian tube ca, BRAC 1 positive s/p b/l mastectomy and hysterectomy.  Pt s/p  left nipple areolar reconstruction, bilateral fat grafting on 7/19. Pt reports pain right right hand @ " thumb joint " first noted 2016 ; s/p Injection x 3 ; pt f/u with surgeon ; pt now presents for Right Hand Basal Joint Arthroplasty  69 yo female. PMH GERD, KIRA with CPAP (however pt stated her KIRA resolved after cryotherapy of nasal turbinates- does not use CPAP anymore), asthma (well controlled),  early stage PHYLLIS adenoCA (s/p segmentectomy 2017, post op c/b GIB 2/s spleen laceration, s/p multiple RBC transfusions), lung nodules (last CT chest 1/2025- no change, followed by pulmonologist),  BRCA1 gene mutation, tubal cancer (stage I), s/p MIMI/BSO, chemo 2018, prophylactic mastectomy with reconstruction, alopecia (on minoxidil). pt endorses urinary stress incontinence aggravated with cough, sneez, evaluated by Dr. Ruiz, now presents to [facility] scheduled for midurethral sling on 8/7.     per request from Dr. Ruiz's office, CNS done at [facility] today.

## 2025-07-17 NOTE — H&P PST ADULT - ASSESSMENT
DASI score: 8  DASI activity: does ballad, active, able to walk up 2 flights of stairs   Loose teeth or denture: denies  Bexarotene Pregnancy And Lactation Text: This medication is Pregnancy Category X and should not be given to women who are pregnant or may become pregnant. This medication should not be used if you are breast feeding.

## 2025-07-17 NOTE — H&P PST ADULT - NSICDXPASTSURGICALHX_GEN_ALL_CORE_FT
PAST SURGICAL HISTORY:  History of breast surgery bilateral mastectomy with expanders,   exchange of bilateral expanders for implants 3/19  Left Nipple Areola Reconstructions, fat Grafting 7/19    Nasal bone fracture ORIF nasal fx- 2015    Papilloma of breast right breast excision & bx 2013    S/P adenoidectomy as a child    S/P arthroscopy of shoulder right April 2013    S/P blepharoplasty in 2008    S/P laparoscopic assisted vaginal hysterectomy (LAVH) Omentectomy, BSO - 2/2018  Chemotherapy x 4 months    S/P partial lobectomy of lung Left upper lobe - VATS - segmentectomy - 7/2017    Spleen laceration 7/2017 - transfused with 5 units PRBC     PAST SURGICAL HISTORY:  H/O hand surgery     History of breast surgery bilateral mastectomy with expanders,   exchange of bilateral expanders for implants 3/19  Left Nipple Areola Reconstructions, fat Grafting 7/19    Nasal bone fracture ORIF nasal fx- 2015    Papilloma of breast right breast excision & bx 2013    S/P adenoidectomy as a child    S/P arthroscopy of shoulder right April 2013    S/P blepharoplasty in 2008    S/P laparoscopic assisted vaginal hysterectomy (LAVH) Omentectomy, BSO - 2/2018  Chemotherapy x 4 months    S/P partial lobectomy of lung Left upper lobe - VATS - segmentectomy - 7/2017    Spleen laceration 7/2017 - transfused with 5 units PRBC

## 2025-07-17 NOTE — H&P PST ADULT - NSANTHOSAYNRD_GEN_A_CORE
h/o of KIRA, however after nasal procedure (cryotherapy of nasal turbinates, pt states her  snoring improved and she does not use CPAP anymore.  She belives she doesn't have KIRA anymore

## 2025-07-18 LAB
CULTURE RESULTS: SIGNIFICANT CHANGE UP
SPECIMEN SOURCE: SIGNIFICANT CHANGE UP

## 2025-07-22 ENCOUNTER — APPOINTMENT (OUTPATIENT)
Dept: UROGYNECOLOGY | Facility: CLINIC | Age: 70
End: 2025-07-22

## 2025-07-28 ENCOUNTER — APPOINTMENT (OUTPATIENT)
Dept: CARDIOLOGY | Facility: CLINIC | Age: 70
End: 2025-07-28
Payer: MEDICARE

## 2025-07-28 VITALS
DIASTOLIC BLOOD PRESSURE: 64 MMHG | WEIGHT: 115 LBS | SYSTOLIC BLOOD PRESSURE: 92 MMHG | BODY MASS INDEX: 20.37 KG/M2 | OXYGEN SATURATION: 96 % | HEART RATE: 61 BPM

## 2025-07-28 DIAGNOSIS — Z00.00 ENCOUNTER FOR GENERAL ADULT MEDICAL EXAMINATION W/OUT ABNORMAL FINDINGS: ICD-10-CM

## 2025-07-28 DIAGNOSIS — I25.10 ATHEROSCLEROTIC HEART DISEASE OF NATIVE CORONARY ARTERY W/OUT ANGINA PECTORIS: ICD-10-CM

## 2025-07-28 DIAGNOSIS — E78.5 HYPERLIPIDEMIA, UNSPECIFIED: ICD-10-CM

## 2025-07-28 PROCEDURE — 99214 OFFICE O/P EST MOD 30 MIN: CPT

## 2025-07-28 PROCEDURE — G2211 COMPLEX E/M VISIT ADD ON: CPT

## 2025-07-30 ENCOUNTER — NON-APPOINTMENT (OUTPATIENT)
Age: 70
End: 2025-07-30

## 2025-08-07 ENCOUNTER — TRANSCRIPTION ENCOUNTER (OUTPATIENT)
Age: 70
End: 2025-08-07

## 2025-08-07 ENCOUNTER — OUTPATIENT (OUTPATIENT)
Dept: OUTPATIENT SERVICES | Facility: HOSPITAL | Age: 70
LOS: 1 days | End: 2025-08-07
Payer: MEDICARE

## 2025-08-07 ENCOUNTER — APPOINTMENT (OUTPATIENT)
Dept: UROGYNECOLOGY | Facility: HOSPITAL | Age: 70
End: 2025-08-07
Payer: MEDICARE

## 2025-08-07 VITALS
HEART RATE: 56 BPM | DIASTOLIC BLOOD PRESSURE: 60 MMHG | SYSTOLIC BLOOD PRESSURE: 96 MMHG | WEIGHT: 115.08 LBS | OXYGEN SATURATION: 99 % | HEIGHT: 63 IN | RESPIRATION RATE: 18 BRPM | TEMPERATURE: 98 F

## 2025-08-07 VITALS
HEART RATE: 65 BPM | DIASTOLIC BLOOD PRESSURE: 50 MMHG | SYSTOLIC BLOOD PRESSURE: 98 MMHG | RESPIRATION RATE: 18 BRPM | OXYGEN SATURATION: 100 %

## 2025-08-07 DIAGNOSIS — N32.89 OTHER SPECIFIED DISORDERS OF BLADDER: ICD-10-CM

## 2025-08-07 DIAGNOSIS — Z98.890 OTHER SPECIFIED POSTPROCEDURAL STATES: Chronic | ICD-10-CM

## 2025-08-07 DIAGNOSIS — S02.2XXA FRACTURE OF NASAL BONES, INITIAL ENCOUNTER FOR CLOSED FRACTURE: Chronic | ICD-10-CM

## 2025-08-07 DIAGNOSIS — Z90.2 ACQUIRED ABSENCE OF LUNG [PART OF]: Chronic | ICD-10-CM

## 2025-08-07 DIAGNOSIS — S36.039A UNSPECIFIED LACERATION OF SPLEEN, INITIAL ENCOUNTER: Chronic | ICD-10-CM

## 2025-08-07 DIAGNOSIS — N39.3 STRESS INCONTINENCE (FEMALE) (MALE): ICD-10-CM

## 2025-08-07 DIAGNOSIS — D24.9 BENIGN NEOPLASM OF UNSPECIFIED BREAST: Chronic | ICD-10-CM

## 2025-08-07 DIAGNOSIS — Z90.710 ACQUIRED ABSENCE OF BOTH CERVIX AND UTERUS: Chronic | ICD-10-CM

## 2025-08-07 PROCEDURE — C1771: CPT

## 2025-08-07 PROCEDURE — 57288 REPAIR BLADDER DEFECT: CPT

## 2025-08-07 PROCEDURE — C9399: CPT

## 2025-08-07 DEVICE — IMPLANTABLE DEVICE: Type: IMPLANTABLE DEVICE | Status: FUNCTIONAL

## 2025-08-07 RX ORDER — IBUPROFEN 200 MG
1 TABLET ORAL
Qty: 0 | Refills: 0 | DISCHARGE

## 2025-08-07 RX ORDER — OMEPRAZOLE 20 MG/1
1 CAPSULE, DELAYED RELEASE ORAL
Refills: 0 | DISCHARGE

## 2025-08-07 RX ORDER — FENTANYL CITRATE-0.9 % NACL/PF 100MCG/2ML
25 SYRINGE (ML) INTRAVENOUS
Refills: 0 | Status: DISCONTINUED | OUTPATIENT
Start: 2025-08-07 | End: 2025-08-07

## 2025-08-07 RX ORDER — SODIUM CHLORIDE 9 G/1000ML
1000 INJECTION, SOLUTION INTRAVENOUS
Refills: 0 | Status: ACTIVE | OUTPATIENT
Start: 2025-08-07 | End: 2026-07-06

## 2025-08-07 RX ORDER — ONDANSETRON HCL/PF 4 MG/2 ML
4 VIAL (ML) INJECTION ONCE
Refills: 0 | Status: COMPLETED | OUTPATIENT
Start: 2025-08-07 | End: 2025-08-07

## 2025-08-07 RX ORDER — CEFAZOLIN SODIUM IN 0.9 % NACL 3 G/100 ML
2000 INTRAVENOUS SOLUTION, PIGGYBACK (ML) INTRAVENOUS ONCE
Refills: 0 | Status: COMPLETED | OUTPATIENT
Start: 2025-08-07 | End: 2025-08-07

## 2025-08-07 RX ORDER — TRANEXAMIC ACID 1000 MG/10
2 AMPUL (ML) INTRAVENOUS
Qty: 30 | Refills: 0
Start: 2025-08-07 | End: 2025-08-11

## 2025-08-07 RX ORDER — DOCUSATE SODIUM 100 MG
1 CAPSULE ORAL
Refills: 0 | DISCHARGE

## 2025-08-07 RX ORDER — ROSUVASTATIN CALCIUM 20 MG/1
1 TABLET, FILM COATED ORAL
Refills: 0 | DISCHARGE

## 2025-08-07 RX ORDER — ACETAMINOPHEN 500 MG/5ML
3 LIQUID (ML) ORAL
Qty: 0 | Refills: 0 | DISCHARGE

## 2025-08-07 RX ORDER — LIDOCAINE HCL/PF 10 MG/ML
0.2 VIAL (ML) INJECTION ONCE
Refills: 0 | Status: ACTIVE | OUTPATIENT
Start: 2025-08-07

## 2025-08-07 RX ADMIN — SODIUM CHLORIDE 100 MILLILITER(S): 9 INJECTION, SOLUTION INTRAVENOUS at 13:03

## 2025-08-07 RX ADMIN — Medication 4 MILLIGRAM(S): at 15:28

## 2025-08-08 ENCOUNTER — TRANSCRIPTION ENCOUNTER (OUTPATIENT)
Age: 70
End: 2025-08-08

## 2025-08-08 PROBLEM — N32.89 BLADDER SPASMS: Status: ACTIVE | Noted: 2025-08-08

## 2025-08-08 PROBLEM — Z87.2 PERSONAL HISTORY OF DISEASES OF THE SKIN AND SUBCUTANEOUS TISSUE: Chronic | Status: ACTIVE | Noted: 2025-07-17

## 2025-08-08 RX ORDER — MIRABEGRON 50 MG/1
50 TABLET, EXTENDED RELEASE ORAL
Qty: 30 | Refills: 2 | Status: DISCONTINUED | COMMUNITY
Start: 2025-08-08 | End: 2025-08-08

## 2025-08-08 RX ORDER — MIRABEGRON 50 MG/1
50 TABLET, EXTENDED RELEASE ORAL
Qty: 30 | Refills: 0 | Status: ACTIVE | COMMUNITY
Start: 2025-08-08 | End: 1900-01-01

## 2025-08-12 ENCOUNTER — APPOINTMENT (OUTPATIENT)
Dept: UROGYNECOLOGY | Facility: CLINIC | Age: 70
End: 2025-08-12
Payer: MEDICARE

## 2025-08-12 VITALS
HEART RATE: 61 BPM | SYSTOLIC BLOOD PRESSURE: 100 MMHG | BODY MASS INDEX: 20.38 KG/M2 | WEIGHT: 115 LBS | HEIGHT: 63 IN | DIASTOLIC BLOOD PRESSURE: 58 MMHG

## 2025-08-12 DIAGNOSIS — N39.0 URINARY TRACT INFECTION, SITE NOT SPECIFIED: ICD-10-CM

## 2025-08-12 PROBLEM — Z09 POSTOPERATIVE EXAMINATION: Status: ACTIVE | Noted: 2025-08-12

## 2025-08-12 PROCEDURE — 99024 POSTOP FOLLOW-UP VISIT: CPT

## 2025-08-12 RX ORDER — NITROFURANTOIN (MONOHYDRATE/MACROCRYSTALS) 25; 75 MG/1; MG/1
100 CAPSULE ORAL
Qty: 10 | Refills: 0 | Status: ACTIVE | COMMUNITY
Start: 2025-08-12 | End: 1900-01-01

## 2025-08-19 ENCOUNTER — APPOINTMENT (OUTPATIENT)
Dept: UROGYNECOLOGY | Facility: CLINIC | Age: 70
End: 2025-08-19
Payer: MEDICARE

## 2025-08-19 VITALS
HEART RATE: 67 BPM | BODY MASS INDEX: 20.91 KG/M2 | DIASTOLIC BLOOD PRESSURE: 60 MMHG | SYSTOLIC BLOOD PRESSURE: 98 MMHG | HEIGHT: 63 IN | WEIGHT: 118 LBS

## 2025-08-19 DIAGNOSIS — Z09 ENCOUNTER FOR FOLLOW-UP EXAMINATION AFTER COMPLETED TREATMENT FOR CONDITIONS OTHER THAN MALIGNANT NEOPLASM: ICD-10-CM

## 2025-08-19 LAB
BILIRUB UR QL STRIP: NORMAL
CLARITY UR: CLEAR
COLLECTION METHOD: NORMAL
GLUCOSE UR-MCNC: NORMAL
HCG UR QL: 0.2 EU/DL
HGB UR QL STRIP.AUTO: NORMAL
KETONES UR-MCNC: NORMAL
LEUKOCYTE ESTERASE UR QL STRIP: NORMAL
NITRITE UR QL STRIP: NORMAL
PH UR STRIP: 6
PROT UR STRIP-MCNC: NORMAL
SP GR UR STRIP: 1.01

## 2025-08-19 PROCEDURE — 99024 POSTOP FOLLOW-UP VISIT: CPT

## 2025-08-19 PROCEDURE — 81003 URINALYSIS AUTO W/O SCOPE: CPT | Mod: QW

## 2025-08-20 ENCOUNTER — NON-APPOINTMENT (OUTPATIENT)
Age: 70
End: 2025-08-20

## 2025-08-20 LAB
APPEARANCE: CLEAR
BACTERIA: NEGATIVE /HPF
BILIRUBIN URINE: NEGATIVE
BLOOD URINE: NEGATIVE
CAST: 0 /LPF
COLOR: YELLOW
EPITHELIAL CELLS: 3 /HPF
GLUCOSE QUALITATIVE U: NEGATIVE MG/DL
KETONES URINE: NEGATIVE MG/DL
LEUKOCYTE ESTERASE URINE: NEGATIVE
MICROSCOPIC-UA: NORMAL
NITRITE URINE: NEGATIVE
PH URINE: 6.5
PROTEIN URINE: NEGATIVE MG/DL
RED BLOOD CELLS URINE: 1 /HPF
SPECIFIC GRAVITY URINE: 1.01
UROBILINOGEN URINE: 1 MG/DL
WHITE BLOOD CELLS URINE: 0 /HPF

## 2025-08-21 ENCOUNTER — APPOINTMENT (OUTPATIENT)
Dept: DERMATOLOGY | Facility: CLINIC | Age: 70
End: 2025-08-21
Payer: MEDICARE

## 2025-08-21 LAB — BACTERIA UR CULT: NORMAL

## 2025-08-21 PROCEDURE — G2211 COMPLEX E/M VISIT ADD ON: CPT

## 2025-08-21 PROCEDURE — 99213 OFFICE O/P EST LOW 20 MIN: CPT

## 2025-09-05 ENCOUNTER — TRANSCRIPTION ENCOUNTER (OUTPATIENT)
Age: 70
End: 2025-09-05

## 2025-09-09 PROBLEM — Z98.890 POST-OPERATIVE STATE: Status: ACTIVE | Noted: 2025-09-09

## 2025-09-10 ENCOUNTER — APPOINTMENT (OUTPATIENT)
Dept: UROGYNECOLOGY | Facility: CLINIC | Age: 70
End: 2025-09-10
Payer: MEDICARE

## 2025-09-10 VITALS
DIASTOLIC BLOOD PRESSURE: 60 MMHG | WEIGHT: 116 LBS | SYSTOLIC BLOOD PRESSURE: 100 MMHG | HEIGHT: 63 IN | BODY MASS INDEX: 20.55 KG/M2

## 2025-09-10 DIAGNOSIS — Z98.890 OTHER SPECIFIED POSTPROCEDURAL STATES: ICD-10-CM

## 2025-09-10 PROCEDURE — 99024 POSTOP FOLLOW-UP VISIT: CPT

## (undated) DEVICE — MEDICATION LABELS W MARKER

## (undated) DEVICE — DRAPE INSTRUMENT POUCH 6.75" X 11"

## (undated) DEVICE — DRAPE LAVH 124" X 30" X125"

## (undated) DEVICE — LONE STAR ELASTIC STAY HOOK 5MM SHARP

## (undated) DEVICE — WARMING BLANKET UPPER ADULT

## (undated) DEVICE — SOL IRR POUR H2O 250ML

## (undated) DEVICE — NDL HYPO SAFE 18G X 1.5" (PINK)

## (undated) DEVICE — DRSG KLING 4"

## (undated) DEVICE — TUBING SUCTION 20FT

## (undated) DEVICE — LONE STAR RETRACTOR RING 32.5CM X 18.3CM DISP

## (undated) DEVICE — Device

## (undated) DEVICE — FOLEY CATH 2-WAY 18FR 5CC SILICONE

## (undated) DEVICE — SUT MAXON 2-0 36" GS-21

## (undated) DEVICE — PREP BETADINE KIT

## (undated) DEVICE — DRSG DERMABOND 0.7ML

## (undated) DEVICE — TUBING TUR 2 PRONG

## (undated) DEVICE — DRAPE MAYO STAND 30"

## (undated) DEVICE — SOL IRR BAG H2O 3000ML

## (undated) DEVICE — POSITIONER FOAM EGG CRATE ULNAR 2PCS (PINK)

## (undated) DEVICE — LAP PAD 18 X 18"

## (undated) DEVICE — FOLEY TRAY 16FR 5CC LTX UMETER CLOSED

## (undated) DEVICE — SPECIMEN CONTAINER 100ML

## (undated) DEVICE — SUT POLYSORB 2-0 18" V-20

## (undated) DEVICE — SYR LUER LOK 10CC

## (undated) DEVICE — VENODYNE/SCD SLEEVE CALF MEDIUM

## (undated) DEVICE — DRAPE TOWEL BLUE 17" X 24"

## (undated) DEVICE — SUT POLYSORB 0 18" GS-21 (POP-OFF)

## (undated) DEVICE — SOL IRR POUR NS 0.9% 500ML

## (undated) DEVICE — PACK MINOR